# Patient Record
Sex: FEMALE | Race: BLACK OR AFRICAN AMERICAN | NOT HISPANIC OR LATINO | URBAN - METROPOLITAN AREA
[De-identification: names, ages, dates, MRNs, and addresses within clinical notes are randomized per-mention and may not be internally consistent; named-entity substitution may affect disease eponyms.]

---

## 2017-08-10 ENCOUNTER — OUTPATIENT (OUTPATIENT)
Dept: OUTPATIENT SERVICES | Facility: HOSPITAL | Age: 22
LOS: 1 days | Discharge: HOME | End: 2017-08-10

## 2017-08-10 DIAGNOSIS — R10.84 GENERALIZED ABDOMINAL PAIN: ICD-10-CM

## 2017-08-10 DIAGNOSIS — R80.9 PROTEINURIA, UNSPECIFIED: ICD-10-CM

## 2017-08-10 DIAGNOSIS — K80.00 CALCULUS OF GALLBLADDER WITH ACUTE CHOLECYSTITIS WITHOUT OBSTRUCTION: ICD-10-CM

## 2017-08-10 DIAGNOSIS — D57.1 SICKLE-CELL DISEASE WITHOUT CRISIS: ICD-10-CM

## 2018-12-28 ENCOUNTER — OUTPATIENT (OUTPATIENT)
Dept: OUTPATIENT SERVICES | Facility: HOSPITAL | Age: 23
LOS: 1 days | Discharge: HOME | End: 2018-12-28

## 2018-12-28 DIAGNOSIS — R80.9 PROTEINURIA, UNSPECIFIED: ICD-10-CM

## 2019-01-02 ENCOUNTER — EMERGENCY (EMERGENCY)
Facility: HOSPITAL | Age: 24
LOS: 0 days | Discharge: HOME | End: 2019-01-02
Attending: EMERGENCY MEDICINE | Admitting: EMERGENCY MEDICINE

## 2019-01-02 VITALS
DIASTOLIC BLOOD PRESSURE: 58 MMHG | OXYGEN SATURATION: 95 % | SYSTOLIC BLOOD PRESSURE: 121 MMHG | RESPIRATION RATE: 18 BRPM | TEMPERATURE: 98 F | HEART RATE: 86 BPM

## 2019-01-02 DIAGNOSIS — D57.1 SICKLE-CELL DISEASE WITHOUT CRISIS: ICD-10-CM

## 2019-01-02 DIAGNOSIS — Z79.899 OTHER LONG TERM (CURRENT) DRUG THERAPY: ICD-10-CM

## 2019-01-02 DIAGNOSIS — R53.1 WEAKNESS: ICD-10-CM

## 2019-01-02 DIAGNOSIS — Z88.8 ALLERGY STATUS TO OTHER DRUGS, MEDICAMENTS AND BIOLOGICAL SUBSTANCES STATUS: ICD-10-CM

## 2019-01-02 LAB
ALBUMIN SERPL ELPH-MCNC: 4.6 G/DL — SIGNIFICANT CHANGE UP (ref 3.5–5.2)
ALP SERPL-CCNC: 53 U/L — SIGNIFICANT CHANGE UP (ref 30–115)
ALT FLD-CCNC: 14 U/L — SIGNIFICANT CHANGE UP (ref 0–41)
ANION GAP SERPL CALC-SCNC: 15 MMOL/L — HIGH (ref 7–14)
APPEARANCE UR: CLEAR — SIGNIFICANT CHANGE UP
AST SERPL-CCNC: 40 U/L — SIGNIFICANT CHANGE UP (ref 0–41)
BILIRUB SERPL-MCNC: 8.2 MG/DL — HIGH (ref 0.2–1.2)
BILIRUB UR-MCNC: NEGATIVE — SIGNIFICANT CHANGE UP
BUN SERPL-MCNC: 5 MG/DL — LOW (ref 10–20)
CALCIUM SERPL-MCNC: 9.3 MG/DL — SIGNIFICANT CHANGE UP (ref 8.5–10.1)
CHLORIDE SERPL-SCNC: 99 MMOL/L — SIGNIFICANT CHANGE UP (ref 98–110)
CO2 SERPL-SCNC: 22 MMOL/L — SIGNIFICANT CHANGE UP (ref 17–32)
COLOR SPEC: SIGNIFICANT CHANGE UP
CREAT SERPL-MCNC: <0.5 MG/DL — LOW (ref 0.7–1.5)
DIFF PNL FLD: ABNORMAL
EPI CELLS # UR: ABNORMAL /HPF
GLUCOSE SERPL-MCNC: 91 MG/DL — SIGNIFICANT CHANGE UP (ref 70–99)
GLUCOSE UR QL: NEGATIVE MG/DL — SIGNIFICANT CHANGE UP
HCT VFR BLD CALC: 20.6 % — LOW (ref 37–47)
HGB BLD-MCNC: 7.7 G/DL — LOW (ref 12–16)
KETONES UR-MCNC: NEGATIVE — SIGNIFICANT CHANGE UP
LEUKOCYTE ESTERASE UR-ACNC: ABNORMAL
MCHC RBC-ENTMCNC: 33 PG — HIGH (ref 27–31)
MCHC RBC-ENTMCNC: 37.4 G/DL — HIGH (ref 32–37)
MCV RBC AUTO: 88.4 FL — SIGNIFICANT CHANGE UP (ref 81–99)
NITRITE UR-MCNC: NEGATIVE — SIGNIFICANT CHANGE UP
PH UR: 6.5 — SIGNIFICANT CHANGE UP (ref 5–8)
PLATELET # BLD AUTO: 421 K/UL — HIGH (ref 130–400)
POTASSIUM SERPL-MCNC: 5 MMOL/L — SIGNIFICANT CHANGE UP (ref 3.5–5)
POTASSIUM SERPL-SCNC: 5 MMOL/L — SIGNIFICANT CHANGE UP (ref 3.5–5)
PROT SERPL-MCNC: 7.5 G/DL — SIGNIFICANT CHANGE UP (ref 6–8)
PROT UR-MCNC: 30 MG/DL
RBC # BLD: 2.33 M/UL — LOW (ref 4.2–5.4)
RBC # BLD: 2.33 M/UL — LOW (ref 4.2–5.4)
RBC # FLD: 22.8 % — HIGH (ref 11.5–14.5)
RBC CASTS # UR COMP ASSIST: SIGNIFICANT CHANGE UP /HPF
RETICS #: 663.8 K/UL — HIGH (ref 25–125)
RETICS/RBC NFR: 28.5 % — HIGH (ref 0.5–1.5)
SODIUM SERPL-SCNC: 136 MMOL/L — SIGNIFICANT CHANGE UP (ref 135–146)
SP GR SPEC: 1.01 — SIGNIFICANT CHANGE UP (ref 1.01–1.03)
TROPONIN T SERPL-MCNC: <0.01 NG/ML — SIGNIFICANT CHANGE UP
UROBILINOGEN FLD QL: 1 MG/DL (ref 0.2–0.2)
WBC # BLD: 13.66 K/UL — HIGH (ref 4.8–10.8)
WBC # FLD AUTO: 13.66 K/UL — HIGH (ref 4.8–10.8)
WBC UR QL: SIGNIFICANT CHANGE UP /HPF

## 2019-01-02 NOTE — ED PROVIDER NOTE - MEDICAL DECISION MAKING DETAILS
23f w HbSS and generalized weakness. nontoxic appearing, n/v intact. Labs, EKG, & imaging reviewed. Pt advised regarding symptomatic/supportive care, importance of PMD/Hematology f/u, and symptoms to prompt ED return. Copy of results given to patient.

## 2019-01-02 NOTE — ED PROVIDER NOTE - NSFOLLOWUPINSTRUCTIONS_ED_ALL_ED_FT
Return to the ER for worsening or concerning symptoms.    See printed discharge information sheets for further instructions on care for your condition.

## 2019-01-02 NOTE — ED PROVIDER NOTE - NS ED ROS FT
Review of Systems  Constitutional:  No fever or chills. +Generalized weakness.  Eyes:  Negative.   ENMT:  No nasal congestion, discharge, or throat pain.   Cardiac:  No chest pain, palpitations, syncope, or edema.  Respiratory:  No dyspnea, orthopnea, wheezing, or cough. No hemoptysis.  GI:  No vomiting, diarrhea, or abdominal pain. No melena or hematochezia.  :  No dysuria or hematuria.   Musculoskeletal:  No gait abnormality, joint swelling, joint pain, or back pain.  Skin:  No skin rash, jaundice, or lesions.  Neuro:  No headache, loss of sensation, or focal weakness.  No change in mental status.

## 2019-01-02 NOTE — ED PROVIDER NOTE - PROVIDER TOKENS
FREE:[LAST:[Your Primary Care Physician],PHONE:[(   )    -],FAX:[(   )    -]],FREE:[LAST:[Your Hematologist],PHONE:[(   )    -],FAX:[(   )    -]],TOKEN:'[id number]'

## 2019-01-02 NOTE — ED PROVIDER NOTE - CARE PLAN
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 nontoxic appearing, n/v intact. --CBC, CMP, HCG, retic%, CXR, EKG, UA. --Transfuse as needed, observe/re-assess.  Secondary Diagnosis:	Hb-SS disease without crisis

## 2019-01-02 NOTE — ED PROVIDER NOTE - CARE PROVIDERS DIRECT ADDRESSES
,DirectAddress_Unknown,DirectAddress_Unknown,[email or web address]

## 2019-01-02 NOTE — ED PROVIDER NOTE - CARE PROVIDER_API CALL
Your Primary Care Physician,   Phone: (   )    -  Fax: (   )    -    Your Hematologist,   Phone: (   )    -  Fax: (   )    -    Power Thomas), Internal Medicine; Medical Oncology  [address] [place] [address]  Phone: [phone]  Fax: [phone]

## 2019-01-02 NOTE — ED PROVIDER NOTE - PLAN OF CARE
23f w HbSS and generalized weakness. nontoxic appearing, n/v intact. --CBC, CMP, HCG, retic%, CXR, EKG, UA. --Transfuse as needed, observe/re-assess.

## 2019-01-02 NOTE — ED PROVIDER NOTE - PROGRESS NOTE DETAILS
prior labs reviewed in HIE tab and Hb unchanged from 2017 prior labs reviewed in HIE tab and Hb unchanged from 2017 and bilirubin improved from that time. no indication for transfusion at this time.

## 2019-01-02 NOTE — ED PROVIDER NOTE - PHYSICAL EXAMINATION
Physical Exam  General: Awake, alert, NAD, WDWN, non-toxic appearing, NCAT  Eyes: PERRL, EOMI, +scleral icterus (chronic according to mother), lids are normal  ENT: External inspection normal, pink/moist membranes, pharynx normal  CV: S1S2, regular rate and rhythm, no murmur/gallops/rubs, no JVD, 2+ pulses b/l, no edema/cords/homans, warm/well-perfused  Respiratory: Normal respiratory rate/effort, no respiratory distress, normal voice, speaking full sentences, lungs clear to auscultation b/l, no wheezing/rales/rhonchi, no retractions, no stridor  Abdomen: Soft abdomen, no tender/distended/guarding/rebound, no CVA tender  Musculoskeletal: FROM all 4 extremities, N/V intact, no isela tender/deform, stable gait  Neck: FROM neck, supple, no meningismus, trachea midline, no JVD  Integumentary: Color normal for race, warm and dry, no rash  Neuro: Oriented x3, CN 2-12 grossly intact, normal motor, normal sensory, normal gait  Psych: Oriented x3, mood normal, affect normal Physical Exam  General: Awake, alert, NAD, WDWN, non-toxic appearing, NCAT  Eyes: PERRL, EOMI, +scleral icterus (chronic/unchanged according to mother), lids are normal  ENT: External inspection normal, pink/moist membranes, pharynx normal  CV: S1S2, regular rate and rhythm, no murmur/gallops/rubs, no JVD, 2+ pulses b/l, no edema/cords/homans, warm/well-perfused  Respiratory: Normal respiratory rate/effort, no respiratory distress, normal voice, speaking full sentences, lungs clear to auscultation b/l, no wheezing/rales/rhonchi, no retractions, no stridor  Abdomen: Soft abdomen, no tender/distended/guarding/rebound, no CVA tender  Musculoskeletal: FROM all 4 extremities, N/V intact, no isela tender/deform, stable gait  Neck: FROM neck, supple, no meningismus, trachea midline, no JVD  Integumentary: Color normal for race, warm and dry, no rash  Neuro: Oriented x3, CN 2-12 grossly intact, normal motor, normal sensory, normal gait  Psych: Oriented x3, mood normal, affect normal

## 2019-01-02 NOTE — ED ADULT NURSE NOTE - NSIMPLEMENTINTERV_GEN_ALL_ED
Implemented All Universal Safety Interventions:  [place] to call system. Call bell, personal items and telephone within reach. Instruct patient to call for assistance. Room bathroom lighting operational. Non-slip footwear when patient is off stretcher. Physically safe environment: no spills, clutter or unnecessary equipment. Stretcher in lowest position, wheels locked, appropriate side rails in place.

## 2019-01-02 NOTE — ED PROVIDER NOTE - OBJECTIVE STATEMENT
23f w a hx of HbSS reports 2-3 months of generalized weakness. Symptoms are moderate, constant, no radiating, no exacerbating/alleviating. No changes w eating/exertion. No recent travel/hosp/immobilization. No bleeding. Pt denies any pain or swollen joints.

## 2019-01-03 ENCOUNTER — INBOUND DOCUMENT (OUTPATIENT)
Age: 24
End: 2019-01-03

## 2019-08-15 ENCOUNTER — OUTPATIENT (OUTPATIENT)
Dept: OUTPATIENT SERVICES | Facility: HOSPITAL | Age: 24
LOS: 1 days | Discharge: HOME | End: 2019-08-15

## 2019-08-16 DIAGNOSIS — D57.1 SICKLE-CELL DISEASE WITHOUT CRISIS: ICD-10-CM

## 2019-08-16 DIAGNOSIS — R80.9 PROTEINURIA, UNSPECIFIED: ICD-10-CM

## 2019-11-21 ENCOUNTER — APPOINTMENT (OUTPATIENT)
Dept: HEMATOLOGY ONCOLOGY | Facility: CLINIC | Age: 24
End: 2019-11-21
Payer: COMMERCIAL

## 2019-11-21 ENCOUNTER — OUTPATIENT (OUTPATIENT)
Dept: OUTPATIENT SERVICES | Facility: HOSPITAL | Age: 24
LOS: 1 days | Discharge: HOME | End: 2019-11-21

## 2019-11-21 ENCOUNTER — LABORATORY RESULT (OUTPATIENT)
Age: 24
End: 2019-11-21

## 2019-11-21 VITALS
HEART RATE: 74 BPM | SYSTOLIC BLOOD PRESSURE: 104 MMHG | WEIGHT: 144 LBS | RESPIRATION RATE: 16 BRPM | DIASTOLIC BLOOD PRESSURE: 68 MMHG | HEIGHT: 64 IN | BODY MASS INDEX: 24.59 KG/M2 | TEMPERATURE: 97.2 F

## 2019-11-21 DIAGNOSIS — Z78.9 OTHER SPECIFIED HEALTH STATUS: ICD-10-CM

## 2019-11-21 DIAGNOSIS — Z92.89 PERSONAL HISTORY OF OTHER MEDICAL TREATMENT: ICD-10-CM

## 2019-11-21 DIAGNOSIS — D57.00 HB-SS DISEASE WITH CRISIS, UNSPECIFIED: ICD-10-CM

## 2019-11-21 DIAGNOSIS — Z83.2 FAMILY HISTORY OF DISEASES OF THE BLOOD AND BLOOD-FORMING ORGANS AND CERTAIN DISORDERS INVOLVING THE IMMUNE MECHANISM: ICD-10-CM

## 2019-11-21 PROCEDURE — 99245 OFF/OP CONSLTJ NEW/EST HI 55: CPT

## 2019-11-21 RX ORDER — FOLIC ACID 1 MG/1
1 TABLET ORAL
Refills: 0 | Status: ACTIVE | COMMUNITY

## 2019-11-22 DIAGNOSIS — D57.1 SICKLE-CELL DISEASE WITHOUT CRISIS: ICD-10-CM

## 2019-11-22 LAB
ALBUMIN SERPL ELPH-MCNC: 4.5 G/DL
ALP BLD-CCNC: 53 U/L
ALT SERPL-CCNC: 15 U/L
ANION GAP SERPL CALC-SCNC: 15 MMOL/L
AST SERPL-CCNC: 45 U/L
BILIRUB SERPL-MCNC: 8 MG/DL
BUN SERPL-MCNC: 6 MG/DL
CALCIUM SERPL-MCNC: 9.3 MG/DL
CHLORIDE SERPL-SCNC: 103 MMOL/L
CO2 SERPL-SCNC: 22 MMOL/L
CREAT SERPL-MCNC: <0.5 MG/DL
FERRITIN SERPL-MCNC: 75 NG/ML
GLUCOSE SERPL-MCNC: 83 MG/DL
HBV CORE IGG+IGM SER QL: NONREACTIVE
HBV SURFACE AB SER QL: ABNORMAL
HBV SURFACE AG SER QL: NONREACTIVE
HCT VFR BLD CALC: 21.3 %
HGB BLD-MCNC: 7.6 G/DL
LDH SERPL-CCNC: 610
MCHC RBC-ENTMCNC: 31.9 PG
MCHC RBC-ENTMCNC: 35.7 G/DL
MCV RBC AUTO: 89.5 FL
PLATELET # BLD AUTO: 357 K/UL
PMV BLD: 9.6 FL
POTASSIUM SERPL-SCNC: 5.2 MMOL/L
PROT SERPL-MCNC: 7.6 G/DL
RBC # BLD: 2.38 M/UL
RBC # FLD: 23.6 %
SODIUM SERPL-SCNC: 140 MMOL/L
VIT B12 SERPL-MCNC: 468 PG/ML
WBC # FLD AUTO: 8.36 K/UL

## 2019-11-22 NOTE — PHYSICAL EXAM
[Fully active, able to carry on all pre-disease performance without restriction] : Status 0 - Fully active, able to carry on all pre-disease performance without restriction [Normal Female] : normal external genitalia, urethral meatus without lesions or discharge. Bladder non-distended, without tenderness. Vagina and cervix normal on visual inspection. On bimanual exam uterus, adnexa, parametria all normal without any discrete masses. [Normal] : grossly intact [de-identified] : icteric sclerae [de-identified] : spleen non-palpable

## 2019-11-22 NOTE — ASSESSMENT
[FreeTextEntry1] : Sickle cell disease with chronic hemolysis , infrequent vaso-occlusive crisis , nephropathy with hypertension and proteinuria . \par - Follow up labs (CBC,CMP,LDH,ferritin, Hepatitis panel, HIV, hemoglobin electrophoresis) \par - Will review abdominal u/s from [facility]  \par - Follow with nephrologist .\par - Refer to opthalmology to r/o retinopathy  \par - Continue Tylenol and heat for pain management folic acid . Encouraged PO hydration . follow up in 6 months .

## 2019-11-22 NOTE — REVIEW OF SYSTEMS
[Shortness Of Breath] : shortness of breath [Negative] : Heme/Lymph [FreeTextEntry9] : intermittent left wrist pain, bilateral shin pain

## 2019-11-22 NOTE — HISTORY OF PRESENT ILLNESS
[de-identified] : 24 year old female sent in by nephrologist for evaluation of sickle cell disease diagnosed at  birth. Both parents are carriers of sickle cell trait. She gives history of transfusion once only at 9 years of age  and had a cholecystectomy at age 17. She has infrequent mild  vaso-occlusive  crisis and required admission only once at 18 years of age . She has not had any recent crisis, but has occasional mild intermittent pain in her left wrist, bilateral shins, and fingers. In addition, she has occasional SOB with exertion. 2Decho was normal and she follows with her cardiologist. Pain is controlled with Tylenol and heat. She is followed by nephrology for hypertension and proteinuria , sickle cell related . NO history of leg ulcers , visual symptoms , acute chest syndrome or significant arthropathy .

## 2019-11-25 LAB
HGB A MFR BLD: 0 %
HGB A2 MFR BLD: 3.3 %
HGB F MFR BLD: 6.1 %
HGB FRACT BLD-IMP: NORMAL
HGB S MFR BLD: 90.6 %
HIV1+2 AB SPEC QL IA.RAPID: NONREACTIVE

## 2020-05-21 ENCOUNTER — APPOINTMENT (OUTPATIENT)
Dept: HEMATOLOGY ONCOLOGY | Facility: CLINIC | Age: 25
End: 2020-05-21

## 2022-01-11 ENCOUNTER — EMERGENCY (EMERGENCY)
Facility: HOSPITAL | Age: 27
LOS: 0 days | Discharge: HOME | End: 2022-01-11
Attending: STUDENT IN AN ORGANIZED HEALTH CARE EDUCATION/TRAINING PROGRAM | Admitting: STUDENT IN AN ORGANIZED HEALTH CARE EDUCATION/TRAINING PROGRAM
Payer: COMMERCIAL

## 2022-01-11 VITALS
TEMPERATURE: 98 F | HEIGHT: 64 IN | SYSTOLIC BLOOD PRESSURE: 122 MMHG | HEART RATE: 77 BPM | RESPIRATION RATE: 17 BRPM | WEIGHT: 145.06 LBS | OXYGEN SATURATION: 97 % | DIASTOLIC BLOOD PRESSURE: 58 MMHG

## 2022-01-11 DIAGNOSIS — M79.604 PAIN IN RIGHT LEG: ICD-10-CM

## 2022-01-11 DIAGNOSIS — M79.642 PAIN IN LEFT HAND: ICD-10-CM

## 2022-01-11 DIAGNOSIS — M79.641 PAIN IN RIGHT HAND: ICD-10-CM

## 2022-01-11 DIAGNOSIS — D57.00 HB-SS DISEASE WITH CRISIS, UNSPECIFIED: ICD-10-CM

## 2022-01-11 LAB
ACANTHOCYTES BLD QL SMEAR: SLIGHT — SIGNIFICANT CHANGE UP
ALBUMIN SERPL ELPH-MCNC: 4.6 G/DL — SIGNIFICANT CHANGE UP (ref 3.5–5.2)
ALP SERPL-CCNC: 52 U/L — SIGNIFICANT CHANGE UP (ref 30–115)
ALT FLD-CCNC: 9 U/L — SIGNIFICANT CHANGE UP (ref 0–41)
ANION GAP SERPL CALC-SCNC: 17 MMOL/L — HIGH (ref 7–14)
ANISOCYTOSIS BLD QL: SIGNIFICANT CHANGE UP
AST SERPL-CCNC: 31 U/L — SIGNIFICANT CHANGE UP (ref 0–41)
BASOPHILS # BLD AUTO: 0.09 K/UL — SIGNIFICANT CHANGE UP (ref 0–0.2)
BASOPHILS NFR BLD AUTO: 0.9 % — SIGNIFICANT CHANGE UP (ref 0–1)
BILIRUB SERPL-MCNC: 7.7 MG/DL — HIGH (ref 0.2–1.2)
BUN SERPL-MCNC: 4 MG/DL — LOW (ref 10–20)
CALCIUM SERPL-MCNC: 9.5 MG/DL — SIGNIFICANT CHANGE UP (ref 8.5–10.1)
CHLORIDE SERPL-SCNC: 103 MMOL/L — SIGNIFICANT CHANGE UP (ref 98–110)
CO2 SERPL-SCNC: 19 MMOL/L — SIGNIFICANT CHANGE UP (ref 17–32)
CREAT SERPL-MCNC: <0.5 MG/DL — LOW (ref 0.7–1.5)
ELLIPTOCYTES BLD QL SMEAR: SIGNIFICANT CHANGE UP
EOSINOPHIL # BLD AUTO: 0.37 K/UL — SIGNIFICANT CHANGE UP (ref 0–0.7)
EOSINOPHIL NFR BLD AUTO: 3.6 % — SIGNIFICANT CHANGE UP (ref 0–8)
GIANT PLATELETS BLD QL SMEAR: PRESENT — SIGNIFICANT CHANGE UP
GLUCOSE SERPL-MCNC: 90 MG/DL — SIGNIFICANT CHANGE UP (ref 70–99)
HCT VFR BLD CALC: 20.2 % — LOW (ref 37–47)
HGB BLD-MCNC: 7.4 G/DL — LOW (ref 12–16)
HOWELL-JOLLY BOD BLD QL SMEAR: PRESENT — SIGNIFICANT CHANGE UP
LDH SERPL L TO P-CCNC: 581 — HIGH (ref 50–242)
LYMPHOCYTES # BLD AUTO: 2.48 K/UL — SIGNIFICANT CHANGE UP (ref 1.2–3.4)
LYMPHOCYTES # BLD AUTO: 24.3 % — SIGNIFICANT CHANGE UP (ref 20.5–51.1)
MACROCYTES BLD QL: SIGNIFICANT CHANGE UP
MANUAL SMEAR VERIFICATION: SIGNIFICANT CHANGE UP
MCHC RBC-ENTMCNC: 33.8 PG — HIGH (ref 27–31)
MCHC RBC-ENTMCNC: 36.6 G/DL — SIGNIFICANT CHANGE UP (ref 32–37)
MCV RBC AUTO: 92.2 FL — SIGNIFICANT CHANGE UP (ref 81–99)
MONOCYTES # BLD AUTO: 0.92 K/UL — HIGH (ref 0.1–0.6)
MONOCYTES NFR BLD AUTO: 9 % — SIGNIFICANT CHANGE UP (ref 1.7–9.3)
NEUTROPHILS # BLD AUTO: 6.35 K/UL — SIGNIFICANT CHANGE UP (ref 1.4–6.5)
NEUTROPHILS NFR BLD AUTO: 62.2 % — SIGNIFICANT CHANGE UP (ref 42.2–75.2)
NRBC # BLD: 3 /100 — HIGH (ref 0–0)
NRBC # BLD: SIGNIFICANT CHANGE UP /100 WBCS (ref 0–0)
OVALOCYTES BLD QL SMEAR: SIGNIFICANT CHANGE UP
PLAT MORPH BLD: ABNORMAL
PLATELET # BLD AUTO: 549 K/UL — HIGH (ref 130–400)
POIKILOCYTOSIS BLD QL AUTO: SIGNIFICANT CHANGE UP
POLYCHROMASIA BLD QL SMEAR: SIGNIFICANT CHANGE UP
POTASSIUM SERPL-MCNC: 5 MMOL/L — SIGNIFICANT CHANGE UP (ref 3.5–5)
POTASSIUM SERPL-SCNC: 5 MMOL/L — SIGNIFICANT CHANGE UP (ref 3.5–5)
PROT SERPL-MCNC: 7.7 G/DL — SIGNIFICANT CHANGE UP (ref 6–8)
RBC # BLD: 2.19 M/UL — LOW (ref 4.2–5.4)
RBC # BLD: 2.19 M/UL — LOW (ref 4.2–5.4)
RBC # FLD: 22.6 % — HIGH (ref 11.5–14.5)
RBC BLD AUTO: ABNORMAL
RETICS #: 701.9 K/UL — HIGH (ref 25–125)
RETICS/RBC NFR: 32.1 % — HIGH (ref 0.5–1.5)
SCHISTOCYTES BLD QL AUTO: SLIGHT — SIGNIFICANT CHANGE UP
SICKLE CELLS BLD QL SMEAR: SIGNIFICANT CHANGE UP
SODIUM SERPL-SCNC: 139 MMOL/L — SIGNIFICANT CHANGE UP (ref 135–146)
TARGETS BLD QL SMEAR: SIGNIFICANT CHANGE UP
WBC # BLD: 10.21 K/UL — SIGNIFICANT CHANGE UP (ref 4.8–10.8)
WBC # FLD AUTO: 10.21 K/UL — SIGNIFICANT CHANGE UP (ref 4.8–10.8)

## 2022-01-11 PROCEDURE — 99285 EMERGENCY DEPT VISIT HI MDM: CPT

## 2022-01-11 RX ORDER — MORPHINE SULFATE 50 MG/1
30 CAPSULE, EXTENDED RELEASE ORAL ONCE
Refills: 0 | Status: DISCONTINUED | OUTPATIENT
Start: 2022-01-11 | End: 2022-01-11

## 2022-01-11 RX ORDER — HYDROMORPHONE HYDROCHLORIDE 2 MG/ML
4 INJECTION INTRAMUSCULAR; INTRAVENOUS; SUBCUTANEOUS ONCE
Refills: 0 | Status: DISCONTINUED | OUTPATIENT
Start: 2022-01-11 | End: 2022-01-11

## 2022-01-11 RX ORDER — METOCLOPRAMIDE HCL 10 MG
10 TABLET ORAL ONCE
Refills: 0 | Status: COMPLETED | OUTPATIENT
Start: 2022-01-11 | End: 2022-01-11

## 2022-01-11 RX ORDER — DIPHENHYDRAMINE HCL 50 MG
25 CAPSULE ORAL ONCE
Refills: 0 | Status: COMPLETED | OUTPATIENT
Start: 2022-01-11 | End: 2022-01-11

## 2022-01-11 RX ORDER — SODIUM CHLORIDE 9 MG/ML
1000 INJECTION, SOLUTION INTRAVENOUS ONCE
Refills: 0 | Status: COMPLETED | OUTPATIENT
Start: 2022-01-11 | End: 2022-01-11

## 2022-01-11 RX ORDER — IBUPROFEN 200 MG
600 TABLET ORAL ONCE
Refills: 0 | Status: COMPLETED | OUTPATIENT
Start: 2022-01-11 | End: 2022-01-11

## 2022-01-11 RX ADMIN — SODIUM CHLORIDE 1000 MILLILITER(S): 9 INJECTION, SOLUTION INTRAVENOUS at 10:10

## 2022-01-11 RX ADMIN — MORPHINE SULFATE 30 MILLIGRAM(S): 50 CAPSULE, EXTENDED RELEASE ORAL at 10:30

## 2022-01-11 RX ADMIN — Medication 10 MILLIGRAM(S): at 10:30

## 2022-01-11 RX ADMIN — Medication 600 MILLIGRAM(S): at 10:30

## 2022-01-11 RX ADMIN — Medication 25 MILLIGRAM(S): at 10:30

## 2022-01-11 NOTE — ED PROVIDER NOTE - NS ED ROS FT
Constitutional: See HPI.  Eyes: No visual changes, No Photophobia  Cardiac: No SOB or edema. No chest pain with exertion.  Respiratory: No cough or respiratory distress. No hemoptysis.   GI: No nausea, vomiting, diarrhea or abdominal pain.  : No dysuria, frequency or burning. No Discharge  MS: + joint pain, No myalgia, muscle weakness, back pain.  Neuro: No headache or weakness. No LOC.  Skin: No skin rash.  Except as documented in the HPI, all other systems are negative.

## 2022-01-11 NOTE — ED PROVIDER NOTE - PROGRESS NOTE DETAILS
patient reassessed at the bedside; pain moderate/high  will give po dilaudid 4mg and reassess. Labs pending

## 2022-01-11 NOTE — ED PROVIDER NOTE - DISPOSITION TYPE
pt alert verbal, speaks comprehend simple english phrases, assist adl rw ambulation to weakness and unsteady gait
DISCHARGE

## 2022-01-11 NOTE — ED PROVIDER NOTE - NSFOLLOWUPINSTRUCTIONS_ED_ALL_ED_FT
Sickle cell disease (SCD) causes your RBCs to be sickle (crescent) shaped. The sickle shape is caused by abnormal hemoglobin attached to the RBC. Hemoglobin carries oxygen to all tissues in your body. Sickle-shaped RBCs can get stuck to the walls of blood vessels. This can stop or slow blood flow, and prevent oxygen from getting to tissues. When this happens, it is called a sickle cell crisis.    Hydroxyurea helps your body make red blood cells that are less likely to sickle. This may help decrease your pain and prevent sickle cell crisis. Folic acid helps your body may healthy red blood cells.    **For pain, please take tylenol as needed- 1000mg up to 3 times per day. You may also take ibuprofen (Motrin) 600mg up to 3 times per day. Other ways of dealing with pain include: Apply heat to areas of pain. Use a heating pad or take a warm bath. Gently massage areas where you feel pain. Balance rest and exercise. Rest during a sickle cell crisis. Over time, increase your activity. Exercise may help decrease pain. Eat healthy foods.    To prevent a sickle cell crisis: Drink liquids as directed. Dehydration can increase your risk for a sickle cell crisis. Avoid quick changes in temperature. Do not go quickly from a warm place to a cold place. Get a flu shot every year as directed. You may also need a pneumonia vaccine. Wash your hands frequently. Do not smoke. Limit or do not drink alcohol. Manage your stress. Do not travel in an unpressurized plane or travel to high altitudes.     Seek immediate medical attention if you have a fever, severe headache, chest pain, trouble breathing, have facial drooping, feel pain more severe than usual or so severe that you cannot cope and feel like harming yourself, if you have nausea or vomiting, are not urinating as much as usual, or eyes or skin are yellow.

## 2022-01-11 NOTE — ED PROVIDER NOTE - PROVIDER TOKENS
PROVIDER:[TOKEN:[[id number]],FOLLOWUP:[1-3 Days]],PROVIDER:[TOKEN:[[id number]],FOLLOWUP:[1-3 Days],ESTABLISHEDPATIENT:[T]]

## 2022-01-11 NOTE — ED PROVIDER NOTE - OBJECTIVE STATEMENT
26F with pmh SCD on folic acid who presents to [facility] for pain to b/l hands and legs similar to prior SC crises in the past; pain present for last few days, unrelieved with tylenol at home. Not Rx hydroxyurea; was lost to f/u with her heme/onc and has not seen in years. Denies CP, SOB, cough, f/c, abd pain, n/v/d.

## 2022-01-11 NOTE — ED PROVIDER NOTE - CLINICAL SUMMARY MEDICAL DECISION MAKING FREE TEXT BOX
26F with pmh SCD on folic acid who presents to [facility] for pain to b/l hands and legs similar to prior SC crises in the past; 26F with pmh SCD on folic acid who presents to [facility] for pain to b/l hands and legs similar to prior SC crises in the past; Exam reassuring; labs reviewed with elevated reticulocyte ct, Hb similar to prior. Pt given po meds with interval improvement of sx. States she has never been on hydroxyurea. Will give heme/onc referral and close f/u with pcp.  Patient given return precautions, f/u instructions and verbalizes understanding.

## 2022-01-11 NOTE — ED PROVIDER NOTE - PATIENT PORTAL LINK FT
You can access the FollowMyHealth Patient Portal offered by [facility] by registering at the following website: [email or web address]. By joining [employer]’s FollowMyHealth portal, you will also be able to view your health information using other applications (apps) compatible with our system.

## 2022-01-11 NOTE — ED PROVIDER NOTE - CARE PROVIDER_API CALL
Nate Ortiz)  Hematology; Internal Medicine; Medical Oncology  [address] [address]  Phone: [phone]  Fax: [phone]  Follow Up Time: 1-3 Days    Rito Cabrera)  Internal Medicine; Nephrology  [address] [place] [address]  Phone: [phone]  Fax: [phone]  Established Patient  Follow Up Time: 1-3 Days

## 2022-01-11 NOTE — ED PROVIDER NOTE - CARE PROVIDERS DIRECT ADDRESSES
,[email or web address]

## 2022-03-14 ENCOUNTER — INPATIENT (INPATIENT)
Facility: HOSPITAL | Age: 27
LOS: 1 days | Discharge: HOME | End: 2022-03-16
Attending: INTERNAL MEDICINE | Admitting: INTERNAL MEDICINE
Payer: COMMERCIAL

## 2022-03-14 VITALS
RESPIRATION RATE: 20 BRPM | HEIGHT: 64 IN | DIASTOLIC BLOOD PRESSURE: 52 MMHG | WEIGHT: 139.99 LBS | TEMPERATURE: 99 F | SYSTOLIC BLOOD PRESSURE: 110 MMHG | HEART RATE: 84 BPM | OXYGEN SATURATION: 93 %

## 2022-03-14 DIAGNOSIS — Z90.49 ACQUIRED ABSENCE OF OTHER SPECIFIED PARTS OF DIGESTIVE TRACT: Chronic | ICD-10-CM

## 2022-03-14 LAB
ALBUMIN SERPL ELPH-MCNC: 4.3 G/DL — SIGNIFICANT CHANGE UP (ref 3.5–5.2)
ALP SERPL-CCNC: 54 U/L — SIGNIFICANT CHANGE UP (ref 30–115)
ALT FLD-CCNC: 11 U/L — SIGNIFICANT CHANGE UP (ref 0–41)
ANION GAP SERPL CALC-SCNC: 12 MMOL/L — SIGNIFICANT CHANGE UP (ref 7–14)
AST SERPL-CCNC: 41 U/L — SIGNIFICANT CHANGE UP (ref 0–41)
BASE EXCESS BLDV CALC-SCNC: 1.1 MMOL/L — SIGNIFICANT CHANGE UP (ref -2–3)
BASOPHILS # BLD AUTO: 0.07 K/UL — SIGNIFICANT CHANGE UP (ref 0–0.2)
BASOPHILS NFR BLD AUTO: 0.5 % — SIGNIFICANT CHANGE UP (ref 0–1)
BILIRUB SERPL-MCNC: 7.5 MG/DL — HIGH (ref 0.2–1.2)
BUN SERPL-MCNC: 3 MG/DL — LOW (ref 10–20)
CA-I SERPL-SCNC: 1.26 MMOL/L — SIGNIFICANT CHANGE UP (ref 1.15–1.33)
CALCIUM SERPL-MCNC: 9.4 MG/DL — SIGNIFICANT CHANGE UP (ref 8.5–10.1)
CHLORIDE SERPL-SCNC: 103 MMOL/L — SIGNIFICANT CHANGE UP (ref 98–110)
CO2 SERPL-SCNC: 23 MMOL/L — SIGNIFICANT CHANGE UP (ref 17–32)
CREAT SERPL-MCNC: <0.5 MG/DL — LOW (ref 0.7–1.5)
D DIMER BLD IA.RAPID-MCNC: 399 NG/ML DDU — HIGH (ref 0–230)
EGFR: 142 ML/MIN/1.73M2 — SIGNIFICANT CHANGE UP
EOSINOPHIL # BLD AUTO: 0.22 K/UL — SIGNIFICANT CHANGE UP (ref 0–0.7)
EOSINOPHIL NFR BLD AUTO: 1.4 % — SIGNIFICANT CHANGE UP (ref 0–8)
GAS PNL BLDV: 138 MMOL/L — SIGNIFICANT CHANGE UP (ref 136–145)
GAS PNL BLDV: SIGNIFICANT CHANGE UP
GLUCOSE SERPL-MCNC: 104 MG/DL — HIGH (ref 70–99)
HCG SERPL QL: NEGATIVE — SIGNIFICANT CHANGE UP
HCO3 BLDV-SCNC: 26 MMOL/L — SIGNIFICANT CHANGE UP (ref 22–29)
HCT VFR BLD CALC: 19.4 % — LOW (ref 37–47)
HCT VFR BLDA CALC: 23 % — LOW (ref 39–51)
HGB BLD CALC-MCNC: 7.6 G/DL — LOW (ref 12.6–17.4)
HGB BLD-MCNC: 7.1 G/DL — LOW (ref 12–16)
IMM GRANULOCYTES NFR BLD AUTO: 0.5 % — HIGH (ref 0.1–0.3)
LACTATE BLDV-MCNC: 1 MMOL/L — SIGNIFICANT CHANGE UP (ref 0.5–2)
LYMPHOCYTES # BLD AUTO: 27.1 % — SIGNIFICANT CHANGE UP (ref 20.5–51.1)
LYMPHOCYTES # BLD AUTO: 4.12 K/UL — HIGH (ref 1.2–3.4)
MCHC RBC-ENTMCNC: 34.8 PG — HIGH (ref 27–31)
MCHC RBC-ENTMCNC: 36.6 G/DL — SIGNIFICANT CHANGE UP (ref 32–37)
MCV RBC AUTO: 95.1 FL — SIGNIFICANT CHANGE UP (ref 81–99)
MONOCYTES # BLD AUTO: 1.21 K/UL — HIGH (ref 0.1–0.6)
MONOCYTES NFR BLD AUTO: 8 % — SIGNIFICANT CHANGE UP (ref 1.7–9.3)
NEUTROPHILS # BLD AUTO: 9.52 K/UL — HIGH (ref 1.4–6.5)
NEUTROPHILS NFR BLD AUTO: 62.5 % — SIGNIFICANT CHANGE UP (ref 42.2–75.2)
NRBC # BLD: 1 /100 WBCS — HIGH (ref 0–0)
NT-PROBNP SERPL-SCNC: 14 PG/ML — SIGNIFICANT CHANGE UP (ref 0–300)
PCO2 BLDV: 42 MMHG — SIGNIFICANT CHANGE UP (ref 39–42)
PH BLDV: 7.4 — SIGNIFICANT CHANGE UP (ref 7.32–7.43)
PLATELET # BLD AUTO: 479 K/UL — HIGH (ref 130–400)
PO2 BLDV: 59 MMHG — SIGNIFICANT CHANGE UP
POTASSIUM BLDV-SCNC: 4.6 MMOL/L — SIGNIFICANT CHANGE UP (ref 3.5–5.1)
POTASSIUM SERPL-MCNC: 4.6 MMOL/L — SIGNIFICANT CHANGE UP (ref 3.5–5)
POTASSIUM SERPL-SCNC: 4.6 MMOL/L — SIGNIFICANT CHANGE UP (ref 3.5–5)
PROT SERPL-MCNC: 7.5 G/DL — SIGNIFICANT CHANGE UP (ref 6–8)
RBC # BLD: 2.04 M/UL — LOW (ref 4.2–5.4)
RBC # BLD: 2.04 M/UL — LOW (ref 4.2–5.4)
RBC # FLD: 25.5 % — HIGH (ref 11.5–14.5)
RETICS #: 689.7 K/UL — HIGH (ref 25–125)
RETICS/RBC NFR: 33.8 % — HIGH (ref 0.5–1.5)
SAO2 % BLDV: 78.7 % — SIGNIFICANT CHANGE UP
SARS-COV-2 RNA SPEC QL NAA+PROBE: SIGNIFICANT CHANGE UP
SODIUM SERPL-SCNC: 138 MMOL/L — SIGNIFICANT CHANGE UP (ref 135–146)
TROPONIN T SERPL-MCNC: <0.01 NG/ML — SIGNIFICANT CHANGE UP
WBC # BLD: 15.22 K/UL — HIGH (ref 4.8–10.8)
WBC # FLD AUTO: 15.22 K/UL — HIGH (ref 4.8–10.8)

## 2022-03-14 PROCEDURE — 99223 1ST HOSP IP/OBS HIGH 75: CPT

## 2022-03-14 PROCEDURE — 36000 PLACE NEEDLE IN VEIN: CPT

## 2022-03-14 PROCEDURE — 76937 US GUIDE VASCULAR ACCESS: CPT | Mod: 26

## 2022-03-14 PROCEDURE — 71045 X-RAY EXAM CHEST 1 VIEW: CPT | Mod: 26

## 2022-03-14 PROCEDURE — 71275 CT ANGIOGRAPHY CHEST: CPT | Mod: 26,MA

## 2022-03-14 PROCEDURE — 93010 ELECTROCARDIOGRAM REPORT: CPT

## 2022-03-14 PROCEDURE — 99285 EMERGENCY DEPT VISIT HI MDM: CPT | Mod: 25

## 2022-03-14 RX ORDER — SODIUM CHLORIDE 9 MG/ML
1000 INJECTION INTRAMUSCULAR; INTRAVENOUS; SUBCUTANEOUS ONCE
Refills: 0 | Status: COMPLETED | OUTPATIENT
Start: 2022-03-14 | End: 2022-03-14

## 2022-03-14 RX ORDER — ENOXAPARIN SODIUM 100 MG/ML
40 INJECTION SUBCUTANEOUS EVERY 24 HOURS
Refills: 0 | Status: DISCONTINUED | OUTPATIENT
Start: 2022-03-14 | End: 2022-03-16

## 2022-03-14 RX ORDER — KETOROLAC TROMETHAMINE 30 MG/ML
15 SYRINGE (ML) INJECTION EVERY 8 HOURS
Refills: 0 | Status: DISCONTINUED | OUTPATIENT
Start: 2022-03-14 | End: 2022-03-16

## 2022-03-14 RX ORDER — AZITHROMYCIN 500 MG/1
500 TABLET, FILM COATED ORAL ONCE
Refills: 0 | Status: COMPLETED | OUTPATIENT
Start: 2022-03-14 | End: 2022-03-14

## 2022-03-14 RX ORDER — LOSARTAN POTASSIUM 100 MG/1
50 TABLET, FILM COATED ORAL DAILY
Refills: 0 | Status: DISCONTINUED | OUTPATIENT
Start: 2022-03-14 | End: 2022-03-16

## 2022-03-14 RX ORDER — ONDANSETRON 8 MG/1
4 TABLET, FILM COATED ORAL EVERY 8 HOURS
Refills: 0 | Status: DISCONTINUED | OUTPATIENT
Start: 2022-03-14 | End: 2022-03-16

## 2022-03-14 RX ORDER — CEFTRIAXONE 500 MG/1
1000 INJECTION, POWDER, FOR SOLUTION INTRAMUSCULAR; INTRAVENOUS EVERY 24 HOURS
Refills: 0 | Status: DISCONTINUED | OUTPATIENT
Start: 2022-03-15 | End: 2022-03-16

## 2022-03-14 RX ORDER — CEFTRIAXONE 500 MG/1
1000 INJECTION, POWDER, FOR SOLUTION INTRAMUSCULAR; INTRAVENOUS ONCE
Refills: 0 | Status: COMPLETED | OUTPATIENT
Start: 2022-03-14 | End: 2022-03-14

## 2022-03-14 RX ORDER — KETOROLAC TROMETHAMINE 30 MG/ML
15 SYRINGE (ML) INJECTION ONCE
Refills: 0 | Status: DISCONTINUED | OUTPATIENT
Start: 2022-03-14 | End: 2022-03-14

## 2022-03-14 RX ORDER — ACETAMINOPHEN 500 MG
650 TABLET ORAL EVERY 6 HOURS
Refills: 0 | Status: DISCONTINUED | OUTPATIENT
Start: 2022-03-14 | End: 2022-03-15

## 2022-03-14 RX ORDER — SODIUM CHLORIDE 9 MG/ML
1000 INJECTION INTRAMUSCULAR; INTRAVENOUS; SUBCUTANEOUS
Refills: 0 | Status: DISCONTINUED | OUTPATIENT
Start: 2022-03-14 | End: 2022-03-15

## 2022-03-14 RX ORDER — FOLIC ACID 0.8 MG
1 TABLET ORAL DAILY
Refills: 0 | Status: DISCONTINUED | OUTPATIENT
Start: 2022-03-14 | End: 2022-03-16

## 2022-03-14 RX ORDER — LANOLIN ALCOHOL/MO/W.PET/CERES
3 CREAM (GRAM) TOPICAL AT BEDTIME
Refills: 0 | Status: DISCONTINUED | OUTPATIENT
Start: 2022-03-14 | End: 2022-03-16

## 2022-03-14 RX ORDER — AZITHROMYCIN 500 MG/1
500 TABLET, FILM COATED ORAL EVERY 24 HOURS
Refills: 0 | Status: DISCONTINUED | OUTPATIENT
Start: 2022-03-15 | End: 2022-03-16

## 2022-03-14 RX ADMIN — Medication 650 MILLIGRAM(S): at 20:33

## 2022-03-14 RX ADMIN — Medication 650 MILLIGRAM(S): at 21:03

## 2022-03-14 RX ADMIN — ENOXAPARIN SODIUM 40 MILLIGRAM(S): 100 INJECTION SUBCUTANEOUS at 22:19

## 2022-03-14 RX ADMIN — Medication 1 MILLIGRAM(S): at 22:19

## 2022-03-14 RX ADMIN — Medication 15 MILLIGRAM(S): at 08:38

## 2022-03-14 RX ADMIN — AZITHROMYCIN 500 MILLIGRAM(S): 500 TABLET, FILM COATED ORAL at 10:33

## 2022-03-14 RX ADMIN — Medication 15 MILLIGRAM(S): at 08:53

## 2022-03-14 RX ADMIN — CEFTRIAXONE 100 MILLIGRAM(S): 500 INJECTION, POWDER, FOR SOLUTION INTRAMUSCULAR; INTRAVENOUS at 10:23

## 2022-03-14 RX ADMIN — SODIUM CHLORIDE 150 MILLILITER(S): 9 INJECTION INTRAMUSCULAR; INTRAVENOUS; SUBCUTANEOUS at 15:41

## 2022-03-14 RX ADMIN — SODIUM CHLORIDE 1000 MILLILITER(S): 9 INJECTION INTRAMUSCULAR; INTRAVENOUS; SUBCUTANEOUS at 11:00

## 2022-03-14 RX ADMIN — AZITHROMYCIN 255 MILLIGRAM(S): 500 TABLET, FILM COATED ORAL at 09:32

## 2022-03-14 NOTE — ED ADULT NURSE NOTE - CHIEF COMPLAINT QUOTE
Pt. complains left sided chest pain radiating to left shoulder and left back. Pain intensifies when taking deep breaths As per pt. symptoms began this evening.

## 2022-03-14 NOTE — H&P ADULT - NSHPPHYSICALEXAM_GEN_ALL_CORE
General: In NAD  HEENT: No LAD, Oropharynx clear. Icteric sclerae   CV: RRR, normal S1/S2, systolic murmur heard at apex, no rubs/gallops  Respiratory: CTAB  Abdom: NT, ND, No HSM, normal BS  EXTREM: Nonedematous  MSK: 5/5 strength b/l in UE and LE  NEURO: CN II-XII intact, sensation intact b/l in UE and LE

## 2022-03-14 NOTE — H&P ADULT - NSHPLABSRESULTS_GEN_ALL_CORE
LABS                  7.1    15.22 )-----------( 479      ( 14 Mar 2022 04:25 )             19.4   03-14    138  |  103  |  3<L>  ----------------------------<  104<H>  4.6   |  23  |  <0.5<L>    Ca    9.4      14 Mar 2022 04:25    TPro  7.5  /  Alb  4.3  /  TBili  7.5<H>  /  DBili  x   /  AST  41  /  ALT  11  /  AlkPhos  54  03-14    RADS      ACC: [id number] EXAM:  XR CHEST PORTABLE URGENT 1V                          PROCEDURE DATE:  03/14/2022          INTERPRETATION:  Clinical History / Reason for exam: Sickle cell disease,   chest pain    Comparison : Chest radiograph 1/2/2019    Technique/Positioning: AP portable.    Findings:    Support devices: None.    Cardiac/mediastinum/hilum: Unremarkable.    Lung parenchyma/Pleura: Within normal limits.    Skeleton/soft tissues: Right upper quadrant surgical clips.    Impression:    No radiographic evidence of acute cardiopulmonary disease.        --- End of Report ---            OLEG EDWARDS MD; Attending Radiologist  This document has been electronically signed. Mar 14 2022  6:20AM    CTA PE (3/14/22):    IMPRESSION:  1.  No evidence of acute pulmonary embolism.  2.  Slightly increased patchy groundglass opacification in the left lower   lobe with adjacent small effusion may represent an atypical infectious   process.  3.  Slightly prominent hilar lymph nodes measuring up to 1.4 cm on the   right, nonspecific though possibly reactive.  4.  Cardiomegaly.

## 2022-03-14 NOTE — ED PROCEDURE NOTE - NS ED ATTENDING STATEMENT MOD
This was a shared visit with the ADRIANO. I reviewed and verified the documentation and independently performed the documented:

## 2022-03-14 NOTE — ED PROVIDER NOTE - PROGRESS NOTE DETAILS
Patient remained stable in ED, signed out pending CTA/reevaluation and dispo. Note authored by Dr. Angel: Patient signed out to me by Dr. Gaxiola to follow-up results of the CT scan.  In short, patient is a 26-year-old sickle cell with left-sided pleuritic chest pain.  Labs and imaging reviewed.  Patient currently going to CT scan.  No respiratory distress. MQ: New infiltrate on CT, will give IV abx, will admit

## 2022-03-14 NOTE — CONSULT NOTE ADULT - SUBJECTIVE AND OBJECTIVE BOX
Pt examined bed side on 3Awith family around  She appeared comfortable, no acute distress   Stated her pain occurs during inhalation only  Rates her pain 4/10 and stated she currently does not want any pain medication     Explained to pt if pain increases that I will recommend pain medications and for her and to tell her nurse or primary care team

## 2022-03-14 NOTE — ED ADULT TRIAGE NOTE - CHIEF COMPLAINT QUOTE
Pt. complains left sided chest pain radiating to left shoulder and left back. As per pt. symptoms began this evening. Pt. complains left sided chest pain radiating to left shoulder and left back. Pain intensifies when taking deep breaths As per pt. symptoms began this evening.

## 2022-03-14 NOTE — H&P ADULT - NSHPOUTPATIENTPROVIDERS_GEN_ALL_CORE
Dr. Wesley Jimenez, Hematologist at [facility] (started seeing recently, within past 6 months)   Dr. Rito Cabrera, Nephrologist (been seing for three years)  Dr. Ericka Vinson, Pediatrician (patient hasn't seen since 21)

## 2022-03-14 NOTE — ED PROVIDER NOTE - ATTENDING CONTRIBUTION TO CARE
Patient presents to ED c/o Chest pain/sob, described as difficulty breathing and pain on breathing x one day. Patient denies f/c/n/v/URI symptoms. Denies abd pain. Denies lower ext pain/swelling. Patient is also c/o pain in LT hand 2nd MCP joint area. Patient states that, when she has sickle cell crisis, has pain/swelling in the joints/fingers. Patient denies previous h/o sickle cell chest syndrome. Patient denies taking birth control pills, denies recent travel and other risk factors for PE.   Vitals reviewed.   Patient is awake, alert, appears uncomfortable but not in distress.   +icteric sclera,   Lungs: B/L decreased air entry, no wheezing, no crackles.   Abd: +BS, NT, ND, soft,   Ext: no E/E/C: pulse+  no calf tenderness.   CNS: awake, alert, o x 3, no focal neurologic deficits.  A/P: Chest pain/sob,   labs, EKG, CXR,   D-dimer, reevaluation.

## 2022-03-14 NOTE — H&P ADULT - HISTORY OF PRESENT ILLNESS
Marianne Gonzalez is a 26 year old female with Sickle Cell Disease, cholecystitis s/p cholecystectomy (2013), nephropathy who presents for recent onset chest pain and difficulty breathing. Patient noted yesterday evening sudden onset left sided pleuritic chest pain radiating from the back (L side) up to the L shoulder. Patient has noted some difficulty breathing, as she has chest pain worsened with inspiration. Patient notes that she has had sickle cell crises in the past 3x (when she was younger, at age 18, and this past January); she notes her crises have been with severe (8/10) pain in her shins, fingers/hands. She notes previously having similar chest pain in January (separate from her ED visit for SC crisis in her fingers/hands) that lasted four days and resolved on its own. Patient denies any associated fevers, nausea/vomiting, no weakness/lightheadedness, no diarrhea, dysuria.   Patient has recently started seeing Dr. Wesley Jimenez, Hematologist at [facility], within past 6 months.     In the ED: VS showing T 98.6, HR 84, /52, SpO2 93% on RA. WBC elevated to 15.22, H/H 7.1/19.4 (bl ~7-8), D-dimer 399, Bilirubin 7.5. Troponin .01, Pro-BNP 14. CXR non-impressive. CTA PE showing no PE, but showing opacification of LLL w/ adjacent small effusion and b/l subsegmental atelectasis.   Received azithromycin/cefepime, 1L LR, 15 mg Toradol.

## 2022-03-14 NOTE — PATIENT PROFILE ADULT - FALL HARM RISK - HARM RISK INTERVENTIONS

## 2022-03-14 NOTE — H&P ADULT - ASSESSMENT
Marianne Gonzalez is a 26 year old female with Sickle Cell Disease, cholecystitis s/p cholecystectomy (2013), nephropathy who presents for recent onset chest pain and difficulty breathing.     #Pleuritic Chest pain 2/2 LLL PNA vs Acute Chest Syndrome  #Sickle Cell Disease  #Hemolytic Anemia   - Patient with new L sided pleuritic chest pain   - Follows with Dr. Wesley Jimenez, Hematologist at [facility]   - WBC 15.22, Hgb 7.1 (bl ~ 7-8)  - Reticulocyte increased to 33.8 (32.1 in 1/2022), bilirubin 7.5  - Troponin .01, BNP 14  - CXR non-impressive  - CTA PE showing no PE  - CTA showing opacification of LLL w/ adjacent small effusion, b/l subsegmental atelectasis  - Due to elevated WBC and opacity on LLL, will treat for PNA  - C/w azithromycin, Rocephin   - C/w Folic acid 1 mg daily   - With opacity seen on CT, and patient having chest pain, slightly decreased oxygenation (93%), remain concerned for ACS presentation  - Pain management w/ Toradol   - Maintain SpO2 >92%     #Nephropathy likely 2/2 SCD  - Patient noting she sees Dr. Rito Paula, Nephrologist  - UA showing proteinuria  - C/w Losartan 50 mg (home dose)     #Cardiomegaly  - Noted cardiomegaly on CTA PE  - Likely 2/2 SCD with increased output     #Punctate RUL micronodule  - 2 mm nodule seen on CT scan    DVT PPx: Lovenox 40 mg   GI PPx: None  Diet: Regular   Code Status: Full  Dispo: Acute, admit to medicine  Marianne Gonzalez is a 26 year old female with Sickle Cell Disease, cholecystitis s/p cholecystectomy (2013), nephropathy who presents for recent onset chest pain and difficulty breathing.     #Pleuritic Chest pain 2/2 LLL PNA vs Acute Chest Syndrome  #Sickle Cell Disease w/ possible acute Sickle Cell Crisis   #Hemolytic Anemia   - Patient with new L sided pleuritic chest pain   - Follows with Dr. Wesley Jimenez, Hematologist at [facility]   - WBC 15.22, Hgb 7.1 (bl ~ 7-8)  - Reticulocyte increased to 33.8 (32.1 in 1/2022), bilirubin 7.5  - Troponin .01, BNP 14, EKG showing NSR w/ 1st degree AV block   - CXR non-impressive  - CTA PE showing no PE  - CTA showing opacification of LLL w/ adjacent small effusion, b/l subsegmental atelectasis  - Due to elevated WBC and opacity on LLL, will treat for PNA  - C/w azithromycin, Rocephin   - With opacity seen on CT, and patient having chest pain, slightly decreased oxygenation (93%), remain concerned for ACS presentation  - Pain management w/ Toradol, consider opioids if uncontrolled   - Pain management c/s for appropriate pain control   - C/w Folic acid 1 mg daily   - C/w IVF NS at 150 cc/hr to maintain intravascular volume, decrease sickling   - Maintain SpO2 >92%, supplemental O2 as needed, incentive spirometry  - Patient notes having history of SpO2 in the mid 80s when hospitalized     #Nephropathy likely 2/2 SCD  - Patient noting she sees Nephrologist Dr. Rito Cabrera  - UA showing proteinuria  - C/w Losartan 50 mg (home dose)     #Cardiomegaly  - Noted cardiomegaly on CTA PE  - Likely 2/2 SCD with high output     #Hilad LAD   - Measuring up to 1.4 cm seen on CT  - Possibly reactive  - F/u CT OP     DVT PPx: Lovenox 40 mg   GI PPx: None  Diet: Regular   Code Status: Full  Dispo: Acute, admit to medicine

## 2022-03-14 NOTE — ED PROVIDER NOTE - OBJECTIVE STATEMENT
26y F pmh sickle cell presenting with left sided chest pain radiating to the back since this evening. Intermittent, no exacerbating/relieving factors. Not on hydroxyurea. No cough/sore throat. No hx of sickle cell. No f/c/n/v. No abdominal pain. No shortness of breath. Non smoker. No hx of blood clots.

## 2022-03-14 NOTE — H&P ADULT - ATTENDING COMMENTS
27 YO F with a PMH of Sickle Cell Disease and nephropathy who presents to the hospital with a c/o CP for the past x 1 day. Described as sharp, left-sided, radiating to back, and waxes/wanes. - worse with exertion. Associated with + SOB, - nausea, - palpitations, and - diaphoresis. Denies any fevers/chills, cough, ABD pain, LE swelling, dysuria, headaches, or rashes.     In the ED, cardiac enzymes were negative and an EKG showed no ischemic changes. CTA-chest showed LLL opacity and cardiomegaly, no PE. Pt started on IV ABXs (Ceftr/Azithro) in the ED.     Family hx of early heart disease (-)     Physical exam shows pt in NAD, resting comfortably. VSS, afebrile, not hypoxic on RA. A&Ox3. Neuro exam intact. CTA B/L with no W/C/R. RRR, no M/G/R. ABD is soft and non-tender to palpation, normoactive BSs. LEs without swelling, pulses palpated bilaterally. No rashes. Labs and imaging as above resident note.     Chest pain, atypical, likely from pneumonia vs sickle cell crisis, doubt acute chest syndrome. Serial cardiac enzymes and EKGs. Send procal/CRP. IV ABXs (Ceftr/Azithro). Incentive spirometry. Supplemental O2 PRN. PRN pain meds. Restart home meds.   -Obtain pain management consult for SCD crisis    Normocytic anemia, at baseline. Pt denies bleeding symptoms. Send anemia work-up. Replace as necessary.     Hx of nephropathy & SCD. Restart home meds, except as stated above. DVT PPX. Inform PCP of pt's admission to hospital. My note supersedes the residents note.     Date seen by Attending: 3/14/22

## 2022-03-14 NOTE — CONSULT NOTE ADULT - ASSESSMENT
25yo F  with Sickle Cell Disease, cholecystitis s/p cholecystectomy (2013), nephropathy who presents for recent onset chest pain and difficulty breathing. possible acute Sickle Cell Crisis with Hemolytic Anemia               recommendations  -standing tylenol  - oxycodone 5 mg q4h prn   - if pain increased can increase to oxycodone 10mg q6h prn  - if pt requires additional  may order Dilaudid pca 0.2mg q 8min, 4hr max does 8 mg

## 2022-03-15 LAB
ALBUMIN SERPL ELPH-MCNC: 3.9 G/DL — SIGNIFICANT CHANGE UP (ref 3.5–5.2)
ALLERGY+IMMUNOLOGY DIAG STUDY NOTE: SIGNIFICANT CHANGE UP
ALP SERPL-CCNC: 52 U/L — SIGNIFICANT CHANGE UP (ref 30–115)
ALT FLD-CCNC: 11 U/L — SIGNIFICANT CHANGE UP (ref 0–41)
ANION GAP SERPL CALC-SCNC: 12 MMOL/L — SIGNIFICANT CHANGE UP (ref 7–14)
AST SERPL-CCNC: 32 U/L — SIGNIFICANT CHANGE UP (ref 0–41)
BASOPHILS # BLD AUTO: 0.07 K/UL — SIGNIFICANT CHANGE UP (ref 0–0.2)
BASOPHILS NFR BLD AUTO: 0.6 % — SIGNIFICANT CHANGE UP (ref 0–1)
BILIRUB SERPL-MCNC: 4.6 MG/DL — HIGH (ref 0.2–1.2)
BLD GP AB SCN SERPL QL: SIGNIFICANT CHANGE UP
BUN SERPL-MCNC: 5 MG/DL — LOW (ref 10–20)
CALCIUM SERPL-MCNC: 9 MG/DL — SIGNIFICANT CHANGE UP (ref 8.5–10.1)
CHLORIDE SERPL-SCNC: 106 MMOL/L — SIGNIFICANT CHANGE UP (ref 98–110)
CO2 SERPL-SCNC: 21 MMOL/L — SIGNIFICANT CHANGE UP (ref 17–32)
CREAT SERPL-MCNC: <0.5 MG/DL — LOW (ref 0.7–1.5)
DIR ANTIGLOB POLYSPECIFIC INTERPRETATION: SIGNIFICANT CHANGE UP
EGFR: 142 ML/MIN/1.73M2 — SIGNIFICANT CHANGE UP
EOSINOPHIL # BLD AUTO: 0.3 K/UL — SIGNIFICANT CHANGE UP (ref 0–0.7)
EOSINOPHIL NFR BLD AUTO: 2.4 % — SIGNIFICANT CHANGE UP (ref 0–8)
FERRITIN SERPL-MCNC: 85 NG/ML — SIGNIFICANT CHANGE UP (ref 15–150)
GLUCOSE SERPL-MCNC: 84 MG/DL — SIGNIFICANT CHANGE UP (ref 70–99)
HCT VFR BLD CALC: 17.4 % — LOW (ref 37–47)
HGB BLD-MCNC: 6.5 G/DL — CRITICAL LOW (ref 12–16)
IMM GRANULOCYTES NFR BLD AUTO: 0.7 % — HIGH (ref 0.1–0.3)
LDH SERPL L TO P-CCNC: 509 — HIGH (ref 50–242)
LYMPHOCYTES # BLD AUTO: 32.7 % — SIGNIFICANT CHANGE UP (ref 20.5–51.1)
LYMPHOCYTES # BLD AUTO: 4 K/UL — HIGH (ref 1.2–3.4)
MAGNESIUM SERPL-MCNC: 1.6 MG/DL — LOW (ref 1.8–2.4)
MCHC RBC-ENTMCNC: 35.3 PG — HIGH (ref 27–31)
MCHC RBC-ENTMCNC: 37.4 G/DL — HIGH (ref 32–37)
MCV RBC AUTO: 94.6 FL — SIGNIFICANT CHANGE UP (ref 81–99)
MONOCYTES # BLD AUTO: 1.25 K/UL — HIGH (ref 0.1–0.6)
MONOCYTES NFR BLD AUTO: 10.2 % — HIGH (ref 1.7–9.3)
NEUTROPHILS # BLD AUTO: 6.55 K/UL — HIGH (ref 1.4–6.5)
NEUTROPHILS NFR BLD AUTO: 53.4 % — SIGNIFICANT CHANGE UP (ref 42.2–75.2)
NRBC # BLD: 1 /100 WBCS — HIGH (ref 0–0)
PLATELET # BLD AUTO: 412 K/UL — HIGH (ref 130–400)
POTASSIUM SERPL-MCNC: 4.4 MMOL/L — SIGNIFICANT CHANGE UP (ref 3.5–5)
POTASSIUM SERPL-SCNC: 4.4 MMOL/L — SIGNIFICANT CHANGE UP (ref 3.5–5)
PROCALCITONIN SERPL-MCNC: <0.02 NG/ML — SIGNIFICANT CHANGE UP (ref 0.02–0.1)
PROT SERPL-MCNC: 6.5 G/DL — SIGNIFICANT CHANGE UP (ref 6–8)
RBC # BLD: 1.84 M/UL — LOW (ref 4.2–5.4)
RBC # FLD: 21.8 % — HIGH (ref 11.5–14.5)
SODIUM SERPL-SCNC: 139 MMOL/L — SIGNIFICANT CHANGE UP (ref 135–146)
TROPONIN T SERPL-MCNC: <0.01 NG/ML — SIGNIFICANT CHANGE UP
WBC # BLD: 12.25 K/UL — HIGH (ref 4.8–10.8)
WBC # FLD AUTO: 12.25 K/UL — HIGH (ref 4.8–10.8)

## 2022-03-15 PROCEDURE — 99232 SBSQ HOSP IP/OBS MODERATE 35: CPT

## 2022-03-15 PROCEDURE — 86077 PHYS BLOOD BANK SERV XMATCH: CPT

## 2022-03-15 RX ORDER — OXYCODONE HYDROCHLORIDE 5 MG/1
5 TABLET ORAL EVERY 4 HOURS
Refills: 0 | Status: DISCONTINUED | OUTPATIENT
Start: 2022-03-15 | End: 2022-03-16

## 2022-03-15 RX ORDER — ACETAMINOPHEN 500 MG
650 TABLET ORAL EVERY 6 HOURS
Refills: 0 | Status: DISCONTINUED | OUTPATIENT
Start: 2022-03-15 | End: 2022-03-16

## 2022-03-15 RX ADMIN — LOSARTAN POTASSIUM 50 MILLIGRAM(S): 100 TABLET, FILM COATED ORAL at 05:59

## 2022-03-15 RX ADMIN — AZITHROMYCIN 255 MILLIGRAM(S): 500 TABLET, FILM COATED ORAL at 00:10

## 2022-03-15 RX ADMIN — Medication 650 MILLIGRAM(S): at 12:58

## 2022-03-15 RX ADMIN — CEFTRIAXONE 100 MILLIGRAM(S): 500 INJECTION, POWDER, FOR SOLUTION INTRAMUSCULAR; INTRAVENOUS at 05:58

## 2022-03-15 RX ADMIN — Medication 650 MILLIGRAM(S): at 23:53

## 2022-03-15 RX ADMIN — AZITHROMYCIN 255 MILLIGRAM(S): 500 TABLET, FILM COATED ORAL at 23:53

## 2022-03-15 RX ADMIN — SODIUM CHLORIDE 150 MILLILITER(S): 9 INJECTION INTRAMUSCULAR; INTRAVENOUS; SUBCUTANEOUS at 04:40

## 2022-03-15 RX ADMIN — Medication 1 MILLIGRAM(S): at 12:58

## 2022-03-15 NOTE — PROGRESS NOTE ADULT - ASSESSMENT
26 year old female with Sickle Cell Disease, cholecystitis s/p cholecystectomy (2013), and nephropathy who presents for recent onset chest pain and difficulty breathing.     #Pleuritic Chest pain 2/2 LLL PNA vs Acute Chest Syndrome  #Sickle Cell Disease w/ possible acute Sickle Cell Crisis   #Hemolytic Anemia   > Patient presents with new L sided pleuritic chest pain  > Of note, pt has history of SpO2 in the mid 80s when hospitalized   > Follows with Dr. Wesley Jimenez, Hematologist at [place]   > WBC 15.22, Hgb 7.1 (bl ~ 7-8) on admission  > Reticulocyte increased to 33.8 (32.1 in 1/2022), bilirubin 7.5  > Troponin .01, BNP 14, EKG showing NSR w/ 1st degree AV block   > CXR non-impressive  > CTA showing no PE but opacification of LLL w/ adjacent small effusion, b/l subsegmental atelectasis  > empirically treating for pna with azithro and rocephin in setting of leukocytosis and LLL opacity on CTA  - C/w azithromycin, Rocephin   - With opacity seen on CT, and patient having chest pain, slightly decreased oxygenation (93%), remain concerned for ACS presentation  - c/w pain regimen of standing tylenol and Toradol and Oxycodone PRN per pain managment  - C/w Folic acid 1 mg daily   - C/w IVF NS at 150 cc/hr to maintain intravascular volume, decrease sickling   - Maintain SpO2 >92%, supplemental O2 via NC as needed, incentive spirometry    #Nephropathy likely 2/2 SCD  > Patient noting she sees Nephrologist Dr. Rito Cabrera  > UA showing proteinuria  - C/w Losartan 50 mg (home dose)     #Cardiomegaly, likely 2/2 SCD with high output  > Noted cardiomegaly on CTA PE    #Hilad LAD, possibly reactive   > Measuring up to 1.4 cm seen on CT  - F/u CT OP      26 year old female with Sickle Cell Disease, cholecystitis s/p cholecystectomy (2013), and nephropathy who presents for recent onset chest pain and difficulty breathing.     #Pleuritic Chest pain 2/2 LLL PNA vs Acute Chest Syndrome  #Sickle Cell Disease w/  acute Sickle Cell Crisis   #Hemolytic Anemia   - Patient presents with new L sided pleuritic chest pain  - Follows with Dr. Wesley Jimenez, Hematologist at [place]   - 3/15 patient states being chest pain free - IVF and oxygen stopped  - WBC trending down   - Reticulocyte increased to 33.8 (32.1 in 1/2022), bilirubin 7.5  - CXR non-impressive  - CTA showing no PE but opacification of LLL w/ adjacent small effusion, b/l subsegmental atelectasis  - empirically treating for pna with azithro and rocephin in setting of leukocytosis and LLL opacity on CTA  - procal pending  - c/w pain regimen of standing tylenol and Toradol and Oxycodone PRN per pain managment  - C/w Folic acid 1 mg daily   - hemoglobin trending down to 6.6 -> will transfuse 1UPRBC    #Cardiomegaly on CT  - concern for sickle complication  - TTE ordered    #Anxiety  -patient states anxiety regarding being the hospital  -patient was offered reassurance  -refusing any medication assistance     #Nephropathy likely 2/2 SCD  > Patient noting she sees Nephrologist Dr. Rito Cabrera  > UA showing proteinuria  - C/w Losartan 50 mg (home dose)     #Hilad LAD, possibly reactive   > Measuring up to 1.4 cm seen on CT  - F/u CT OP     Pending: Procal, repeat CBC, transfusion, TTE

## 2022-03-15 NOTE — CHART NOTE - NSCHARTNOTEFT_GEN_A_CORE
Spoke with patient regarding collecting a type and screen in anticipation of a blood transfusion given her hgb of 6.5.  Discussed the risks/benefits of transfusion.  Patient became very emotional and requested time to consider the treatment.  Upon returning with the ultrasound for peripheral IV placement and blood draw, patient refused all intervention including IV placement at this time.  Will continue to try to keep open communication with the patient.  Placed type and screen order for AM labs in case patient changes her mind regarding the transfusion.

## 2022-03-16 ENCOUNTER — TRANSCRIPTION ENCOUNTER (OUTPATIENT)
Age: 27
End: 2022-03-16

## 2022-03-16 VITALS
DIASTOLIC BLOOD PRESSURE: 53 MMHG | TEMPERATURE: 98 F | SYSTOLIC BLOOD PRESSURE: 107 MMHG | HEART RATE: 68 BPM | RESPIRATION RATE: 18 BRPM

## 2022-03-16 DIAGNOSIS — D57.00 HB-SS DISEASE WITH CRISIS, UNSPECIFIED: ICD-10-CM

## 2022-03-16 DIAGNOSIS — I51.7 CARDIOMEGALY: ICD-10-CM

## 2022-03-16 DIAGNOSIS — D57.1 SICKLE-CELL DISEASE WITHOUT CRISIS: ICD-10-CM

## 2022-03-16 DIAGNOSIS — J18.9 PNEUMONIA, UNSPECIFIED ORGANISM: ICD-10-CM

## 2022-03-16 LAB
ALBUMIN SERPL ELPH-MCNC: 3.6 G/DL — SIGNIFICANT CHANGE UP (ref 3.5–5.2)
ALP SERPL-CCNC: 59 U/L — SIGNIFICANT CHANGE UP (ref 30–115)
ALT FLD-CCNC: 12 U/L — SIGNIFICANT CHANGE UP (ref 0–41)
ANION GAP SERPL CALC-SCNC: 10 MMOL/L — SIGNIFICANT CHANGE UP (ref 7–14)
AST SERPL-CCNC: 31 U/L — SIGNIFICANT CHANGE UP (ref 0–41)
BASOPHILS # BLD AUTO: 0.11 K/UL — SIGNIFICANT CHANGE UP (ref 0–0.2)
BASOPHILS NFR BLD AUTO: 0.8 % — SIGNIFICANT CHANGE UP (ref 0–1)
BILIRUB SERPL-MCNC: 5.6 MG/DL — HIGH (ref 0.2–1.2)
BUN SERPL-MCNC: 5 MG/DL — LOW (ref 10–20)
CALCIUM SERPL-MCNC: 8.7 MG/DL — SIGNIFICANT CHANGE UP (ref 8.5–10.1)
CHLORIDE SERPL-SCNC: 106 MMOL/L — SIGNIFICANT CHANGE UP (ref 98–110)
CO2 SERPL-SCNC: 23 MMOL/L — SIGNIFICANT CHANGE UP (ref 17–32)
CREAT SERPL-MCNC: <0.5 MG/DL — LOW (ref 0.7–1.5)
CRP SERPL-MCNC: 8 MG/L — HIGH
EGFR: 142 ML/MIN/1.73M2 — SIGNIFICANT CHANGE UP
EOSINOPHIL # BLD AUTO: 0.32 K/UL — SIGNIFICANT CHANGE UP (ref 0–0.7)
EOSINOPHIL NFR BLD AUTO: 2.4 % — SIGNIFICANT CHANGE UP (ref 0–8)
GLUCOSE SERPL-MCNC: 84 MG/DL — SIGNIFICANT CHANGE UP (ref 70–99)
HCT VFR BLD CALC: 20.5 % — LOW (ref 37–47)
HGB BLD-MCNC: 7.4 G/DL — LOW (ref 12–16)
IMM GRANULOCYTES NFR BLD AUTO: 0.9 % — HIGH (ref 0.1–0.3)
LYMPHOCYTES # BLD AUTO: 28.3 % — SIGNIFICANT CHANGE UP (ref 20.5–51.1)
LYMPHOCYTES # BLD AUTO: 3.74 K/UL — HIGH (ref 1.2–3.4)
MAGNESIUM SERPL-MCNC: 1.8 MG/DL — SIGNIFICANT CHANGE UP (ref 1.8–2.4)
MCHC RBC-ENTMCNC: 33.9 PG — HIGH (ref 27–31)
MCHC RBC-ENTMCNC: 36.1 G/DL — SIGNIFICANT CHANGE UP (ref 32–37)
MCV RBC AUTO: 94 FL — SIGNIFICANT CHANGE UP (ref 81–99)
MONOCYTES # BLD AUTO: 1.19 K/UL — HIGH (ref 0.1–0.6)
MONOCYTES NFR BLD AUTO: 9 % — SIGNIFICANT CHANGE UP (ref 1.7–9.3)
NEUTROPHILS # BLD AUTO: 7.72 K/UL — HIGH (ref 1.4–6.5)
NEUTROPHILS NFR BLD AUTO: 58.6 % — SIGNIFICANT CHANGE UP (ref 42.2–75.2)
NRBC # BLD: 1 /100 WBCS — HIGH (ref 0–0)
PLATELET # BLD AUTO: 483 K/UL — HIGH (ref 130–400)
POTASSIUM SERPL-MCNC: 4.4 MMOL/L — SIGNIFICANT CHANGE UP (ref 3.5–5)
POTASSIUM SERPL-SCNC: 4.4 MMOL/L — SIGNIFICANT CHANGE UP (ref 3.5–5)
PROT SERPL-MCNC: 6.6 G/DL — SIGNIFICANT CHANGE UP (ref 6–8)
RBC # BLD: 2.18 M/UL — LOW (ref 4.2–5.4)
RBC # FLD: 22.8 % — HIGH (ref 11.5–14.5)
SODIUM SERPL-SCNC: 139 MMOL/L — SIGNIFICANT CHANGE UP (ref 135–146)
WBC # BLD: 13.2 K/UL — HIGH (ref 4.8–10.8)
WBC # FLD AUTO: 13.2 K/UL — HIGH (ref 4.8–10.8)

## 2022-03-16 PROCEDURE — 93306 TTE W/DOPPLER COMPLETE: CPT | Mod: 26

## 2022-03-16 PROCEDURE — 99232 SBSQ HOSP IP/OBS MODERATE 35: CPT

## 2022-03-16 RX ADMIN — Medication 650 MILLIGRAM(S): at 00:23

## 2022-03-16 RX ADMIN — CEFTRIAXONE 100 MILLIGRAM(S): 500 INJECTION, POWDER, FOR SOLUTION INTRAMUSCULAR; INTRAVENOUS at 05:58

## 2022-03-16 RX ADMIN — Medication 650 MILLIGRAM(S): at 06:28

## 2022-03-16 RX ADMIN — Medication 650 MILLIGRAM(S): at 05:58

## 2022-03-16 RX ADMIN — Medication 1 MILLIGRAM(S): at 13:12

## 2022-03-16 NOTE — DISCHARGE NOTE PROVIDER - HOSPITAL COURSE
HPI: 26 year old female with Sickle Cell Disease, cholecystitis s/p cholecystectomy (2013), and nephropathy who presents for recent onset chest pain and difficulty breathing. Patient noted yesterday evening sudden onset left sided pleuritic chest pain radiating from the back (L side) up to the L shoulder. Patient has noted some difficulty breathing, as she has chest pain worsened with inspiration. Patient notes that she has had sickle cell crises in the past 3x (when she was younger, at age 18, and this past January); she notes her crises have been with severe (8/10) pain in her shins, fingers/hands. She notes previously having similar chest pain in January (separate from her ED visit for SC crisis in her fingers/hands) that lasted four days and resolved on its own. Patient denies any associated fevers, nausea/vomiting, no weakness/lightheadedness, no diarrhea, dysuria.  Of note, patient has recently started seeing Dr. Wesley Jimenez, Hematologist at [place], within past 6 months.     ED Course: VS showing T 98.6, HR 84, /52, SpO2 93% on RA. WBC elevated to 15.22, H/H 7.1/19.4 (bl ~7-8), D-dimer 399, Bilirubin 7.5. Troponin .01, Pro-BNP 14. CXR non-impressive. CTA PE showing no PE, but showing opacification of LLL w/ adjacent small effusion and b/l subsegmental atelectasis. Received azithromycin/cefepime, 1L LR, 15 mg Toradol. Admitted to Medicine for further management and work-up of acute chest pain and SOB 2/2 pna vs acute chest syndrome.    Hospital Course: Repeat blood work revealed hgb 6.5.  Patient received 1u pRBC and repeat hgb was 7.4 this AM.  Patient scheduled for TTE today for cardiomegaly seen on imaging.  Patient otherwise denies any pain this morning and is only taking tylenol for pain control.  Patient is medically optimized for discharge to home with hematology and nephrology outpatient follow-up.

## 2022-03-16 NOTE — DISCHARGE NOTE PROVIDER - NSDCPNSUBOBJ_GEN_ALL_CORE
<<<RESIDENT DISCHARGE NOTE>>>     JAMES VÁSQUEZ  MRN-[id number]    VITAL SIGNS:  T(F): 97.4 (03-16-22 @ 05:00), Max: 99.1 (03-15-22 @ 21:10)  HR: 74 (03-16-22 @ 10:02)  BP: 106/52 (03-16-22 @ 10:02)  SpO2: 100% (03-16-22 @ 10:02)      PHYSICAL EXAMINATION:  General: lying in bed, NAD  Head & Neck: nontraumatic, neck supple   Pulmonary: CTA, no cough or increased WOB, on RA  Cardiovascular: RRR, S1/S2  Gastrointestinal/Abdomen & Pelvis: soft, nontender, nondistended  Neurologic/Motor: AAOx4, CN grossly intact    TEST RESULTS:                        7.4    13.20 )-----------( 483      ( 16 Mar 2022 04:30 )             20.5       03-16    139  |  106  |  5<L>  ----------------------------<  84  4.4   |  23  |  <0.5<L>    Ca    8.7      16 Mar 2022 04:30  Mg     1.8     03-16    TPro  6.6  /  Alb  3.6  /  TBili  5.6<H>  /  DBili  x   /  AST  31  /  ALT  12  /  AlkPhos  59  03-16      FINAL DISCHARGE INTERVIEW:  Resident(s) Present: (Name: Deidra Seth PGY1), RN Present: (Name: ROSETTA Sparks)    DISCHARGE MEDICATION RECONCILIATION  reviewed with Attending (Name: Lg Berg MD)    DISPOSITION:   [ X ] Home,    [  ] Home with Visiting Nursing Services,   [    ]  SNF/ NH,    [   ] Acute Rehab (4A),   [   ] Other (Specify:_________)

## 2022-03-16 NOTE — DISCHARGE NOTE PROVIDER - CARE PROVIDER_API CALL
Rito Cabrera (DO)  Internal Medicine; Nephrology  [address] [place] [address]  Phone: [phone]  Fax: [phone]  Established Patient  Follow Up Time: 1 month

## 2022-03-16 NOTE — DISCHARGE NOTE NURSING/CASE MANAGEMENT/SOCIAL WORK - NSDCFUADDAPPT_GEN_ALL_CORE_FT
Please follow-up with your primary care provider and Nephrologist, Dr. Cabrera, upon discharge from the hospital to review the results of your cardiac echocardiogram.     Please also follow-up with your hematologist, Dr. Wesley Jimenez, within 2 weeks upon discharge from the hospital.
No

## 2022-03-16 NOTE — DISCHARGE NOTE NURSING/CASE MANAGEMENT/SOCIAL WORK - PATIENT PORTAL LINK FT
You can access the FollowMyHealth Patient Portal offered by [facility] by registering at the following website: [email or web address]. By joining [employer]’s FollowMyHealth portal, you will also be able to view your health information using other applications (apps) compatible with our system.

## 2022-03-16 NOTE — DISCHARGE NOTE NURSING/CASE MANAGEMENT/SOCIAL WORK - NSDCPEFALRISK_GEN_ALL_CORE
For information on Fall & Injury Prevention, visit: [email or web address] OR  [email or web address] OR  https://www.cdc.gov/steadi/patient.html

## 2022-03-16 NOTE — PROGRESS NOTE ADULT - SUBJECTIVE AND OBJECTIVE BOX
Patient is a 26y old  Female who presents with a chief complaint of Chest pain, Difficulty Breathing (15 Mar 2022 10:43)      Patient seen and examined at bedside.    ALLERGIES:  No Known Allergies    MEDICATIONS:  acetaminophen     Tablet .. 650 milliGRAM(s) Oral every 6 hours  aluminum hydroxide/magnesium hydroxide/simethicone Suspension 30 milliLiter(s) Oral every 4 hours PRN  azithromycin  IVPB 500 milliGRAM(s) IV Intermittent every 24 hours  cefTRIAXone   IVPB 1000 milliGRAM(s) IV Intermittent every 24 hours  enoxaparin Injectable 40 milliGRAM(s) SubCutaneous every 24 hours  folic acid 1 milliGRAM(s) Oral daily  ketorolac   Injectable 15 milliGRAM(s) IV Push every 8 hours PRN  losartan 50 milliGRAM(s) Oral daily  melatonin 3 milliGRAM(s) Oral at bedtime PRN  ondansetron Injectable 4 milliGRAM(s) IV Push every 8 hours PRN  oxyCODONE    IR 5 milliGRAM(s) Oral every 4 hours PRN    Vital Signs Last 24 Hrs  T(F): 98.3 (15 Mar 2022 11:50), Max: 98.5 (15 Mar 2022 05:00)  HR: 83 (15 Mar 2022 11:50) (78 - 83)  BP: 105/55 (15 Mar 2022 11:50) (102/52 - 105/55)  RR: 18 (15 Mar 2022 11:50) (18 - 18)  SpO2: 90% (14 Mar 2022 20:00) (90% - 92%)  I&O's Summary      PHYSICAL EXAM:  General: NAD, A/O x 3  ENT: MMM, significant scleral icterus   Neck: Supple, No JVD  Lungs: Clear to auscultation bilaterally  Cardio: RRR, S1/S2, No murmurs  Abdomen: Soft, Nontender, Nondistended; Bowel sounds present  Extremities: No cyanosis, No edema    LABS:                        6.5    12.25 )-----------( 412      ( 15 Mar 2022 04:30 )             17.4     03-15    139  |  106  |  5   ----------------------------<  84  4.4   |  21  |  <0.5    Ca    9.0      15 Mar 2022 04:30  Mg     1.6     03-15    TPro  6.5  /  Alb  3.9  /  TBili  4.6  /  DBili  x   /  AST  32  /  ALT  11  /  AlkPhos  52  03-15                  04:35 - VBG - pH: 7.40  | pCO2: 42    | pO2: 59    | Lactate: 1.00                     COVID-19 PCR: NotDetec (03-14-22 @ 09:27)      RADIOLOGY & ADDITIONAL TESTS:    Care Discussed with Consultants/Other Providers: 
  JAMES VÁSQUEZ  26y  Female      Patient is a 26y old  Female who presents with a chief complaint of Chest pain, Difficulty Breathing/      INTERVAL HPI/OVERNIGHT EVENTS:      ******************************* REVIEW OF SYSTEMS:**********************************************  All other review of systems negative    *********************** VITALS ******************************************    T(F): 97.4 (03-16-22 @ 05:00)  HR: 74 (03-16-22 @ 10:02) (68 - 84)  BP: 106/52 (03-16-22 @ 10:02) (106/51 - 108/62)  RR: 18 (03-16-22 @ 05:00) (18 - 18)  SpO2: 100% (03-16-22 @ 10:02) (93% - 100%)            ******************************** PHYSICAL EXAM:**************************************************  GENERAL: NAD    PSYCH: no agitation, baseline mentation  HEENT:     NERVOUS SYSTEM:  Alert & Oriented X3, MS  5/5 B/L  UE and LE ; Sensory intact    PULMONARY: PRABHJOT, CTA    CARDIOVASCULAR: S1S2 RRR    GI: Soft, NT, ND; BS present.    EXTREMITIES:  2+ Peripheral Pulses, No clubbing, cyanosis, or edema    LYMPH: No lymphadenopathy noted    SKIN: No rashes or lesions      **************************** LABS *******************************************************                          7.4    13.20 )-----------( 483      ( 16 Mar 2022 04:30 )             20.5     03-16    139  |  106  |  5<L>  ----------------------------<  84  4.4   |  23  |  <0.5<L>    Ca    8.7      16 Mar 2022 04:30  Mg     1.8     03-16    TPro  6.6  /  Alb  3.6  /  TBili  5.6<H>  /  DBili  x   /  AST  31  /  ALT  12  /  AlkPhos  59  03-16          Lactate Trend    CARDIAC MARKERS ( 15 Mar 2022 04:30 )  x     / <0.01 ng/mL / x     / x     / x          CAPILLARY BLOOD GLUCOSE              **************************Active Medications *******************************************  No Known Allergies      acetaminophen     Tablet .. 650 milliGRAM(s) Oral every 6 hours  aluminum hydroxide/magnesium hydroxide/simethicone Suspension 30 milliLiter(s) Oral every 4 hours PRN  azithromycin  IVPB 500 milliGRAM(s) IV Intermittent every 24 hours  cefTRIAXone   IVPB 1000 milliGRAM(s) IV Intermittent every 24 hours  enoxaparin Injectable 40 milliGRAM(s) SubCutaneous every 24 hours  folic acid 1 milliGRAM(s) Oral daily  ketorolac   Injectable 15 milliGRAM(s) IV Push every 8 hours PRN  losartan 50 milliGRAM(s) Oral daily  melatonin 3 milliGRAM(s) Oral at bedtime PRN  ondansetron Injectable 4 milliGRAM(s) IV Push every 8 hours PRN  oxyCODONE    IR 5 milliGRAM(s) Oral every 4 hours PRN      ***************************************************  RADIOLOGY & ADDITIONAL TESTS:    Imaging Personally Reviewed:  [ ] YES  [ ] NO    HEALTH ISSUES - PROBLEM Dx:  Sickle cell anemia with pain    Sickle cell nephropathy    Pneumonia    Cardiomegaly        
JAMES VÁSQUEZ 26y Female  MRN#: [id number]   CODE STATUS: FULL    Hospital Day: 1d    Pt is currently admitted with the primary diagnosis of chest pain and SOB.    SUBJECTIVE  HPI: 26 year old female with Sickle Cell Disease, cholecystitis s/p cholecystectomy (2013), and nephropathy who presents for recent onset chest pain and difficulty breathing. Patient noted yesterday evening sudden onset left sided pleuritic chest pain radiating from the back (L side) up to the L shoulder. Patient has noted some difficulty breathing, as she has chest pain worsened with inspiration. Patient notes that she has had sickle cell crises in the past 3x (when she was younger, at age 18, and this past January); she notes her crises have been with severe (8/10) pain in her shins, fingers/hands. She notes previously having similar chest pain in January (separate from her ED visit for SC crisis in her fingers/hands) that lasted four days and resolved on its own. Patient denies any associated fevers, nausea/vomiting, no weakness/lightheadedness, no diarrhea, dysuria.  Of note, patient has recently started seeing Dr. Wesley Jimenez, Hematologist at [place], within past 6 months.     ED Course: VS showing T 98.6, HR 84, /52, SpO2 93% on RA. WBC elevated to 15.22, H/H 7.1/19.4 (bl ~7-8), D-dimer 399, Bilirubin 7.5. Troponin .01, Pro-BNP 14. CXR non-impressive. CTA PE showing no PE, but showing opacification of LLL w/ adjacent small effusion and b/l subsegmental atelectasis. Received azithromycin/cefepime, 1L LR, 15 mg Toradol. Admitted to Medicine for further management and work-up of acute chest pain and SOB 2/2 pna vs acute chest syndrome.    Overnight events   Patient admitted to Medicine overnight.    Subjective complaints:  Patient seen and examined at bedside.  Patient resting comfortably this AM and only taking tylenol for pain.    Present Today:   - Renae:  No [ X ], Yes [   ] : Indication:     - Type of IV Access:       .. CVC/Piccline:  No [ X ], Yes [   ] : Indication:       .. Midline: No [ X ], Yes [   ] : Indication:                                             ----------------------------------------------------------  OBJECTIVE  PAST MEDICAL & SURGICAL HISTORY  Sickle cell anemia    S/P cholecystectomy                                              -----------------------------------------------------------  ALLERGIES:  No Known Allergies                                            ------------------------------------------------------------    HOME MEDICATIONS  Home Medications:  folic acid 1 mg oral tablet: 1 tab(s) orally once a day (14 Mar 2022 11:52)  losartan 50 mg oral tablet: 1 tab(s) orally once a day (14 Mar 2022 11:52)                           MEDICATIONS:  STANDING MEDICATIONS  acetaminophen     Tablet .. 650 milliGRAM(s) Oral every 6 hours  azithromycin  IVPB 500 milliGRAM(s) IV Intermittent every 24 hours  cefTRIAXone   IVPB 1000 milliGRAM(s) IV Intermittent every 24 hours  enoxaparin Injectable 40 milliGRAM(s) SubCutaneous every 24 hours  folic acid 1 milliGRAM(s) Oral daily  losartan 50 milliGRAM(s) Oral daily  sodium chloride 0.9%. 1000 milliLiter(s) IV Continuous <Continuous>    PRN MEDICATIONS  aluminum hydroxide/magnesium hydroxide/simethicone Suspension 30 milliLiter(s) Oral every 4 hours PRN  ketorolac   Injectable 15 milliGRAM(s) IV Push every 8 hours PRN  melatonin 3 milliGRAM(s) Oral at bedtime PRN  ondansetron Injectable 4 milliGRAM(s) IV Push every 8 hours PRN  oxyCODONE    IR 5 milliGRAM(s) Oral every 4 hours PRN                                            ------------------------------------------------------------  VITAL SIGNS: Last 24 Hours  T(C): 36.9 (15 Mar 2022 05:00), Max: 36.9 (15 Mar 2022 05:00)  T(F): 98.5 (15 Mar 2022 05:00), Max: 98.5 (15 Mar 2022 05:00)  HR: 78 (15 Mar 2022 05:00) (78 - 83)  BP: 104/56 (15 Mar 2022 05:00) (102/52 - 104/56)  BP(mean): --  RR: 18 (15 Mar 2022 05:00) (18 - 18)  SpO2: 90% (14 Mar 2022 20:00) (90% - 92%)                                             --------------------------------------------------------------  LABS:                        6.5    12.25 )-----------( 412      ( 15 Mar 2022 04:30 )             17.4     03-15    139  |  106  |  5<L>  ----------------------------<  84  4.4   |  21  |  <0.5<L>    Ca    9.0      15 Mar 2022 04:30  Mg     1.6     03-15    TPro  6.5  /  Alb  3.9  /  TBili  4.6<H>  /  DBili  x   /  AST  32  /  ALT  11  /  AlkPhos  52  03-15          Troponin T, Serum: <0.01 ng/mL (03-15-22 @ 04:30)          CARDIAC MARKERS ( 15 Mar 2022 04:30 )  x     / <0.01 ng/mL / x     / x     / x      CARDIAC MARKERS ( 14 Mar 2022 04:25 )  x     / <0.01 ng/mL / x     / x     / x                                                  -------------------------------------------------------------  RADIOLOGY:  ACC: [id number] [facility]AM:  [facility]                        PROCEDURE DATE:  03/14/2022    IMPRESSION:  1.  No evidence of acute pulmonary embolism.  2.  Slightly increased patchy groundglass opacification in the left lower   lobe with adjacent small effusion may represent an atypical infectious process.  3.  Slightly prominent hilar lymph nodes measuring up to 1.4 cm on the   right, nonspecific though possibly reactive.  4.  Cardiomegaly.      ACC: [id number] EXAM:  XR CHEST PORTABLE URGENT 1V                        PROCEDURE DATE:  03/14/2022    Impression:  No radiographic evidence of acute cardiopulmonary disease.                                            --------------------------------------------------------------    PHYSICAL EXAM:  General: lying in bed, NAD  HEENT: nontraumatic, neck supple, conjunctiva clear  LUNGS: CTA, no cough or increased WOB  HEART: RRR, S1/S2  ABDOMEN: soft, nontender, nondistended  EXT: moves all extremities  NEURO: AAOx3, CN grossly intact  SKIN: no LE edema or erythema                                           --------------------------------------------------------------    ASSESSMENT & PLAN  26 year old female with Sickle Cell Disease, cholecystitis s/p cholecystectomy (2013), and nephropathy who presents for recent onset chest pain and difficulty breathing.     #Pleuritic Chest pain 2/2 LLL PNA vs Acute Chest Syndrome  #Sickle Cell Disease w/ possible acute Sickle Cell Crisis   #Hemolytic Anemia   > Patient presents with new L sided pleuritic chest pain  > Of note, pt has history of SpO2 in the mid 80s when hospitalized   > Follows with Dr. Wesley Jimenez, Hematologist at [place]   > WBC 15.22, Hgb 7.1 (bl ~ 7-8) on admission  > Reticulocyte increased to 33.8 (32.1 in 1/2022), bilirubin 7.5  > Troponin .01, BNP 14, EKG showing NSR w/ 1st degree AV block   > CXR non-impressive  > CTA showing no PE but opacification of LLL w/ adjacent small effusion, b/l subsegmental atelectasis  > empirically treating for pna with azithro and rocephin in setting of leukocytosis and LLL opacity on CTA  - C/w azithromycin, Rocephin   - With opacity seen on CT, and patient having chest pain, slightly decreased oxygenation (93%), remain concerned for ACS presentation  - c/w pain regimen of standing tylenol and Toradol and Oxycodone PRN per pain managment  - C/w Folic acid 1 mg daily   - C/w IVF NS at 150 cc/hr to maintain intravascular volume, decrease sickling   - Maintain SpO2 >92%, supplemental O2 via NC as needed, incentive spirometry    #Nephropathy likely 2/2 SCD  > Patient noting she sees Nephrologist Dr. Rito Cabrera  > UA showing proteinuria  - C/w Losartan 50 mg (home dose)     #Cardiomegaly, likely 2/2 SCD with high output  > Noted cardiomegaly on CTA PE    #Hilad LAD, possibly reactive   > Measuring up to 1.4 cm seen on CT  - F/u CT OP                                                                               ----------------------------------------------------  # DVT prophylaxis: Lovenox  # GI prophylaxis: not indicated  # Diet: regular  # Activity Score (AM-PAC): AAT  # Code status: FULL  # Disposition: from home, acute for now                                                                             --------------------------------------------------------    # Handoff   - F/u CT OP

## 2022-03-16 NOTE — DISCHARGE NOTE PROVIDER - NSDCFUADDAPPT_GEN_ALL_CORE_FT
Please follow-up with your primary care provider and Nephrologist, Dr. Cabrera, upon discharge from the hospital to review the results of your cardiac echocardiogram.     Please also follow-up with your hematologist, Dr. Wesley Jimenez, within 2 weeks upon discharge from the hospital.

## 2022-03-16 NOTE — DISCHARGE NOTE PROVIDER - NSDCMRMEDTOKEN_GEN_ALL_CORE_FT
amoxicillin-clavulanate 875 mg-125 mg oral tablet: 1 tab(s) orally every 12 hours through Friday, 3/18/22  folic acid 1 mg oral tablet: 1 tab(s) orally once a day  losartan 50 mg oral tablet: 1 tab(s) orally once a day

## 2022-03-16 NOTE — PROGRESS NOTE ADULT - ASSESSMENT
26 year old female with Sickle Cell Disease, cholecystitis s/p cholecystectomy (2013), and nephropathy who presents for recent onset chest pain and difficulty breathing.     #Pleuritic Chest pain 2/2 LLL PNA vs Acute Chest Syndrome  #Sickle Cell Disease w/  acute Sickle Cell Crisis   #Hemolytic Anemia   - Patient presents with new L sided pleuritic chest pain  - Follows with Dr. Wesley Jimenez, Hematologist at [place]   - 3/15 patient states being chest pain free - IVF and oxygen stopped  - WBC trending down   - Reticulocyte increased to 33.8 (32.1 in 1/2022), bilirubin 7.5  - CXR non-impressive  - CTA showing no PE but opacification of LLL w/ adjacent small effusion, b/l subsegmental atelectasis  - empirically treating for pna with azithro and rocephin in setting of leukocytosis and LLL opacity on CTA  - procal >> Neg  >>> can stop aBX.   - c/w pain regimen of standing tylenol and Toradol and Oxycodone PRN per pain managment  - C/w Folic acid 1 mg daily   - hemoglobin trending down to 6.6 -> S/P transfusion ,  1UPRBC    #Cardiomegaly on CT  - concern for sickle complication  - TTE ordered    #Anxiety  -patient states anxiety regarding being the hospital  -patient was offered reassurance  -refusing any medication assistance     #Nephropathy likely 2/2 SCD  > Patient noting she sees Nephrologist Dr. Rito Cabrera  > UA showing proteinuria  - C/w Losartan 50 mg (home dose)     #Hilar  LAD, possibly reactive   > Measuring up to 1.4 cm seen on CT  - F/u CT OP     D/C planning.  Pending 2D Echo for cardiomegaly.

## 2022-03-16 NOTE — DISCHARGE NOTE PROVIDER - NSDCCPCAREPLAN_GEN_ALL_CORE_FT
PRINCIPAL DISCHARGE DIAGNOSIS  Diagnosis: Acute chest syndrome  Assessment and Plan of Treatment: Chest Pain  Chest pain can be caused by many different conditions which may or may not be dangerous. Causes include heartburn, lung infections, heart attack, blood clot in lungs, skin infections, strain or damage to muscle, cartilage, or bones, etc. In addition to a history and physical examination, an electrocardiogram (ECG) or other lab tests may have been performed to determine the cause of your chest pain. Follow up with your primary care provider or with a cardiologist as instructed.   SEEK IMMEDIATE MEDICAL CARE IF YOU HAVE ANY OF THE FOLLOWING SYMPTOMS: worsening chest pain, coughing up blood, unexplained back/neck/jaw pain, severe abdominal pain, dizziness or lightheadedness, fainting, shortness of breath, sweaty or clammy skin, vomiting, or racing heart beat. These symptoms may represent a serious problem that is an emergency. Do not wait to see if the symptoms will go away. Get medical help right away. Call 911 and do not drive yourself to the hospital.

## 2022-03-22 DIAGNOSIS — R07.9 CHEST PAIN, UNSPECIFIED: ICD-10-CM

## 2022-03-22 DIAGNOSIS — D57.01 HB-SS DISEASE WITH ACUTE CHEST SYNDROME: ICD-10-CM

## 2022-03-22 DIAGNOSIS — N08 GLOMERULAR DISORDERS IN DISEASES CLASSIFIED ELSEWHERE: ICD-10-CM

## 2022-03-22 DIAGNOSIS — D72.829 ELEVATED WHITE BLOOD CELL COUNT, UNSPECIFIED: ICD-10-CM

## 2022-03-22 DIAGNOSIS — D59.9 ACQUIRED HEMOLYTIC ANEMIA, UNSPECIFIED: ICD-10-CM

## 2022-03-22 DIAGNOSIS — R06.02 SHORTNESS OF BREATH: ICD-10-CM

## 2022-03-22 DIAGNOSIS — I51.7 CARDIOMEGALY: ICD-10-CM

## 2022-05-14 ENCOUNTER — TRANSCRIPTION ENCOUNTER (OUTPATIENT)
Age: 27
End: 2022-05-14

## 2022-05-14 ENCOUNTER — INPATIENT (INPATIENT)
Facility: HOSPITAL | Age: 27
LOS: 0 days | Discharge: LEFT BEFORE DISCHARGE | End: 2022-05-14
Attending: HOSPITALIST | Admitting: HOSPITALIST
Payer: COMMERCIAL

## 2022-05-14 VITALS
DIASTOLIC BLOOD PRESSURE: 80 MMHG | HEIGHT: 64 IN | OXYGEN SATURATION: 92 % | HEART RATE: 103 BPM | WEIGHT: 139.99 LBS | SYSTOLIC BLOOD PRESSURE: 141 MMHG | RESPIRATION RATE: 16 BRPM | TEMPERATURE: 99 F

## 2022-05-14 DIAGNOSIS — Z90.49 ACQUIRED ABSENCE OF OTHER SPECIFIED PARTS OF DIGESTIVE TRACT: Chronic | ICD-10-CM

## 2022-05-14 LAB
ALBUMIN SERPL ELPH-MCNC: 4.4 G/DL — SIGNIFICANT CHANGE UP (ref 3.5–5.2)
ALP SERPL-CCNC: 44 U/L — SIGNIFICANT CHANGE UP (ref 30–115)
ALT FLD-CCNC: 21 U/L — SIGNIFICANT CHANGE UP (ref 0–41)
ANION GAP SERPL CALC-SCNC: 14 MMOL/L — SIGNIFICANT CHANGE UP (ref 7–14)
ANISOCYTOSIS BLD QL: SIGNIFICANT CHANGE UP
APPEARANCE UR: ABNORMAL
AST SERPL-CCNC: 87 U/L — HIGH (ref 0–41)
BACTERIA # UR AUTO: NEGATIVE — SIGNIFICANT CHANGE UP
BASE EXCESS BLDV CALC-SCNC: -2.6 MMOL/L — LOW (ref -2–3)
BASOPHILS # BLD AUTO: 0.11 K/UL — SIGNIFICANT CHANGE UP (ref 0–0.2)
BASOPHILS NFR BLD AUTO: 0.9 % — SIGNIFICANT CHANGE UP (ref 0–1)
BILIRUB SERPL-MCNC: 5.8 MG/DL — HIGH (ref 0.2–1.2)
BILIRUB UR-MCNC: ABNORMAL
BUN SERPL-MCNC: 7 MG/DL — LOW (ref 10–20)
CA-I SERPL-SCNC: 1.11 MMOL/L — LOW (ref 1.15–1.33)
CALCIUM SERPL-MCNC: 9.1 MG/DL — SIGNIFICANT CHANGE UP (ref 8.5–10.1)
CHLORIDE SERPL-SCNC: 96 MMOL/L — LOW (ref 98–110)
CO2 SERPL-SCNC: 22 MMOL/L — SIGNIFICANT CHANGE UP (ref 17–32)
COLOR SPEC: ABNORMAL
CREAT SERPL-MCNC: 0.6 MG/DL — LOW (ref 0.7–1.5)
DIFF PNL FLD: ABNORMAL
EGFR: 128 ML/MIN/1.73M2 — SIGNIFICANT CHANGE UP
EOSINOPHIL # BLD AUTO: 0.11 K/UL — SIGNIFICANT CHANGE UP (ref 0–0.7)
EOSINOPHIL NFR BLD AUTO: 0.9 % — SIGNIFICANT CHANGE UP (ref 0–8)
EPI CELLS # UR: 4 /HPF — SIGNIFICANT CHANGE UP (ref 0–5)
GAS PNL BLDV: 133 MMOL/L — LOW (ref 136–145)
GAS PNL BLDV: SIGNIFICANT CHANGE UP
GIANT PLATELETS BLD QL SMEAR: PRESENT — SIGNIFICANT CHANGE UP
GLUCOSE SERPL-MCNC: 92 MG/DL — SIGNIFICANT CHANGE UP (ref 70–99)
GLUCOSE UR QL: NEGATIVE — SIGNIFICANT CHANGE UP
HCO3 BLDV-SCNC: 22 MMOL/L — SIGNIFICANT CHANGE UP (ref 22–29)
HCT VFR BLD CALC: 22.8 % — LOW (ref 37–47)
HCT VFR BLDA CALC: 23 % — LOW (ref 39–51)
HGB BLD CALC-MCNC: 7.5 G/DL — LOW (ref 12.6–17.4)
HGB BLD-MCNC: 8.2 G/DL — LOW (ref 12–16)
HYALINE CASTS # UR AUTO: 10 /LPF — HIGH (ref 0–7)
HYPOCHROMIA BLD QL: SIGNIFICANT CHANGE UP
KETONES UR-MCNC: ABNORMAL
LACTATE BLDV-MCNC: 1.9 MMOL/L — SIGNIFICANT CHANGE UP (ref 0.5–2)
LEUKOCYTE ESTERASE UR-ACNC: ABNORMAL
LYMPHOCYTES # BLD AUTO: 0.88 K/UL — LOW (ref 1.2–3.4)
LYMPHOCYTES # BLD AUTO: 7.1 % — LOW (ref 20.5–51.1)
MACROCYTES BLD QL: SIGNIFICANT CHANGE UP
MANUAL SMEAR VERIFICATION: SIGNIFICANT CHANGE UP
MCHC RBC-ENTMCNC: 33.9 PG — HIGH (ref 27–31)
MCHC RBC-ENTMCNC: 36 G/DL — SIGNIFICANT CHANGE UP (ref 32–37)
MCV RBC AUTO: 94.2 FL — SIGNIFICANT CHANGE UP (ref 81–99)
MONOCYTES # BLD AUTO: 0.77 K/UL — HIGH (ref 0.1–0.6)
MONOCYTES NFR BLD AUTO: 6.2 % — SIGNIFICANT CHANGE UP (ref 1.7–9.3)
MYELOCYTES NFR BLD: 0.9 % — HIGH (ref 0–0)
NEUTROPHILS # BLD AUTO: 10.41 K/UL — HIGH (ref 1.4–6.5)
NEUTROPHILS NFR BLD AUTO: 82.3 % — HIGH (ref 42.2–75.2)
NEUTS BAND # BLD: 1.7 % — SIGNIFICANT CHANGE UP (ref 0–6)
NITRITE UR-MCNC: NEGATIVE — SIGNIFICANT CHANGE UP
NRBC # BLD: 19 /100 — HIGH (ref 0–0)
NRBC # BLD: SIGNIFICANT CHANGE UP /100 WBCS (ref 0–0)
PCO2 BLDV: 39 MMHG — SIGNIFICANT CHANGE UP (ref 39–42)
PH BLDV: 7.37 — SIGNIFICANT CHANGE UP (ref 7.32–7.43)
PH UR: 6.5 — SIGNIFICANT CHANGE UP (ref 5–8)
PLAT MORPH BLD: ABNORMAL
PLATELET # BLD AUTO: 330 K/UL — SIGNIFICANT CHANGE UP (ref 130–400)
PO2 BLDV: 25 MMHG — SIGNIFICANT CHANGE UP
POIKILOCYTOSIS BLD QL AUTO: SIGNIFICANT CHANGE UP
POLYCHROMASIA BLD QL SMEAR: SIGNIFICANT CHANGE UP
POTASSIUM BLDV-SCNC: 4.4 MMOL/L — SIGNIFICANT CHANGE UP (ref 3.5–5.1)
POTASSIUM SERPL-MCNC: 4.3 MMOL/L — SIGNIFICANT CHANGE UP (ref 3.5–5)
POTASSIUM SERPL-SCNC: 4.3 MMOL/L — SIGNIFICANT CHANGE UP (ref 3.5–5)
PROT SERPL-MCNC: 8.1 G/DL — HIGH (ref 6–8)
PROT UR-MCNC: ABNORMAL
RAPID RVP RESULT: SIGNIFICANT CHANGE UP
RBC # BLD: 2.42 M/UL — LOW (ref 4.2–5.4)
RBC # BLD: 2.42 M/UL — LOW (ref 4.2–5.4)
RBC # FLD: 23.9 % — HIGH (ref 11.5–14.5)
RBC BLD AUTO: ABNORMAL
RBC CASTS # UR COMP ASSIST: 4 /HPF — SIGNIFICANT CHANGE UP (ref 0–4)
RETICS #: 657.8 K/UL — HIGH (ref 25–125)
RETICS/RBC NFR: 27.2 % — HIGH (ref 0.5–1.5)
SAO2 % BLDV: 29.2 % — SIGNIFICANT CHANGE UP
SARS-COV-2 RNA SPEC QL NAA+PROBE: SIGNIFICANT CHANGE UP
SCHISTOCYTES BLD QL AUTO: SLIGHT — SIGNIFICANT CHANGE UP
SICKLE CELLS BLD QL SMEAR: SIGNIFICANT CHANGE UP
SODIUM SERPL-SCNC: 132 MMOL/L — LOW (ref 135–146)
SP GR SPEC: 1.02 — SIGNIFICANT CHANGE UP (ref 1.01–1.03)
TARGETS BLD QL SMEAR: SIGNIFICANT CHANGE UP
UROBILINOGEN FLD QL: ABNORMAL
WBC # BLD: 12.39 K/UL — HIGH (ref 4.8–10.8)
WBC # FLD AUTO: 12.39 K/UL — HIGH (ref 4.8–10.8)
WBC UR QL: 13 /HPF — HIGH (ref 0–5)

## 2022-05-14 PROCEDURE — 71046 X-RAY EXAM CHEST 2 VIEWS: CPT | Mod: 26

## 2022-05-14 PROCEDURE — 99284 EMERGENCY DEPT VISIT MOD MDM: CPT

## 2022-05-14 RX ORDER — DIPHENHYDRAMINE HCL 50 MG
50 CAPSULE ORAL ONCE
Refills: 0 | Status: COMPLETED | OUTPATIENT
Start: 2022-05-14 | End: 2022-05-14

## 2022-05-14 RX ORDER — FAMOTIDINE 10 MG/ML
20 INJECTION INTRAVENOUS ONCE
Refills: 0 | Status: DISCONTINUED | OUTPATIENT
Start: 2022-05-14 | End: 2022-05-14

## 2022-05-14 RX ORDER — ACETAMINOPHEN 500 MG
975 TABLET ORAL ONCE
Refills: 0 | Status: COMPLETED | OUTPATIENT
Start: 2022-05-14 | End: 2022-05-14

## 2022-05-14 RX ORDER — VANCOMYCIN HCL 1 G
1000 VIAL (EA) INTRAVENOUS ONCE
Refills: 0 | Status: DISCONTINUED | OUTPATIENT
Start: 2022-05-14 | End: 2022-05-14

## 2022-05-14 RX ORDER — SODIUM CHLORIDE 9 MG/ML
1000 INJECTION INTRAMUSCULAR; INTRAVENOUS; SUBCUTANEOUS ONCE
Refills: 0 | Status: COMPLETED | OUTPATIENT
Start: 2022-05-14 | End: 2022-05-14

## 2022-05-14 RX ORDER — MEROPENEM 1 G/30ML
1000 INJECTION INTRAVENOUS ONCE
Refills: 0 | Status: COMPLETED | OUTPATIENT
Start: 2022-05-14 | End: 2022-05-14

## 2022-05-14 RX ADMIN — MEROPENEM 100 MILLIGRAM(S): 1 INJECTION INTRAVENOUS at 17:00

## 2022-05-14 RX ADMIN — SODIUM CHLORIDE 1000 MILLILITER(S): 9 INJECTION INTRAMUSCULAR; INTRAVENOUS; SUBCUTANEOUS at 16:22

## 2022-05-14 RX ADMIN — Medication 975 MILLIGRAM(S): at 16:59

## 2022-05-14 RX ADMIN — Medication 50 MILLIGRAM(S): at 16:59

## 2022-05-14 RX ADMIN — Medication 100 MILLIGRAM(S): at 17:00

## 2022-05-14 NOTE — DISCHARGE NOTE PROVIDER - NSDCMRMEDTOKEN_GEN_ALL_CORE_FT
amoxicillin-clavulanate 875 mg-125 mg oral tablet: 1 tab(s) orally 2 times a day   folic acid 1 mg oral tablet: 1 tab(s) orally once a day  losartan 50 mg oral tablet: 1 tab(s) orally once a day

## 2022-05-14 NOTE — ED PROVIDER NOTE - OBJECTIVE STATEMENT
27 y/o female with hx Sickle cell anemia presents to the ED c/o "I have fever with chills tmax 103 since Monday. I also have a itchy rash to my body and some facial swelling. I was using jergens lotion that was recalled for bacteria contamination."

## 2022-05-14 NOTE — ED PROVIDER NOTE - ATTENDING APP SHARED VISIT CONTRIBUTION OF CARE
26-year-old female past medical history sickle cell, status post cholecystectomy, presents with 5 days of fever and generalized body rash.  Itching red bumps.  Patient states symptoms started after using Jergens ultra healing lotion 10 ounce , which has been recalled out of concern for bacterial contamination.  No chest pain shortness of breath.  No abdominal pain nausea vomiting diarrhea.  No throat pain or swelling.  No cough URI symptoms.  Patient seen at urgent care prior to arrival, had temp of 100.3 and was tachycardic with systolic blood pressure in the 90s.  Patient also concern for facial swelling.    On exam, AFVSS, Well appearing, No acute distress, NCAT, EOMI, PERRLA, MMM, mild nonpitting facial swelling noted, posterior occipital lymphadenopathy present, neck supple, LCTAB, RRR nl s1s2 No mrg, Abdomen Soft NTND, AAOx3, No Focal Deficits, No LE edema or calf TTP, sandpaperlike rash to arms and legs    A/P; fever/rash x5 days, sandpaperlike on exam, facial swelling, concern for possible bacterial infection/bacteremia, ?strep, enterobacter 2/2 jergens, will do labs cultures x-ray urine RVP IV antibiotics Benadryl admit

## 2022-05-14 NOTE — ED PROVIDER NOTE - CLINICAL SUMMARY MEDICAL DECISION MAKING FREE TEXT BOX
A/P; fever/rash x5 days, sandpaperlike on exam, facial swelling, concern for possible bacterial infection/bacteremia, ?strep, enterobacter 2/2 jergens, will do labs cultures x-ray urine RVP IV antibiotics Benadryl admit

## 2022-05-14 NOTE — DISCHARGE NOTE PROVIDER - NSDCFUSCHEDAPPT_GEN_ALL_CORE_FT
[facility]  Cardio [address] [place] [address]  Scheduled Appointment: 05/16/2022

## 2022-05-14 NOTE — H&P ADULT - ASSESSMENT
26 year old female with Sickle Cell Disease, cholecystitis s/p cholecystectomy (2013), and nephropathy who presents with diffuse body hives and fever for the past week.     #Suspected skin infection  #Sepsis  - No rash at the time of exam  - Was using skin cream that was recalled for contamination with pluralibacter gergoviae 26 year old female with Sickle Cell Disease, cholecystitis s/p cholecystectomy (2013), and nephropathy who presents with diffuse body hives and fever for the past week.     #Suspected skin infection  #Sepsis  - No rash at the time of exam  - Was using skin cream that was recalled for contamination with pluralibacter gergoviae  - Patient leaving AMA, risks of untreated infection discussed  - Will discharge on Augmentin and strongly recommend close follow up with PCP on Monday  - Instructed to come back to the ED if she has recurrent high fever, rashes, facial swelling or rash    #SCD  - Labs at baseline, not in crisis    Dispo: AMA discharge

## 2022-05-14 NOTE — DISCHARGE NOTE PROVIDER - CARE PROVIDER_API CALL
Rito Cabrera (DO)  Internal Medicine; Nephrology  [address] [place] [address]  Phone: [phone]  Fax: [phone]  Follow Up Time: 1-3 days

## 2022-05-14 NOTE — H&P ADULT - NSHPPHYSICALEXAM_GEN_ALL_CORE
General: NAD  Mental status: AAOx3  Eyes: jaundice  ENT: Moist mucous membranes  Neck: No JVD  Chest/lung: Clear to auscultation bilaterally; No wheezing or crackles  Heart: Regular rate and rhythm; No murmurs, rubs, or gallops  Abdomen: Bowel sounds present; Soft, Nontender, Nondistended  Extremities:  2+ Peripheral Pulses. No edema or cyanosis  Skin: no facial swelling or rash. No rash on visible parts of the body

## 2022-05-14 NOTE — DISCHARGE NOTE PROVIDER - HOSPITAL COURSE
26 year old female with Sickle Cell Disease, cholecystitis s/p cholecystectomy (2013), and nephropathy who presents with diffuse body hives and fever for the past week. She had used a topical cream called Jergens (applied all over the body) on the weekend before the rash started. This cream had been recalled from the market for contamination with pluralibacter gergoviae. The patient went to the urgent care then was sent to the ED. She denies facial swelling or rash, SOB, cough, chest pain, abdominal pain, dysuria, weakness, joint pains.    ED VS: T(F): , Max: 99 (05-14-22 @ 13:34)  HR:  (103 - 103)  BP:  (141/80 - 141/80)  RR: 16  SpO2:  (92% - 92%)    The patient was not aware she is being admitted and decided to leave AMA. When I saw the patient, she had no facial swelling or rash or rash on visible parts of the body. The risks and complications of an untreated or undertreated infection were discussed with the patient and she still requested to leave AMA.    #Suspected skin infection  #Sepsis  - No rash at the time of exam  - Was using skin cream that was recalled for contamination with pluralibacter gergoviae  - Patient leaving AMA, risks of untreated infection discussed  - Will discharge on Augmentin and strongly recommend close follow up with PCP on Monday  - Instructed to come back to the ED if she has recurrent high fever, rashes, facial swelling or rash    #SCD  - Labs at baseline, not in crisis    Dispo: AMA discharge

## 2022-05-14 NOTE — DISCHARGE NOTE PROVIDER - NSDCCPCAREPLAN_GEN_ALL_CORE_FT
PRINCIPAL DISCHARGE DIAGNOSIS  Diagnosis: Fever  Assessment and Plan of Treatment: You had a fever that was presumed to be from a skin infection, possibly related to the use of skin care cream that was contaminated with antibiotic. You decided to leave the hospital against medical advice. The risks of an untreated or undertreated infection were discussed with you. You will be discharged with antibiotics to take at home. Follow up with your doctor on Monday. If you develop high fever, facial rash, trouble breathing, chest pain, shortness of breath, severe headache, sensitivity to light come back to the ED.      SECONDARY DISCHARGE DIAGNOSES  Diagnosis: Rash  Assessment and Plan of Treatment:

## 2022-05-14 NOTE — H&P ADULT - HISTORY OF PRESENT ILLNESS
26 year old female with Sickle Cell Disease, cholecystitis s/p cholecystectomy (2013), and nephropathy who presents with diffuse body hives and fever for the past week. She had used a topical cream called Jergens (applied all over the body) on the weekend before the rash started. This cream had been recalled from the market for contamination with pluralibacter gergoviae. The patient went to the urgent care then was sent to the ED. She denies facial swelling or rash, SOB, cough, chest pain, abdominal pain, dysuria, weakness, joint pains.    ED VS: T(F): , Max: 99 (05-14-22 @ 13:34)  HR:  (103 - 103)  BP:  (141/80 - 141/80)  RR: 16  SpO2:  (92% - 92%)    The patient was not aware she is being admitted and decided to leave AMA. When I saw the patient, she had no facial swelling or rash or rash on visible parts of the body. The risks and complications of an untreated or undertreated infection were discussed with the patient and she still requested to leave AMA.

## 2022-05-14 NOTE — H&P ADULT - NSHPLABSRESULTS_GEN_ALL_CORE
LABS:  cret                        8.2    12.39 )-----------( 330      ( 14 May 2022 16:45 )             22.8     05-14    132<L>  |  96<L>  |  7<L>  ----------------------------<  92  4.3   |  22  |  0.6<L>    Ca    9.1      14 May 2022 16:45    TPro  8.1<H>  /  Alb  4.4  /  TBili  5.8<H>  /  DBili  x   /  AST  87<H>  /  ALT  21  /  AlkPhos  44  05-14

## 2022-05-14 NOTE — ED ADULT NURSE NOTE - OBJECTIVE STATEMENT
Patient presents to ED c/o rash, fever, and chills. Patient states she used Jergens lotion that was recalled.

## 2022-05-20 ENCOUNTER — INPATIENT (INPATIENT)
Facility: HOSPITAL | Age: 27
LOS: 2 days | Discharge: HOME | End: 2022-05-23
Attending: INTERNAL MEDICINE | Admitting: INTERNAL MEDICINE
Payer: COMMERCIAL

## 2022-05-20 VITALS
WEIGHT: 139.99 LBS | TEMPERATURE: 103 F | SYSTOLIC BLOOD PRESSURE: 122 MMHG | RESPIRATION RATE: 18 BRPM | HEIGHT: 64 IN | OXYGEN SATURATION: 91 % | HEART RATE: 122 BPM | DIASTOLIC BLOOD PRESSURE: 60 MMHG

## 2022-05-20 DIAGNOSIS — L08.9 LOCAL INFECTION OF THE SKIN AND SUBCUTANEOUS TISSUE, UNSPECIFIED: ICD-10-CM

## 2022-05-20 DIAGNOSIS — D57.1 SICKLE-CELL DISEASE WITHOUT CRISIS: ICD-10-CM

## 2022-05-20 DIAGNOSIS — Z90.49 ACQUIRED ABSENCE OF OTHER SPECIFIED PARTS OF DIGESTIVE TRACT: Chronic | ICD-10-CM

## 2022-05-20 LAB
ALBUMIN SERPL ELPH-MCNC: 4.1 G/DL — SIGNIFICANT CHANGE UP (ref 3.5–5.2)
ALLERGY+IMMUNOLOGY DIAG STUDY NOTE: SIGNIFICANT CHANGE UP
ALP SERPL-CCNC: 57 U/L — SIGNIFICANT CHANGE UP (ref 30–115)
ALT FLD-CCNC: 24 U/L — SIGNIFICANT CHANGE UP (ref 0–41)
ANION GAP SERPL CALC-SCNC: 14 MMOL/L — SIGNIFICANT CHANGE UP (ref 7–14)
AST SERPL-CCNC: 53 U/L — HIGH (ref 0–41)
BASOPHILS # BLD AUTO: 0.1 K/UL — SIGNIFICANT CHANGE UP (ref 0–0.2)
BASOPHILS # BLD AUTO: 0.11 K/UL — SIGNIFICANT CHANGE UP (ref 0–0.2)
BASOPHILS NFR BLD AUTO: 0.4 % — SIGNIFICANT CHANGE UP (ref 0–1)
BASOPHILS NFR BLD AUTO: 0.4 % — SIGNIFICANT CHANGE UP (ref 0–1)
BILIRUB SERPL-MCNC: 4 MG/DL — HIGH (ref 0.2–1.2)
BLD GP AB SCN SERPL QL: SIGNIFICANT CHANGE UP
BUN SERPL-MCNC: <3 MG/DL — LOW (ref 10–20)
CALCIUM SERPL-MCNC: 9.2 MG/DL — SIGNIFICANT CHANGE UP (ref 8.5–10.1)
CHLORIDE SERPL-SCNC: 103 MMOL/L — SIGNIFICANT CHANGE UP (ref 98–110)
CO2 SERPL-SCNC: 21 MMOL/L — SIGNIFICANT CHANGE UP (ref 17–32)
CREAT SERPL-MCNC: <0.5 MG/DL — LOW (ref 0.7–1.5)
CULTURE RESULTS: SIGNIFICANT CHANGE UP
CULTURE RESULTS: SIGNIFICANT CHANGE UP
D DIMER BLD IA.RAPID-MCNC: 727 NG/ML DDU — HIGH (ref 0–230)
DAT IGG-SP REAG RBC-IMP: ABNORMAL
DIR ANTIGLOB POLYSPECIFIC INTERPRETATION: ABNORMAL
EGFR: 142 ML/MIN/1.73M2 — SIGNIFICANT CHANGE UP
EOSINOPHIL # BLD AUTO: 0.02 K/UL — SIGNIFICANT CHANGE UP (ref 0–0.7)
EOSINOPHIL # BLD AUTO: 0.07 K/UL — SIGNIFICANT CHANGE UP (ref 0–0.7)
EOSINOPHIL NFR BLD AUTO: 0.1 % — SIGNIFICANT CHANGE UP (ref 0–8)
EOSINOPHIL NFR BLD AUTO: 0.2 % — SIGNIFICANT CHANGE UP (ref 0–8)
GLUCOSE SERPL-MCNC: 86 MG/DL — SIGNIFICANT CHANGE UP (ref 70–99)
HCG SERPL QL: NEGATIVE — SIGNIFICANT CHANGE UP
HCT VFR BLD CALC: 19.7 % — LOW (ref 37–47)
HCT VFR BLD CALC: 23 % — LOW (ref 37–47)
HGB BLD-MCNC: 7.1 G/DL — LOW (ref 12–16)
HGB BLD-MCNC: 8.3 G/DL — LOW (ref 12–16)
IAT COMP-SP REAG SERPL QL: SIGNIFICANT CHANGE UP
IMM GRANULOCYTES NFR BLD AUTO: 0.8 % — HIGH (ref 0.1–0.3)
IMM GRANULOCYTES NFR BLD AUTO: 1.2 % — HIGH (ref 0.1–0.3)
LACTATE SERPL-SCNC: 1.4 MMOL/L — SIGNIFICANT CHANGE UP (ref 0.7–2)
LDH SERPL L TO P-CCNC: 737 — HIGH (ref 50–242)
LYMPHOCYTES # BLD AUTO: 16.7 % — LOW (ref 20.5–51.1)
LYMPHOCYTES # BLD AUTO: 18.1 % — LOW (ref 20.5–51.1)
LYMPHOCYTES # BLD AUTO: 3.78 K/UL — HIGH (ref 1.2–3.4)
LYMPHOCYTES # BLD AUTO: 5.22 K/UL — HIGH (ref 1.2–3.4)
MCHC RBC-ENTMCNC: 33.2 PG — HIGH (ref 27–31)
MCHC RBC-ENTMCNC: 33.6 PG — HIGH (ref 27–31)
MCHC RBC-ENTMCNC: 36 G/DL — SIGNIFICANT CHANGE UP (ref 32–37)
MCHC RBC-ENTMCNC: 36.1 G/DL — SIGNIFICANT CHANGE UP (ref 32–37)
MCV RBC AUTO: 92 FL — SIGNIFICANT CHANGE UP (ref 81–99)
MCV RBC AUTO: 93.4 FL — SIGNIFICANT CHANGE UP (ref 81–99)
MONOCYTES # BLD AUTO: 1.63 K/UL — HIGH (ref 0.1–0.6)
MONOCYTES # BLD AUTO: 3.66 K/UL — HIGH (ref 0.1–0.6)
MONOCYTES NFR BLD AUTO: 12.7 % — HIGH (ref 1.7–9.3)
MONOCYTES NFR BLD AUTO: 7.2 % — SIGNIFICANT CHANGE UP (ref 1.7–9.3)
NEUTROPHILS # BLD AUTO: 16.86 K/UL — HIGH (ref 1.4–6.5)
NEUTROPHILS # BLD AUTO: 19.41 K/UL — HIGH (ref 1.4–6.5)
NEUTROPHILS NFR BLD AUTO: 67.4 % — SIGNIFICANT CHANGE UP (ref 42.2–75.2)
NEUTROPHILS NFR BLD AUTO: 74.8 % — SIGNIFICANT CHANGE UP (ref 42.2–75.2)
NRBC # BLD: 2 /100 WBCS — HIGH (ref 0–0)
NRBC # BLD: 3 /100 WBCS — HIGH (ref 0–0)
NT-PROBNP SERPL-SCNC: 71 PG/ML — SIGNIFICANT CHANGE UP (ref 0–300)
PLATELET # BLD AUTO: 515 K/UL — HIGH (ref 130–400)
PLATELET # BLD AUTO: 518 K/UL — HIGH (ref 130–400)
POTASSIUM SERPL-MCNC: 4.2 MMOL/L — SIGNIFICANT CHANGE UP (ref 3.5–5)
POTASSIUM SERPL-SCNC: 4.2 MMOL/L — SIGNIFICANT CHANGE UP (ref 3.5–5)
PROT SERPL-MCNC: 7.7 G/DL — SIGNIFICANT CHANGE UP (ref 6–8)
RAPID RVP RESULT: SIGNIFICANT CHANGE UP
RBC # BLD: 2.11 M/UL — LOW (ref 4.2–5.4)
RBC # BLD: 2.5 M/UL — LOW (ref 4.2–5.4)
RBC # BLD: 2.5 M/UL — LOW (ref 4.2–5.4)
RBC # FLD: 20 % — HIGH (ref 11.5–14.5)
RBC # FLD: 20.8 % — HIGH (ref 11.5–14.5)
RETICS #: 421.5 K/UL — HIGH (ref 25–125)
RETICS/RBC NFR: 16.9 % — HIGH (ref 0.5–1.5)
SARS-COV-2 RNA SPEC QL NAA+PROBE: SIGNIFICANT CHANGE UP
SODIUM SERPL-SCNC: 138 MMOL/L — SIGNIFICANT CHANGE UP (ref 135–146)
SPECIMEN SOURCE: SIGNIFICANT CHANGE UP
SPECIMEN SOURCE: SIGNIFICANT CHANGE UP
TROPONIN T SERPL-MCNC: <0.01 NG/ML — SIGNIFICANT CHANGE UP
WBC # BLD: 22.57 K/UL — HIGH (ref 4.8–10.8)
WBC # BLD: 28.82 K/UL — HIGH (ref 4.8–10.8)
WBC # FLD AUTO: 22.57 K/UL — HIGH (ref 4.8–10.8)
WBC # FLD AUTO: 28.82 K/UL — HIGH (ref 4.8–10.8)

## 2022-05-20 PROCEDURE — 86077 PHYS BLOOD BANK SERV XMATCH: CPT

## 2022-05-20 PROCEDURE — 99223 1ST HOSP IP/OBS HIGH 75: CPT

## 2022-05-20 PROCEDURE — 71250 CT THORAX DX C-: CPT | Mod: 26

## 2022-05-20 PROCEDURE — 71045 X-RAY EXAM CHEST 1 VIEW: CPT | Mod: 26

## 2022-05-20 PROCEDURE — 93010 ELECTROCARDIOGRAM REPORT: CPT

## 2022-05-20 PROCEDURE — 99291 CRITICAL CARE FIRST HOUR: CPT

## 2022-05-20 RX ORDER — FOLIC ACID 0.8 MG
1 TABLET ORAL
Qty: 0 | Refills: 0 | DISCHARGE

## 2022-05-20 RX ORDER — ACETAMINOPHEN 500 MG
975 TABLET ORAL ONCE
Refills: 0 | Status: COMPLETED | OUTPATIENT
Start: 2022-05-20 | End: 2022-05-20

## 2022-05-20 RX ORDER — FOLIC ACID 0.8 MG
1 TABLET ORAL DAILY
Refills: 0 | Status: DISCONTINUED | OUTPATIENT
Start: 2022-05-20 | End: 2022-05-23

## 2022-05-20 RX ORDER — AZITHROMYCIN 500 MG/1
500 TABLET, FILM COATED ORAL EVERY 24 HOURS
Refills: 0 | Status: DISCONTINUED | OUTPATIENT
Start: 2022-05-21 | End: 2022-05-21

## 2022-05-20 RX ORDER — CEFTRIAXONE 500 MG/1
1000 INJECTION, POWDER, FOR SOLUTION INTRAMUSCULAR; INTRAVENOUS ONCE
Refills: 0 | Status: COMPLETED | OUTPATIENT
Start: 2022-05-20 | End: 2022-05-20

## 2022-05-20 RX ORDER — SODIUM CHLORIDE 9 MG/ML
500 INJECTION, SOLUTION INTRAVENOUS ONCE
Refills: 0 | Status: COMPLETED | OUTPATIENT
Start: 2022-05-20 | End: 2022-05-20

## 2022-05-20 RX ORDER — ENOXAPARIN SODIUM 100 MG/ML
40 INJECTION SUBCUTANEOUS EVERY 24 HOURS
Refills: 0 | Status: DISCONTINUED | OUTPATIENT
Start: 2022-05-20 | End: 2022-05-23

## 2022-05-20 RX ORDER — KETOROLAC TROMETHAMINE 30 MG/ML
15 SYRINGE (ML) INJECTION ONCE
Refills: 0 | Status: DISCONTINUED | OUTPATIENT
Start: 2022-05-20 | End: 2022-05-20

## 2022-05-20 RX ORDER — SODIUM CHLORIDE 9 MG/ML
1000 INJECTION, SOLUTION INTRAVENOUS
Refills: 0 | Status: DISCONTINUED | OUTPATIENT
Start: 2022-05-20 | End: 2022-05-23

## 2022-05-20 RX ORDER — AZITHROMYCIN 500 MG/1
500 TABLET, FILM COATED ORAL ONCE
Refills: 0 | Status: COMPLETED | OUTPATIENT
Start: 2022-05-20 | End: 2022-05-20

## 2022-05-20 RX ORDER — LOSARTAN POTASSIUM 100 MG/1
1 TABLET, FILM COATED ORAL
Qty: 0 | Refills: 0 | DISCHARGE

## 2022-05-20 RX ORDER — ACETAMINOPHEN 500 MG
650 TABLET ORAL EVERY 6 HOURS
Refills: 0 | Status: DISCONTINUED | OUTPATIENT
Start: 2022-05-20 | End: 2022-05-23

## 2022-05-20 RX ORDER — CEFTRIAXONE 500 MG/1
1000 INJECTION, POWDER, FOR SOLUTION INTRAMUSCULAR; INTRAVENOUS EVERY 24 HOURS
Refills: 0 | Status: DISCONTINUED | OUTPATIENT
Start: 2022-05-21 | End: 2022-05-23

## 2022-05-20 RX ORDER — ONDANSETRON 8 MG/1
4 TABLET, FILM COATED ORAL ONCE
Refills: 0 | Status: COMPLETED | OUTPATIENT
Start: 2022-05-20 | End: 2022-05-20

## 2022-05-20 RX ADMIN — SODIUM CHLORIDE 500 MILLILITER(S): 9 INJECTION, SOLUTION INTRAVENOUS at 16:48

## 2022-05-20 RX ADMIN — Medication 15 MILLIGRAM(S): at 13:59

## 2022-05-20 RX ADMIN — Medication 975 MILLIGRAM(S): at 13:59

## 2022-05-20 RX ADMIN — CEFTRIAXONE 100 MILLIGRAM(S): 500 INJECTION, POWDER, FOR SOLUTION INTRAMUSCULAR; INTRAVENOUS at 13:58

## 2022-05-20 RX ADMIN — SODIUM CHLORIDE 100 MILLILITER(S): 9 INJECTION, SOLUTION INTRAVENOUS at 23:08

## 2022-05-20 RX ADMIN — AZITHROMYCIN 255 MILLIGRAM(S): 500 TABLET, FILM COATED ORAL at 13:58

## 2022-05-20 NOTE — ED ADULT NURSE NOTE - OBJECTIVE STATEMENT
Patient came in with complains of generalized chest pain started few days ago. Patient denies dizziness, nausea vomiting and SOB at this time. Patient also complains of fever chills started few days ago.

## 2022-05-20 NOTE — ED PROVIDER NOTE - OBJECTIVE STATEMENT
Patient is a 25 yo female with PMHx of sickle cell disease c/o chest pain and fever for past couple of days. Patient has had fever up to 103F for the past couple of days, taking tylenol without relief. Also c/o of sharp, midsternal, constant chest pain associated with some SOB. denies chills, cough, abdominal pain, n/v/d. Admitted a couple of months ago for acute chest syndrome. Hematologist in [place] Dr. Jimenez.

## 2022-05-20 NOTE — ED PROVIDER NOTE - NS ED ROS FT
Review of Systems:  •	CONSTITUTIONAL - + fever, No diaphoresis, No weight change  •	SKIN - No rash  •	HEMATOLOGIC - No abnormal bleeding or bruising  •	EYES - No eye pain, No blurred vision  •	ENT - No change in hearing, No sore throat, No neck pain, No rhinorrhea, No ear pain  •	RESPIRATORY - + shortness of breath, No cough  •	CARDIAC -+ chest pain, No palpitations  •	GI - No abdominal pain, No nausea, No vomiting, No diarrhea, No constipation, No bright red blood per rectum or melena. No flank pain  •                 - No dysuria, frequency, hematuria.   •	ENDO - No polydypsia, No polyuria, No heat/cold intolerance  •	MUSCULOSKELETAL - No joint paint, No swelling, No back pain  •	NEUROLOGIC - No numbness, No focal weakness, No headache, No dizziness  All other systems negative, unless specified in HPI

## 2022-05-20 NOTE — H&P ADULT - ASSESSMENT
26 year old F with a PMHx of sickle cell disease on obryxta (last use was one week ago), cholelithiasis s/p CCY in 2013, presents to the ED for a two day history of abdominal and chest pain.    IMPRESSION:  LLL infiltrate  Acute Chest Syndrome  Sickle Cell Disease Crisis   Probable LLL CAP      PLAN:    CNS: pain control w/tylenol, if no relief, then can try keterolac, then if not relieving pain, can escalate to opioids.     HEENT: Oral care    PULMONARY:  HOB @ 45 degrees.  Aspiration precautions. Target SaO2 >92%. CXR showes left lower lobe infiltrate, check D-Dimer. CT Chest NC    CARDIOVASCULAR: keep euvolemic, D51/2NS @150cc/hr    GI: GI prophylaxis.  Feeding as tolerated.      RENAL:  Follow up lytes.  Correct as needed    INFECTIOUS DISEASE: Follow up cultures, continue Ceftriaxone, check procalcitonin, continue ceftriaxone and azithromycin    HEMATOLOGICAL:  DVT prophylaxis. Heme evaluation: Holding off on exchange now as patient is stable and not hypoxic or in distress, follow up LDH.   -    ENDOCRINE:  Follow up FS.  Insulin protocol if needed.    MUSCULOSKELETAL: bedrest    MICU monitoring

## 2022-05-20 NOTE — H&P ADULT - NSHPLABSRESULTS_GEN_ALL_CORE
CBC Full  -  ( 20 May 2022 13:22 )  WBC Count : 22.57 K/uL  RBC Count : 2.50 M/uL  Hemoglobin : 8.3 g/dL  Hematocrit : 23.0 %  Platelet Count - Automated : 515 K/uL  Mean Cell Volume : 92.0 fL  Mean Cell Hemoglobin : 33.2 pg  Mean Cell Hemoglobin Concentration : 36.1 g/dL  Auto Neutrophil # : 16.86 K/uL  Auto Lymphocyte # : 3.78 K/uL  Auto Monocyte # : 1.63 K/uL  Auto Eosinophil # : 0.02 K/uL  Auto Basophil # : 0.10 K/uL  Auto Neutrophil % : 74.8 %  Auto Lymphocyte % : 16.7 %  Auto Monocyte % : 7.2 %  Auto Eosinophil % : 0.1 %  Auto Basophil % : 0.4 %  05-20    138  |  103  |  <3<L>  ----------------------------<  86  4.2   |  21  |  <0.5<L>    Ca    9.2      20 May 2022 13:22    TPro  7.7  /  Alb  4.1  /  TBili  4.0<H>  /  DBili  x   /  AST  53<H>  /  ALT  24  /  AlkPhos  57  05-20  < from: Xray Chest 1 View- PORTABLE-Urgent (05.20.22 @ 13:47) >      Impression:    Patchy bilateral opacifications, worse within the left lower lung field.    < end of copied text >

## 2022-05-20 NOTE — ED PROVIDER NOTE - NS ED MD DISPO ISOLATION TYPES
41yo F with RLQ pain, no s/s of appendicitis    - C/W regular diet  - Imaging for dysmotility as per GI  - Rifaxamin as per GI  - Possible transfer to a non-surgical service  - OOB/IS no difficulties None

## 2022-05-20 NOTE — ED PROVIDER NOTE - ACUTE OR EVOLVING MI?
[Dear  ___] : Dear  [unfilled], [Consult Letter:] : I had the pleasure of evaluating your patient, [unfilled]. no [Please see my note below.] : Please see my note below. [Consult Closing:] : Thank you very much for allowing me to participate in the care of this patient.  If you have any questions, please do not hesitate to contact me. [Sincerely,] : Sincerely, [FreeTextEntry2] : SIMEON HERNÁNDEZ\par  [FreeTextEntry3] : Leonardo Farrell MD\par Chief of Joint Reconstruction\par Primary / Revision Hip & Knee Replacement\par [facility]

## 2022-05-20 NOTE — ED PROVIDER NOTE - CLINICAL SUMMARY MEDICAL DECISION MAKING FREE TEXT BOX
26 y.o. F, PMH of Sickle cell anemia (on Oxbryta - followed by Hematologist - Dr. Jimenez in [place]), recently discharged after treatment of pneumonia and sickle cell crisis, presented to the ED with c/o right sided chest, shoulder and upper back pain, fever and cough. States that she overexerted herself. She states that she has not been feeling well since yesterday. Denies any shortness of breath but it is painful to take a deep breath. No abdominal pain, nausea, vomiting, diarrhea, leg pain/swelling. On exam, pt in NAD, AAOx3, head NC/AT, CN II-XII intact, lungs rales B/L, CV S1S2 regular, abdomen soft/NT/ND/(+)BS, ext (-) edema, motor 5/5x4, sensation intact. Labs/XR reviewed. Pt with pneumonia, SS crisis and concern for acute chest. Heme/onc aware, will evaluate pt. Will admit pt to ICU.

## 2022-05-20 NOTE — CONSULT NOTE ADULT - ATTENDING COMMENTS
The patient was seen. Agree with above.  27 yo female with Hgb SS disease presented with chest pain and fever. CXR shows b/l patchy opacities.   O2 Sat 96% on 2% O2.    -- Started on ABx with Ceftriaxone and Azithromycin.   -- Supportive care and O2 support.   -- IVF for hydration.   -- Folic acid  -- No indication for exchange transfusion at this time. Will close monitor.

## 2022-05-20 NOTE — ED PROVIDER NOTE - PHYSICAL EXAMINATION
CONSTITUTIONAL: in no acute distress, laying on the bed  SKIN: warm, dry  HEAD: Normocephalic; atraumatic.  EYES: no conjunctival injection. PERRL.   ENT: No nasal discharge; airway clear.  NECK: Supple; non tender.  CARD: S1, S2 normal; no murmurs, gallops, or rubs. Tachycardic and regular rhythm.   RESP: No respiratory distress. B/l rales, no wheezing or rhonchi.  ABD: soft ntnd  EXT: Normal ROM.  No clubbing, cyanosis or edema.   LYMPH: No acute cervical adenopathy.  NEURO: Alert, oriented, grossly unremarkable  PSYCH: Cooperative, appropriate.

## 2022-05-20 NOTE — PATIENT PROFILE ADULT - FALL HARM RISK - UNIVERSAL INTERVENTIONS
Bed in lowest position, wheels locked, appropriate side rails in place/Call bell, personal items and telephone in reach/Instruct patient to call for assistance before getting out of bed or chair/Non-slip footwear when patient is out of bed/[place] to call system/Physically safe environment - no spills, clutter or unnecessary equipment/Purposeful Proactive Rounding/Room/bathroom lighting operational, light cord in reach

## 2022-05-20 NOTE — ED ADULT NURSE NOTE - NSIMPLEMENTINTERV_GEN_ALL_ED
Implemented All Universal Safety Interventions:  [place] to call system. Call bell, personal items and telephone within reach. Instruct patient to call for assistance. Room bathroom lighting operational. Non-slip footwear when patient is off stretcher. Physically safe environment: no spills, clutter or unnecessary equipment. Stretcher in lowest position, wheels locked, appropriate side rails in place.

## 2022-05-20 NOTE — H&P ADULT - HISTORY OF PRESENT ILLNESS
This is a 26 year old F with a PMHx of SCD  This is a 26 year old F with a PMHx of sickle cell disease on obryxta (last use was one week ago), cholelithiasis s/p CCY in 2013, presents to the ED for a two day history of abdominal and chest pain.  Presentation dates back to 5/18/2022 when patient first endorsed epigastric pain that was sharp in nature, which then radiated to the right shoulder and then to her entire back.  She did not endorse fever, rigors, shortness or chest pain during this time.  On 5/19/22, patient endorsed sharp pain across her entire chest that was exacerbated by inspiration. She did not endorse shortness of breath during the entire presentation.  She also endorsed a 103F prior to admission w/rigors.  She denies nausea, vomiting, headache, dizziness, joint pains, urinary symptoms.     ICU Vital Signs Last 24 Hrs  T(C): 39.3 (20 May 2022 11:14), Max: 39.3 (20 May 2022 11:14)  T(F): 102.7 (20 May 2022 11:14), Max: 102.7 (20 May 2022 11:14)  HR: 122 (20 May 2022 11:14) (122 - 122)  BP: 122/60 (20 May 2022 11:14) (122/60 - 122/60)  BP(mean): --  ABP: --  ABP(mean): --  RR: 18 (20 May 2022 11:14) (18 - 18)  SpO2: 91% (20 May 2022 11:14) (91% - 91%)    In the ED: Patient was found to be tachycardic. WBC 22K, Hgb 8.3, Reticulocyte % 16.3, CMP does not demonstrate electrolyte abnormalities, Trop -ve x1, RVP -ve.  CXR demonstrated bilateral infiltrates. Patient was found to have a SpO2 of 90% on RA which improved to 94% on 2L of O2. Received 500cc of LR, 1x dose of Rocephin and Azithromycin.

## 2022-05-20 NOTE — CONSULT NOTE ADULT - ASSESSMENT
IMPRESSION:    Acute Chest Syndrome  Sickle Cell Disease Crisis       PLAN:    CNS: pain control     HEENT: Oral care    PULMONARY:  HOB @ 45 degrees.  Aspiration precautions. Target SaO2 >92%    CARDIOVASCULAR: keep euvolemic, D51/2NS @150cc/hr    GI: GI prophylaxis.  Feeding as tolerated.  Bowel regimen     RENAL:  Follow up lytes.  Correct as needed    INFECTIOUS DISEASE: Follow up cultures, continue Ceftriaxone, check procalcitonin    HEMATOLOGICAL:  DVT prophylaxis. Heme evaluation for need of RBC exchange     ENDOCRINE:  Follow up FS.  Insulin protocol if needed.    MUSCULOSKELETAL: bedrest    MICU monitoring          IMPRESSION:    Acute Chest Syndrome  Sickle Cell Disease Crisis       PLAN:    CNS: pain control     HEENT: Oral care    PULMONARY:  HOB @ 45 degrees.  Aspiration precautions. Target SaO2 >92%. check D-Dimer    CARDIOVASCULAR: keep euvolemic, D51/2NS @150cc/hr    GI: GI prophylaxis.  Feeding as tolerated.  Bowel regimen     RENAL:  Follow up lytes.  Correct as needed    INFECTIOUS DISEASE: Follow up cultures, continue Ceftriaxone, check procalcitonin    HEMATOLOGICAL:  DVT prophylaxis. Heme evaluation for need of RBC exchange     ENDOCRINE:  Follow up FS.  Insulin protocol if needed.    MUSCULOSKELETAL: bedrest    MICU monitoring          IMPRESSION:    Acute Chest Syndrome  Sickle Cell Disease Crisis   Probable LLL CAP      PLAN:    CNS: pain control     HEENT: Oral care    PULMONARY:  HOB @ 45 degrees.  Aspiration precautions. Target SaO2 >92%. check D-Dimer. CT Chest NC    CARDIOVASCULAR: keep euvolemic, D51/2NS @150cc/hr    GI: GI prophylaxis.  Feeding as tolerated.  Bowel regimen     RENAL:  Follow up lytes.  Correct as needed    INFECTIOUS DISEASE: Follow up cultures, continue Ceftriaxone, check procalcitonin, continue ceftriaxone and azithromycin    HEMATOLOGICAL:  DVT prophylaxis. Heme evaluation for need of RBC exchange     ENDOCRINE:  Follow up FS.  Insulin protocol if needed.    MUSCULOSKELETAL: bedrest    MICU monitoring          IMPRESSION:  LLL infiltrate  Acute Chest Syndrome  Sickle Cell Disease Crisis   Probable LLL CAP      PLAN:    CNS: pain control     HEENT: Oral care    PULMONARY:  HOB @ 45 degrees.  Aspiration precautions. Target SaO2 >92%. check D-Dimer. CT Chest NC    CARDIOVASCULAR: keep euvolemic, D51/2NS @150cc/hr    GI: GI prophylaxis.  Feeding as tolerated.  Bowel regimen     RENAL:  Follow up lytes.  Correct as needed    INFECTIOUS DISEASE: Follow up cultures, continue Ceftriaxone, check procalcitonin, continue ceftriaxone and azithromycin    HEMATOLOGICAL:  DVT prophylaxis. Heme evaluation for need of RBC exchange     ENDOCRINE:  Follow up FS.  Insulin protocol if needed.    MUSCULOSKELETAL: bedrest    MICU monitoring

## 2022-05-20 NOTE — CONSULT NOTE ADULT - ASSESSMENT
27 yo F with PMHx of Hgb SS disease and cholecystitis s/p cholecystectomy (2013) presented to ED complaining of chest pain and fever for past couple of days. In ED, pt had a fever of 102.7 and CXR shows b/l patchy opacities. Hematologist is in [place] - Dr. Jimenez.    Hematology consulted for suspected acute chest syndrome.     # r/o acute chest syndrome  # Hgb SS disease  - r/o infection   - CXR shows patchy bilateral opacification  - start abx with ceftriaxone and azithro 27 yo F with PMHx of Hgb SS disease and cholecystitis s/p cholecystectomy (2013) presented to ED complaining of chest pain and fever for past couple of days. In ED, pt had a fever of 102.7 and CXR shows b/l patchy opacities. Hematologist is in [place] - Dr. Jimenez.    Hematology consulted for suspected acute chest syndrome.     # r/o acute chest syndrome  # Hgb SS disease  - r/o infection   - CXR shows patchy bilateral opacification  - hgb baseline of 7-8  - reticulocytes elevated from baseline likely representing sickling  - leukocytosis and thrombocytosis likely reactive from sickle cell crisis     Plan:  - obtain panculture  - f/u COVID PCR testing   - obtain LDH  - c/w folic acid supplement  - adequate pain control  - start abx with ceftriaxone and azithro  - start gentle IV 0.45% NS at 100 cc/hr and encourage PO hydration   - pt is 2L NC and spO2 of 96%, resting comfortably. Will hold off RBC exchange for now  - please notify heme/onc stat if there is increase demand of O2     Will follow.

## 2022-05-20 NOTE — ED ADULT TRIAGE NOTE - CHIEF COMPLAINT QUOTE
pt co chest pain with deep breathes starting Wednesday and fever today tmax 103.   last week was here for bacterial infection generalized body. took ABx.

## 2022-05-20 NOTE — ED PROVIDER NOTE - ATTENDING CONTRIBUTION TO CARE
26 y.o. F, PMH of Sickle cell anemia (on Oxbryta - followed by Hematologist - Dr. Jimenez in [place]), recently discharged after treatment of pneumonia and sickle cell crisis, presented to the ED with c/o right sided chest, shoulder and upper back pain, fever and cough. States that she overexerted herself. She states that she has not been feeling well since yesterday. Denies any shortness of breath but it is painful to take a deep breath. No abdominal pain, nausea, vomiting, diarrhea, leg pain/swelling. On exam, pt in NAD, AAOx3, head NC/AT, CN II-XII intact, lungs rales B/L, CV S1S2 regular, abdomen soft/NT/ND/(+)BS, ext (-) edema, motor 5/5x4, sensation intact. Labs/XR reviewed. Pt with pneumonia, SS crisis and concern for acute chest. Heme/onc aware, will evaluate pt. Will admit pt to ICU.

## 2022-05-21 LAB
ALBUMIN SERPL ELPH-MCNC: 3.4 G/DL — LOW (ref 3.5–5.2)
ALP SERPL-CCNC: 46 U/L — SIGNIFICANT CHANGE UP (ref 30–115)
ALT FLD-CCNC: 19 U/L — SIGNIFICANT CHANGE UP (ref 0–41)
ANION GAP SERPL CALC-SCNC: 10 MMOL/L — SIGNIFICANT CHANGE UP (ref 7–14)
AST SERPL-CCNC: 36 U/L — SIGNIFICANT CHANGE UP (ref 0–41)
BASOPHILS # BLD AUTO: 0.1 K/UL — SIGNIFICANT CHANGE UP (ref 0–0.2)
BASOPHILS # BLD AUTO: 0.12 K/UL — SIGNIFICANT CHANGE UP (ref 0–0.2)
BASOPHILS NFR BLD AUTO: 0.4 % — SIGNIFICANT CHANGE UP (ref 0–1)
BASOPHILS NFR BLD AUTO: 0.4 % — SIGNIFICANT CHANGE UP (ref 0–1)
BILIRUB SERPL-MCNC: 2.5 MG/DL — HIGH (ref 0.2–1.2)
BUN SERPL-MCNC: <3 MG/DL — LOW (ref 10–20)
CALCIUM SERPL-MCNC: 8.4 MG/DL — LOW (ref 8.5–10.1)
CHLORIDE SERPL-SCNC: 104 MMOL/L — SIGNIFICANT CHANGE UP (ref 98–110)
CO2 SERPL-SCNC: 24 MMOL/L — SIGNIFICANT CHANGE UP (ref 17–32)
CREAT SERPL-MCNC: <0.5 MG/DL — LOW (ref 0.7–1.5)
EGFR: 142 ML/MIN/1.73M2 — SIGNIFICANT CHANGE UP
EOSINOPHIL # BLD AUTO: 0.02 K/UL — SIGNIFICANT CHANGE UP (ref 0–0.7)
EOSINOPHIL # BLD AUTO: 0.12 K/UL — SIGNIFICANT CHANGE UP (ref 0–0.7)
EOSINOPHIL NFR BLD AUTO: 0.1 % — SIGNIFICANT CHANGE UP (ref 0–8)
EOSINOPHIL NFR BLD AUTO: 0.4 % — SIGNIFICANT CHANGE UP (ref 0–8)
GLUCOSE SERPL-MCNC: 99 MG/DL — SIGNIFICANT CHANGE UP (ref 70–99)
HCT VFR BLD CALC: 19.7 % — LOW (ref 37–47)
HCT VFR BLD CALC: 20.5 % — LOW (ref 37–47)
HGB BLD-MCNC: 7 G/DL — LOW (ref 12–16)
HGB BLD-MCNC: 7.4 G/DL — LOW (ref 12–16)
IMM GRANULOCYTES NFR BLD AUTO: 1 % — HIGH (ref 0.1–0.3)
IMM GRANULOCYTES NFR BLD AUTO: 1.1 % — HIGH (ref 0.1–0.3)
LDH SERPL L TO P-CCNC: 713 — HIGH (ref 50–242)
LYMPHOCYTES # BLD AUTO: 16.4 % — LOW (ref 20.5–51.1)
LYMPHOCYTES # BLD AUTO: 17.9 % — LOW (ref 20.5–51.1)
LYMPHOCYTES # BLD AUTO: 4.62 K/UL — HIGH (ref 1.2–3.4)
LYMPHOCYTES # BLD AUTO: 5.59 K/UL — HIGH (ref 1.2–3.4)
MAGNESIUM SERPL-MCNC: 1.6 MG/DL — LOW (ref 1.8–2.4)
MCHC RBC-ENTMCNC: 32.9 PG — HIGH (ref 27–31)
MCHC RBC-ENTMCNC: 33.5 PG — HIGH (ref 27–31)
MCHC RBC-ENTMCNC: 35.5 G/DL — SIGNIFICANT CHANGE UP (ref 32–37)
MCHC RBC-ENTMCNC: 36.1 G/DL — SIGNIFICANT CHANGE UP (ref 32–37)
MCV RBC AUTO: 92.5 FL — SIGNIFICANT CHANGE UP (ref 81–99)
MCV RBC AUTO: 92.8 FL — SIGNIFICANT CHANGE UP (ref 81–99)
MONOCYTES # BLD AUTO: 3.59 K/UL — HIGH (ref 0.1–0.6)
MONOCYTES # BLD AUTO: 3.68 K/UL — HIGH (ref 0.1–0.6)
MONOCYTES NFR BLD AUTO: 11.5 % — HIGH (ref 1.7–9.3)
MONOCYTES NFR BLD AUTO: 13.1 % — HIGH (ref 1.7–9.3)
NEUTROPHILS # BLD AUTO: 19.34 K/UL — HIGH (ref 1.4–6.5)
NEUTROPHILS # BLD AUTO: 21.53 K/UL — HIGH (ref 1.4–6.5)
NEUTROPHILS NFR BLD AUTO: 68.7 % — SIGNIFICANT CHANGE UP (ref 42.2–75.2)
NEUTROPHILS NFR BLD AUTO: 69 % — SIGNIFICANT CHANGE UP (ref 42.2–75.2)
NRBC # BLD: 1 /100 WBCS — HIGH (ref 0–0)
NRBC # BLD: 2 /100 WBCS — HIGH (ref 0–0)
PHOSPHATE SERPL-MCNC: 2.8 MG/DL — SIGNIFICANT CHANGE UP (ref 2.1–4.9)
PLATELET # BLD AUTO: 544 K/UL — HIGH (ref 130–400)
PLATELET # BLD AUTO: 555 K/UL — HIGH (ref 130–400)
POTASSIUM SERPL-MCNC: 3.4 MMOL/L — LOW (ref 3.5–5)
POTASSIUM SERPL-SCNC: 3.4 MMOL/L — LOW (ref 3.5–5)
PROT SERPL-MCNC: 6.4 G/DL — SIGNIFICANT CHANGE UP (ref 6–8)
RBC # BLD: 2.13 M/UL — LOW (ref 4.2–5.4)
RBC # BLD: 2.21 M/UL — LOW (ref 4.2–5.4)
RBC # FLD: 18.8 % — HIGH (ref 11.5–14.5)
RBC # FLD: 18.9 % — HIGH (ref 11.5–14.5)
SODIUM SERPL-SCNC: 138 MMOL/L — SIGNIFICANT CHANGE UP (ref 135–146)
WBC # BLD: 28.15 K/UL — HIGH (ref 4.8–10.8)
WBC # BLD: 31.2 K/UL — HIGH (ref 4.8–10.8)
WBC # FLD AUTO: 28.15 K/UL — HIGH (ref 4.8–10.8)
WBC # FLD AUTO: 31.2 K/UL — HIGH (ref 4.8–10.8)

## 2022-05-21 PROCEDURE — 83020 HEMOGLOBIN ELECTROPHORESIS: CPT | Mod: 26

## 2022-05-21 PROCEDURE — 99232 SBSQ HOSP IP/OBS MODERATE 35: CPT

## 2022-05-21 PROCEDURE — 99233 SBSQ HOSP IP/OBS HIGH 50: CPT

## 2022-05-21 RX ORDER — IBUPROFEN 200 MG
400 TABLET ORAL ONCE
Refills: 0 | Status: COMPLETED | OUTPATIENT
Start: 2022-05-21 | End: 2022-05-21

## 2022-05-21 RX ORDER — ACETAMINOPHEN 500 MG
650 TABLET ORAL ONCE
Refills: 0 | Status: COMPLETED | OUTPATIENT
Start: 2022-05-21 | End: 2022-05-21

## 2022-05-21 RX ORDER — KETOROLAC TROMETHAMINE 30 MG/ML
15 SYRINGE (ML) INJECTION ONCE
Refills: 0 | Status: DISCONTINUED | OUTPATIENT
Start: 2022-05-21 | End: 2022-05-21

## 2022-05-21 RX ORDER — CHLORHEXIDINE GLUCONATE 213 G/1000ML
1 SOLUTION TOPICAL
Refills: 0 | Status: DISCONTINUED | OUTPATIENT
Start: 2022-05-21 | End: 2022-05-23

## 2022-05-21 RX ORDER — PANTOPRAZOLE SODIUM 20 MG/1
40 TABLET, DELAYED RELEASE ORAL
Refills: 0 | Status: DISCONTINUED | OUTPATIENT
Start: 2022-05-21 | End: 2022-05-23

## 2022-05-21 RX ORDER — MORPHINE SULFATE 50 MG/1
2 CAPSULE, EXTENDED RELEASE ORAL EVERY 4 HOURS
Refills: 0 | Status: DISCONTINUED | OUTPATIENT
Start: 2022-05-21 | End: 2022-05-23

## 2022-05-21 RX ADMIN — CEFTRIAXONE 100 MILLIGRAM(S): 500 INJECTION, POWDER, FOR SOLUTION INTRAMUSCULAR; INTRAVENOUS at 08:50

## 2022-05-21 RX ADMIN — Medication 650 MILLIGRAM(S): at 03:19

## 2022-05-21 RX ADMIN — CHLORHEXIDINE GLUCONATE 1 APPLICATION(S): 213 SOLUTION TOPICAL at 06:26

## 2022-05-21 RX ADMIN — Medication 650 MILLIGRAM(S): at 12:53

## 2022-05-21 RX ADMIN — Medication 650 MILLIGRAM(S): at 13:30

## 2022-05-21 RX ADMIN — Medication 650 MILLIGRAM(S): at 08:50

## 2022-05-21 RX ADMIN — MORPHINE SULFATE 2 MILLIGRAM(S): 50 CAPSULE, EXTENDED RELEASE ORAL at 08:50

## 2022-05-21 RX ADMIN — Medication 400 MILLIGRAM(S): at 14:47

## 2022-05-21 RX ADMIN — Medication 400 MILLIGRAM(S): at 14:20

## 2022-05-21 RX ADMIN — Medication 650 MILLIGRAM(S): at 09:20

## 2022-05-21 RX ADMIN — Medication 1 MILLIGRAM(S): at 12:13

## 2022-05-21 RX ADMIN — ENOXAPARIN SODIUM 40 MILLIGRAM(S): 100 INJECTION SUBCUTANEOUS at 06:27

## 2022-05-21 RX ADMIN — Medication 650 MILLIGRAM(S): at 05:00

## 2022-05-21 RX ADMIN — MORPHINE SULFATE 2 MILLIGRAM(S): 50 CAPSULE, EXTENDED RELEASE ORAL at 09:20

## 2022-05-21 NOTE — PROVIDER CONTACT NOTE (CHANGE IN STATUS NOTIFICATION) - NAME OF MD/NP/PA/DO NOTIFIED:
Dr Regis Perry
77yF w/pmhx CKD, HTN, gout, hx spinal fusion p/w left arm weakness x 8am.  Was code stroke in the ED, CT head w/o acute findings, refused CTa due to renal disease. In main ED labs with Cr 2.12, CT head w/o acute findings, neuro evaluated pt is now back at baseline  Sent to CDU for MRI brain/c-spine, echo and US carotid dopplers

## 2022-05-22 LAB
ALBUMIN SERPL ELPH-MCNC: 3.3 G/DL — LOW (ref 3.5–5.2)
ALP SERPL-CCNC: 58 U/L — SIGNIFICANT CHANGE UP (ref 30–115)
ALT FLD-CCNC: 18 U/L — SIGNIFICANT CHANGE UP (ref 0–41)
ANION GAP SERPL CALC-SCNC: 15 MMOL/L — HIGH (ref 7–14)
AST SERPL-CCNC: 32 U/L — SIGNIFICANT CHANGE UP (ref 0–41)
BILIRUB SERPL-MCNC: 4.2 MG/DL — HIGH (ref 0.2–1.2)
BUN SERPL-MCNC: <3 MG/DL — LOW (ref 10–20)
CALCIUM SERPL-MCNC: 8.3 MG/DL — LOW (ref 8.5–10.1)
CHLORIDE SERPL-SCNC: 104 MMOL/L — SIGNIFICANT CHANGE UP (ref 98–110)
CO2 SERPL-SCNC: 19 MMOL/L — SIGNIFICANT CHANGE UP (ref 17–32)
CREAT SERPL-MCNC: <0.5 MG/DL — LOW (ref 0.7–1.5)
EGFR: 152 ML/MIN/1.73M2 — SIGNIFICANT CHANGE UP
GLUCOSE SERPL-MCNC: 80 MG/DL — SIGNIFICANT CHANGE UP (ref 70–99)
HAPTOGLOB SERPL-MCNC: <20 MG/DL — LOW (ref 34–200)
HCT VFR BLD CALC: 18.5 % — LOW (ref 37–47)
HCT VFR BLD CALC: 19.2 % — LOW (ref 37–47)
HGB BLD-MCNC: 6.2 G/DL — CRITICAL LOW (ref 12–16)
HGB BLD-MCNC: 6.5 G/DL — CRITICAL LOW (ref 12–16)
LDH SERPL L TO P-CCNC: 650 — HIGH (ref 50–242)
MAGNESIUM SERPL-MCNC: 1.5 MG/DL — LOW (ref 1.8–2.4)
MCHC RBC-ENTMCNC: 31.8 PG — HIGH (ref 27–31)
MCHC RBC-ENTMCNC: 32.3 PG — HIGH (ref 27–31)
MCHC RBC-ENTMCNC: 33.5 G/DL — SIGNIFICANT CHANGE UP (ref 32–37)
MCHC RBC-ENTMCNC: 33.9 G/DL — SIGNIFICANT CHANGE UP (ref 32–37)
MCV RBC AUTO: 94.9 FL — SIGNIFICANT CHANGE UP (ref 81–99)
MCV RBC AUTO: 95.5 FL — SIGNIFICANT CHANGE UP (ref 81–99)
NRBC # BLD: 1 /100 WBCS — HIGH (ref 0–0)
NRBC # BLD: 1 /100 WBCS — HIGH (ref 0–0)
PLATELET # BLD AUTO: 613 K/UL — HIGH (ref 130–400)
PLATELET # BLD AUTO: 615 K/UL — HIGH (ref 130–400)
POTASSIUM SERPL-MCNC: 3.6 MMOL/L — SIGNIFICANT CHANGE UP (ref 3.5–5)
POTASSIUM SERPL-SCNC: 3.6 MMOL/L — SIGNIFICANT CHANGE UP (ref 3.5–5)
PROCALCITONIN SERPL-MCNC: 0.09 NG/ML — SIGNIFICANT CHANGE UP (ref 0.02–0.1)
PROT SERPL-MCNC: 6.5 G/DL — SIGNIFICANT CHANGE UP (ref 6–8)
RBC # BLD: 1.95 M/UL — LOW (ref 4.2–5.4)
RBC # BLD: 2.01 M/UL — LOW (ref 4.2–5.4)
RBC # BLD: 2.01 M/UL — LOW (ref 4.2–5.4)
RBC # FLD: 18.2 % — HIGH (ref 11.5–14.5)
RBC # FLD: 18.3 % — HIGH (ref 11.5–14.5)
RETICS #: 263.6 K/UL — HIGH (ref 25–125)
RETICS/RBC NFR: 13.6 % — HIGH (ref 0.5–1.5)
SODIUM SERPL-SCNC: 138 MMOL/L — SIGNIFICANT CHANGE UP (ref 135–146)
WBC # BLD: 22.46 K/UL — HIGH (ref 4.8–10.8)
WBC # BLD: 24.61 K/UL — HIGH (ref 4.8–10.8)
WBC # FLD AUTO: 22.46 K/UL — HIGH (ref 4.8–10.8)
WBC # FLD AUTO: 24.61 K/UL — HIGH (ref 4.8–10.8)

## 2022-05-22 PROCEDURE — 99232 SBSQ HOSP IP/OBS MODERATE 35: CPT

## 2022-05-22 PROCEDURE — 99233 SBSQ HOSP IP/OBS HIGH 50: CPT

## 2022-05-22 RX ORDER — SODIUM CHLORIDE 9 MG/ML
1000 INJECTION INTRAMUSCULAR; INTRAVENOUS; SUBCUTANEOUS ONCE
Refills: 0 | Status: COMPLETED | OUTPATIENT
Start: 2022-05-22 | End: 2022-05-22

## 2022-05-22 RX ORDER — MAGNESIUM SULFATE 500 MG/ML
2 VIAL (ML) INJECTION ONCE
Refills: 0 | Status: COMPLETED | OUTPATIENT
Start: 2022-05-22 | End: 2022-05-22

## 2022-05-22 RX ADMIN — CHLORHEXIDINE GLUCONATE 1 APPLICATION(S): 213 SOLUTION TOPICAL at 05:29

## 2022-05-22 RX ADMIN — Medication 1 MILLIGRAM(S): at 11:39

## 2022-05-22 RX ADMIN — SODIUM CHLORIDE 100 MILLILITER(S): 9 INJECTION, SOLUTION INTRAVENOUS at 02:42

## 2022-05-22 RX ADMIN — Medication 650 MILLIGRAM(S): at 14:13

## 2022-05-22 RX ADMIN — CEFTRIAXONE 100 MILLIGRAM(S): 500 INJECTION, POWDER, FOR SOLUTION INTRAMUSCULAR; INTRAVENOUS at 09:13

## 2022-05-22 RX ADMIN — Medication 650 MILLIGRAM(S): at 14:43

## 2022-05-22 RX ADMIN — ENOXAPARIN SODIUM 40 MILLIGRAM(S): 100 INJECTION SUBCUTANEOUS at 05:29

## 2022-05-22 RX ADMIN — SODIUM CHLORIDE 1000 MILLILITER(S): 9 INJECTION INTRAMUSCULAR; INTRAVENOUS; SUBCUTANEOUS at 03:57

## 2022-05-22 RX ADMIN — Medication 650 MILLIGRAM(S): at 03:37

## 2022-05-22 NOTE — CHART NOTE - NSCHARTNOTEFT_GEN_A_CORE
patient Hgb = 6.2   despite only 3x lifetime transfusions she has warm antibodies against blood  Spoke w Blood Bank Director Lisa Burroughs who stated that it is still ok to give blood slowly over 4hrs  nurse aware that there is a risk the patient becomes hemodynamically unstable during transfusion. She is sitting outside of patient's room, if she goes on break I will sit at her station while blood is being administered

## 2022-05-22 NOTE — PROVIDER CONTACT NOTE (MEDICATION) - SITUATION
As per MD Coleman ext [phone] RN to wait for complete blood count before blood transfusion. RN awaiting instruction from MD before administering blood products.

## 2022-05-22 NOTE — PROGRESS NOTE ADULT - ATTENDING COMMENTS
The patient was seen. Agree with above.  Off oxygen. O2 sat is normal.  Still febrile.  Continue ABx, IVF and supportive care.  Hb 7.0, Recommended blood transfusion. She prefers to wait. Will reassess tomorrow.
The patient was seen. Agree with above.  Remain febrile. Blood culture is negative. Continue ABx and ID consult.  Hb is trending down to 6.5. Recommend to give one unit PRBC.   Continue IVF and Folic acid.

## 2022-05-23 ENCOUNTER — TRANSCRIPTION ENCOUNTER (OUTPATIENT)
Age: 27
End: 2022-05-23

## 2022-05-23 VITALS
DIASTOLIC BLOOD PRESSURE: 53 MMHG | RESPIRATION RATE: 20 BRPM | HEART RATE: 86 BPM | TEMPERATURE: 96 F | SYSTOLIC BLOOD PRESSURE: 103 MMHG

## 2022-05-23 LAB
ALBUMIN SERPL ELPH-MCNC: 3.2 G/DL — LOW (ref 3.5–5.2)
ALP SERPL-CCNC: 64 U/L — SIGNIFICANT CHANGE UP (ref 30–115)
ALT FLD-CCNC: 16 U/L — SIGNIFICANT CHANGE UP (ref 0–41)
ANION GAP SERPL CALC-SCNC: 13 MMOL/L — SIGNIFICANT CHANGE UP (ref 7–14)
AST SERPL-CCNC: 28 U/L — SIGNIFICANT CHANGE UP (ref 0–41)
BILIRUB SERPL-MCNC: 3.5 MG/DL — HIGH (ref 0.2–1.2)
BUN SERPL-MCNC: <3 MG/DL — LOW (ref 10–20)
CALCIUM SERPL-MCNC: 8.6 MG/DL — SIGNIFICANT CHANGE UP (ref 8.5–10.1)
CHLORIDE SERPL-SCNC: 108 MMOL/L — SIGNIFICANT CHANGE UP (ref 98–110)
CO2 SERPL-SCNC: 21 MMOL/L — SIGNIFICANT CHANGE UP (ref 17–32)
CREAT SERPL-MCNC: <0.5 MG/DL — LOW (ref 0.7–1.5)
EGFR: 142 ML/MIN/1.73M2 — SIGNIFICANT CHANGE UP
GLUCOSE SERPL-MCNC: 83 MG/DL — SIGNIFICANT CHANGE UP (ref 70–99)
HCT VFR BLD CALC: 21.2 % — LOW (ref 37–47)
HGB BLD-MCNC: 7.3 G/DL — LOW (ref 12–16)
LEGIONELLA AG UR QL: NEGATIVE — SIGNIFICANT CHANGE UP
MAGNESIUM SERPL-MCNC: 1.5 MG/DL — LOW (ref 1.8–2.4)
MCHC RBC-ENTMCNC: 32 PG — HIGH (ref 27–31)
MCHC RBC-ENTMCNC: 34.4 G/DL — SIGNIFICANT CHANGE UP (ref 32–37)
MCV RBC AUTO: 93 FL — SIGNIFICANT CHANGE UP (ref 81–99)
NRBC # BLD: 1 /100 WBCS — HIGH (ref 0–0)
PLATELET # BLD AUTO: 708 K/UL — HIGH (ref 130–400)
POTASSIUM SERPL-MCNC: 3.8 MMOL/L — SIGNIFICANT CHANGE UP (ref 3.5–5)
POTASSIUM SERPL-SCNC: 3.8 MMOL/L — SIGNIFICANT CHANGE UP (ref 3.5–5)
PROT SERPL-MCNC: 6.7 G/DL — SIGNIFICANT CHANGE UP (ref 6–8)
RBC # BLD: 2.28 M/UL — LOW (ref 4.2–5.4)
RBC # FLD: 17.2 % — HIGH (ref 11.5–14.5)
S PNEUM AG UR QL: NEGATIVE — SIGNIFICANT CHANGE UP
SODIUM SERPL-SCNC: 142 MMOL/L — SIGNIFICANT CHANGE UP (ref 135–146)
WBC # BLD: 18.41 K/UL — HIGH (ref 4.8–10.8)
WBC # FLD AUTO: 18.41 K/UL — HIGH (ref 4.8–10.8)

## 2022-05-23 PROCEDURE — 99239 HOSP IP/OBS DSCHRG MGMT >30: CPT

## 2022-05-23 PROCEDURE — 71045 X-RAY EXAM CHEST 1 VIEW: CPT | Mod: 26

## 2022-05-23 RX ORDER — ENOXAPARIN SODIUM 100 MG/ML
40 INJECTION SUBCUTANEOUS EVERY 24 HOURS
Refills: 0 | Status: DISCONTINUED | OUTPATIENT
Start: 2022-05-23 | End: 2022-05-23

## 2022-05-23 RX ORDER — MAGNESIUM SULFATE 500 MG/ML
2 VIAL (ML) INJECTION
Refills: 0 | Status: COMPLETED | OUTPATIENT
Start: 2022-05-23 | End: 2022-05-23

## 2022-05-23 RX ADMIN — SODIUM CHLORIDE 100 MILLILITER(S): 9 INJECTION, SOLUTION INTRAVENOUS at 08:55

## 2022-05-23 RX ADMIN — CEFTRIAXONE 100 MILLIGRAM(S): 500 INJECTION, POWDER, FOR SOLUTION INTRAMUSCULAR; INTRAVENOUS at 09:03

## 2022-05-23 RX ADMIN — Medication 25 GRAM(S): at 11:12

## 2022-05-23 RX ADMIN — CHLORHEXIDINE GLUCONATE 1 APPLICATION(S): 213 SOLUTION TOPICAL at 05:17

## 2022-05-23 RX ADMIN — Medication 1 MILLIGRAM(S): at 11:08

## 2022-05-23 RX ADMIN — Medication 25 GRAM(S): at 15:02

## 2022-05-23 RX ADMIN — Medication 650 MILLIGRAM(S): at 05:27

## 2022-05-23 NOTE — PROGRESS NOTE ADULT - SUBJECTIVE AND OBJECTIVE BOX
JAMES VÁSQUEZ  26y  Female      Patient is a 26y old  Female who presents with a chief complaint of Chest Pain (22 May 2022 10:35)      INTERVAL HPI/OVERNIGHT EVENTS:  She feels better, no chest pain, SOB improved, still with fever today.   Vital Signs Last 24 Hrs  T(C): 37.3 (22 May 2022 07:40), Max: 39.9 (22 May 2022 03:24)  T(F): 99.1 (22 May 2022 07:40), Max: 103.8 (22 May 2022 03:24)  HR: 95 (22 May 2022 07:40) (82 - 128)  BP: 109/55 (22 May 2022 05:32) (97/51 - 114/53)  BP(mean): 72 (21 May 2022 20:44) (68 - 75)  RR: 19 (22 May 2022 07:40) (16 - 33)  SpO2: 94% (22 May 2022 07:40) (92% - 97%)      05-21-22 @ 07:01  -  05-22-22 @ 07:00  --------------------------------------------------------  IN: 2090 mL / OUT: 0 mL / NET: 2090 mL            Consultant(s) Notes Reviewed:  [x ] YES  [ ] NO          MEDICATIONS  (STANDING):  cefTRIAXone   IVPB 1000 milliGRAM(s) IV Intermittent every 24 hours  chlorhexidine 4% Liquid 1 Application(s) Topical <User Schedule>  dextrose 5% + sodium chloride 0.45%. 1000 milliLiter(s) (100 mL/Hr) IV Continuous <Continuous>  enoxaparin Injectable 40 milliGRAM(s) SubCutaneous every 24 hours  folic acid 1 milliGRAM(s) Oral daily  levoFLOXacin IVPB 500 milliGRAM(s) IV Intermittent every 24 hours  magnesium sulfate  IVPB 2 Gram(s) IV Intermittent once  pantoprazole    Tablet 40 milliGRAM(s) Oral before breakfast    MEDICATIONS  (PRN):  acetaminophen     Tablet .. 650 milliGRAM(s) Oral every 6 hours PRN Temp greater or equal to 38C (100.4F), Mild Pain (1 - 3)  morphine  - Injectable 2 milliGRAM(s) IV Push every 4 hours PRN Moderate Pain (4 - 6)      LABS                          6.5    24.61 )-----------( 615      ( 22 May 2022 06:21 )             19.2     05-22    138  |  104  |  <3<L>  ----------------------------<  80  3.6   |  19  |  <0.5<L>    Ca    8.3<L>      22 May 2022 06:21  Phos  2.8     05-21  Mg     1.5     05-22    TPro  6.5  /  Alb  3.3<L>  /  TBili  4.2<H>  /  DBili  x   /  AST  32  /  ALT  18  /  AlkPhos  58  05-22          Lactate Trend  05-20 @ 13:22 Lactate:1.4     CARDIAC MARKERS ( 20 May 2022 13:22 )  x     / <0.01 ng/mL / x     / x     / x          CAPILLARY BLOOD GLUCOSE          Culture - Blood (collected 05-20-22 @ 21:41)  Source: .Blood Blood-Peripheral  Preliminary Report (05-22-22 @ 09:01):    No growth to date.        RADIOLOGY & ADDITIONAL TESTS:    Imaging Personally Reviewed:  [ ] YES  [ ] NO    HEALTH ISSUES - PROBLEM Dx:          PHYSICAL EXAM:  GENERAL: NAD, well-developed.  HEAD:  Atraumatic, Normocephalic.  EYES: EOMI, PERRLA, conjunctiva and sclera clear.  NECK: Supple, No JVD.  CHEST/LUNG: left lung rales, decrease breath sound on lower lungs mostly on right side.   HEART: Regular rate and rhythm; S1 S2.   ABDOMEN: Soft, Nontender, Nondistended; Bowel sounds present.  EXTREMITIES:  2+ Peripheral Pulses, No clubbing, cyanosis, or edema.  PSYCH: AAOx3.  NEUROLOGY: non-focal.  SKIN: No rashes or lesions.
JAMES VÁSQUEZ 26y Female  MRN#: [id number]   Hospital Day: 1d    SUBJECTIVE  Patient is a 26y old Female who presents with a chief complaint of Chest Pain (21 May 2022 12:04)  Currently admitted to medicine with the primary diagnosis of Acute chest syndrome      INTERVAL HPI AND OVERNIGHT EVENTS:  Patient was examined and seen at bedside. This morning she is resting comfortably in bed. No issues or overnight events.    OBJECTIVE  PAST MEDICAL & SURGICAL HISTORY  Sickle cell anemia    S/P cholecystectomy      ALLERGIES:  No Known Allergies    MEDICATIONS:  STANDING MEDICATIONS  acetaminophen     Tablet .. 650 milliGRAM(s) Oral once  cefTRIAXone   IVPB 1000 milliGRAM(s) IV Intermittent every 24 hours  chlorhexidine 4% Liquid 1 Application(s) Topical <User Schedule>  dextrose 5% + sodium chloride 0.45%. 1000 milliLiter(s) IV Continuous <Continuous>  enoxaparin Injectable 40 milliGRAM(s) SubCutaneous every 24 hours  folic acid 1 milliGRAM(s) Oral daily  levoFLOXacin IVPB 500 milliGRAM(s) IV Intermittent every 24 hours  pantoprazole    Tablet 40 milliGRAM(s) Oral before breakfast    PRN MEDICATIONS  acetaminophen     Tablet .. 650 milliGRAM(s) Oral every 6 hours PRN  morphine  - Injectable 2 milliGRAM(s) IV Push every 4 hours PRN      VITAL SIGNS: Last 24 Hours  T(C): 38.9 (21 May 2022 12:00), Max: 39.3 (21 May 2022 02:00)  T(F): 102.1 (21 May 2022 12:00), Max: 102.7 (21 May 2022 02:00)  HR: 114 (21 May 2022 12:00) (84 - 114)  BP: 95/50 (21 May 2022 11:00) (95/50 - 125/65)  BP(mean): 64 (21 May 2022 11:00) (64 - 76)  RR: 29 (21 May 2022 12:00) (18 - 29)  SpO2: 95% (21 May 2022 12:00) (95% - 100%)    LABS:                        7.0    28.15 )-----------( 555      ( 21 May 2022 05:01 )             19.7     05-21    138  |  104  |  <3<L>  ----------------------------<  99  3.4<L>   |  24  |  <0.5<L>    Ca    8.4<L>      21 May 2022 05:01  Phos  2.8     05-21  Mg     1.6     05-21    TPro  6.4  /  Alb  3.4<L>  /  TBili  2.5<H>  /  DBili  x   /  AST  36  /  ALT  19  /  AlkPhos  46  05-21          Troponin T, Serum: <0.01 ng/mL (05-20-22 @ 13:22)  Lactate, Blood: 1.4 mmol/L (05-20-22 @ 13:22)      CARDIAC MARKERS ( 20 May 2022 13:22 )  x     / <0.01 ng/mL / x     / x     / x          RADIOLOGY:      PHYSICAL EXAM:  GENERAL: NAD, lying in bed comfortably  HEAD:  Atraumatic, Normocephalic  EYES: EOMI, PERRLA, conjunctiva and sclera clear  ENT: Moist mucous membranes  NECK: Supple, No JVD  CHEST/LUNG: Clear to auscultation bilaterally; No rales, rhonchi, wheezing, or rubs. Unlabored respirations  HEART: Regular rate and rhythm; No murmurs, rubs, or gallops  ABDOMEN: Bowel sounds present; Soft, Nontender, Nondistended. No hepatomegally  EXTREMITIES:  2+ Peripheral Pulses, brisk capillary refill. No clubbing, cyanosis, or edema  NERVOUS SYSTEM:  Alert & Oriented X3, speech clear. No deficits   MSK: FROM all 4 extremities, full and equal strength  SKIN: No rashes or lesions  
JAMES VÁSQUEZ 26y Female  MRN#: [id number]   CODE STATUS: FULL     Hospital Day: 3d    Pt is currently admitted with the primary diagnosis of sickle cell crisis with concern for bacterial PNA vs acute chest syndrome     SUBJECTIVE  Hospital Course: This is a 26 year old F with a PMHx of sickle cell disease on obryxta (last use was one week ago), cholelithiasis s/p CCY in 2013, presents to the ED for a two day history of abdominal and chest pain.  Presentation dates back to 5/18/2022 when patient first endorsed epigastric pain that was sharp in nature, which then radiated to the right shoulder and then to her entire back.  She did not endorse fever, rigors, shortness or chest pain during this time.  On 5/19/22, patient endorsed sharp pain across her entire chest that was exacerbated by inspiration. She did not endorse shortness of breath during the entire presentation.  She also endorsed a 103F prior to admission w/rigors.  She denies nausea, vomiting, headache, dizziness, joint pains, urinary symptoms.     ICU Vital Signs Last 24 Hrs  T(C): 39.3 (20 May 2022 11:14), Max: 39.3 (20 May 2022 11:14)  T(F): 102.7 (20 May 2022 11:14), Max: 102.7 (20 May 2022 11:14)  HR: 122 (20 May 2022 11:14) (122 - 122)  BP: 122/60 (20 May 2022 11:14) (122/60 - 122/60)  BP(mean): --  ABP: --  ABP(mean): --  RR: 18 (20 May 2022 11:14) (18 - 18)  SpO2: 91% (20 May 2022 11:14) (91% - 91%)    In the ED: Patient was found to be tachycardic. WBC 22K, Hgb 8.3, Reticulocyte % 16.3, CMP does not demonstrate electrolyte abnormalities, Trop -ve x1, RVP -ve.  CXR demonstrated bilateral infiltrates. Patient was found to have a SpO2 of 90% on RA which improved to 94% on 2L of O2. Received 500cc of LR, 1x dose of Rocephin and Azithromycin.     Overnight events: none    Interval hx/Subjective complaints: Pt feeling well, reports pain better controlled today. No complaints.     Pt denies any fevers, chills, fatigue, CP, palpitations, cough, SOB, abdominal pain, n/v/d, hematochezia, melena, dysuria, urinary urgency/frequency weakness, numbness, tingling, or other FND.                                             ----------------------------------------------------------  OBJECTIVE  PAST MEDICAL & SURGICAL HISTORY  Sickle cell anemia    S/P cholecystectomy                                              -----------------------------------------------------------  ALLERGIES:  No Known Allergies                                            ------------------------------------------------------------    HOME MEDICATIONS  Home Medications:  folic acid 1 mg oral tablet: 1 tab(s) orally once a day (20 May 2022 16:12)  losartan 25 mg oral tablet: 1 tab(s) orally once a day (20 May 2022 16:12)                           MEDICATIONS:  STANDING MEDICATIONS  cefTRIAXone   IVPB 1000 milliGRAM(s) IV Intermittent every 24 hours  chlorhexidine 4% Liquid 1 Application(s) Topical <User Schedule>  dextrose 5% + sodium chloride 0.45%. 1000 milliLiter(s) IV Continuous <Continuous>  enoxaparin Injectable 40 milliGRAM(s) SubCutaneous every 24 hours  folic acid 1 milliGRAM(s) Oral daily  levoFLOXacin IVPB 500 milliGRAM(s) IV Intermittent every 24 hours  magnesium sulfate  IVPB 2 Gram(s) IV Intermittent every 2 hours  pantoprazole    Tablet 40 milliGRAM(s) Oral before breakfast    PRN MEDICATIONS  acetaminophen     Tablet .. 650 milliGRAM(s) Oral every 6 hours PRN  morphine  - Injectable 2 milliGRAM(s) IV Push every 4 hours PRN                                            ------------------------------------------------------------  VITAL SIGNS: Last 24 Hours  T(C): 36.4 (23 May 2022 10:15), Max: 38.3 (23 May 2022 03:50)  T(F): 97.5 (23 May 2022 10:15), Max: 100.9 (23 May 2022 03:50)  HR: 93 (23 May 2022 03:50) (77 - 101)  BP: 108/55 (23 May 2022 03:50) (102/53 - 140/73)  BP(mean): --  RR: 16 (23 May 2022 03:50) (16 - 18)  SpO2: 95% (23 May 2022 09:47) (95% - 95%)                                             --------------------------------------------------------------  LABS:                        7.3    18.41 )-----------( 708      ( 23 May 2022 07:15 )             21.2     05-23    142  |  108  |  <3<L>  ----------------------------<  83  3.8   |  21  |  <0.5<L>    Ca    8.6      23 May 2022 07:15  Mg     1.5     05-23    TPro  6.7  /  Alb  3.2<L>  /  TBili  3.5<H>  /  DBili  x   /  AST  28  /  ALT  16  /  AlkPhos  64  05-23                Culture - Blood (collected 20 May 2022 21:41)  Source: .Blood Blood-Peripheral  Preliminary Report (22 May 2022 09:01):    No growth to date.                                                    -------------------------------------------------------------  RADIOLOGY:                                            --------------------------------------------------------------  PHYSICAL EXAM:  General: Well appearing, in NAD  HEENT: ncat, eomi, mmm  LUNGS: dec bibasilar breath sounds   HEART: s1/s2, rrr  ABDOMEN: soft, ntnd, bs+  EXT: wwp, no cyanosis, clubbing, edema  NEURO: aox4, EAST  SKIN: intact without rashes or lesions                                          --------------------------------------------------------------    ASSESSMENT & PLAN  26 year old F with a PMHx of sickle cell disease on obryxta (last use was one week ago), cholelithiasis s/p CCY in 2013, presents to the ED for a two day history of abdominal and chest pain.      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 If needed please run blood slowly over 4h and continuously monitor pt for transfusion rxn     #Hypomagnesemia   - 1.7 today, repleated     #Misc  - Dvt ppx: lovenox  - GI ppx: protonix  - Diet: regular  - Activity: IAT  - Dispo: acute pending afebrile 24h     #Handoff   - Monitor for resolution of fevers   - f/u ID recs   
Patient is a 26y old  Female who presents with a chief complaint of Chest Pain (21 May 2022 12:31)      Subjective: Still spiking high fever. Symptoms wise she feels better than yesterday. No breathing complaints. Pain better controlled.       Vital Signs Last 24 Hrs  T(C): 37.3 (22 May 2022 07:40), Max: 39.9 (22 May 2022 03:24)  T(F): 99.1 (22 May 2022 07:40), Max: 103.8 (22 May 2022 03:24)  HR: 95 (22 May 2022 07:40) (82 - 128)  BP: 109/55 (22 May 2022 05:32) (95/50 - 114/53)  BP(mean): 72 (21 May 2022 20:44) (64 - 75)  RR: 19 (22 May 2022 07:40) (16 - 33)  SpO2: 94% (22 May 2022 07:40) (92% - 97%)    PHYSICAL EXAM  General: adult in NAD  HEENT:  anicteric sclera, pink conjunctiva  Neck: supple  CV: normal S1/S2 with no murmur rubs or gallops  Lungs: positive air movement b/l ant lungs  Abdomen: soft non-tender non-distended  Ext: no clubbing cyanosis or edema  Skin: no rashes and no petechiae  Neuro: alert and oriented X 4, no focal deficits    MEDICATIONS  (STANDING):  cefTRIAXone   IVPB 1000 milliGRAM(s) IV Intermittent every 24 hours  chlorhexidine 4% Liquid 1 Application(s) Topical <User Schedule>  dextrose 5% + sodium chloride 0.45%. 1000 milliLiter(s) (100 mL/Hr) IV Continuous <Continuous>  enoxaparin Injectable 40 milliGRAM(s) SubCutaneous every 24 hours  folic acid 1 milliGRAM(s) Oral daily  levoFLOXacin IVPB 500 milliGRAM(s) IV Intermittent every 24 hours  pantoprazole    Tablet 40 milliGRAM(s) Oral before breakfast    MEDICATIONS  (PRN):  acetaminophen     Tablet .. 650 milliGRAM(s) Oral every 6 hours PRN Temp greater or equal to 38C (100.4F), Mild Pain (1 - 3)  morphine  - Injectable 2 milliGRAM(s) IV Push every 4 hours PRN Moderate Pain (4 - 6)      LABS:                          6.5    24.61 )-----------( 615      ( 22 May 2022 06:21 )             19.2         Mean Cell Volume : 95.5 fL  Mean Cell Hemoglobin : 32.3 pg  Mean Cell Hemoglobin Concentration : 33.9 g/dL  Auto Neutrophil # : x  Auto Lymphocyte # : x  Auto Monocyte # : x  Auto Eosinophil # : x  Auto Basophil # : x  Auto Neutrophil % : x  Auto Lymphocyte % : x  Auto Monocyte % : x  Auto Eosinophil % : x  Auto Basophil % : x      Serial CBC's  05-22 @ 06:21  Hct-19.2 / Hgb-6.5 / Plat-615 / RBC-2.01 / WBC-24.61  Serial CBC's  05-21 @ 12:12  Hct-20.5 / Hgb-7.4 / Plat-544 / RBC-2.21 / WBC-31.20  Serial CBC's  05-21 @ 05:01  Hct-19.7 / Hgb-7.0 / Plat-555 / RBC-2.13 / WBC-28.15  Serial CBC's  05-20 @ 21:41  Hct-19.7 / Hgb-7.1 / Plat-518 / RBC-2.11 / WBC-28.82  Serial CBC's  05-20 @ 13:22  Hct-23.0 / Hgb-8.3 / Plat-515 / RBC-2.50 / WBC-22.57      05-22    138  |  104  |  <3<L>  ----------------------------<  80  3.6   |  19  |  <0.5<L>    Ca    8.3<L>      22 May 2022 06:21  Phos  2.8     05-21  Mg     1.5     05-22    TPro  6.5  /  Alb  3.3<L>  /  TBili  4.2<H>  /  DBili  x   /  AST  32  /  ALT  18  /  AlkPhos  58  05-22    Reticulocyte Percent: 13.6 % (05-22 @ 06:21)  Reticulocyte Percent: 16.9 % (05-20 @ 13:22)      Culture - Blood (collected 20 May 2022 21:41)  Source: .Blood Blood-Peripheral  Preliminary Report (22 May 2022 09:01):    No growth to date.            
Patient is a 26y old  Female who presents with a chief complaint of Chest Pain (21 May 2022 08:38)      Subjective: Pt complains of pain, in the back and LE. Breathing is better, and is not using oxygen currently.      Vital Signs Last 24 Hrs  T(C): 38.6 (21 May 2022 08:00), Max: 39.3 (21 May 2022 02:00)  T(F): 101.5 (21 May 2022 08:00), Max: 102.7 (21 May 2022 02:00)  HR: 98 (21 May 2022 11:00) (84 - 104)  BP: 95/50 (21 May 2022 11:00) (95/50 - 125/65)  BP(mean): 64 (21 May 2022 11:00) (64 - 76)  RR: 23 (21 May 2022 11:00) (18 - 28)  SpO2: 95% (21 May 2022 11:00) (95% - 100%)    PHYSICAL EXAM  General: adult in NAD  HEENT: anicteric sclera, pink conjunctiva  Neck: supple  CV: normal S1/S2   Lungs: positive air movement b/l ant lungs  Abdomen: soft non-tender non-distended  Ext: no clubbing cyanosis or edema  Skin: no rashes and no petechiae  Neuro: alert and oriented X 4    MEDICATIONS  (STANDING):  cefTRIAXone   IVPB 1000 milliGRAM(s) IV Intermittent every 24 hours  chlorhexidine 4% Liquid 1 Application(s) Topical <User Schedule>  dextrose 5% + sodium chloride 0.45%. 1000 milliLiter(s) (100 mL/Hr) IV Continuous <Continuous>  enoxaparin Injectable 40 milliGRAM(s) SubCutaneous every 24 hours  folic acid 1 milliGRAM(s) Oral daily  levoFLOXacin IVPB 500 milliGRAM(s) IV Intermittent every 24 hours  pantoprazole    Tablet 40 milliGRAM(s) Oral before breakfast    MEDICATIONS  (PRN):  acetaminophen     Tablet .. 650 milliGRAM(s) Oral every 6 hours PRN Temp greater or equal to 38C (100.4F), Mild Pain (1 - 3)  morphine  - Injectable 2 milliGRAM(s) IV Push every 4 hours PRN Moderate Pain (4 - 6)      LABS:                          7.0    28.15 )-----------( 555      ( 21 May 2022 05:01 )             19.7         Mean Cell Volume : 92.5 fL  Mean Cell Hemoglobin : 32.9 pg  Mean Cell Hemoglobin Concentration : 35.5 g/dL  Auto Neutrophil # : 19.34 K/uL  Auto Lymphocyte # : 4.62 K/uL  Auto Monocyte # : 3.68 K/uL  Auto Eosinophil # : 0.12 K/uL  Auto Basophil # : 0.10 K/uL  Auto Neutrophil % : 68.7 %  Auto Lymphocyte % : 16.4 %  Auto Monocyte % : 13.1 %  Auto Eosinophil % : 0.4 %  Auto Basophil % : 0.4 %      Serial CBC's  05-21 @ 05:01  Hct-19.7 / Hgb-7.0 / Plat-555 / RBC-2.13 / WBC-28.15  Serial CBC's  05-20 @ 21:41  Hct-19.7 / Hgb-7.1 / Plat-518 / RBC-2.11 / WBC-28.82  Serial CBC's  05-20 @ 13:22  Hct-23.0 / Hgb-8.3 / Plat-515 / RBC-2.50 / WBC-22.57      05-21    138  |  104  |  <3<L>  ----------------------------<  99  3.4<L>   |  24  |  <0.5<L>    Ca    8.4<L>      21 May 2022 05:01  Phos  2.8     05-21  Mg     1.6     05-21    TPro  6.4  /  Alb  3.4<L>  /  TBili  2.5<H>  /  DBili  x   /  AST  36  /  ALT  19  /  AlkPhos  46  05-21      Reticulocyte Percent: 16.9 % (05-20 @ 13:22)  Reticulocyte Percent: 27.2 % (05-14 @ 16:45)        
Over Night Events: events noted, on RA co back pain, low grade fever, sp hemoc eval    PHYSICAL EXAM    ICU Vital Signs Last 24 Hrs  T(C): 37.9 (21 May 2022 05:00), Max: 39.3 (20 May 2022 11:14)  T(F): 100.2 (21 May 2022 05:00), Max: 102.7 (20 May 2022 11:14)  HR: 96 (21 May 2022 07:00) (84 - 122)  BP: 103/48 (21 May 2022 07:00) (97/54 - 125/65)  BP(mean): 69 (21 May 2022 07:00) (67 - 73)  RR: 23 (21 May 2022 07:00) (18 - 26)  SpO2: 95% (21 May 2022 07:00) (91% - 100%)      General: ill looking  HEENT: ANTONIETTA             Lymph Nodes: No cervical LN   Lungs: dec bs both bases  Cardiovascular: HOMA 2/6  Abdomen: Soft, Positive BS  Extremities: No clubbing   Skin: Warm  Neurological: Non focal       05-20-22 @ 07:01  -  05-21-22 @ 07:00  --------------------------------------------------------  IN:    dextrose 5% + sodium chloride 0.45%: 850 mL  Total IN: 850 mL    OUT:    Voided (mL): 400 mL  Total OUT: 400 mL    Total NET: 450 mL          LABS:                          7.0    28.15 )-----------( 555      ( 21 May 2022 05:01 )             19.7                                               05-21    138  |  104  |  <3<L>  ----------------------------<  99  3.4<L>   |  24  |  <0.5<L>    Ca    8.4<L>      21 May 2022 05:01  Phos  2.8     05-21  Mg     1.6     05-21    TPro  6.4  /  Alb  3.4<L>  /  TBili  2.5<H>  /  DBili  x   /  AST  36  /  ALT  19  /  AlkPhos  46  05-21                                                 CARDIAC MARKERS ( 20 May 2022 13:22 )  x     / <0.01 ng/mL / x     / x     / x                                                LIVER FUNCTIONS - ( 21 May 2022 05:01 )  Alb: 3.4 g/dL / Pro: 6.4 g/dL / ALK PHOS: 46 U/L / ALT: 19 U/L / AST: 36 U/L / GGT: x                                                                                                                                       MEDICATIONS  (STANDING):  azithromycin  IVPB 500 milliGRAM(s) IV Intermittent every 24 hours  cefTRIAXone   IVPB 1000 milliGRAM(s) IV Intermittent every 24 hours  chlorhexidine 4% Liquid 1 Application(s) Topical <User Schedule>  dextrose 5% + sodium chloride 0.45%. 1000 milliLiter(s) (100 mL/Hr) IV Continuous <Continuous>  enoxaparin Injectable 40 milliGRAM(s) SubCutaneous every 24 hours  folic acid 1 milliGRAM(s) Oral daily  pantoprazole    Tablet 40 milliGRAM(s) Oral before breakfast    MEDICATIONS  (PRN):  acetaminophen     Tablet .. 650 milliGRAM(s) Oral every 6 hours PRN Temp greater or equal to 38C (100.4F), Mild Pain (1 - 3)    
  JAMES VÁSQUEZ  26y  Female  ***My note supersedes ALL resident notes that I sign.  My corrections for their notes are in my note.***    I can be reached directly on [employer] [id number]. My office number is [phone]. My personal cell number is [phone].    INTERVAL EVENTS: Here for f/u of sickle cell dz. Pt feels well and wants to go home. No cough. No SOB. Pain is well controlled and she does NOT want pain meds other than tyl or advil.    T(F): 96 (05-23-22 @ 12:54), Max: 100.9 (05-23-22 @ 03:50)  HR: 86 (05-23-22 @ 12:54) (77 - 95)  BP: 103/53 (05-23-22 @ 12:54) (103/53 - 140/73)  RR: 20 (05-23-22 @ 12:54) (16 - 20)  SpO2: 95% (05-23-22 @ 09:47) (95% - 95%) - room air    Gen: NAD  HEENT: PERRL, EOMI, mouth clr, nose clr  Neck: no nodes, no JVD, thyroid nl  lungs: decr BS b/l bases w/ slight crackles; no wheeze  hrt: s1 s2 rrr no murmur  abd: soft, NT/ND, no HS megaly  ext: no edema, no c/c  neuro: aa ox3, cn intact, can move all 4 ext    LABS:                      7.3     (    93.0   18.41 )-----------( ---------      708      ( 23 May 2022 07:15 )             21.2    (    17.2     WBC Count: 18.41 K/uL (05-23-22 @ 07:15) - pt reports chr elevation to WBC 2/2 SC Dz  WBC Count: 22.46 K/uL (05-22-22 @ 18:00)  WBC Count: 24.61 K/uL (05-22-22 @ 06:21)  WBC Count: 31.20 K/uL (05-21-22 @ 12:12)  WBC Count: 28.15 K/uL (05-21-22 @ 05:01)  WBC Count: 28.82 K/uL (05-20-22 @ 21:41)  WBC Count: 22.57 K/uL (05-20-22 @ 13:22)    Hemoglobin: 7.3 g/dL (05-23 @ 07:15)  Hemoglobin: 6.2 g/dL (05-22 @ 18:00)  Hemoglobin: 6.5 g/dL (05-22 @ 06:21)  Hemoglobin: 7.4 g/dL (05-21 @ 12:12)  Hemoglobin: 7.0 g/dL (05-21 @ 05:01)  Hemoglobin: 7.1 g/dL (05-20 @ 21:41)  Hemoglobin: 8.3 g/dL (05-20 @ 13:22)    142   (   108   (   83      05-23-22 @ 07:15  ----------------------               3.8   (   21   (   <3                             -----                        <0.5  Ca  8.6   Mg  1.5    P   --     LFT  6.7  (  3.5  (  28       05-23-22 @ 07:15  -------------------------  3.2  (  64  (  16    RADIOLOGY & ADDITIONAL TESTS:  < from: Xray Chest 1 View- PORTABLE-Routine (Xray Chest 1 View- PORTABLE-Routine in AM.) (05.23.22 @ 06:23) >  IMPRESSION:    Bibasilar opacities without significant change.    < end of copied text >    < from: CT Chest No Cont (05.20.22 @ 17:21) >    IMPRESSION:  1.  Patchy bilateral lower lobe consolidation suspicious for bilateral   pneumonia in the appropriate clinical setting.    < end of copied text >    MEDICATIONS:  cefTRIAXone   IVPB 1000 milliGRAM(s) IV Intermittent every 24 hours  levoFLOXacin IVPB 500 milliGRAM(s) IV Intermittent every 24 hours    acetaminophen     Tablet .. 650 milliGRAM(s) Oral every 6 hours PRN  chlorhexidine 4% Liquid 1 Application(s) Topical <User Schedule>  dextrose 5% + sodium chloride 0.45%. 1000 milliLiter(s) IV Continuous <Continuous>  enoxaparin Injectable 40 milliGRAM(s) SubCutaneous every 24 hours  folic acid 1 milliGRAM(s) Oral daily  morphine  - Injectable 2 milliGRAM(s) IV Push every 4 hours PRN  pantoprazole    Tablet 40 milliGRAM(s) Oral before breakfast

## 2022-05-23 NOTE — DISCHARGE NOTE NURSING/CASE MANAGEMENT/SOCIAL WORK - PATIENT PORTAL LINK FT
You can access the FollowMyHealth Patient Portal offered by [facility] by registering at the following website: [email or web address]. By joining [employer]’s FollowMyHealth portal, you will also be able to view your health information using other applications (apps) compatible with our system.

## 2022-05-23 NOTE — DISCHARGE NOTE PROVIDER - PROVIDER TOKENS
PROVIDER:[TOKEN:[[id number]],FOLLOWUP:[1 week]],PROVIDER:[TOKEN:[[id number]],FOLLOWUP:[Routine]]

## 2022-05-23 NOTE — DISCHARGE NOTE PROVIDER - NSDCFUSCHEDAPPT_GEN_ALL_CORE_FT
Deepak Bridges Physician [facility]  Cardio [address] [place] [address]  Scheduled Appointment: 07/18/2022

## 2022-05-23 NOTE — DISCHARGE NOTE PROVIDER - NSDCCPCAREPLAN_GEN_ALL_CORE_FT
PRINCIPAL DISCHARGE DIAGNOSIS  Diagnosis: Acute sickle cell crisis  Assessment and Plan of Treatment: You came into the hospital with chest/abdominal pain and were diagnosed with acute sickle cell pain crisis. You were treated with pain control and IV fluids. You did require 1 unit blood transfusion for low hemoglobin (a marker of red blood cell count) and your red blood cell count improved to appropriate levels. Please continue to follow with your primary doctor and hematologist.   If you develop recurrence of chest/abdominal pain or symptoms of anemia (fatigue, lightheadedness, palpitations), please seek evaluation by a medical professional.      SECONDARY DISCHARGE DIAGNOSES  Diagnosis: Bacterial pneumonia  Assessment and Plan of Treatment: While being worked up for sickle cell pain crisis, you were found to have evidence of pneumonia, an infection in your lungs. You were treated with antibiotics. You were seen by our infectious disease doctor who reccomended you complete a couse of antibiotics with levofloxacin. We are sending this medication to your pharmacy. Please complete the course of antibiotics as prescribed.   If you develop worsenin fevers, chills, cough, or shortness of breath please return to the ED for evaluation.

## 2022-05-23 NOTE — MEDICAL STUDENT PROGRESS NOTE(EDUCATION) - SUBJECTIVE AND OBJECTIVE BOX
JAMES VÁSQUEZ 26y Female  MRN#: [id number]   CODE STATUS: full    Hospital Day: 3d    Pt is currently admitted with the primary diagnosis of acute chest syndrome secondary to sickle cell anemia    SUBJECTIVE  Hospital Course  Patient is a 26 year old F with a PMHx of sickle cell disease on obryxta (last use was one week ago), cholelithiasis s/p CCY in 2013, presents to the ED for a two day history of abdominal and chest pain.  Presentation dates back to 5/18/2022 when patient first endorsed epigastric pain that was sharp in nature, which then radiated to the right shoulder and then to her entire back.  She did not endorse fever, rigors, shortness or chest pain during this time.  On 5/19/22, patient endorsed sharp pain across her entire chest that was exacerbated by inspiration. She did not endorse shortness of breath during the entire presentation.  She also endorsed a 103F prior to admission w/rigors.  She denies nausea, vomiting, headache, dizziness, joint pains, urinary symptoms.     Overnight events: patient had a 100.9 fever at 3am    Subjective complaints: Patient resting comfortably in bed, A&Ox3. No breathing complaints, reported that pain is well controlled    Present Today:   - Renae:  No [x  ], Yes [   ] : Indication:     - Type of IV Access:       .. CVC/Piccline:  No [x  ], Yes [   ] : Indication:       .. Midline: No [x  ], Yes [   ] : Indication:                                             ----------------------------------------------------------  OBJECTIVE  PAST MEDICAL & SURGICAL HISTORY  Sickle cell anemia    S/P cholecystectomy                                              -----------------------------------------------------------  ALLERGIES:  No Known Allergies                                            ------------------------------------------------------------    HOME MEDICATIONS  Home Medications:  folic acid 1 mg oral tablet: 1 tab(s) orally once a day (20 May 2022 16:12)  losartan 25 mg oral tablet: 1 tab(s) orally once a day (20 May 2022 16:12)                           MEDICATIONS:  STANDING MEDICATIONS  cefTRIAXone   IVPB 1000 milliGRAM(s) IV Intermittent every 24 hours  chlorhexidine 4% Liquid 1 Application(s) Topical <User Schedule>  dextrose 5% + sodium chloride 0.45%. 1000 milliLiter(s) IV Continuous <Continuous>  enoxaparin Injectable 40 milliGRAM(s) SubCutaneous every 24 hours  folic acid 1 milliGRAM(s) Oral daily  levoFLOXacin IVPB 500 milliGRAM(s) IV Intermittent every 24 hours  magnesium sulfate  IVPB 2 Gram(s) IV Intermittent every 2 hours  pantoprazole    Tablet 40 milliGRAM(s) Oral before breakfast    PRN MEDICATIONS  acetaminophen     Tablet .. 650 milliGRAM(s) Oral every 6 hours PRN  morphine  - Injectable 2 milliGRAM(s) IV Push every 4 hours PRN                                            ------------------------------------------------------------  VITAL SIGNS: Last 24 Hours  T(C): 36.4 (23 May 2022 10:15), Max: 38.3 (23 May 2022 03:50)  T(F): 97.5 (23 May 2022 10:15), Max: 100.9 (23 May 2022 03:50)  HR: 93 (23 May 2022 03:50) (77 - 101)  BP: 108/55 (23 May 2022 03:50) (102/53 - 140/73)  BP(mean): --  RR: 16 (23 May 2022 03:50) (16 - 18)  SpO2: 95% (23 May 2022 09:47) (95% - 95%)                                             --------------------------------------------------------------  LABS:                        7.3    18.41 )-----------( 708      ( 23 May 2022 07:15 )             21.2     05-23    142  |  108  |  <3<L>  ----------------------------<  83  3.8   |  21  |  <0.5<L>    Ca    8.6      23 May 2022 07:15  Mg     1.5     05-23    TPro  6.7  /  Alb  3.2<L>  /  TBili  3.5<H>  /  DBili  x   /  AST  28  /  ALT  16  /  AlkPhos  64  05-23      Culture - Blood (collected 20 May 2022 21:41)  Source: .Blood Blood-Peripheral  Preliminary Report (22 May 2022 09:01):    No growth to date.    Procalcitonin, Serum: 0.09  D-Dimer Assay, Quantitative: 727  Lactate Dehydrogenase, Serum: 713 (05.21.22 @ 12:12)                                               -------------------------------------------------------------  RADIOLOGY:  < from: CT Chest No Cont (05.20.22 @ 17:21) >  IMPRESSION:  1.  Patchy bilateral lower lobe consolidation suspicious for bilateral   pneumonia in the appropriate clinical setting.    < from: Xray Chest 1 View- PORTABLE-Urgent (05.20.22 @ 13:47) >  Impression:    Patchy bilateral opacifications, worse within the left lower lung field.                                              --------------------------------------------------------------    PHYSICAL EXAM:  General: NAD, A&Ox3  HEENT: anicteric sclera  LUNGS: decreased breath sounds at lung bases b/l. R>L  HEART: RRR, S1/S2, no murmurs, rubs or gallops  ABDOMEN: nontender, nondistended, BS present  EXT: no edema, clubbing or cyanosis  SKIN: no rashes, lesions or petichiae                                           --------------------------------------------------------------    ASSESSMENT & PLAN    Patient is a 26 year old F with a PMHx of sickle cell disease on obryxta (last use was one week ago), cholelithiasis s/p CCY in 2013, presents to the ED for a two day history of abdominal and chest pain.  Presentation dates back to 5/18/2022 when patient first endorsed epigastric pain that was sharp in nature, which then radiated to the right shoulder and then to her entire back.  She did not endorse fever, rigors, shortness or chest pain during this time.  On 5/19/22, patient endorsed sharp pain across her entire chest that was exacerbated by inspiration. She did not endorse shortness of breath during the entire presentation.  She also endorsed a 103F prior to admission w/rigors.  She denies nausea, vomiting, headache, dizziness, joint pains, urinary symptoms.    #Acute Chest Syndrome secondary to Sickle cell Anemia vs B/L bacterial pneumonia  - Sepsis on admission, fever, tachycardia,   - CXR showed patchy bilateral opacities worse on the left lower lung.   - Chest CT showed Patchy bilateral lower lobe consolidation suspicious for bilateral pneumonia in the appropriate clinical setting.  - Procalcitonin 0.09  - Last fever (5/23) of 100.9 at 3am, 97.5F on repeat at 10am no cough, chest pain improved, blood cx no growth. Legionella, mycoplasma pneumonia and Streptococcus pending  - Continue Rocephin and Zithromax, ID consult.   - Per hematology, no need for plasma exchange, as no hypoxia.   - Continue pain management, pain is controlled today, continue folic acid  - Hgb 7.3 after 1 unit PRBC yesterday (5/22) - increased from 6.2 yesterday  - Hematology f/u  - d/c IVF  - Anticipate for tomorrow if stays afebrile                                                                                ----------------------------------------------------  # DVT prophylaxis: lovenox  # GI prophylaxis: protonix  # Diet: Regular  # Code status: full  # Disposition: home                                                                             --------------------------------------------------------    Beltran Choi

## 2022-05-23 NOTE — DISCHARGE NOTE NURSING/CASE MANAGEMENT/SOCIAL WORK - NSDCPEFALRISK_GEN_ALL_CORE
For information on Fall & Injury Prevention, visit: [email or web address] OR  [email or web address] OR  https://www.cdc.gov/steadi/patient.html

## 2022-05-23 NOTE — DISCHARGE NOTE PROVIDER - CARE PROVIDER_API CALL
Rito Cabrera ()  Internal Medicine; Nephrology  [address] [place] [address]  Phone: [phone]  Fax: [phone]  Follow Up Time: 1 week    Fiorella Avila)  Hematology; Internal Medicine; Medical Oncology  [address] [address]  Phone: [phone]  Fax: [phone]  Follow Up Time: Routine

## 2022-05-23 NOTE — DISCHARGE NOTE PROVIDER - HOSPITAL COURSE
26 year old F with a PMHx of sickle cell disease on obryxta (last use was one week ago), cholelithiasis s/p CCY in 2013, presents to the ED for a two day history of abdominal and chest pain.      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 If needed please run blood slowly over 4h and continuously monitor pt for transfusion rxn     #Hypomagnesemia   - 1.7 today, repleated

## 2022-05-23 NOTE — DISCHARGE NOTE PROVIDER - NSDCMRMEDTOKEN_GEN_ALL_CORE_FT
folic acid 1 mg oral tablet: 1 tab(s) orally once a day  losartan 25 mg oral tablet: 1 tab(s) orally once a day   folic acid 1 mg oral tablet: 1 tab(s) orally once a day  levoFLOXacin 500 mg oral tablet: 1 tab(s) orally once a day   losartan 25 mg oral tablet: 1 tab(s) orally once a day

## 2022-05-23 NOTE — CONSULT NOTE ADULT - ASSESSMENT
26 year old F with a PMHx of sickle cell disease on obryxta (last use was one week ago), cholelithiasis s/p CCY in 2013, presents to the ED for a two day history of abdominal and chest pain.  Presentation dates back to 5/18/2022 when patient first endorsed epigastric pain that was sharp in nature, which then radiated to the right shoulder and then to her entire back.  She did not endorse fever, rigors, shortness or chest pain during this time.  On 5/19/22, patient endorsed sharp pain across her entire chest that was exacerbated by inspiration. She did not endorse shortness of breath during the entire presentation.  She also endorsed a 103F prior to admission w/rigors.    26 year old F with a PMHx of sickle cell disease on obryxta (last use was one week ago), cholelithiasis s/p CCY in 2013, presents to the ED for a two day history of abdominal and chest pain.  Presentation dates back to 5/18/2022 when patient first endorsed epigastric pain that was sharp in nature, which then radiated to the right shoulder and then to her entire back.  She did not endorse fever, rigors, shortness or chest pain during this time.  On 5/19/22, patient endorsed sharp pain across her entire chest that was exacerbated by inspiration. She did not endorse shortness of breath during the entire presentation.  She also endorsed a 103F prior to admission w/rigors.     IMPRESSION;   Doubt infectious etiology of fevers  Doubt bacterial PNA  Clinically presently has no complaints  Procal 0.09  5/14,20 BCx NGTD  Sickle cell crisis with acute chest syndrome  Haptoglobin < 20, reticulocytosis    RECOMMENDATIONS;  Po Levoquin 500 mg q24h for 7 days starting 5/21  Please do not hesitate to recall ID if any questions arise either through [employer] or through [employer]

## 2022-05-23 NOTE — DISCHARGE NOTE PROVIDER - CARE PROVIDERS DIRECT ADDRESSES
[email or web address]

## 2022-05-23 NOTE — PROGRESS NOTE ADULT - ASSESSMENT
25 yo F with PMHx of Hgb SS disease and cholecystitis s/p cholecystectomy (2013) presented to ED complaining of chest pain and fever for past couple of days. In ED, pt had a fever of 102.7 and CXR shows b/l patchy opacities. Hematologist is in [place] - Dr. Jimenez.    Hematology consulted for suspected acute chest syndrome.     # r/o acute chest syndrome  # Hgb SS disease  - r/o infection   - CXR shows patchy bilateral opacification  - hgb baseline of 7-8  - reticulocytes elevated from baseline likely representing sickling  - leukocytosis and thrombocytosis likely reactive from sickle cell crisis   -     Plan:  - obtain panculture; COVID negative  - c/w folic acid supplement  - adequate pain control  - C/w abx per primary team  - C/w IV 0.45% NS at 100 cc/hr and encourage PO hydration   - pt is currently off oxygen: Will hold off RBC exchange for now  - We recommended 1U PRBC today but pt wants to hold off today: If Hb <7 tomorrow, give 1U PRBC.     Rest of the care per primary team.     Will follow.  
25 yo F with PMHx of Hgb SS disease and cholecystitis s/p cholecystectomy (2013) presented to ED complaining of chest pain and fever for past couple of days. In ED, pt had a fever of 102.7 and CXR shows b/l patchy opacities. Hematologist is in [place] - Dr. Jimenez.    Hematology consulted for suspected acute chest syndrome.     # r/o acute chest syndrome  # Hgb SS disease  - r/o infection : Still febrile.   - CXR shows patchy bilateral opacification  - hgb baseline of 7-8  - reticulocytes elevated from baseline likely representing sickling  - leukocytosis and thrombocytosis likely reactive from sickle cell crisis   -   - COVID and blood cx negative    Plan:  - c/w folic acid supplement  - Adequate pain control  - C/w abx per primary team  - C/w IV 0.45% NS at 100 cc/hr and encourage PO hydration   - Recommend 1U PRBC today as Hb is 6.5  - Recommend ID follow up as pt is still spiking high grade fever  - pt is currently off oxygen: Will hold off RBC exchange for now     Rest of the care per primary team.     Will follow.
IMPRESSION:    Sickle Cell Disease Crisis   Possible pneumonia/ atelectasis      PLAN:    CNS: pain control     HEENT: Oral care    PULMONARY:  HOB @ 45 degrees.  Aspiration precautions. Target SaO2 92 to 96%    CARDIOVASCULAR: keep euvolemic, D51/2NS @100cc/hr    GI: GI prophylaxis.  Feeding as tolerated.  Bowel regimen     RENAL:  Follow up lytes.  Correct as needed    INFECTIOUS DISEASE: iv abx, procal    HEMATOLOGICAL:  DVT prophylaxis. trend hemolysis profile, HB Electropheresis    ENDOCRINE:  Follow up FS.  Insulin protocol if needed.    MUSCULOSKELETAL: bedrest    MICU monitoring         
This is a 26 year old F with a PMHx of sickle cell disease on Oxbryta (last use was one week ago), cholelithiasis s/p CCY in 2013, presents to the ED for a two day history of abdominal and chest pain.  Presentation dates back to 5/18/2022 when patient first endorsed epigastric pain that was sharp in nature, which then radiated to the right shoulder and then to her entire back.  She did not endorse fever, rigors, shortness or chest pain during this time.  On 5/19/22, patient endorsed sharp pain across her entire chest that was exacerbated by inspiration. She did not endorse shortness of breath during the entire presentation.  She also endorsed a 103F prior to admission w/rigors.  She denies nausea, vomiting, headache, dizziness, joint pains, urinary symptoms.     # Sickle cell crisis; b/l infil on CT chest w/ fever and high WBC favors Bilateral bacterial pneumonia poss 2/2 to gram negative bacteria; reactive leukocytosis and thrombocytosis  Sepsis was present on admission  CXR showed patchy bilateral opacities worse on the left lower lung.   Chest CT showed Patchy bilateral lower lobe consolidation suspicious for bilateral pneumonia in the appropriate clinical setting.  Procalcitonin 0.09  bld cx neg  ID noted  abx: levo 500mg po q24 for 1wk (5/21-5/28)  Per hematology, no need for plasma exchange, as no hypoxia.   pain is controlled today - only needs tyl or advil  continue folic acid  Hematology follow up as outpt    # hypomagnesemia  replete    # DVT Prophylaxis: Lovenox 40mg daily.     # updated mom at bedside    Disposition: despite minor fever and WBC, pt wanted to go home today; oral abx  d/c home
26 year old F with a PMHx of sickle cell disease on obryxta (last use was one week ago), cholelithiasis s/p CCY in 2013, presents to the ED for a two day history of abdominal and chest pain.    In the ED: Patient was found to be tachycardic. WBC 22K, Hgb 8.3, Reticulocyte % 16.3, Trop -ve x1, RVP -ve.  CXR demonstrated bilateral infiltrates. Patient was found to have a SpO2 of 90% on RA which improved to 94% on 2L of O2. Received 500cc of LR, 1x dose of Rocephin and Azithromycin.   Admitted to ICU for management of acute chest syndrome.     #Sickle Cell Disease  # LLL infiltrate  # Acute Chest Syndrome   - Follow up cultures, check procalcitonin  - continue Ceftriaxone, DC azitho and start levofloxacin   - keep euvolemic, D51/2NS @100cc/hr  - trend hemolysis profile daily, will send HB Electropheresis  - heme onc following - Will hold off RBC exchange for now.  If Hb <7 tomorrow, give 1U PRBC.   - Id consult sent  - pain management consult sent  - pain regimen as needed     DVt: lovenox  GI: ppi  Diet: regular  Activity: IAT  Dispo: MARIELLE
This is a 26 year old F with a PMHx of sickle cell disease on obryxta (last use was one week ago), cholelithiasis s/p CCY in 2013, presents to the ED for a two day history of abdominal and chest pain.  Presentation dates back to 5/18/2022 when patient first endorsed epigastric pain that was sharp in nature, which then radiated to the right shoulder and then to her entire back.  She did not endorse fever, rigors, shortness or chest pain during this time.  On 5/19/22, patient endorsed sharp pain across her entire chest that was exacerbated by inspiration. She did not endorse shortness of breath during the entire presentation.  She also endorsed a 103F prior to admission w/rigors.  She denies nausea, vomiting, headache, dizziness, joint pains, urinary symptoms.     A/P;   Sickle cell crisis, to rule out Acute chest Syndrome vs Bilateral bacterial pneumonia favor for gram negative.   Sepsis on admission, fever, tachycardia,   CXR showed patchy bilateral opacities worse on the left lower lung.   Chest CT showed Patchy bilateral lower lobe consolidation suspicious for bilateral pneumonia in the appropriate clinical setting.  Procalcitonin 0.09  Still with fever, no cough, however chest pain improved, blood cx no growth. Send Legionella and Streptococcus.   Continue Rocephin and Zithromax, ID consult.   Per hematology, no need for plasma exchange, as no hypoxia.   Continue pain management, pain is controlled today, continue folic acid  Hb 6.5 will give 1 packed RBC today.   Hematology follow up.     DVT Prophylaxis: Lovenox 40mg daily.   #Progress Note Handoff:  Pending (specify):  improving fever, anemia, ID consult.   Family discussion:  Disposition: Home.

## 2022-05-23 NOTE — CONSULT NOTE ADULT - SUBJECTIVE AND OBJECTIVE BOX
Pain Medicine Consult Note    History of Present Illness  26 year old female with pmh of sickle cell presented to ED with acute chest pain, findings consistent with acute chest syndrome. Patient seen and examined at bedside, patient receiving IV fluid hydration, denies pain. States that her pain is well controlled, Denies chest pain, shortness of breath, nausea, vomiting, dizziness, weakness.     Current Inpatient Medication Regimen:  acetaminophen     Tablet .. 650 milliGRAM(s) Oral every 6 hours PRN  cefTRIAXone   IVPB 1000 milliGRAM(s) IV Intermittent every 24 hours  chlorhexidine 4% Liquid 1 Application(s) Topical <User Schedule>  dextrose 5% + sodium chloride 0.45%. 1000 milliLiter(s) IV Continuous <Continuous>  enoxaparin Injectable 40 milliGRAM(s) SubCutaneous every 24 hours  folic acid 1 milliGRAM(s) Oral daily  levoFLOXacin IVPB 500 milliGRAM(s) IV Intermittent every 24 hours  morphine  - Injectable 2 milliGRAM(s) IV Push every 4 hours PRN  pantoprazole    Tablet 40 milliGRAM(s) Oral before breakfast      Home Analgesic Regimen: Patient denies.     Allergies:  No Known Allergies      Past Medical History:  ACUTE CHEST SYNDROME    ^ACUTE CHEST SYNDROME    Handoff    MEWS Score    Sickle cell anemia    Acute chest syndrome    Acute sickle cell crisis    S/P cholecystectomy              Review of Systems:  General: [  ] fevers,  [  ] chills  Eyes: [  ] diplopia, [  ] blurred vision  ENT: [  ] rhinorrhea  CV: [  ] chest pain  Resp: [  ] cough, [  ] dyspnea  GI: [  ] abdominal pain, [  ] constipation, [  ] diarrhea  : [  ] urinary incontinence, [  ] dysuria  Neuro: [  ] weakness, [  ] numbness, [  ] headache  Psych: [  ] depression, [  ] anxiety    Physical Exam:  T(C): 35.6 (05-23-22 @ 12:54), Max: 38.3 (05-23-22 @ 03:50)  HR: 86 (05-23-22 @ 12:54) (77 - 95)  BP: 103/53 (05-23-22 @ 12:54) (103/53 - 140/73)  RR: 20 (05-23-22 @ 12:54) (16 - 20)  SpO2: 95% (05-23-22 @ 09:47) (95% - 95%)  Gen: [  ] NAD  Eyes: [  ] glasses, [  ] scleral icterus  Head: [  ] Normocephalic / Atraumatic  CV: [  ] JVD  Lungs: [  ] nonlabored breathing  Abdomen: [  ] distended, [  ] soft  : [  ] reilly catheter in place  Back: [  ] tenderness to palpation  Neuro: [  ] AOx3, [  ] Cranial nerves intact, [  ] +5/5 strength in ______ extremities  Extremities: [  ] full ROM in upper/lower extremities  Gait: [  ] antalgic  Psych: [  ] normal affect      Labs:  CBC  18.41 K/uL<H> [4.80 - 10.80] > 7.3 g/dL<L> [12.0 - 16.0] / 21.2 %<L> [37.0 - 47.0] < 708 K/uL<H> [130 - 400]      BMP  142 mmol/L [135 - 146] | 108 mmol/L [98 - 110] | <3 mg/dL<L> [10 - 20]  3.8 mmol/L [3.5 - 5.0] | 21 mmol/L [17 - 32] | <0.5 mg/dL<L> [0.7 - 1.5]    83 mg/dL [70 - 99]  CBC  22.46 K/uL<H> [4.80 - 10.80] > 6.2 g/dL<LL> [12.0 - 16.0] / 18.5 %<L> [37.0 - 47.0] < 613 K/uL<H> [130 - 400]      BMP  -- | -- | --  -- | -- | --    --        
Patient is a 26y old  Female who presents with a chief complaint of chest pain    HPI: 27 yo F with PMHx for Sickle Cell Disease, Cholecystitis s/p cholecystectomy (2013), and nephropathy initially presented on 05/14 with complaint of who diffuse body hives and fever for the past week, patient however left AMA returned today with complaint of chest pain.     PAST MEDICAL & SURGICAL HISTORY:  Sickle cell anemia      S/P cholecystectomy          SOCIAL HX:   Smoking                         ETOH                            Other    FAMILY HISTORY:  :  No known cardiovascular family history     Review Of Systems:     All ROS are negative except per HPI       Allergies    No Known Allergies    Intolerances          PHYSICAL EXAM    ICU Vital Signs Last 24 Hrs  T(C): 39.3 (20 May 2022 11:14), Max: 39.3 (20 May 2022 11:14)  T(F): 102.7 (20 May 2022 11:14), Max: 102.7 (20 May 2022 11:14)  HR: 122 (20 May 2022 11:14) (122 - 122)  BP: 122/60 (20 May 2022 11:14) (122/60 - 122/60)  RR: 18 (20 May 2022 11:14) (18 - 18)  SpO2: 91% (20 May 2022 11:14) (91% - 91%)      CONSTITUTIONAL:  Well nourished.   NAD    ENT:   Airway patent,   Mouth with normal mucosa.   No thrush      CARDIAC:   Normal rate,   Regular rhythm.    No edema      Vascular:   normal systolic impulse  no bruits    RESPIRATORY:   No wheezing  Bilateral BS   Not tachypneic,  No use of accessory muscles    GASTROINTESTINAL:  Abdomen soft,   Non-tender,   No guarding,   + BS      NEUROLOGICAL:   Alert and oriented   No motor deficits.    SKIN:   Skin normal color for race,         HEME LYMPH: .  No cervical  lymphadenopathy.  No inguinal lymphadenopathy              LABS:                          8.3    22.57 )-----------( 515      ( 20 May 2022 13:22 )             23.0                                               05-20    138  |  103  |  <3<L>  ----------------------------<  86  4.2   |  21  |  <0.5<L>    Ca    9.2      20 May 2022 13:22    TPro  7.7  /  Alb  4.1  /  TBili  4.0<H>  /  DBili  x   /  AST  53<H>  /  ALT  24  /  AlkPhos  57  05-20                                                 CARDIAC MARKERS ( 20 May 2022 13:22 )  x     / <0.01 ng/mL / x     / x     / x                                                LIVER FUNCTIONS - ( 20 May 2022 13:22 )  Alb: 4.1 g/dL / Pro: 7.7 g/dL / ALK PHOS: 57 U/L / ALT: 24 U/L / AST: 53 U/L / GGT: x                                                                                            MEDICATIONS  (STANDING):  lactated ringers Bolus 500 milliLiter(s) IV Bolus once    CXR: bilateral opacities         
  JAMES VÁSQUEZ  26y, Female  Allergy: No Known Allergies      All historical available data reviewed.    HPI:  This is a 26 year old F with a PMHx of sickle cell disease on obryxta (last use was one week ago), cholelithiasis s/p CCY in 2013, presents to the ED for a two day history of abdominal and chest pain.  Presentation dates back to 5/18/2022 when patient first endorsed epigastric pain that was sharp in nature, which then radiated to the right shoulder and then to her entire back.  She did not endorse fever, rigors, shortness or chest pain during this time.  On 5/19/22, patient endorsed sharp pain across her entire chest that was exacerbated by inspiration. She did not endorse shortness of breath during the entire presentation.  She also endorsed a 103F prior to admission w/rigors.  She denies nausea, vomiting, headache, dizziness, joint pains, urinary symptoms.     ICU Vital Signs Last 24 Hrs  T(C): 39.3 (20 May 2022 11:14), Max: 39.3 (20 May 2022 11:14)  T(F): 102.7 (20 May 2022 11:14), Max: 102.7 (20 May 2022 11:14)  HR: 122 (20 May 2022 11:14) (122 - 122)  BP: 122/60 (20 May 2022 11:14) (122/60 - 122/60)  BP(mean): --  ABP: --  ABP(mean): --  RR: 18 (20 May 2022 11:14) (18 - 18)  SpO2: 91% (20 May 2022 11:14) (91% - 91%)    In the ED: Patient was found to be tachycardic. WBC 22K, Hgb 8.3, Reticulocyte % 16.3, CMP does not demonstrate electrolyte abnormalities, Trop -ve x1, RVP -ve.  CXR demonstrated bilateral infiltrates. Patient was found to have a SpO2 of 90% on RA which improved to 94% on 2L of O2. Received 500cc of LR, 1x dose of Rocephin and Azithromycin.  (20 May 2022 15:20)    ID called to r/o infectious etiology of leucocytosis    FAMILY HISTORY:    PAST MEDICAL & SURGICAL HISTORY:  Sickle cell anemia      S/P cholecystectomy            VITALS:  T(F): 100.9, Max: 100.9 (05-23-22 @ 03:50)  HR: 93  BP: 108/55  RR: 16Vital Signs Last 24 Hrs  T(C): 38.3 (23 May 2022 03:50), Max: 38.3 (23 May 2022 03:50)  T(F): 100.9 (23 May 2022 03:50), Max: 100.9 (23 May 2022 03:50)  HR: 93 (23 May 2022 03:50) (77 - 101)  BP: 108/55 (23 May 2022 03:50) (102/53 - 140/73)  BP(mean): --  RR: 16 (23 May 2022 03:50) (16 - 19)  SpO2: 94% (22 May 2022 07:40) (94% - 94%)    TESTS & MEASUREMENTS:                        6.2    22.46 )-----------( 613      ( 22 May 2022 18:00 )             18.5     05-22    138  |  104  |  <3<L>  ----------------------------<  80  3.6   |  19  |  <0.5<L>    Ca    8.3<L>      22 May 2022 06:21  Mg     1.5     05-22    TPro  6.5  /  Alb  3.3<L>  /  TBili  4.2<H>  /  DBili  x   /  AST  32  /  ALT  18  /  AlkPhos  58  05-22    LIVER FUNCTIONS - ( 22 May 2022 06:21 )  Alb: 3.3 g/dL / Pro: 6.5 g/dL / ALK PHOS: 58 U/L / ALT: 18 U/L / AST: 32 U/L / GGT: x             Culture - Blood (collected 05-20-22 @ 21:41)  Source: .Blood Blood-Peripheral  Preliminary Report (05-22-22 @ 09:01):    No growth to date.            RADIOLOGY & ADDITIONAL TESTS:  Personal review of radiological diagnostics performed  Echo and EKG results noted when applicable.     MEDICATIONS:  acetaminophen     Tablet .. 650 milliGRAM(s) Oral every 6 hours PRN  cefTRIAXone   IVPB 1000 milliGRAM(s) IV Intermittent every 24 hours  chlorhexidine 4% Liquid 1 Application(s) Topical <User Schedule>  dextrose 5% + sodium chloride 0.45%. 1000 milliLiter(s) IV Continuous <Continuous>  enoxaparin Injectable 40 milliGRAM(s) SubCutaneous every 24 hours  folic acid 1 milliGRAM(s) Oral daily  levoFLOXacin IVPB 500 milliGRAM(s) IV Intermittent every 24 hours  morphine  - Injectable 2 milliGRAM(s) IV Push every 4 hours PRN  pantoprazole    Tablet 40 milliGRAM(s) Oral before breakfast      ANTIBIOTICS:  cefTRIAXone   IVPB 1000 milliGRAM(s) IV Intermittent every 24 hours  levoFLOXacin IVPB 500 milliGRAM(s) IV Intermittent every 24 hours    
Patient is a 26y old  Female who presents with a chief complaint of     HPI:  25 yo female with PMHx of sickle cell disease c/o chest pain and fever for past couple of days. Patient has had fever up to 103F for the past couple of days, taking tylenol without relief. Also c/o of sharp, midsternal, constant chest pain associated with some SOB. denies chills, cough, abdominal pain, n/v/d. Admitted a couple of months ago for acute chest syndrome. Hematologist in [place] Dr. Jimenez.    ROS:  Negative except for: chest pain    PAST MEDICAL & SURGICAL HISTORY:  Sickle cell anemia  S/P cholecystectomy    SOCIAL HISTORY: Denies alcohol or tobacco use.    FAMILY HISTORY: No pertinent family hx.    MEDICATIONS  (STANDING):  lactated ringers Bolus 500 milliLiter(s) IV Bolus once    MEDICATIONS  (PRN):    Height (cm): 162.6 (05-20-22 @ 11:14)  Weight (kg): 63.5 (05-20-22 @ 11:14)  BMI (kg/m2): 24 (05-20-22 @ 11:14)  BSA (m2): 1.68 (05-20-22 @ 11:14)  Allergies    No Known Allergies    Intolerances    Vital Signs Last 24 Hrs  T(C): 39.3 (20 May 2022 11:14), Max: 39.3 (20 May 2022 11:14)  T(F): 102.7 (20 May 2022 11:14), Max: 102.7 (20 May 2022 11:14)  HR: 122 (20 May 2022 11:14) (122 - 122)  BP: 122/60 (20 May 2022 11:14) (122/60 - 122/60)  BP(mean): --  RR: 18 (20 May 2022 11:14) (18 - 18)  SpO2: 91% (20 May 2022 11:14) (91% - 91%)    PHYSICAL EXAM  General: adult in NAD on 2L NC   HEENT: clear oropharynx, anicteric sclera, pink conjunctiva  Neck: supple  CV: normal S1/S2 with no murmur rubs or gallops  Lungs: Decreased lung sound on LLL, no wheezes, no rales  Abdomen: soft non-tender non-distended, no hepatosplenomegaly  Ext: no clubbing cyanosis or edema  Skin: no rashes and no petechiae  Neuro: alert and oriented X 4, no focal deficits      LABS:                          8.3    22.57 )-----------( 515      ( 20 May 2022 13:22 )             23.0         Mean Cell Volume : 92.0 fL  Mean Cell Hemoglobin : 33.2 pg  Mean Cell Hemoglobin Concentration : 36.1 g/dL  Auto Neutrophil # : 16.86 K/uL  Auto Lymphocyte # : 3.78 K/uL  Auto Monocyte # : 1.63 K/uL  Auto Eosinophil # : 0.02 K/uL  Auto Basophil # : 0.10 K/uL  Auto Neutrophil % : 74.8 %  Auto Lymphocyte % : 16.7 %  Auto Monocyte % : 7.2 %  Auto Eosinophil % : 0.1 %  Auto Basophil % : 0.4 %      Serial CBC's  05-20 @ 13:22  Hct-23.0 / Hgb-8.3 / Plat-515 / RBC-2.50 / WBC-22.57      05-20    138  |  103  |  <3<L>  ----------------------------<  86  4.2   |  21  |  <0.5<L>    Ca    9.2      20 May 2022 13:22    TPro  7.7  /  Alb  4.1  /  TBili  4.0<H>  /  DBili  x   /  AST  53<H>  /  ALT  24  /  AlkPhos  57  05-20          Reticulocyte Percent: 16.9 % (05-20 @ 13:22)  Reticulocyte Percent: 27.2 % (05-14 @ 16:45)    BLOOD SMEAR INTERPRETATION:       RADIOLOGY & ADDITIONAL STUDIES:    < from: Xray Chest 1 View- PORTABLE-Urgent (05.20.22 @ 13:47) >  Impression:  Patchy bilateral opacifications, worse within the left lower lung field.  --- End of Report ---  < end of copied text >

## 2022-05-24 LAB
HEMOGLOBIN INTERPRETATION: SIGNIFICANT CHANGE UP
HGB A MFR BLD: 0 % — LOW (ref 95.8–98)
HGB A2 MFR BLD: 3.8 % — HIGH (ref 2–3.2)
HGB F MFR BLD: 5.7 % — HIGH (ref 0–1)
HGB S MFR BLD: 90.5 % — HIGH

## 2022-05-25 LAB
M PNEUMO IGM SER-ACNC: 0.78 INDEX — SIGNIFICANT CHANGE UP (ref 0–0.9)
MYCOPLASMA AG SPEC QL: NEGATIVE — SIGNIFICANT CHANGE UP

## 2022-05-26 ENCOUNTER — INPATIENT (INPATIENT)
Facility: HOSPITAL | Age: 27
LOS: 14 days | Discharge: HOME | End: 2022-06-10
Attending: INTERNAL MEDICINE | Admitting: INTERNAL MEDICINE
Payer: COMMERCIAL

## 2022-05-26 VITALS
RESPIRATION RATE: 19 BRPM | TEMPERATURE: 98 F | OXYGEN SATURATION: 97 % | WEIGHT: 130.07 LBS | DIASTOLIC BLOOD PRESSURE: 62 MMHG | SYSTOLIC BLOOD PRESSURE: 110 MMHG | HEART RATE: 117 BPM | HEIGHT: 65 IN

## 2022-05-26 DIAGNOSIS — Z90.49 ACQUIRED ABSENCE OF OTHER SPECIFIED PARTS OF DIGESTIVE TRACT: Chronic | ICD-10-CM

## 2022-05-26 LAB
ALBUMIN SERPL ELPH-MCNC: 3.5 G/DL — SIGNIFICANT CHANGE UP (ref 3.5–5.2)
ALP SERPL-CCNC: 65 U/L — SIGNIFICANT CHANGE UP (ref 30–115)
ALT FLD-CCNC: 15 U/L — SIGNIFICANT CHANGE UP (ref 0–41)
ANION GAP SERPL CALC-SCNC: 15 MMOL/L — HIGH (ref 7–14)
AST SERPL-CCNC: 59 U/L — HIGH (ref 0–41)
BASOPHILS # BLD AUTO: 0.11 K/UL — SIGNIFICANT CHANGE UP (ref 0–0.2)
BASOPHILS NFR BLD AUTO: 0.5 % — SIGNIFICANT CHANGE UP (ref 0–1)
BILIRUB SERPL-MCNC: 4.2 MG/DL — HIGH (ref 0.2–1.2)
BUN SERPL-MCNC: 3 MG/DL — LOW (ref 10–20)
CALCIUM SERPL-MCNC: 8.6 MG/DL — SIGNIFICANT CHANGE UP (ref 8.5–10.1)
CHLORIDE SERPL-SCNC: 98 MMOL/L — SIGNIFICANT CHANGE UP (ref 98–110)
CO2 SERPL-SCNC: 20 MMOL/L — SIGNIFICANT CHANGE UP (ref 17–32)
CREAT SERPL-MCNC: <0.5 MG/DL — LOW (ref 0.7–1.5)
CULTURE RESULTS: SIGNIFICANT CHANGE UP
EGFR: 142 ML/MIN/1.73M2 — SIGNIFICANT CHANGE UP
EOSINOPHIL # BLD AUTO: 0.02 K/UL — SIGNIFICANT CHANGE UP (ref 0–0.7)
EOSINOPHIL NFR BLD AUTO: 0.1 % — SIGNIFICANT CHANGE UP (ref 0–8)
GLUCOSE SERPL-MCNC: 80 MG/DL — SIGNIFICANT CHANGE UP (ref 70–99)
HCG SERPL QL: NEGATIVE — SIGNIFICANT CHANGE UP
HCT VFR BLD CALC: 22.9 % — LOW (ref 37–47)
HGB BLD-MCNC: 7.8 G/DL — LOW (ref 12–16)
IMM GRANULOCYTES NFR BLD AUTO: 1.5 % — HIGH (ref 0.1–0.3)
LYMPHOCYTES # BLD AUTO: 2.09 K/UL — SIGNIFICANT CHANGE UP (ref 1.2–3.4)
LYMPHOCYTES # BLD AUTO: 8.9 % — LOW (ref 20.5–51.1)
MAGNESIUM SERPL-MCNC: 1.5 MG/DL — LOW (ref 1.8–2.4)
MCHC RBC-ENTMCNC: 32.8 PG — HIGH (ref 27–31)
MCHC RBC-ENTMCNC: 34.1 G/DL — SIGNIFICANT CHANGE UP (ref 32–37)
MCV RBC AUTO: 96.2 FL — SIGNIFICANT CHANGE UP (ref 81–99)
MONOCYTES # BLD AUTO: 2.15 K/UL — HIGH (ref 0.1–0.6)
MONOCYTES NFR BLD AUTO: 9.1 % — SIGNIFICANT CHANGE UP (ref 1.7–9.3)
NEUTROPHILS # BLD AUTO: 18.85 K/UL — HIGH (ref 1.4–6.5)
NEUTROPHILS NFR BLD AUTO: 79.9 % — HIGH (ref 42.2–75.2)
NRBC # BLD: 2 /100 WBCS — HIGH (ref 0–0)
PLATELET # BLD AUTO: 650 K/UL — HIGH (ref 130–400)
POTASSIUM SERPL-MCNC: 4.8 MMOL/L — SIGNIFICANT CHANGE UP (ref 3.5–5)
POTASSIUM SERPL-SCNC: 4.8 MMOL/L — SIGNIFICANT CHANGE UP (ref 3.5–5)
PROT SERPL-MCNC: 7.7 G/DL — SIGNIFICANT CHANGE UP (ref 6–8)
RBC # BLD: 2.38 M/UL — LOW (ref 4.2–5.4)
RBC # BLD: 2.39 M/UL — LOW (ref 4.2–5.4)
RBC # FLD: 22.6 % — HIGH (ref 11.5–14.5)
RETICS #: 374.5 K/UL — HIGH (ref 25–125)
RETICS/RBC NFR: 14.9 % — HIGH (ref 0.5–1.5)
SODIUM SERPL-SCNC: 133 MMOL/L — LOW (ref 135–146)
SPECIMEN SOURCE: SIGNIFICANT CHANGE UP
TROPONIN T SERPL-MCNC: <0.01 NG/ML — SIGNIFICANT CHANGE UP
WBC # BLD: 23.58 K/UL — HIGH (ref 4.8–10.8)
WBC # FLD AUTO: 23.58 K/UL — HIGH (ref 4.8–10.8)

## 2022-05-26 PROCEDURE — 71045 X-RAY EXAM CHEST 1 VIEW: CPT | Mod: 26

## 2022-05-26 PROCEDURE — 93010 ELECTROCARDIOGRAM REPORT: CPT

## 2022-05-26 PROCEDURE — 83020 HEMOGLOBIN ELECTROPHORESIS: CPT | Mod: 26

## 2022-05-26 PROCEDURE — 99285 EMERGENCY DEPT VISIT HI MDM: CPT

## 2022-05-26 RX ORDER — LANOLIN ALCOHOL/MO/W.PET/CERES
3 CREAM (GRAM) TOPICAL AT BEDTIME
Refills: 0 | Status: DISCONTINUED | OUTPATIENT
Start: 2022-05-26 | End: 2022-06-10

## 2022-05-26 RX ORDER — ONDANSETRON 8 MG/1
4 TABLET, FILM COATED ORAL EVERY 8 HOURS
Refills: 0 | Status: DISCONTINUED | OUTPATIENT
Start: 2022-05-26 | End: 2022-06-10

## 2022-05-26 RX ORDER — FOLIC ACID 0.8 MG
1 TABLET ORAL DAILY
Refills: 0 | Status: DISCONTINUED | OUTPATIENT
Start: 2022-05-26 | End: 2022-06-10

## 2022-05-26 RX ORDER — OXYCODONE HYDROCHLORIDE 5 MG/1
5 TABLET ORAL EVERY 4 HOURS
Refills: 0 | Status: DISCONTINUED | OUTPATIENT
Start: 2022-05-26 | End: 2022-05-30

## 2022-05-26 RX ORDER — SODIUM CHLORIDE 9 MG/ML
1000 INJECTION, SOLUTION INTRAVENOUS
Refills: 0 | Status: DISCONTINUED | OUTPATIENT
Start: 2022-05-26 | End: 2022-05-27

## 2022-05-26 RX ORDER — LOSARTAN POTASSIUM 100 MG/1
25 TABLET, FILM COATED ORAL DAILY
Refills: 0 | Status: DISCONTINUED | OUTPATIENT
Start: 2022-05-26 | End: 2022-05-26

## 2022-05-26 RX ORDER — MORPHINE SULFATE 50 MG/1
4 CAPSULE, EXTENDED RELEASE ORAL ONCE
Refills: 0 | Status: DISCONTINUED | OUTPATIENT
Start: 2022-05-26 | End: 2022-05-26

## 2022-05-26 RX ORDER — PIPERACILLIN AND TAZOBACTAM 4; .5 G/20ML; G/20ML
3.38 INJECTION, POWDER, LYOPHILIZED, FOR SOLUTION INTRAVENOUS ONCE
Refills: 0 | Status: DISCONTINUED | OUTPATIENT
Start: 2022-05-26 | End: 2022-05-27

## 2022-05-26 RX ORDER — ENOXAPARIN SODIUM 100 MG/ML
40 INJECTION SUBCUTANEOUS EVERY 24 HOURS
Refills: 0 | Status: DISCONTINUED | OUTPATIENT
Start: 2022-05-26 | End: 2022-06-10

## 2022-05-26 RX ORDER — MAGNESIUM SULFATE 500 MG/ML
2 VIAL (ML) INJECTION ONCE
Refills: 0 | Status: COMPLETED | OUTPATIENT
Start: 2022-05-26 | End: 2022-05-26

## 2022-05-26 RX ORDER — ACETAMINOPHEN 500 MG
650 TABLET ORAL EVERY 6 HOURS
Refills: 0 | Status: DISCONTINUED | OUTPATIENT
Start: 2022-05-26 | End: 2022-06-10

## 2022-05-26 RX ORDER — HYDROMORPHONE HYDROCHLORIDE 2 MG/ML
1 INJECTION INTRAMUSCULAR; INTRAVENOUS; SUBCUTANEOUS ONCE
Refills: 0 | Status: DISCONTINUED | OUTPATIENT
Start: 2022-05-26 | End: 2022-05-27

## 2022-05-26 RX ORDER — SODIUM CHLORIDE 9 MG/ML
2000 INJECTION, SOLUTION INTRAVENOUS ONCE
Refills: 0 | Status: COMPLETED | OUTPATIENT
Start: 2022-05-26 | End: 2022-05-26

## 2022-05-26 RX ADMIN — Medication 25 GRAM(S): at 23:14

## 2022-05-26 RX ADMIN — MORPHINE SULFATE 4 MILLIGRAM(S): 50 CAPSULE, EXTENDED RELEASE ORAL at 22:27

## 2022-05-26 NOTE — ED PROVIDER NOTE - CARE PLAN
Principal Discharge DX:	Sickle cell disease, with acute chest syndrome  Secondary Diagnosis:	Pneumonia  Secondary Diagnosis:	Hypomagnesemia   1

## 2022-05-26 NOTE — ED PROVIDER NOTE - PROGRESS NOTE DETAILS
[FreeTextEntry1] : 66 year old male presents to the [facility] with a pilonidal cyst. The patient reports that he has had this cyst for the past couple of months. The patient followed up with Dr. Mcpherson who recommended the patient follow up with the [facility] for care. \par \par Right ischium pressure ulcer.  tpc with hematology- no need for acute exchange, will follow

## 2022-05-26 NOTE — ED PROVIDER NOTE - PHYSICAL EXAMINATION
CONSTITUTIONAL: Well-appearing; well-nourished; in no apparent distress.   NECK: Supple; non-tender; no cervical lymphadenopathy.   CARDIOVASCULAR: Normal S1, S2; no murmurs, rubs, or gallops.   RESPIRATORY: Normal chest excursion with respiration; breath sounds clear and equal bilaterally; no wheezes, rhonchi, or rales.  GI/: non-distended; non-tender; no palpable organomegaly.   MS: No evidence of trauma or deformity. Normal ROM in all four extremities; non-tender to palpation; distal pulses are normal.   SKIN: Normal for age and race; warm; dry; good turgor; no apparent lesions or exudate.   NEURO/PSYCH: A & O x 4; grossly unremarkable. mood and manner are appropriate.

## 2022-05-26 NOTE — H&P ADULT - HISTORY OF PRESENT ILLNESS
27yo  F with PMH of Sickle cell anemia (on oxbryta - followed by Hematologist - Dr. Jimenez in [place]), recently discharged after treatment of pneumonia and sickle cell crisis, presented to the ED with c/o right sided chest, shoulder and upper back pain. States that she overexerted herself. She states that she has not been feeling well since yesterday, had mild cough and fever upto 102F at home. Denies any shortness of breath, abdominal pain, nausea, vomiting, diarrhea, leg pain, swelling.     ED vitals:   T(F): 102.6 (05-26 @ 23:54), Max: 102.6 (05-26 @ 23:54)  HR: 110 (05-26 @ 23:54) (109 - 117)  BP: 117/55 (05-26 @ 23:54) (110/62 - 117/55)  RR: 18 (05-26 @ 23:54) (18 - 19)  SpO2: 95% (05-26 @ 23:54) (95% - 97%)    ED workup: Hb 7.8, WBC 23.58, Plt 650, Retics 14.9%. Mg 1.5.  She was given LR bolus, Magnesium and morphine in ED. Patient being admitted for management of sickle cell crisis, r/o pneumonia/Acute chest syndrome.

## 2022-05-26 NOTE — ED PROVIDER NOTE - NS ED ROS FT
Constitutional: no recent weight loss, change in appetite   Eyes: no redness/discharge/pain/vision changes  ENT: no rhinorrhea/ear pain/sore throat  Cardiac: No chest pain, SOB or edema.  Respiratory: No respiratory distress  GI: No nausea, vomiting, diarrhea or abdominal pain.  : No dysuria, frequency, urgency or hematuria  MS: no loss of ROM, no weakness  Neuro: No headache or weakness. No LOC.  Skin: No skin rash.  Endocrine: No history of thyroid disease or diabetes.  Except as documented in the HPI, all other systems are negative.

## 2022-05-26 NOTE — H&P ADULT - NSHPPHYSICALEXAM_GEN_ALL_CORE
Vital Signs Last 24 Hrs  T(C): 36.9 (26 May 2022 19:22), Max: 36.9 (26 May 2022 19:22)  T(F): 98.4 (26 May 2022 19:22), Max: 98.4 (26 May 2022 19:22)  HR: 109 (26 May 2022 20:45) (109 - 117)  BP: 110/62 (26 May 2022 19:22) (110/62 - 110/62)  RR: 19 (26 May 2022 19:22) (19 - 19)  SpO2: 97% (26 May 2022 20:45) (97% - 97%) Vital Signs Last 24 Hrs  T(C): 36.9 (26 May 2022 19:22), Max: 36.9 (26 May 2022 19:22)  T(F): 98.4 (26 May 2022 19:22), Max: 98.4 (26 May 2022 19:22)  HR: 109 (26 May 2022 20:45) (109 - 117)  BP: 110/62 (26 May 2022 19:22) (110/62 - 110/62)  RR: 19 (26 May 2022 19:22) (19 - 19)  SpO2: 97% (26 May 2022 20:45) (97% - 97%)    General: No acute distress; Pallor (+), Icterus (+), Cyanosis (-), Clubbing (-)  HEENT: Normocephalic, atraumatic, PERRLA, EOMI  PULM: Diminished breath sounds on right side, wheeze (-), rubs (-), crackles (-)  CVS: Normal S1 and S2, murmurs (-), rubs (-), gallops (-)   GI: Soft, nondistended, nontender, BS +  MSK: Edema (-), no muscle, bone or joint tenderness noted  SKIN: Warm and well perfused, no rashes noted  NEURO:  Alert and Oriented x 3; No gross focal neurological deficit noted

## 2022-05-26 NOTE — ED PROVIDER NOTE - OBJECTIVE STATEMENT
pt with pmhx sickle cell disease on Oxbryta (followed by heme in [place]), recent DC 3 days ago; was admitted for sickle cell crisis and b/l PNA and was DC on levaquin 500mg daily- still has 2 more doses; returned to work today but feels she overexerted herself and is c/o exac of sickle pain- typically localized to R shoulder and upper back. has still been experiencing fevers intermittently. Denies HA/dizziness/chest pain/palpitation/sob/abd pain/n/v/d/ black stool/bloody stool/urinary sxs

## 2022-05-26 NOTE — H&P ADULT - NSHPLABSRESULTS_GEN_ALL_CORE
(05-26 @ 21:55)                      7.8  23.58 )-----------( 650                 22.9    Neutrophils = 18.85 (79.9%)  Lymphocytes = 2.09 (8.9%)  Eosinophils = 0.02 (0.1%)  Basophils = 0.11 (0.5%)  Monocytes = 2.15 (9.1%)  Bands = --%    05-26    133<L>  |  98  |  3<L>  ----------------------------<  80  4.8   |  20  |  <0.5<L>    Ca    8.6      26 May 2022 21:55  Mg     1.5     05-26    TPro  7.7  /  Alb  3.5  /  TBili  4.2<H>  /  DBili  x   /  AST  59<H>  /  ALT  15  /  AlkPhos  65  05-26    Magnesium, Serum: 1.5 mg/dL (05-26-22 @ 21:55)      CARDIAC MARKERS ( 26 May 2022 21:55 )  Trop <0.01 ng/mL / CK x     / CKMB x           Serum Pro-Brain Natriuretic Peptide: 71 pg/mL (05-20-22 @ 13:22)    RVP:(05-20 @ 13:28)  [facility] (05-26 @ 21:55)                      7.8  23.58 )-----------( 650                 22.9    Neutrophils = 18.85 (79.9%)  Lymphocytes = 2.09 (8.9%)  Eosinophils = 0.02 (0.1%)  Basophils = 0.11 (0.5%)  Monocytes = 2.15 (9.1%)  Bands = --%    05-26    133<L>  |  98  |  3<L>  ----------------------------<  80  4.8   |  20  |  <0.5<L>    Ca    8.6      26 May 2022 21:55  Mg     1.5     05-26    TPro  7.7  /  Alb  3.5  /  TBili  4.2<H>  /  DBili  x   /  AST  59<H>  /  ALT  15  /  AlkPhos  65  05-26    Magnesium, Serum: 1.5 mg/dL (05-26-22 @ 21:55)      CARDIAC MARKERS ( 26 May 2022 21:55 )  Trop <0.01 ng/mL / CK x     / CKMB x           Serum Pro-Brain Natriuretic Peptide: 71 pg/mL (05-20-22 @ 13:22)    RVP:(05-20 @ 13:28)  NotDetec    CXR - RLL opacity

## 2022-05-26 NOTE — ED PROVIDER NOTE - CLINICAL SUMMARY MEDICAL DECISION MAKING FREE TEXT BOX
26-year-old female with known sickle cell disease presents to the emergency department for persistence of right chest and back pain consistent with known vaso-occlusive crisis.  Had recent admission for same with supportive management and IV antibiotics with outpatient care instituted recently.  Presents for persistence of symptoms, thinks overexertion is the provoking situation.  In the emergency department had screening exam, labs, imaging, pain management and parenteral IV antibiotics.  Noted persistence of right-sided infiltrate, given signs and symptoms, concern for acute chest syndrome.  Will admit to inpatient monitored setting for continued management.  Had conversation with ICU team for optimal disposition and plan is ICU admission.

## 2022-05-26 NOTE — ED ADULT TRIAGE NOTE - CHIEF COMPLAINT QUOTE
pt recently d/c from hospital on sunday after being admitted with pneumonia. pt now c/o returning sob and cp

## 2022-05-26 NOTE — ED PROVIDER NOTE - ATTENDING APP SHARED VISIT CONTRIBUTION OF CARE
I personally evaluated the patient. I reviewed the Resident’s or Physician Assistant’s note (as assigned above), and agree with the findings and plan except as documented in my note.     26 female with known sickle cell diseases here for upper back pain. Recent evaluation for same with inpatient management. States symptoms provoked by overexertion at work.  Denies cough, chest pain, nausea, vomiting, abdominal pain. Symptoms consistent with prior VOC.     PMD Hematologist is in [place]. Compliant with regimen and treatments otherwise.     ROS otherwise unremarkable    PE: female in no distress. CV: pulses intact. CHEST: normal work of breathing. no accessory muscle use. ABD: nondistended. SKIN: normal. EXT: FROM. NEURO: AAO 3 no focal deficits. HEENT: mucosa normal icteric sclera noted.     Impression: vasoocclusive crisis    Plan: IV labs imaging supportive care and reevaluation

## 2022-05-26 NOTE — H&P ADULT - ASSESSMENT
Impression:   Sickle cell crisis, r/o Acute chest syndrome  Suspected RLL pneumonia - septic on presentation  h/o Sickle cell disease    Plan:   CNS:   - Avoid CNS Depressants   - Pain control - Tylenol, oxycodone PRN    HEENT:   - Oral care  - Aspiration precautions     PULMONARY:   - HOB @ 45.   - Monitor Pulse Ox. Keep > 93%. Supplement as needed    CARDIOVASCULAR:   - Daily Weight, Strict I&Os. Keep I < O  - Check Trops, Telemetry    GI:   - Diet: Regular    RENAL:   - Monitor BMP/Cr  - Avoid nephrotoxic agents     INFECTIOUS DISEASE:   - Monitor WBC  - Trend fever  - ID eval  - follow up blood cultures, Procal, sputum culture, Urine strep/legionella, Nasal MRSA  - Start on Rocephin and azithromycin    HEMATOLOGICAL:   - Monitor H&H, Keep active T&S, Transfuse PRN to Hb >7  - DVT prophylaxis: Lovenox  - Hem-onc following; No indication for exchange transfusion currently  - Will need exchange transfusion if worsening chest pain/Hypoxia  - Follow up Coags    ENDOCRINE:   - Monitor FS  - Insulin regimen if needed    MUSCULOSKELETAL:   - Ambulate as tolerated     CODE STATUS: Full    DISPOSITION: MICU   Impression:   Sickle cell crisis, r/o Acute chest syndrome  Suspected RLL pneumonia - septic on presentation  h/o Sickle cell disease    Plan:   CNS:   - Avoid CNS Depressants   - Pain control - Tylenol, oxycodone PRN    HEENT:   - Oral care  - Aspiration precautions     PULMONARY:   - HOB @ 45.   - Monitor Pulse Ox. Keep > 93%. Supplement as needed    CARDIOVASCULAR:   - Daily Weight, Strict I&Os. Keep I < O  - Check Trops, Telemetry  - s/p 2L LR in ED, continue with IV hydration - D5, 1/2NS    GI:   - Diet: Regular    RENAL:   - Monitor BMP/Cr  - Avoid nephrotoxic agents     INFECTIOUS DISEASE:   - Monitor WBC  - Trend fever  - ID eval  - follow up blood cultures, Procal, sputum culture, Urine strep/legionella, Nasal MRSA  - Start on Rocephin and azithromycin      HEMATOLOGICAL:   - Monitor H&H, Keep active T&S, Transfuse PRN to Hb >7  - DVT prophylaxis: Lovenox  - Hem-onc following; No indication for exchange transfusion currently  - Will need exchange transfusion if worsening chest pain/Hypoxia  - Follow up Coags    ENDOCRINE:   - Monitor FS  - Insulin regimen if needed    MUSCULOSKELETAL:   - Ambulate as tolerated     CODE STATUS: Full    DISPOSITION: MICU

## 2022-05-27 DIAGNOSIS — A41.9 SEPSIS, UNSPECIFIED ORGANISM: ICD-10-CM

## 2022-05-27 DIAGNOSIS — D57.01 HB-SS DISEASE WITH ACUTE CHEST SYNDROME: ICD-10-CM

## 2022-05-27 DIAGNOSIS — D75.838 OTHER THROMBOCYTOSIS: ICD-10-CM

## 2022-05-27 DIAGNOSIS — E83.42 HYPOMAGNESEMIA: ICD-10-CM

## 2022-05-27 DIAGNOSIS — R07.9 CHEST PAIN, UNSPECIFIED: ICD-10-CM

## 2022-05-27 DIAGNOSIS — J98.11 ATELECTASIS: ICD-10-CM

## 2022-05-27 DIAGNOSIS — D72.829 ELEVATED WHITE BLOOD CELL COUNT, UNSPECIFIED: ICD-10-CM

## 2022-05-27 DIAGNOSIS — J15.9 UNSPECIFIED BACTERIAL PNEUMONIA: ICD-10-CM

## 2022-05-27 LAB
ALBUMIN SERPL ELPH-MCNC: 3.1 G/DL — LOW (ref 3.5–5.2)
ALLERGY+IMMUNOLOGY DIAG STUDY NOTE: SIGNIFICANT CHANGE UP
ALP SERPL-CCNC: 63 U/L — SIGNIFICANT CHANGE UP (ref 30–115)
ALT FLD-CCNC: 12 U/L — SIGNIFICANT CHANGE UP (ref 0–41)
ANION GAP SERPL CALC-SCNC: 12 MMOL/L — SIGNIFICANT CHANGE UP (ref 7–14)
ANISOCYTOSIS BLD QL: SIGNIFICANT CHANGE UP
APTT BLD: 32.7 SEC — SIGNIFICANT CHANGE UP (ref 27–39.2)
AST SERPL-CCNC: 24 U/L — SIGNIFICANT CHANGE UP (ref 0–41)
BASOPHILS # BLD AUTO: 0 K/UL — SIGNIFICANT CHANGE UP (ref 0–0.2)
BASOPHILS NFR BLD AUTO: 0 % — SIGNIFICANT CHANGE UP (ref 0–1)
BILIRUB SERPL-MCNC: 3.1 MG/DL — HIGH (ref 0.2–1.2)
BLD GP AB SCN SERPL QL: SIGNIFICANT CHANGE UP
BUN SERPL-MCNC: 4 MG/DL — LOW (ref 10–20)
CALCIUM SERPL-MCNC: 8.2 MG/DL — LOW (ref 8.5–10.1)
CHLORIDE SERPL-SCNC: 98 MMOL/L — SIGNIFICANT CHANGE UP (ref 98–110)
CO2 SERPL-SCNC: 23 MMOL/L — SIGNIFICANT CHANGE UP (ref 17–32)
CREAT SERPL-MCNC: <0.5 MG/DL — LOW (ref 0.7–1.5)
D DIMER BLD IA.RAPID-MCNC: 1093 NG/ML DDU — HIGH (ref 0–230)
DAT IGG-SP REAG RBC-IMP: ABNORMAL
DIR ANTIGLOB POLYSPECIFIC INTERPRETATION: ABNORMAL
EGFR: 152 ML/MIN/1.73M2 — SIGNIFICANT CHANGE UP
EOSINOPHIL # BLD AUTO: 0 K/UL — SIGNIFICANT CHANGE UP (ref 0–0.7)
EOSINOPHIL NFR BLD AUTO: 0 % — SIGNIFICANT CHANGE UP (ref 0–8)
GIANT PLATELETS BLD QL SMEAR: PRESENT — SIGNIFICANT CHANGE UP
GLUCOSE SERPL-MCNC: 107 MG/DL — HIGH (ref 70–99)
HAPTOGLOB SERPL-MCNC: <20 MG/DL — LOW (ref 34–200)
HCT VFR BLD CALC: 19.2 % — LOW (ref 37–47)
HCT VFR BLD CALC: 19.6 % — LOW (ref 37–47)
HGB BLD-MCNC: 6.5 G/DL — CRITICAL LOW (ref 12–16)
HGB BLD-MCNC: 6.7 G/DL — CRITICAL LOW (ref 12–16)
IAT COMP-SP REAG SERPL QL: SIGNIFICANT CHANGE UP
INR BLD: 1.57 RATIO — HIGH (ref 0.65–1.3)
LDH SERPL L TO P-CCNC: 447 — HIGH (ref 50–242)
LDH SERPL L TO P-CCNC: 529 — HIGH (ref 50–242)
LYMPHOCYTES # BLD AUTO: 14.8 % — LOW (ref 20.5–51.1)
LYMPHOCYTES # BLD AUTO: 4.1 K/UL — HIGH (ref 1.2–3.4)
MACROCYTES BLD QL: SIGNIFICANT CHANGE UP
MAGNESIUM SERPL-MCNC: 1.8 MG/DL — SIGNIFICANT CHANGE UP (ref 1.8–2.4)
MANUAL SMEAR VERIFICATION: SIGNIFICANT CHANGE UP
MCHC RBC-ENTMCNC: 32.3 PG — HIGH (ref 27–31)
MCHC RBC-ENTMCNC: 32.8 PG — HIGH (ref 27–31)
MCHC RBC-ENTMCNC: 33.9 G/DL — SIGNIFICANT CHANGE UP (ref 32–37)
MCHC RBC-ENTMCNC: 34.2 G/DL — SIGNIFICANT CHANGE UP (ref 32–37)
MCV RBC AUTO: 95.5 FL — SIGNIFICANT CHANGE UP (ref 81–99)
MCV RBC AUTO: 96.1 FL — SIGNIFICANT CHANGE UP (ref 81–99)
MONOCYTES # BLD AUTO: 4.32 K/UL — HIGH (ref 0.1–0.6)
MONOCYTES NFR BLD AUTO: 15.6 % — HIGH (ref 1.7–9.3)
NEUTROPHILS # BLD AUTO: 19.26 K/UL — HIGH (ref 1.4–6.5)
NEUTROPHILS NFR BLD AUTO: 69.6 % — SIGNIFICANT CHANGE UP (ref 42.2–75.2)
NRBC # BLD: 0 /100 WBCS — SIGNIFICANT CHANGE UP (ref 0–0)
NRBC # BLD: 4 /100 — HIGH (ref 0–0)
NRBC # BLD: SIGNIFICANT CHANGE UP /100 WBCS (ref 0–0)
PLAT MORPH BLD: ABNORMAL
PLATELET # BLD AUTO: 520 K/UL — HIGH (ref 130–400)
PLATELET # BLD AUTO: 533 K/UL — HIGH (ref 130–400)
POLYCHROMASIA BLD QL SMEAR: SIGNIFICANT CHANGE UP
POTASSIUM SERPL-MCNC: 3.5 MMOL/L — SIGNIFICANT CHANGE UP (ref 3.5–5)
POTASSIUM SERPL-SCNC: 3.5 MMOL/L — SIGNIFICANT CHANGE UP (ref 3.5–5)
PROT SERPL-MCNC: 6.7 G/DL — SIGNIFICANT CHANGE UP (ref 6–8)
PROTHROM AB SERPL-ACNC: 18 SEC — HIGH (ref 9.95–12.87)
RAPID RVP RESULT: SIGNIFICANT CHANGE UP
RBC # BLD: 2.01 M/UL — LOW (ref 4.2–5.4)
RBC # BLD: 2.04 M/UL — LOW (ref 4.2–5.4)
RBC # FLD: 20.2 % — HIGH (ref 11.5–14.5)
RBC # FLD: 21.7 % — HIGH (ref 11.5–14.5)
RBC BLD AUTO: ABNORMAL
SARS-COV-2 RNA SPEC QL NAA+PROBE: SIGNIFICANT CHANGE UP
SODIUM SERPL-SCNC: 133 MMOL/L — LOW (ref 135–146)
TARGETS BLD QL SMEAR: SIGNIFICANT CHANGE UP
TROPONIN T SERPL-MCNC: <0.01 NG/ML — SIGNIFICANT CHANGE UP
WBC # BLD: 25.67 K/UL — HIGH (ref 4.8–10.8)
WBC # BLD: 27.67 K/UL — HIGH (ref 4.8–10.8)
WBC # FLD AUTO: 25.67 K/UL — HIGH (ref 4.8–10.8)
WBC # FLD AUTO: 27.67 K/UL — HIGH (ref 4.8–10.8)

## 2022-05-27 PROCEDURE — 86077 PHYS BLOOD BANK SERV XMATCH: CPT

## 2022-05-27 PROCEDURE — 71045 X-RAY EXAM CHEST 1 VIEW: CPT | Mod: 26

## 2022-05-27 PROCEDURE — 99221 1ST HOSP IP/OBS SF/LOW 40: CPT | Mod: GC

## 2022-05-27 PROCEDURE — 99233 SBSQ HOSP IP/OBS HIGH 50: CPT

## 2022-05-27 RX ORDER — AZITHROMYCIN 500 MG/1
500 TABLET, FILM COATED ORAL ONCE
Refills: 0 | Status: COMPLETED | OUTPATIENT
Start: 2022-05-27 | End: 2022-05-27

## 2022-05-27 RX ORDER — CEFEPIME 1 G/1
2000 INJECTION, POWDER, FOR SOLUTION INTRAMUSCULAR; INTRAVENOUS ONCE
Refills: 0 | Status: COMPLETED | OUTPATIENT
Start: 2022-05-27 | End: 2022-05-27

## 2022-05-27 RX ORDER — INFLUENZA VIRUS VACCINE 15; 15; 15; 15 UG/.5ML; UG/.5ML; UG/.5ML; UG/.5ML
0.5 SUSPENSION INTRAMUSCULAR ONCE
Refills: 0 | Status: COMPLETED | OUTPATIENT
Start: 2022-05-27 | End: 2022-05-27

## 2022-05-27 RX ORDER — CEFTRIAXONE 500 MG/1
1000 INJECTION, POWDER, FOR SOLUTION INTRAMUSCULAR; INTRAVENOUS ONCE
Refills: 0 | Status: COMPLETED | OUTPATIENT
Start: 2022-05-27 | End: 2022-05-27

## 2022-05-27 RX ORDER — SODIUM CHLORIDE 9 MG/ML
1000 INJECTION, SOLUTION INTRAVENOUS
Refills: 0 | Status: DISCONTINUED | OUTPATIENT
Start: 2022-05-27 | End: 2022-06-10

## 2022-05-27 RX ORDER — HYDROMORPHONE HYDROCHLORIDE 2 MG/ML
1 INJECTION INTRAMUSCULAR; INTRAVENOUS; SUBCUTANEOUS ONCE
Refills: 0 | Status: DISCONTINUED | OUTPATIENT
Start: 2022-05-27 | End: 2022-05-27

## 2022-05-27 RX ORDER — CEFEPIME 1 G/1
INJECTION, POWDER, FOR SOLUTION INTRAMUSCULAR; INTRAVENOUS
Refills: 0 | Status: DISCONTINUED | OUTPATIENT
Start: 2022-05-27 | End: 2022-06-03

## 2022-05-27 RX ORDER — CEFEPIME 1 G/1
2000 INJECTION, POWDER, FOR SOLUTION INTRAMUSCULAR; INTRAVENOUS EVERY 8 HOURS
Refills: 0 | Status: DISCONTINUED | OUTPATIENT
Start: 2022-05-27 | End: 2022-06-03

## 2022-05-27 RX ORDER — SODIUM CHLORIDE 9 MG/ML
1000 INJECTION, SOLUTION INTRAVENOUS
Refills: 0 | Status: DISCONTINUED | OUTPATIENT
Start: 2022-05-27 | End: 2022-05-27

## 2022-05-27 RX ORDER — AZITHROMYCIN 500 MG/1
TABLET, FILM COATED ORAL
Refills: 0 | Status: DISCONTINUED | OUTPATIENT
Start: 2022-05-27 | End: 2022-05-28

## 2022-05-27 RX ORDER — PANTOPRAZOLE SODIUM 20 MG/1
40 TABLET, DELAYED RELEASE ORAL
Refills: 0 | Status: DISCONTINUED | OUTPATIENT
Start: 2022-05-27 | End: 2022-06-10

## 2022-05-27 RX ORDER — LOSARTAN POTASSIUM 100 MG/1
25 TABLET, FILM COATED ORAL DAILY
Refills: 0 | Status: DISCONTINUED | OUTPATIENT
Start: 2022-05-27 | End: 2022-06-10

## 2022-05-27 RX ORDER — CHLORHEXIDINE GLUCONATE 213 G/1000ML
1 SOLUTION TOPICAL DAILY
Refills: 0 | Status: DISCONTINUED | OUTPATIENT
Start: 2022-05-27 | End: 2022-06-10

## 2022-05-27 RX ORDER — HYDROMORPHONE HYDROCHLORIDE 2 MG/ML
1 INJECTION INTRAMUSCULAR; INTRAVENOUS; SUBCUTANEOUS EVERY 4 HOURS
Refills: 0 | Status: DISCONTINUED | OUTPATIENT
Start: 2022-05-27 | End: 2022-05-29

## 2022-05-27 RX ORDER — CEFTRIAXONE 500 MG/1
INJECTION, POWDER, FOR SOLUTION INTRAMUSCULAR; INTRAVENOUS
Refills: 0 | Status: DISCONTINUED | OUTPATIENT
Start: 2022-05-27 | End: 2022-05-27

## 2022-05-27 RX ORDER — AZITHROMYCIN 500 MG/1
500 TABLET, FILM COATED ORAL EVERY 24 HOURS
Refills: 0 | Status: DISCONTINUED | OUTPATIENT
Start: 2022-05-28 | End: 2022-05-28

## 2022-05-27 RX ORDER — VANCOMYCIN HCL 1 G
1000 VIAL (EA) INTRAVENOUS EVERY 12 HOURS
Refills: 0 | Status: DISCONTINUED | OUTPATIENT
Start: 2022-05-27 | End: 2022-05-30

## 2022-05-27 RX ADMIN — HYDROMORPHONE HYDROCHLORIDE 1 MILLIGRAM(S): 2 INJECTION INTRAMUSCULAR; INTRAVENOUS; SUBCUTANEOUS at 01:58

## 2022-05-27 RX ADMIN — Medication 650 MILLIGRAM(S): at 01:57

## 2022-05-27 RX ADMIN — Medication 650 MILLIGRAM(S): at 10:47

## 2022-05-27 RX ADMIN — Medication 250 MILLIGRAM(S): at 17:36

## 2022-05-27 RX ADMIN — SODIUM CHLORIDE 2000 MILLILITER(S): 9 INJECTION, SOLUTION INTRAVENOUS at 00:28

## 2022-05-27 RX ADMIN — CEFEPIME 100 MILLIGRAM(S): 1 INJECTION, POWDER, FOR SOLUTION INTRAMUSCULAR; INTRAVENOUS at 10:49

## 2022-05-27 RX ADMIN — HYDROMORPHONE HYDROCHLORIDE 1 MILLIGRAM(S): 2 INJECTION INTRAMUSCULAR; INTRAVENOUS; SUBCUTANEOUS at 23:41

## 2022-05-27 RX ADMIN — HYDROMORPHONE HYDROCHLORIDE 1 MILLIGRAM(S): 2 INJECTION INTRAMUSCULAR; INTRAVENOUS; SUBCUTANEOUS at 18:37

## 2022-05-27 RX ADMIN — AZITHROMYCIN 255 MILLIGRAM(S): 500 TABLET, FILM COATED ORAL at 23:23

## 2022-05-27 RX ADMIN — Medication 650 MILLIGRAM(S): at 18:37

## 2022-05-27 RX ADMIN — Medication 650 MILLIGRAM(S): at 11:17

## 2022-05-27 RX ADMIN — AZITHROMYCIN 500 MILLIGRAM(S): 500 TABLET, FILM COATED ORAL at 00:46

## 2022-05-27 RX ADMIN — HYDROMORPHONE HYDROCHLORIDE 1 MILLIGRAM(S): 2 INJECTION INTRAMUSCULAR; INTRAVENOUS; SUBCUTANEOUS at 01:43

## 2022-05-27 RX ADMIN — PANTOPRAZOLE SODIUM 40 MILLIGRAM(S): 20 TABLET, DELAYED RELEASE ORAL at 06:01

## 2022-05-27 RX ADMIN — SODIUM CHLORIDE 150 MILLILITER(S): 9 INJECTION, SOLUTION INTRAVENOUS at 02:00

## 2022-05-27 RX ADMIN — Medication 650 MILLIGRAM(S): at 01:27

## 2022-05-27 RX ADMIN — HYDROMORPHONE HYDROCHLORIDE 1 MILLIGRAM(S): 2 INJECTION INTRAMUSCULAR; INTRAVENOUS; SUBCUTANEOUS at 10:48

## 2022-05-27 RX ADMIN — Medication 650 MILLIGRAM(S): at 17:10

## 2022-05-27 RX ADMIN — ENOXAPARIN SODIUM 40 MILLIGRAM(S): 100 INJECTION SUBCUTANEOUS at 00:46

## 2022-05-27 RX ADMIN — LOSARTAN POTASSIUM 25 MILLIGRAM(S): 100 TABLET, FILM COATED ORAL at 06:00

## 2022-05-27 RX ADMIN — HYDROMORPHONE HYDROCHLORIDE 1 MILLIGRAM(S): 2 INJECTION INTRAMUSCULAR; INTRAVENOUS; SUBCUTANEOUS at 17:09

## 2022-05-27 RX ADMIN — CEFTRIAXONE 100 MILLIGRAM(S): 500 INJECTION, POWDER, FOR SOLUTION INTRAMUSCULAR; INTRAVENOUS at 00:46

## 2022-05-27 RX ADMIN — CEFEPIME 100 MILLIGRAM(S): 1 INJECTION, POWDER, FOR SOLUTION INTRAMUSCULAR; INTRAVENOUS at 22:14

## 2022-05-27 NOTE — ED ADULT NURSE NOTE - OBJECTIVE STATEMENT
pt presented to ED  recently d/c from hospital on sunday after being admitted with pneumonia. pt now c/o returning sob and cp. Pt placed on 2L NC Pt placed on cardiac monitor. Pt states julia

## 2022-05-27 NOTE — CONSULT NOTE ADULT - ASSESSMENT
IMPRESSION:    Sickle cell crisis  CAP non resolving after levaquin therapy   HO sickle cell disease     PLAN:    CNS:  Pain control     HEENT: Oral care    PULMONARY:  HOB @ 45 degrees.  Aspiration precautions.  Wean o2     CARDIOVASCULAR:  Continue Hydration.      GI: GI prophylaxis.  Feeding.  Bowel regimen     RENAL:  Follow up lytes.  Correct as needed    INFECTIOUS DISEASE: Follow up cultures.  Cefepime., Vanc, and Azithro.  Procal.  Nasal MRSA     HEMATOLOGICAL:  DVT prophylaxis.  FU wiht Hem onc.  FU Hemolysis.      ENDOCRINE:  Follow up FS.  Insulin protocol if needed.    MUSCULOSKELETAL:  OOB to chair     DGTF if no intervention

## 2022-05-27 NOTE — CONSULT NOTE ADULT - ATTENDING COMMENTS
She was seen earlier today with my foellow.  She was comfortable on 2 L of nasal cannula, and review of vitals showed that she was 100% at a certain point on room air as well.  Continues to be febrile.  She continues to be in pain but declined to have her oxycodone adjusted.    Hemoglobin is below baseline and continues to have hemolysis.    She was just recently discharged from the hospital    Plan  #Vaso-occlusive pain crisis likely due to pneumonia and possible acute chest syndrome.  -Continue with antibiotics as per infectious disease  -Follow-up cultures  -I spoke to her about a simple PRBC transfusion given that chest x-ray showing multilobular disease but she is saturating well on minimal oxygen and she was hesitant because of concern of iron overload and after discussion we decided for close observation and possible simple transfusion or exchange transfusions depending on clinical course  -Continue with hydration at one half NS at 100 cc  -Continue with current oxycodone dose  -Continue with folic acid 1 mg daily  -Monitor CBC  -She also has IV Dilaudid ordered  -If there is any clinical evidence of deterioration we will arrange for a RBC exchange  -On Lovenox for DVT prophylaxis

## 2022-05-27 NOTE — CONSULT NOTE ADULT - SUBJECTIVE AND OBJECTIVE BOX
Patient is a 26y old  Female who presents with a chief complaint of Acute chest syndrome (27 May 2022 05:37)      HPI:  25yo  F with PMH of Sickle cell anemia (on oxbryta - followed by Hematologist - Dr. Jimenez in [place]), recently discharged after treatment of pneumonia and sickle cell crisis, presented to the ED with c/o right sided chest, shoulder and upper back pain. States that she overexerted herself. She states that she has not been feeling well since yesterday, had mild cough and fever upto 102F at home. Denies any shortness of breath, abdominal pain, nausea, vomiting, diarrhea, leg pain, swelling.     ED vitals:   T(F): 102.6 (05-26 @ 23:54), Max: 102.6 (05-26 @ 23:54)  HR: 110 (05-26 @ 23:54) (109 - 117)  BP: 117/55 (05-26 @ 23:54) (110/62 - 117/55)  RR: 18 (05-26 @ 23:54) (18 - 19)  SpO2: 95% (05-26 @ 23:54) (95% - 97%)    ED workup: Hb 7.8, WBC 23.58, Plt 650, Retics 14.9%. Mg 1.5.  She was given LR bolus, Magnesium and morphine in ED. Patient being admitted for management of sickle cell crisis, r/o pneumonia/Acute chest syndrome.  (26 May 2022 23:34)      PAST MEDICAL & SURGICAL HISTORY:  Sickle cell anemia      S/P cholecystectomy          SOCIAL HX:   Smoking     NO                   ETOH                            Other    FAMILY HISTORY:  :  No known cardiovacular family hisotry     Review Of Systems:     All ROS are negative except per HPI       Allergies    No Known Allergies    Intolerances          PHYSICAL EXAM    ICU Vital Signs Last 24 Hrs  T(C): 37.1 (27 May 2022 05:00), Max: 39.2 (26 May 2022 23:54)  T(F): 98.7 (27 May 2022 05:00), Max: 102.6 (26 May 2022 23:54)  HR: 82 (27 May 2022 07:00) (76 - 117)  BP: 98/50 (27 May 2022 07:00) (98/50 - 118/62)  BP(mean): 66 (27 May 2022 07:00) (66 - 84)  ABP: --  ABP(mean): --  RR: 27 (27 May 2022 07:00) (18 - 27)  SpO2: 100% (27 May 2022 07:00) (93% - 100%)      CONSTITUTIONAL:  ILL APPEARING IN NAD    ENT:   Airway patent,   Mouth with normal mucosa.         CARDIAC:   Normal rate,   Regular rhythm.    No edema      RESPIRATORY:   No wheezing  Bilateral crackles   Not tachypneic,  No use of accessory muscles    GASTROINTESTINAL:  Abdomen soft,   Non-tender,   No guarding,   + BS      NEUROLOGICAL:   Alert and oriented   No motor deficits.    SKIN:   Skin normal color for race,   No evidence of rash.      HEME LYMPH: .  No cervical  lymphadenopathy.  No inguinal lymphadenopathy            05-26-22 @ 07:01  -  05-27-22 @ 07:00  --------------------------------------------------------  IN:    dextrose 5% + sodium chloride 0.45%: 600 mL    IV PiggyBack: 250 mL  Total IN: 850 mL    OUT:  Total OUT: 0 mL    Total NET: 850 mL          LABS:                          6.7    27.67 )-----------( 533      ( 27 May 2022 05:27 )             19.6                                               05-27    133<L>  |  98  |  4<L>  ----------------------------<  107<H>  3.5   |  23  |  <0.5<L>    Ca    8.2<L>      27 May 2022 05:27  Mg     1.8     05-27    TPro  6.7  /  Alb  3.1<L>  /  TBili  3.1<H>  /  DBili  x   /  AST  24  /  ALT  12  /  AlkPhos  63  05-27      PT/INR - ( 27 May 2022 00:40 )   PT: 18.00 sec;   INR: 1.57 ratio         PTT - ( 27 May 2022 00:40 )  PTT:32.7 sec                                           CARDIAC MARKERS ( 27 May 2022 05:27 )  x     / <0.01 ng/mL / x     / x     / x      CARDIAC MARKERS ( 26 May 2022 21:55 )  x     / <0.01 ng/mL / x     / x     / x                                                LIVER FUNCTIONS - ( 27 May 2022 05:27 )  Alb: 3.1 g/dL / Pro: 6.7 g/dL / ALK PHOS: 63 U/L / ALT: 12 U/L / AST: 24 U/L / GGT: x                                                                                                                                       X-Rays reviewed                                                                                     ECHO        MEDICATIONS  (STANDING):  azithromycin  IVPB      cefTRIAXone   IVPB      chlorhexidine 4% Liquid 1 Application(s) Topical daily  dextrose 5% + sodium chloride 0.45%. 1000 milliLiter(s) (150 mL/Hr) IV Continuous <Continuous>  enoxaparin Injectable 40 milliGRAM(s) SubCutaneous every 24 hours  folic acid 1 milliGRAM(s) Oral daily  influenza   Vaccine 0.5 milliLiter(s) IntraMuscular once  losartan 25 milliGRAM(s) Oral daily  pantoprazole    Tablet 40 milliGRAM(s) Oral before breakfast    MEDICATIONS  (PRN):  acetaminophen     Tablet .. 650 milliGRAM(s) Oral every 6 hours PRN Temp greater or equal to 38C (100.4F), Mild Pain (1 - 3)  melatonin 3 milliGRAM(s) Oral at bedtime PRN Insomnia  ondansetron Injectable 4 milliGRAM(s) IV Push every 8 hours PRN Nausea and/or Vomiting  oxyCODONE    IR 5 milliGRAM(s) Oral every 4 hours PRN Severe Pain (7 - 10)

## 2022-05-27 NOTE — CONSULT NOTE ADULT - ASSESSMENT
25yo  F with PMH of Sickle cell anemia (on oxbryta - followed by Hematologist - Dr. Jimenez in [place]), recently discharged after treatment of pneumonia and sickle cell crisis, presented to the ED with c/o right sided chest, shoulder and upper back pain. States that she overexerted herself. She states that she has not been feeling well since yesterday, had mild cough and fever upto 102F at home. Denies any shortness of breath, abdominal pain, nausea, vomiting, diarrhea, leg pain, swelling.     IMPRESSION;  27yo  F with PMH of Sickle cell anemia (on oxbryta - followed by Hematologist - Dr. Jimenez in [place]), recently discharged after treatment of pneumonia and sickle cell crisis, presented to the ED with c/o right sided chest, shoulder and upper back pain. States that she overexerted herself. She states that she has not been feeling well since yesterday, had mild cough and fever upto 102F at home. Denies any shortness of breath, abdominal pain, nausea, vomiting, diarrhea, leg pain, swelling.     IMPRESSION;   Sepsis secondary to multilobar bacterial PNA RLL/LLL  CXR new consolidation RLL  5/20 CT bibasilar consolidation  Sickle cell crisis with hemolysis  5/26 COVID-19 NG   5/20 Legionella NG    RECOMMENDATIONS;     27yo  F with PMH of Sickle cell anemia (on oxbryta - followed by Hematologist - Dr. Jimenez in [place]), recently discharged after treatment of pneumonia and sickle cell crisis, presented to the ED with c/o right sided chest, shoulder and upper back pain. States that she overexerted herself. She states that she has not been feeling well since yesterday, had mild cough and fever upto 102F at home. Denies any shortness of breath, abdominal pain, nausea, vomiting, diarrhea, leg pain, swelling.     IMPRESSION;   Sepsis secondary to multilobar bacterial PNA RLL/LLL  CXR new consolidation RLL  5/20 CT bibasilar consolidation  Sickle cell crisis with hemolysis  5/26 COVID-19 NG   5/20 Legionella NG    RECOMMENDATIONS;  BCx  Nares ORSA  Vancomycin 1 gm iv q12h ( hold if nares ORSA NG )  Cefepime 2 gm iv q8h  Levoquin 750 mg iv q24h  Off loading to prevent pressure sores and preventive measures to avoid aspiration

## 2022-05-27 NOTE — CONSULT NOTE ADULT - SUBJECTIVE AND OBJECTIVE BOX
JAMES VÁSQUEZ  26y, Female  Allergy: No Known Allergies      All historical available data reviewed.    HPI:  27yo  F with PMH of Sickle cell anemia (on oxbryta - followed by Hematologist - Dr. Jimenez in [place]), recently discharged after treatment of pneumonia and sickle cell crisis, presented to the ED with c/o right sided chest, shoulder and upper back pain. States that she overexerted herself. She states that she has not been feeling well since yesterday, had mild cough and fever upto 102F at home. Denies any shortness of breath, abdominal pain, nausea, vomiting, diarrhea, leg pain, swelling.     ED vitals:   T(F): 102.6 (05-26 @ 23:54), Max: 102.6 (05-26 @ 23:54)  HR: 110 (05-26 @ 23:54) (109 - 117)  BP: 117/55 (05-26 @ 23:54) (110/62 - 117/55)  RR: 18 (05-26 @ 23:54) (18 - 19)  SpO2: 95% (05-26 @ 23:54) (95% - 97%)    ED workup: Hb 7.8, WBC 23.58, Plt 650, Retics 14.9%. Mg 1.5.  She was given LR bolus, Magnesium and morphine in ED. Patient being admitted for management of sickle cell crisis, r/o pneumonia/Acute chest syndrome.  (26 May 2022 23:34)    ID called for PNA    FAMILY HISTORY:    PAST MEDICAL & SURGICAL HISTORY:  Sickle cell anemia      S/P cholecystectomy            VITALS:  T(F): 98.7, Max: 102.6 (05-26-22 @ 23:54)  HR: 78  BP: 103/56  RR: 23Vital Signs Last 24 Hrs  T(C): 37.1 (27 May 2022 05:00), Max: 39.2 (26 May 2022 23:54)  T(F): 98.7 (27 May 2022 05:00), Max: 102.6 (26 May 2022 23:54)  HR: 78 (27 May 2022 05:00) (78 - 117)  BP: 103/56 (27 May 2022 05:00) (103/56 - 118/62)  BP(mean): 73 (27 May 2022 05:00) (73 - 84)  RR: 23 (27 May 2022 05:00) (18 - 27)  SpO2: 100% (27 May 2022 05:00) (93% - 100%)    TESTS & MEASUREMENTS:                        7.8    23.58 )-----------( 650      ( 26 May 2022 21:55 )             22.9     05-26    133<L>  |  98  |  3<L>  ----------------------------<  80  4.8   |  20  |  <0.5<L>    Ca    8.6      26 May 2022 21:55  Mg     1.5     05-26    TPro  7.7  /  Alb  3.5  /  TBili  4.2<H>  /  DBili  x   /  AST  59<H>  /  ALT  15  /  AlkPhos  65  05-26    LIVER FUNCTIONS - ( 26 May 2022 21:55 )  Alb: 3.5 g/dL / Pro: 7.7 g/dL / ALK PHOS: 65 U/L / ALT: 15 U/L / AST: 59 U/L / GGT: x             Culture - Blood (collected 05-20-22 @ 21:41)  Source: .Blood Blood-Peripheral  Final Report (05-26-22 @ 09:01):    No Growth Final            RADIOLOGY & ADDITIONAL TESTS:  Personal review of radiological diagnostics performed  Echo and EKG results noted when applicable.     MEDICATIONS:  acetaminophen     Tablet .. 650 milliGRAM(s) Oral every 6 hours PRN  azithromycin  IVPB      cefTRIAXone   IVPB      chlorhexidine 4% Liquid 1 Application(s) Topical daily  dextrose 5% + sodium chloride 0.45%. 1000 milliLiter(s) IV Continuous <Continuous>  enoxaparin Injectable 40 milliGRAM(s) SubCutaneous every 24 hours  folic acid 1 milliGRAM(s) Oral daily  influenza   Vaccine 0.5 milliLiter(s) IntraMuscular once  losartan 25 milliGRAM(s) Oral daily  melatonin 3 milliGRAM(s) Oral at bedtime PRN  ondansetron Injectable 4 milliGRAM(s) IV Push every 8 hours PRN  oxyCODONE    IR 5 milliGRAM(s) Oral every 4 hours PRN  pantoprazole    Tablet 40 milliGRAM(s) Oral before breakfast      ANTIBIOTICS:  azithromycin  IVPB      cefTRIAXone   IVPB

## 2022-05-27 NOTE — CHART NOTE - NSCHARTNOTEFT_GEN_A_CORE
Transfer Note    Transfer from: ICU  Transfer to: Med Floor       HOSPITAL COURSE:    25yo  F with PMH of Sickle cell anemia (on oxbryta - followed by Hematologist - Dr. Jimenez in [place]), recently discharged after treatment of pneumonia and sickle cell crisis, presented to the ED with c/o right sided chest, shoulder and upper back pain. States that she overexerted herself. She states that she has not been feeling well since yesterday, had mild cough and fever upto 102F at home. Denies any shortness of breath, abdominal pain, nausea, vomiting, diarrhea, leg pain, swelling.     ASSESSMENT & PLAN:     The patient was admitted to MICU for possible exchange transfusion ,juong recommended no exchange for now ,the patient will be downgraded to Med floor.    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   -Hemolysis labs: Hb 6.7, Retic count 14.9%,  (hemolyzed), T bili 3.1, hapto <20  -Baseline Hb 7-8  -She has had 3 crisis so far this year.   - C/w folic acid supplementation  - Hem :  will hold off on exchange transfusion as pt is hemodynamically stable with no signs/symptoms of hypoxemia. Recommend simple transfusion to keep Hb around pt's baseline.      #Sepsis secondary to multilobar bacterial PNA RLL/LLL  CXR new consolidation RLL  5/20 CT  Patchy bilateral lower lobe consolidation suspicious for bilateral pneumonia.  5/26 COVID-19 NG   5/20 Legionella , mycoplasma ,strep NG  ID following Vancomycin 1 gm iv q12h ( hold if nares ORSA NG )  Cefepime 2 gm iv q8h ,Levoquin 750 mg iv q24h  f/u Bcx ,procal     # DVT prophylaxis lovenox     # GI prophylaxis protonix     # Diet regular     # Code status Full    # Disposition possible downgrade       For Follow-Up:  f/u Bcx ,procal ,MRSA , CBC at 4pm Transfer Note    Transfer from: ICU  Transfer to: Med Floor       HOSPITAL COURSE:    27yo  F with PMH of Sickle cell anemia (on oxbryta - followed by Hematologist - Dr. Jimenez in [place]), recently discharged after treatment of pneumonia and sickle cell crisis, presented to the ED with c/o right sided chest, shoulder and upper back pain. States that she overexerted herself. She states that she has not been feeling well since yesterday, had mild cough and fever upto 102F at home. Denies any shortness of breath, abdominal pain, nausea, vomiting, diarrhea, leg pain, swelling.     ASSESSMENT & PLAN:     The patient was admitted to MICU for possible exchange transfusion ,hemong recommended no exchange for now ,the patient will be downgraded to Med floor.    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   -Hemolysis labs: Hb 6.7, Retic count 14.9%,  (hemolyzed), T bili 3.1, hapto <20  -Baseline Hb 7-8  -She has had 3 crisis so far this year.   - C/w folic acid supplementation  - Hem :  will hold off on exchange transfusion as pt is hemodynamically stable with no signs/symptoms of hypoxemia. Recommend simple transfusion to keep Hb around pt's baseline.  -pt refused blood transfusion with Hg of 6.7 ,please follow up repeat CBC at 4pm and transfuse if necessary ,pt agreed for transfusion if persistently low.    #Sepsis secondary to multilobar bacterial PNA RLL/LLL  CXR new consolidation RLL  5/20 CT  Patchy bilateral lower lobe consolidation suspicious for bilateral pneumonia.  5/26 COVID-19 NG   5/20 Legionella , mycoplasma ,strep NG  ID following Vancomycin 1 gm iv q12h ( hold if nares ORSA NG )  Cefepime 2 gm iv q8h ,Levoquin 750 mg iv q24h  f/u Bcx ,procal     # DVT prophylaxis lovenox     # GI prophylaxis protonix     # Diet regular     # Code status Full    # Disposition possible downgrade       For Follow-Up:  f/u Bcx ,procal ,MRSA , CBC at 4pm  -pt refused blood transfusion with Hg of 6.7 ,please follow up repeat CBC at 4pm and transfuse if necessary ,pt agreed for transfusion later if persistently low

## 2022-05-27 NOTE — PROGRESS NOTE ADULT - SUBJECTIVE AND OBJECTIVE BOX
JAMES VÁSQUEZ 26y Female  MRN#: [id number]   CODE STATUS:________    Hospital Day: 1d    Pt is currently admitted with the primary diagnosis of pna /acute chest syndrome.    SUBJECTIVE  Hospital Course    27yo  F with PMH of Sickle cell anemia (on oxbryta - followed by Hematologist - Dr. Jimenez in [place]), recently discharged after treatment of pneumonia and sickle cell crisis, presented to the ED with c/o right sided chest, shoulder and upper back pain. States that she overexerted herself. She states that she has not been feeling well since yesterday, had mild cough and fever upto 102F at home. Denies any shortness of breath, abdominal pain, nausea, vomiting, diarrhea, leg pain, swelling.    The patient was seen and examined ,alert ,orientedx3 ,comfortable in bed ,pain controlled.  No new or overnight events.                                            ----------------------------------------------------------  OBJECTIVE  PAST MEDICAL & SURGICAL HISTORY  Sickle cell anemia    S/P cholecystectomy                                              -----------------------------------------------------------  ALLERGIES:  No Known Allergies                                            ------------------------------------------------------------    HOME MEDICATIONS  Home Medications:  folic acid 1 mg oral tablet: 1 tab(s) orally once a day (20 May 2022 16:12)  losartan 25 mg oral tablet: 1 tab(s) orally once a day (20 May 2022 16:12)                           MEDICATIONS:  STANDING MEDICATIONS  azithromycin  IVPB      cefepime   IVPB      cefepime   IVPB 2000 milliGRAM(s) IV Intermittent once  cefepime   IVPB 2000 milliGRAM(s) IV Intermittent every 8 hours  chlorhexidine 4% Liquid 1 Application(s) Topical daily  dextrose 5% + sodium chloride 0.45%. 1000 milliLiter(s) IV Continuous <Continuous>  enoxaparin Injectable 40 milliGRAM(s) SubCutaneous every 24 hours  folic acid 1 milliGRAM(s) Oral daily  influenza   Vaccine 0.5 milliLiter(s) IntraMuscular once  losartan 25 milliGRAM(s) Oral daily  pantoprazole    Tablet 40 milliGRAM(s) Oral before breakfast  vancomycin  IVPB 1000 milliGRAM(s) IV Intermittent every 12 hours    PRN MEDICATIONS  acetaminophen     Tablet .. 650 milliGRAM(s) Oral every 6 hours PRN  melatonin 3 milliGRAM(s) Oral at bedtime PRN  ondansetron Injectable 4 milliGRAM(s) IV Push every 8 hours PRN  oxyCODONE    IR 5 milliGRAM(s) Oral every 4 hours PRN                                            ------------------------------------------------------------  VITAL SIGNS: Last 24 Hours  T(C): 37.1 (27 May 2022 05:00), Max: 39.2 (26 May 2022 23:54)  T(F): 98.7 (27 May 2022 05:00), Max: 102.6 (26 May 2022 23:54)  HR: 82 (27 May 2022 07:00) (76 - 117)  BP: 98/50 (27 May 2022 07:00) (98/50 - 118/62)  BP(mean): 66 (27 May 2022 07:00) (66 - 84)  RR: 27 (27 May 2022 07:00) (18 - 27)  SpO2: 100% (27 May 2022 09:07) (93% - 100%)      05-26-22 @ 07:01  -  05-27-22 @ 07:00  --------------------------------------------------------  IN: 850 mL / OUT: 0 mL / NET: 850 mL                                             --------------------------------------------------------------  LABS:                        6.7    27.67 )-----------( 533      ( 27 May 2022 05:27 )             19.6     05-27    133<L>  |  98  |  4<L>  ----------------------------<  107<H>  3.5   |  23  |  <0.5<L>    Ca    8.2<L>      27 May 2022 05:27  Mg     1.8     05-27    TPro  6.7  /  Alb  3.1<L>  /  TBili  3.1<H>  /  DBili  x   /  AST  24  /  ALT  12  /  AlkPhos  63  05-27    PT/INR - ( 27 May 2022 00:40 )   PT: 18.00 sec;   INR: 1.57 ratio         PTT - ( 27 May 2022 00:40 )  PTT:32.7 sec      Troponin T, Serum: <0.01 ng/mL (05-27-22 @ 05:27)  Troponin T, Serum: <0.01 ng/mL (05-26-22 @ 21:55)          CARDIAC MARKERS ( 27 May 2022 05:27 )  x     / <0.01 ng/mL / x     / x     / x      CARDIAC MARKERS ( 26 May 2022 21:55 )  x     / <0.01 ng/mL / x     / x     / x                                                  -------------------------------------------------------------  RADIOLOGY:  < from: Xray Chest 1 View- PORTABLE-Urgent (Xray Chest 1 View- PORTABLE-Urgent .) (05.27.22 @ 09:18) >  Impression:    Low lung volume.    Bilateral opacities, worse.    < end of copied text >  < from: Xray Chest 1 View-PORTABLE IMMEDIATE (05.26.22 @ 22:03) >    Impression:    Stable left basilar opacity and worsening right basilar opacity/effusion.    < end of copied text >  < from: CT Chest No Cont (05.20.22 @ 17:21) >  1.  Patchy bilateral lower lobe consolidation suspicious for bilateral   pneumonia in the appropriate clinical setting.    < end of copied text >                                            --------------------------------------------------------------    PHYSICAL EXAM:  GENERAL: NAD, lying in bed comfortably  HEAD:  Atraumatic, Normocephalic  ENT: Moist mucous membranes  CHEST/LUNG: Clear to auscultation bilaterally  HEART: Regular rate and rhythm  ABDOMEN:Soft, Nontender, Nondistended.   EXTREMITIES:  No clubbing, cyanosis, or edema  NERVOUS SYSTEM:  Alert & Oriented X3, speech clear. 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   -Hemolysis labs: Hb 6.7, Retic count 14.9%,  (hemolyzed), T bili 3.1, hapto <20  -Baseline Hb 7-8  -She has had 3 crisis so far this year.   - C/w folic acid supplementation  - Hem :  will hold off on exchange transfusion as pt is hemodynamically stable with no signs/symptoms of hypoxemia. Recommend simple transfusion to keep Hb around pt's baseline.      #Sepsis secondary to multilobar bacterial PNA RLL/LLL  CXR new consolidation RLL  5/20 CT  Patchy bilateral lower lobe consolidation suspicious for bilateral pneumonia.  5/26 COVID-19 NG   5/20 Legionella , mycoplasma ,strep NG  ID following Vancomycin 1 gm iv q12h ( hold if nares ORSA NG )  Cefepime 2 gm iv q8h  cont Azithromycin   f/u Bcx ,procal     # DVT prophylaxis lovenox     # GI prophylaxis protonix     # Diet regular     # Code status Full    # Disposition possible downgrade     # Handoff  f/u Bcx ,procal ,MRSA ,Hem

## 2022-05-27 NOTE — CONSULT NOTE ADULT - ASSESSMENT
25yo  F with PMH of Sickle cell anemia (on oxbryta - followed by Hematologist - Dr. Jimenez in [place]), recently discharged after treatment of pneumonia and sickle cell crisis, presented to the ED with c/o right sided chest, shoulder and upper back pain.  She also reports fever at home 102F.  She was recently treated for sickle cell crisis and pneumonia and discharged last week on Levaquin PO.    Hematology consulted for r/o ACS with hx of sickle cell disease.     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   -Hemolysis labs: Hb 6.7, Retic count 14.9%,  (hemolyzed), T bili 3.1, hapto pending   -Baseline Hb 7-8  -She has had 3 crisis so far this year.     #Chronic leukocytosis and thrombocytosis  - likely reactive     RECOMMENDATIONS:    - Continue with IV hydration with 0.45% NS @100cc/Hr and encourage PO hydration.   - Adequate pain control; Pt currently on oxycodone 5mg q4hrly prn. If pain still uncontrolled, please call pain management. Please ensure that patient is discharged on adequate pain control regimen.   - At this point, we will hold off on exchange transfusion as pt is hemodynamically stable with no signs/symptoms of hypoxemia.   - Recommend simple transfusion to keep Hb around pt's baseline.  - C/w folic acid supplementation  - Continue with IV antibiotic therapy per ID for underlying pneumonia   - Follow up rest of the hemolysis labs; F/u Hb electrophoresis (Sent upon admission)  - She can continue Oxbryta as outpatient and follow up with her primary hematologist post discharge.     DVT ppx: Lovenox      27yo  F with PMH of Sickle cell anemia (on oxbryta - followed by Hematologist - Dr. Jimenez in [place]), recently discharged after treatment of pneumonia and sickle cell crisis, presented to the ED with c/o right sided chest, shoulder and upper back pain.  She also reports fever at home 102F.  She was recently treated for sickle cell crisis and pneumonia and discharged last week on Levaquin PO.    Hematology consulted for r/o ACS with hx of sickle cell disease.     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   -Hemolysis labs: Hb 6.7, Retic count 14.9%,  (hemolyzed), T bili 3.1, hapto pending   -Baseline Hb 7-8  -She has had 2criseso far this year.     #Chronic leukocytosis and thrombocytosis  - likely reactive     RECOMMENDATIONS:    - Continue with IV hydration with 0.45% NS @100cc/Hr and encourage PO hydration.   - Adequate pain control; Pt currently on oxycodone 5mg q4hrly prn. If pain still uncontrolled, please call pain management. Please ensure that patient is discharged on adequate pain control regimen.   - At this point, we will hold off on exchange transfusion as pt is hemodynamically stable with no signs/symptoms of hypoxemia. We did discuss with her about simple trans  - C/w folic acid supplementation  - Continue with IV antibiotic therapy per ID for underlying pneumonia   - Follow up rest of the hemolysis labs; F/u Hb electrophoresis (Sent upon admission)  - She can continue Oxbryta as outpatient and follow up with her primary hematologist post discharge.     DVT ppx: Lovenox      27yo  F with PMH of Sickle cell anemia (on oxbryta - followed by Hematologist - Dr. Jiemnez in [place]), recently discharged after treatment of pneumonia and sickle cell crisis, presented to the ED with c/o right sided chest, shoulder and upper back pain.  She also reports fever at home 102F.  She was recently treated for sickle cell crisis and pneumonia and discharged last week on Levaquin PO.    Hematology consulted for r/o ACS with hx of sickle cell disease.     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   -Hemolysis labs: Hb 6.7, Retic count 14.9%,  (hemolyzed), T bili 3.1, hapto pending   -Baseline Hb 7-8  -She has had 2 crises far this year.     #Chronic leukocytosis and thrombocytosis  - likely reactive     RECOMMENDATIONS:    - Continue with IV hydration with 0.45% NS @100cc/Hr and encourage PO hydration.   - Adequate pain control; Pt currently on oxycodone 5mg q4hrly prn. If pain still uncontrolled, please call pain management. Please ensure that patient is discharged on adequate pain control regimen.   - At this point, we will hold off on exchange transfusion as pt is hemodynamically stable with no signs/symptoms of hypoxemia. We did discuss with her about simple transfusion. Pt wanted to hold off for now as she wants to avoid iron overload. We will repeat CBC in the AM, and discuss if she needs transfusion tomorrow. FOr now, we can just monitor CBC, and continue supportive care.   - C/w folic acid supplementation  - Continue with IV antibiotic therapy per ID for underlying pneumonia   - Follow up rest of the hemolysis labs; F/u Hb electrophoresis (Sent upon admission)  - She can continue Oxbryta as outpatient and follow up with her primary hematologist post discharge.     DVT ppx: Lovenox

## 2022-05-27 NOTE — CONSULT NOTE ADULT - SUBJECTIVE AND OBJECTIVE BOX
Patient is a 26y old  Female who presents with a chief complaint of Acute chest syndrome (27 May 2022 08:54)      HPI:  27yo  F with PMH of Sickle cell anemia (on oxbryta - followed by Hematologist - Dr. Jimenez in [place]), recently discharged after treatment of pneumonia and sickle cell crisis, presented to the ED with c/o right sided chest, shoulder and upper back pain. States that she overexerted herself. She states that she has not been feeling well since yesterday, had mild cough and fever upto 102F at home. Denies any shortness of breath, abdominal pain, nausea, vomiting, diarrhea, leg pain, swelling.   ED vitals:   T(F): 102.6 (05-26 @ 23:54), Max: 102.6 (05-26 @ 23:54)  HR: 110 (05-26 @ 23:54) (109 - 117)  BP: 117/55 (05-26 @ 23:54) (110/62 - 117/55)  RR: 18 (05-26 @ 23:54) (18 - 19)  SpO2: 95% (05-26 @ 23:54) (95% - 97%)    ED workup: Hb 7.8, WBC 23.58, Plt 650, Retics 14.9%. Mg 1.5.  She was given LR bolus, Magnesium and morphine in ED. Patient being admitted for management of sickle cell crisis, r/o pneumonia/Acute chest syndrome.  (26 May 2022 23:34)      PAST MEDICAL & SURGICAL HISTORY:  Sickle cell anemia  S/P cholecystectomy    SOCIAL HISTORY: Non smoker, no alcohol use    FAMILY HISTORY: non pertinent    Allergies  No Known Allergies  Intolerances      ROS:  Negative except for:  Pain in the shoulder, upper back, chest- more so right side      Height (cm): 165.1 (05-27-22 @ 02:00)  Weight (kg): 60.6 (05-27-22 @ 02:00)  BMI (kg/m2): 22.2 (05-27-22 @ 02:00)  BSA (m2): 1.67 (05-27-22 @ 02:00)      HOME MEDICATIONS:  folic acid 1 mg oral tablet: 1 tab(s) orally once a day (20 May 2022 16:12)  losartan 25 mg oral tablet: 1 tab(s) orally once a day (20 May 2022 16:12)      Vital Signs Last 24 Hrs  T(C): 37.1 (27 May 2022 05:00), Max: 39.2 (26 May 2022 23:54)  T(F): 98.7 (27 May 2022 05:00), Max: 102.6 (26 May 2022 23:54)  HR: 82 (27 May 2022 07:00) (76 - 117)  BP: 98/50 (27 May 2022 07:00) (98/50 - 118/62)  BP(mean): 66 (27 May 2022 07:00) (66 - 84)  RR: 27 (27 May 2022 07:00) (18 - 27)  SpO2: 100% (27 May 2022 09:07) (93% - 100%) : on room air    PHYSICAL EXAM  General: adult in NAD  HEENT: anicteric sclera, pink conjunctiva  Neck: supple  CV: normal S1/S2 with no murmur rubs or gallops  Lungs: positive air movement b/l ant lungs  Abdomen: soft non-tender non-distended  Ext: no clubbing cyanosis or edema  Skin: no rashes and no petechiae  Neuro: alert and oriented X 4, no focal deficits    MEDICATIONS  (STANDING):  azithromycin  IVPB      cefepime   IVPB      cefepime   IVPB 2000 milliGRAM(s) IV Intermittent once  cefepime   IVPB 2000 milliGRAM(s) IV Intermittent every 8 hours  chlorhexidine 4% Liquid 1 Application(s) Topical daily  dextrose 5% + sodium chloride 0.45%. 1000 milliLiter(s) (150 mL/Hr) IV Continuous <Continuous>  enoxaparin Injectable 40 milliGRAM(s) SubCutaneous every 24 hours  folic acid 1 milliGRAM(s) Oral daily  influenza   Vaccine 0.5 milliLiter(s) IntraMuscular once  losartan 25 milliGRAM(s) Oral daily  pantoprazole    Tablet 40 milliGRAM(s) Oral before breakfast  vancomycin  IVPB 1000 milliGRAM(s) IV Intermittent every 12 hours  acetaminophen     Tablet .. 650 milliGRAM(s) Oral every 6 hours PRN Temp greater or equal to 38C (100.4F), Mild Pain (1 - 3)  melatonin 3 milliGRAM(s) Oral at bedtime PRN Insomnia  ondansetron Injectable 4 milliGRAM(s) IV Push every 8 hours PRN Nausea and/or Vomiting  oxyCODONE    IR 5 milliGRAM(s) Oral every 4 hours PRN Severe Pain (7 - 10)      LABS:                          6.7    27.67 )-----------( 533      ( 27 May 2022 05:27 )             19.6         Mean Cell Volume : 96.1 fL  Mean Cell Hemoglobin : 32.8 pg  Mean Cell Hemoglobin Concentration : 34.2 g/dL  Auto Neutrophil # : 19.26 K/uL  Auto Lymphocyte # : 4.10 K/uL  Auto Monocyte # : 4.32 K/uL  Auto Eosinophil # : 0.00 K/uL  Auto Basophil # : 0.00 K/uL  Auto Neutrophil % : 69.6 %  Auto Lymphocyte % : 14.8 %  Auto Monocyte % : 15.6 %  Auto Eosinophil % : 0.0 %  Auto Basophil % : 0.0 %      Serial CBC's  05-27 @ 05:27  Hct-19.6 / Hgb-6.7 / Plat-533 / RBC-2.04 / WBC-27.67  Serial CBC's  05-26 @ 21:55  Hct-22.9 / Hgb-7.8 / Plat-650 / RBC-2.38 / WBC-23.58      05-27    133<L>  |  98  |  4<L>  ----------------------------<  107<H>  3.5   |  23  |  <0.5<L>    Ca    8.2<L>      27 May 2022 05:27  Mg     1.8     05-27    TPro  6.7  /  Alb  3.1<L>  /  TBili  3.1<H>  /  DBili  x   /  AST  24  /  ALT  12  /  AlkPhos  63  05-27      PT/INR - ( 27 May 2022 00:40 )   PT: 18.00 sec;   INR: 1.57 ratio    PTT - ( 27 May 2022 00:40 )  PTT:32.7 sec    Reticulocyte Percent: 14.9 % (05-26 @ 21:55)  Bilirubin Total, Serum: 3.1 mg/dL (05.27.22 @ 05:27)  Lactate Dehydrogenase, Serum: 447: Hemolyzed. Interpret with caution (05.27.22 @ 05:27)    Reticulocyte Percent: 13.6 % (05-22 @ 06:21)  Reticulocyte Percent: 16.9 % (05-20 @ 13:22)      RADIOLOGY & ADDITIONAL STUDIES:    < from: Xray Chest 1 View-PORTABLE IMMEDIATE (05.26.22 @ 22:03) >  Impression:    Stable left basilar opacity and worsening right basilar opacity/effusion.      < from: CT Chest No Cont (05.20.22 @ 17:21) >  FINDINGS:  Lungs/Airways/Pleura: Patchy bilateral lower lobe consolidations. No   pleural effusion or pneumothorax.    Heart/Vascular: Cardiomegaly.    Mediastinum/Lymph nodes: No definite lymphadenopathy though difficult to   assess without contrast    Visualized upper abdomen: No focal abnormality.    Bones/soft tissues: Unremarkable.    IMPRESSION:  1.  Patchy bilateral lower lobe consolidation suspicious for bilateral   pneumonia in the appropriate clinical setting.

## 2022-05-28 LAB
ALBUMIN SERPL ELPH-MCNC: 3 G/DL — LOW (ref 3.5–5.2)
ALP SERPL-CCNC: 76 U/L — SIGNIFICANT CHANGE UP (ref 30–115)
ALT FLD-CCNC: 16 U/L — SIGNIFICANT CHANGE UP (ref 0–41)
ANION GAP SERPL CALC-SCNC: 11 MMOL/L — SIGNIFICANT CHANGE UP (ref 7–14)
AST SERPL-CCNC: 31 U/L — SIGNIFICANT CHANGE UP (ref 0–41)
BASOPHILS # BLD AUTO: 0.11 K/UL — SIGNIFICANT CHANGE UP (ref 0–0.2)
BASOPHILS NFR BLD AUTO: 0.5 % — SIGNIFICANT CHANGE UP (ref 0–1)
BILIRUB SERPL-MCNC: 4.9 MG/DL — HIGH (ref 0.2–1.2)
BUN SERPL-MCNC: 5 MG/DL — LOW (ref 10–20)
CALCIUM SERPL-MCNC: 8.5 MG/DL — SIGNIFICANT CHANGE UP (ref 8.5–10.1)
CHLORIDE SERPL-SCNC: 106 MMOL/L — SIGNIFICANT CHANGE UP (ref 98–110)
CO2 SERPL-SCNC: 22 MMOL/L — SIGNIFICANT CHANGE UP (ref 17–32)
CREAT SERPL-MCNC: <0.5 MG/DL — LOW (ref 0.7–1.5)
EGFR: 142 ML/MIN/1.73M2 — SIGNIFICANT CHANGE UP
EOSINOPHIL # BLD AUTO: 0.04 K/UL — SIGNIFICANT CHANGE UP (ref 0–0.7)
EOSINOPHIL NFR BLD AUTO: 0.2 % — SIGNIFICANT CHANGE UP (ref 0–8)
GLUCOSE SERPL-MCNC: 100 MG/DL — HIGH (ref 70–99)
HCT VFR BLD CALC: 18 % — LOW (ref 37–47)
HCT VFR BLD CALC: 18.9 % — LOW (ref 37–47)
HGB BLD-MCNC: 6.2 G/DL — CRITICAL LOW (ref 12–16)
HGB BLD-MCNC: 6.6 G/DL — CRITICAL LOW (ref 12–16)
IMM GRANULOCYTES NFR BLD AUTO: 1.6 % — HIGH (ref 0.1–0.3)
LYMPHOCYTES # BLD AUTO: 14.7 % — LOW (ref 20.5–51.1)
LYMPHOCYTES # BLD AUTO: 3.31 K/UL — SIGNIFICANT CHANGE UP (ref 1.2–3.4)
MAGNESIUM SERPL-MCNC: 1.6 MG/DL — LOW (ref 1.8–2.4)
MCHC RBC-ENTMCNC: 31.9 PG — HIGH (ref 27–31)
MCHC RBC-ENTMCNC: 32.1 PG — HIGH (ref 27–31)
MCHC RBC-ENTMCNC: 34.4 G/DL — SIGNIFICANT CHANGE UP (ref 32–37)
MCHC RBC-ENTMCNC: 34.9 G/DL — SIGNIFICANT CHANGE UP (ref 32–37)
MCV RBC AUTO: 91.3 FL — SIGNIFICANT CHANGE UP (ref 81–99)
MCV RBC AUTO: 93.3 FL — SIGNIFICANT CHANGE UP (ref 81–99)
MONOCYTES # BLD AUTO: 2.91 K/UL — HIGH (ref 0.1–0.6)
MONOCYTES NFR BLD AUTO: 12.9 % — HIGH (ref 1.7–9.3)
MRSA PCR RESULT.: NEGATIVE — SIGNIFICANT CHANGE UP
NEUTROPHILS # BLD AUTO: 15.86 K/UL — HIGH (ref 1.4–6.5)
NEUTROPHILS NFR BLD AUTO: 70.1 % — SIGNIFICANT CHANGE UP (ref 42.2–75.2)
NRBC # BLD: 0 /100 WBCS — SIGNIFICANT CHANGE UP (ref 0–0)
NRBC # BLD: 0 /100 WBCS — SIGNIFICANT CHANGE UP (ref 0–0)
PLATELET # BLD AUTO: 490 K/UL — HIGH (ref 130–400)
PLATELET # BLD AUTO: 540 K/UL — HIGH (ref 130–400)
POTASSIUM SERPL-MCNC: 3.6 MMOL/L — SIGNIFICANT CHANGE UP (ref 3.5–5)
POTASSIUM SERPL-SCNC: 3.6 MMOL/L — SIGNIFICANT CHANGE UP (ref 3.5–5)
PROCALCITONIN SERPL-MCNC: 0.09 NG/ML — SIGNIFICANT CHANGE UP (ref 0.02–0.1)
PROT SERPL-MCNC: 6.8 G/DL — SIGNIFICANT CHANGE UP (ref 6–8)
RBC # BLD: 1.93 M/UL — LOW (ref 4.2–5.4)
RBC # BLD: 2.07 M/UL — LOW (ref 4.2–5.4)
RBC # FLD: 18.8 % — HIGH (ref 11.5–14.5)
RBC # FLD: 19.9 % — HIGH (ref 11.5–14.5)
SODIUM SERPL-SCNC: 139 MMOL/L — SIGNIFICANT CHANGE UP (ref 135–146)
VANCOMYCIN TROUGH SERPL-MCNC: 9.8 UG/ML — SIGNIFICANT CHANGE UP (ref 5–10)
WBC # BLD: 19.85 K/UL — HIGH (ref 4.8–10.8)
WBC # BLD: 22.59 K/UL — HIGH (ref 4.8–10.8)
WBC # FLD AUTO: 19.85 K/UL — HIGH (ref 4.8–10.8)
WBC # FLD AUTO: 22.59 K/UL — HIGH (ref 4.8–10.8)

## 2022-05-28 PROCEDURE — 99232 SBSQ HOSP IP/OBS MODERATE 35: CPT

## 2022-05-28 PROCEDURE — 71045 X-RAY EXAM CHEST 1 VIEW: CPT | Mod: 26

## 2022-05-28 PROCEDURE — 99233 SBSQ HOSP IP/OBS HIGH 50: CPT

## 2022-05-28 RX ORDER — MAGNESIUM SULFATE 500 MG/ML
2 VIAL (ML) INJECTION ONCE
Refills: 0 | Status: COMPLETED | OUTPATIENT
Start: 2022-05-28 | End: 2022-05-28

## 2022-05-28 RX ADMIN — Medication 650 MILLIGRAM(S): at 04:08

## 2022-05-28 RX ADMIN — Medication 650 MILLIGRAM(S): at 10:48

## 2022-05-28 RX ADMIN — Medication 1 MILLIGRAM(S): at 12:14

## 2022-05-28 RX ADMIN — Medication 250 MILLIGRAM(S): at 05:01

## 2022-05-28 RX ADMIN — ENOXAPARIN SODIUM 40 MILLIGRAM(S): 100 INJECTION SUBCUTANEOUS at 00:06

## 2022-05-28 RX ADMIN — HYDROMORPHONE HYDROCHLORIDE 1 MILLIGRAM(S): 2 INJECTION INTRAMUSCULAR; INTRAVENOUS; SUBCUTANEOUS at 22:55

## 2022-05-28 RX ADMIN — LOSARTAN POTASSIUM 25 MILLIGRAM(S): 100 TABLET, FILM COATED ORAL at 05:36

## 2022-05-28 RX ADMIN — HYDROMORPHONE HYDROCHLORIDE 1 MILLIGRAM(S): 2 INJECTION INTRAMUSCULAR; INTRAVENOUS; SUBCUTANEOUS at 10:02

## 2022-05-28 RX ADMIN — CEFEPIME 100 MILLIGRAM(S): 1 INJECTION, POWDER, FOR SOLUTION INTRAMUSCULAR; INTRAVENOUS at 05:00

## 2022-05-28 RX ADMIN — Medication 650 MILLIGRAM(S): at 19:53

## 2022-05-28 RX ADMIN — Medication 650 MILLIGRAM(S): at 20:53

## 2022-05-28 RX ADMIN — CEFEPIME 100 MILLIGRAM(S): 1 INJECTION, POWDER, FOR SOLUTION INTRAMUSCULAR; INTRAVENOUS at 21:24

## 2022-05-28 RX ADMIN — PANTOPRAZOLE SODIUM 40 MILLIGRAM(S): 20 TABLET, DELAYED RELEASE ORAL at 06:06

## 2022-05-28 RX ADMIN — HYDROMORPHONE HYDROCHLORIDE 1 MILLIGRAM(S): 2 INJECTION INTRAMUSCULAR; INTRAVENOUS; SUBCUTANEOUS at 17:36

## 2022-05-28 RX ADMIN — HYDROMORPHONE HYDROCHLORIDE 1 MILLIGRAM(S): 2 INJECTION INTRAMUSCULAR; INTRAVENOUS; SUBCUTANEOUS at 22:41

## 2022-05-28 RX ADMIN — SODIUM CHLORIDE 150 MILLILITER(S): 9 INJECTION, SOLUTION INTRAVENOUS at 10:10

## 2022-05-28 RX ADMIN — Medication 250 MILLIGRAM(S): at 15:30

## 2022-05-28 RX ADMIN — Medication 650 MILLIGRAM(S): at 03:38

## 2022-05-28 RX ADMIN — SODIUM CHLORIDE 150 MILLILITER(S): 9 INJECTION, SOLUTION INTRAVENOUS at 00:00

## 2022-05-28 RX ADMIN — HYDROMORPHONE HYDROCHLORIDE 1 MILLIGRAM(S): 2 INJECTION INTRAMUSCULAR; INTRAVENOUS; SUBCUTANEOUS at 00:11

## 2022-05-28 RX ADMIN — CEFEPIME 100 MILLIGRAM(S): 1 INJECTION, POWDER, FOR SOLUTION INTRAMUSCULAR; INTRAVENOUS at 16:43

## 2022-05-28 RX ADMIN — Medication 25 GRAM(S): at 12:27

## 2022-05-28 NOTE — PROGRESS NOTE ADULT - SUBJECTIVE AND OBJECTIVE BOX
24H events:  She is having significant pain today  Her O2% dropped to mid-high 80s on room air  Tmax 103  She agreed to simple transfusion    PAST MEDICAL & SURGICAL HISTORY  Sickle cell anemia    S/P cholecystectomy      SOCIAL HISTORY:  Negative for smoking/alcohol/drug use.     ALLERGIES:  No Known Allergies    MEDICATIONS:  STANDING MEDICATIONS  cefepime   IVPB 2000 milliGRAM(s) IV Intermittent every 8 hours  cefepime   IVPB      chlorhexidine 4% Liquid 1 Application(s) Topical daily  dextrose 5% + sodium chloride 0.45%. 1000 milliLiter(s) IV Continuous <Continuous>  enoxaparin Injectable 40 milliGRAM(s) SubCutaneous every 24 hours  folic acid 1 milliGRAM(s) Oral daily  influenza   Vaccine 0.5 milliLiter(s) IntraMuscular once  levoFLOXacin IVPB 750 milliGRAM(s) IV Intermittent every 24 hours  losartan 25 milliGRAM(s) Oral daily  magnesium sulfate  IVPB 2 Gram(s) IV Intermittent once  pantoprazole    Tablet 40 milliGRAM(s) Oral before breakfast  vancomycin  IVPB 1000 milliGRAM(s) IV Intermittent every 12 hours    PRN MEDICATIONS  acetaminophen     Tablet .. 650 milliGRAM(s) Oral every 6 hours PRN  HYDROmorphone  Injectable 1 milliGRAM(s) IV Push every 4 hours PRN  melatonin 3 milliGRAM(s) Oral at bedtime PRN  ondansetron Injectable 4 milliGRAM(s) IV Push every 8 hours PRN  oxyCODONE    IR 5 milliGRAM(s) Oral every 4 hours PRN    VITALS:   T(F): 99.6  HR: 100  BP: 108/59  RR: 18  SpO2: 98%    LABS:                        6.2    22.59 )-----------( 540      ( 28 May 2022 07:51 )             18.0     05-28    139  |  106  |  5<L>  ----------------------------<  100<H>  3.6   |  22  |  <0.5<L>    Ca    8.5      28 May 2022 07:51  Mg     1.6     05-28    TPro  6.8  /  Alb  3.0<L>  /  TBili  4.9<H>  /  DBili  x   /  AST  31  /  ALT  16  /  AlkPhos  76  05-28    PT/INR - ( 27 May 2022 00:40 )   PT: 18.00 sec;   INR: 1.57 ratio         PTT - ( 27 May 2022 00:40 )  PTT:32.7 sec          CARDIAC MARKERS ( 27 May 2022 05:27 )  x     / <0.01 ng/mL / x     / x     / x      CARDIAC MARKERS ( 26 May 2022 21:55 )  x     / <0.01 ng/mL / x     / x     / x          RADIOLOGY:    PHYSICAL EXAM:  GEN: She appears uncomfortable  HENT: NCAT, EOMI  LYMPH: No appreciable adenopathy  LUNGS: No respiratory distress, clear to auscultation bilaterally   HEART: tachycardic   ABD: Soft, non-tender, non-distended  SKIN: No rash  EXT: No edema  NEURO: AAOX3

## 2022-05-28 NOTE — CHART NOTE - NSCHARTNOTEFT_GEN_A_CORE
nurse called at 3:30am reporting temp of 103F  Patient examined at bedside     patient denies HA, dizziness, CP, SOB, wheezing, pruritus, nausea, itchy/watery eyes, swelling, hives     Vitals: 110/50, HR 78, SpO2 100% on RA   PE:  general: laying very still, occasional dry cough causes her to clutch rt lower rib cage and wince, any movement causes her to cough. hot to the touch   HEENT: oral mucosa w/o ulcers, lesions, erythema/discolorations or tenderness. nontender mobile posterior lymphadenopathy noted  CV: regular rhythm, S1 S2 heard, bounding radial pulses. No murmur appreciated. No vasculitis/induration/tenderness at IV site, no cords on posterior legs  pulm: rt low/mid dullness, w crackles. no wheezing   abdomen: soft non distended no rebound, no splenomegaly or tenderness over spleen  skin: no rash on legs, arms, abdomen, face, armpit or groin   neuro: CN II-XII intact, hand  +5, foot dorsiflexion and hip flexion +5    Patient given 650 tylenol and packed w ice in groin, armpit, back of neck and top of head  Will recheck temp in 45 min, if still high will give 1g IV tylenol. Repeat cultures not ordered for AM as patient has had cultures drawn 5/27, came in w low Hgb and has been on 3 broad spectrum Abx since first fever and would therefore have a low likely clinton of being positive even if patient is septic    Other then enoxaparin at midnight Patient has not received any medications in over 4 hours, due to timing and lack of findings on physical exam unlikely to be drug reaction, will continue with antibiotics started during the day 5/27 (patient was febrile to 102.3F on 5/27 around 11am) MICU UPGRADE    OVERNIGHT EVENTS:   nurse called at 3:30am reporting temp of 103F  Patient examined at bedside     patient denies HA, dizziness, CP, SOB, wheezing, pruritus, nausea, itchy/watery eyes, swelling, hives     Vitals: 110/50, HR 78, SpO2 100% on RA   PE:  general: laying very still, occasional dry cough causes her to clutch rt lower rib cage and wince, any movement causes her to cough. hot to the touch   HEENT: oral mucosa w/o ulcers, lesions, erythema/discolorations or tenderness. nontender mobile posterior lymphadenopathy noted  CV: regular rhythm, S1 S2 heard, bounding radial pulses. No murmur appreciated. No vasculitis/induration/tenderness at IV site, no cords on posterior legs  pulm: rt low/mid dullness, w crackles. no wheezing   abdomen: soft non distended no rebound, no splenomegaly or tenderness over spleen  skin: no rash on legs, arms, abdomen, face, armpit or groin   neuro: CN II-XII intact, hand  +5, foot dorsiflexion and hip flexion +5    Patient given 650 tylenol and packed w ice in groin, armpit, back of neck and top of head  Will recheck temp in 45 min, if still high will give 1g IV tylenol. Repeat cultures not ordered for AM as patient has had cultures drawn 5/27, came in w low Hgb and has been on 3 broad spectrum Abx since first fever and would therefore have a low likely clinton of being positive even if patient is septic    Other then enoxaparin at midnight Patient has not received any medications in over 4 hours, due to timing and lack of findings on physical exam unlikely to be drug reaction, will continue with antibiotics started during the day 5/27 (patient was febrile to 102.3F on 5/27 around 11am)    As of this AM: Pt evaluated by Hospitalist, Heme/Onc and ID both expressing concern for Acute Chest Syndrome, pt more dyspneic on O2 NC, pt refusing exchange transfusion but willing to accept simple transfusion. Case discussed with Dr. Mayorga Pulm/[facility] Fellow, will obtain stat Chest X-Ray per Dr. mayorga.    LABS/RADIOLOGY RESULTS:                          6.2    22.59 )-----------( 540      ( 28 May 2022 07:51 )             18.0   05-28    139  |  106  |  5<L>  ----------------------------<  100<H>  3.6   |  22  |  <0.5<L>    Ca    8.5      28 May 2022 07:51  Mg     1.6     05-28    TPro  6.8  /  Alb  3.0<L>  /  TBili  4.9<H>  /  DBili  x   /  AST  31  /  ALT  16  /  AlkPhos  76  05-28  PT/INR - ( 27 May 2022 00:40 )   PT: 18.00 sec;   INR: 1.57 ratio         PTT - ( 27 May 2022 00:40 )  PTT:32.7 secBlood Cultures

## 2022-05-28 NOTE — PROGRESS NOTE ADULT - ASSESSMENT
27yo  F with PMH of Sickle cell anemia (on oxbryta - followed by Hematologist - Dr. Jimenez in [place]), recently discharged after treatment of pneumonia and sickle cell crisis, presented to the ED with c/o right sided chest, shoulder and upper back pain.  She also reports fever at home 102F.  She was recently treated for sickle cell crisis and pneumonia and discharged last week on Levaquin PO.    Hematology consulted for r/o ACS with hx of sickle cell disease.     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 We recommended exchange transfusion however patient still does not want this. We then offered simple transfusion, she initially declined however after discussing that her ferritin was wnl, she is not at risk of iron overload at this time and that it is the best option other than exchange for improving her pain and respiratory status she agreed. Risks of blood transfusion explained to her and she consented.   -Baseline Hb 7-8  -She has had 2 crises far this year.   - Hb electrophoresis from 5/21 showed 90% HbS    #Chronic leukocytosis and thrombocytosis  - likely reactive     RECOMMENDATIONS:  - Transfuse 1 unit of PRBCs (simple transfusion), she only wants to do one unit for now and then reassess. We can give another unit if she agrees. If she is worsening she will need an exchange transfusion. We already recommended it now for her but she wants to hold off. If she agrees she will need a [place].   - Continue with IV hydration with 0.45% NS @100cc/Hr and encourage PO hydration.   - Increase pain meds to IV dilaudid 1mg q4hr. If pain still uncontrolled, please call pain management. Please ensure that patient is discharged on adequate pain control regimen.   - Antibiotics for multilobar opacities.   - C/w folic acid supplementation  - Upgrade to ICU is reasonable  - She can continue Oxbryta as outpatient and follow up with her primary hematologist post discharge.     DVT ppx: Lovenox

## 2022-05-28 NOTE — PROGRESS NOTE ADULT - SUBJECTIVE AND OBJECTIVE BOX
RAFAEFRENJAMES  26y  Female      Patient is a 26y old  Female who presents with a chief complaint of Acute chest syndrome (27 May 2022 10:00)      INTERVAL HPI/OVERNIGHT EVENTS:    pt seen and examined at bedside   -appears in labored breathing; fatigued (still have full conversation)  -discussed with hematology team at bedside - patient needs exchange transfusion (refusing) but now agreeable for one unit PRBC transfusion  -case discussed with critical care team - upgrade to ICU for current Acute Chest syndrome    REVIEW OF SYSTEMS:  -dyspneic     -Vital Signs Last 24 Hrs  T(C): 37.6 (28 May 2022 06:30), Max: 39.5 (28 May 2022 03:35)  T(F): 99.6 (28 May 2022 06:30), Max: 103.1 (28 May 2022 03:35)  HR: 100 (28 May 2022 05:00) (78 - 101)  BP: 108/59 (28 May 2022 05:00) (95/50 - 117/55)  BP(mean): 81 (27 May 2022 16:00) (62 - 81)  RR: 18 (28 May 2022 05:00) (17 - 34)  SpO2: 98% (28 May 2022 05:00) (96% - 100%)    PHYSICAL EXAM:  GENERAL: in resp distress; labored breathing but able to have full sentence conversation   HEAD:  Atraumatic, Normocephalic  EYES: EOMI, PERRLA, conjunctiva and sclera clear  NERVOUS SYSTEM:  Alert & Oriented X 4, Good concentration; comprehending her medical care   CHEST/LUNG: rhonchi b/l  CV/HEART: Regular rate and rhythm; No murmurs, rubs, or gallops  GI/ABDOMEN: Soft, Nontender, Nondistended; Bowel sounds present  EXTREMITIES:  2+ Peripheral Pulses, No clubbing, cyanosis, or edema  SKIN: No rashes or lesions    LAB:                        6.2    22.59 )-----------( 540      ( 28 May 2022 07:51 )             18.0     05-28    139  |  106  |  5<L>  ----------------------------<  100<H>  3.6   |  22  |  <0.5<L>    Ca    8.5      28 May 2022 07:51  Mg     1.6     05-28    TPro  6.8  /  Alb  3.0<L>  /  TBili  4.9<H>  /  DBili  x   /  AST  31  /  ALT  16  /  AlkPhos  76  05-28    CARDIAC MARKERS ( 27 May 2022 05:27 )  x     / <0.01 ng/mL / x     / x     / x      CARDIAC MARKERS ( 26 May 2022 21:55 )  x     / <0.01 ng/mL / x     / x     / x          Daily     Daily   CAPILLARY BLOOD GLUCOSE          LIVER FUNCTIONS - ( 28 May 2022 07:51 )  Alb: 3.0 g/dL / Pro: 6.8 g/dL / ALK PHOS: 76 U/L / ALT: 16 U/L / AST: 31 U/L / GGT: x               RADIOLOGY:    Imaging Personally visualized and Reviewed:  [y] YES  [ ] NO    HEALTH ISSUES - PROBLEM Dx:    MEDS:  acetaminophen     Tablet .. 650 milliGRAM(s) Oral every 6 hours PRN  cefepime   IVPB 2000 milliGRAM(s) IV Intermittent every 8 hours  cefepime   IVPB      chlorhexidine 4% Liquid 1 Application(s) Topical daily  dextrose 5% + sodium chloride 0.45%. 1000 milliLiter(s) IV Continuous <Continuous>  enoxaparin Injectable 40 milliGRAM(s) SubCutaneous every 24 hours  folic acid 1 milliGRAM(s) Oral daily  HYDROmorphone  Injectable 1 milliGRAM(s) IV Push every 4 hours PRN  influenza   Vaccine 0.5 milliLiter(s) IntraMuscular once  levoFLOXacin IVPB 750 milliGRAM(s) IV Intermittent every 24 hours  losartan 25 milliGRAM(s) Oral daily  melatonin 3 milliGRAM(s) Oral at bedtime PRN  ondansetron Injectable 4 milliGRAM(s) IV Push every 8 hours PRN  oxyCODONE    IR 5 milliGRAM(s) Oral every 4 hours PRN  pantoprazole    Tablet 40 milliGRAM(s) Oral before breakfast  vancomycin  IVPB 1000 milliGRAM(s) IV Intermittent every 12 hours       RAFAEFRENJAMES  26y  Female      Patient is a 26y old  Female who presents with a chief complaint of Acute chest syndrome (27 May 2022 10:00)      INTERVAL HPI/OVERNIGHT EVENTS:    pt seen and examined at bedside   -ill-appearing and with labored breathing; fatigued (still having full conversation)  -discussed with hematology team at bedside - patient needs exchange transfusion (patient refusing) but now agreeable for one unit PRBC transfusion  -case discussed with critical care team - upgrade to ICU for current Acute Chest syndrome    REVIEW OF SYSTEMS:  -dyspneic     -Vital Signs Last 24 Hrs  T(C): 37.6 (28 May 2022 06:30), Max: 39.5 (28 May 2022 03:35)  T(F): 99.6 (28 May 2022 06:30), Max: 103.1 (28 May 2022 03:35)  HR: 100 (28 May 2022 05:00) (78 - 101)  BP: 108/59 (28 May 2022 05:00) (95/50 - 117/55)  BP(mean): 81 (27 May 2022 16:00) (62 - 81)  RR: 18 (28 May 2022 05:00) (17 - 34)  SpO2: 98% (28 May 2022 05:00) (96% - 100%)    PHYSICAL EXAM:  GENERAL: in resp distress; labored breathing but able to have full sentence conversation   HEAD:  Atraumatic, Normocephalic  EYES: EOMI, PERRLA, conjunctiva and sclera clear  NERVOUS SYSTEM:  Alert & Oriented X 4, Good concentration; comprehending her medical care   CHEST/LUNG: rhonchi b/l  CV/HEART: Regular rate and rhythm; No murmurs, rubs, or gallops  GI/ABDOMEN: Soft, Nontender, Nondistended; Bowel sounds present  EXTREMITIES:  2+ Peripheral Pulses, No clubbing, cyanosis, or edema  SKIN: No rashes or lesions    LAB:                        6.2    22.59 )-----------( 540      ( 28 May 2022 07:51 )             18.0     05-28    139  |  106  |  5<L>  ----------------------------<  100<H>  3.6   |  22  |  <0.5<L>    Ca    8.5      28 May 2022 07:51  Mg     1.6     05-28    TPro  6.8  /  Alb  3.0<L>  /  TBili  4.9<H>  /  DBili  x   /  AST  31  /  ALT  16  /  AlkPhos  76  05-28    CARDIAC MARKERS ( 27 May 2022 05:27 )  x     / <0.01 ng/mL / x     / x     / x      CARDIAC MARKERS ( 26 May 2022 21:55 )  x     / <0.01 ng/mL / x     / x     / x          Daily     Daily   CAPILLARY BLOOD GLUCOSE          LIVER FUNCTIONS - ( 28 May 2022 07:51 )  Alb: 3.0 g/dL / Pro: 6.8 g/dL / ALK PHOS: 76 U/L / ALT: 16 U/L / AST: 31 U/L / GGT: x               RADIOLOGY:    Imaging Personally visualized and Reviewed:  [y] YES  [ ] NO    HEALTH ISSUES - PROBLEM Dx:    MEDS:  acetaminophen     Tablet .. 650 milliGRAM(s) Oral every 6 hours PRN  cefepime   IVPB 2000 milliGRAM(s) IV Intermittent every 8 hours  cefepime   IVPB      chlorhexidine 4% Liquid 1 Application(s) Topical daily  dextrose 5% + sodium chloride 0.45%. 1000 milliLiter(s) IV Continuous <Continuous>  enoxaparin Injectable 40 milliGRAM(s) SubCutaneous every 24 hours  folic acid 1 milliGRAM(s) Oral daily  HYDROmorphone  Injectable 1 milliGRAM(s) IV Push every 4 hours PRN  influenza   Vaccine 0.5 milliLiter(s) IntraMuscular once  levoFLOXacin IVPB 750 milliGRAM(s) IV Intermittent every 24 hours  losartan 25 milliGRAM(s) Oral daily  melatonin 3 milliGRAM(s) Oral at bedtime PRN  ondansetron Injectable 4 milliGRAM(s) IV Push every 8 hours PRN  oxyCODONE    IR 5 milliGRAM(s) Oral every 4 hours PRN  pantoprazole    Tablet 40 milliGRAM(s) Oral before breakfast  vancomycin  IVPB 1000 milliGRAM(s) IV Intermittent every 12 hours

## 2022-05-28 NOTE — PROGRESS NOTE ADULT - ASSESSMENT
patient is a Unit Downgrade; found to be in resp distress this am; will be upgraded to ICU this morning     ·	Sepsis / Multi Lobar PNA   ·	Acute Chest Syndrome / Vaso occlusive Pain crisis / Sickle Cell Crisis (Sickle Cell Anemia)  ·	Acute Anemia   ·	Suspected Magnesium and Folic acid Def     -patient needs exchange transfusion (refusing); one unit PRBC transfusion ordered- Hematology following (discussed the case at bedside)  -Abx regimen adjusted - Added IV levaquin, continue with IV vanco and IV cefepime; pancultures, daily cxr, check MRSA nares, procalcitonin   -ID follow up   -replete electrolytes as needed   -upgrade to ICU       # Progress Note Handoff  PENDING as follows  consults: critical care   Test: CBC, pan cultures   Family discussion: discussed with patient in length and agreeable for current plan of care including icu upgrade and blood transfusion   Disposition: Acute; not discharge ready    Attending Physician Dr. Jacquie Jordan # [id number]      patient is a Unit Downgrade; found to be in resp distress this am; will be upgraded to ICU this morning     ·	Sepsis / Multi Lobar PNA   ·	Acute Chest Syndrome / Vaso occlusive Pain crisis / Sickle Cell Crisis (Sickle Cell Anemia)  ·	Acute Anemia   ·	Suspected Magnesium and Folic acid Def     -patient needs exchange transfusion (refusing); one unit PRBC transfusion ordered- Hematology following (discussed the case at bedside)  -Abx regimen adjusted - Added IV levaquin, continue with IV vanco and IV cefepime; pancultures, daily cxr, check MRSA nares, procalcitonin   -ID follow up   -replete electrolytes as needed   -prn pain meds + pain management consult for dilaudid PCA pump  -upgrade to ICU   -FULL CODE     # Progress Note Handoff  PENDING as follows  consults: critical care   Test: CBC, pan cultures   Family discussion: discussed with patient in length and agreeable for current plan of care including icu upgrade and blood transfusion   Disposition: Acute; not discharge ready    Attending Physician Dr. Jacquie Jordan # [id number]

## 2022-05-29 LAB
ALBUMIN SERPL ELPH-MCNC: 2.8 G/DL — LOW (ref 3.5–5.2)
ALP SERPL-CCNC: 80 U/L — SIGNIFICANT CHANGE UP (ref 30–115)
ALT FLD-CCNC: 19 U/L — SIGNIFICANT CHANGE UP (ref 0–41)
ANION GAP SERPL CALC-SCNC: 11 MMOL/L — SIGNIFICANT CHANGE UP (ref 7–14)
AST SERPL-CCNC: 40 U/L — SIGNIFICANT CHANGE UP (ref 0–41)
BASOPHILS # BLD AUTO: 0.1 K/UL — SIGNIFICANT CHANGE UP (ref 0–0.2)
BASOPHILS NFR BLD AUTO: 0.6 % — SIGNIFICANT CHANGE UP (ref 0–1)
BILIRUB SERPL-MCNC: 5.7 MG/DL — HIGH (ref 0.2–1.2)
BUN SERPL-MCNC: 6 MG/DL — LOW (ref 10–20)
CALCIUM SERPL-MCNC: 8.3 MG/DL — LOW (ref 8.5–10.1)
CHLORIDE SERPL-SCNC: 100 MMOL/L — SIGNIFICANT CHANGE UP (ref 98–110)
CO2 SERPL-SCNC: 21 MMOL/L — SIGNIFICANT CHANGE UP (ref 17–32)
CREAT SERPL-MCNC: <0.5 MG/DL — LOW (ref 0.7–1.5)
EGFR: 142 ML/MIN/1.73M2 — SIGNIFICANT CHANGE UP
EOSINOPHIL # BLD AUTO: 0.25 K/UL — SIGNIFICANT CHANGE UP (ref 0–0.7)
EOSINOPHIL NFR BLD AUTO: 1.4 % — SIGNIFICANT CHANGE UP (ref 0–8)
GLUCOSE SERPL-MCNC: 87 MG/DL — SIGNIFICANT CHANGE UP (ref 70–99)
HCT VFR BLD CALC: 21.8 % — LOW (ref 37–47)
HCT VFR BLD CALC: 23.3 % — LOW (ref 37–47)
HGB BLD-MCNC: 7.4 G/DL — LOW (ref 12–16)
HGB BLD-MCNC: 7.9 G/DL — LOW (ref 12–16)
IMM GRANULOCYTES NFR BLD AUTO: 2.8 % — HIGH (ref 0.1–0.3)
LYMPHOCYTES # BLD AUTO: 17 % — LOW (ref 20.5–51.1)
LYMPHOCYTES # BLD AUTO: 3.09 K/UL — SIGNIFICANT CHANGE UP (ref 1.2–3.4)
MAGNESIUM SERPL-MCNC: 1.7 MG/DL — LOW (ref 1.8–2.4)
MCHC RBC-ENTMCNC: 30.5 PG — SIGNIFICANT CHANGE UP (ref 27–31)
MCHC RBC-ENTMCNC: 30.8 PG — SIGNIFICANT CHANGE UP (ref 27–31)
MCHC RBC-ENTMCNC: 33.9 G/DL — SIGNIFICANT CHANGE UP (ref 32–37)
MCHC RBC-ENTMCNC: 33.9 G/DL — SIGNIFICANT CHANGE UP (ref 32–37)
MCV RBC AUTO: 90 FL — SIGNIFICANT CHANGE UP (ref 81–99)
MCV RBC AUTO: 90.8 FL — SIGNIFICANT CHANGE UP (ref 81–99)
MONOCYTES # BLD AUTO: 2.37 K/UL — HIGH (ref 0.1–0.6)
MONOCYTES NFR BLD AUTO: 13.1 % — HIGH (ref 1.7–9.3)
NEUTROPHILS # BLD AUTO: 11.84 K/UL — HIGH (ref 1.4–6.5)
NEUTROPHILS NFR BLD AUTO: 65.1 % — SIGNIFICANT CHANGE UP (ref 42.2–75.2)
NRBC # BLD: 1 /100 WBCS — HIGH (ref 0–0)
NRBC # BLD: 3 /100 WBCS — HIGH (ref 0–0)
PHOSPHATE SERPL-MCNC: 4 MG/DL — SIGNIFICANT CHANGE UP (ref 2.1–4.9)
PLATELET # BLD AUTO: 314 K/UL — SIGNIFICANT CHANGE UP (ref 130–400)
PLATELET # BLD AUTO: 475 K/UL — HIGH (ref 130–400)
POTASSIUM SERPL-MCNC: 3.4 MMOL/L — LOW (ref 3.5–5)
POTASSIUM SERPL-SCNC: 3.4 MMOL/L — LOW (ref 3.5–5)
PROT SERPL-MCNC: 6.6 G/DL — SIGNIFICANT CHANGE UP (ref 6–8)
RBC # BLD: 2.4 M/UL — LOW (ref 4.2–5.4)
RBC # BLD: 2.59 M/UL — LOW (ref 4.2–5.4)
RBC # FLD: 18.3 % — HIGH (ref 11.5–14.5)
RBC # FLD: 19.1 % — HIGH (ref 11.5–14.5)
SODIUM SERPL-SCNC: 132 MMOL/L — LOW (ref 135–146)
WBC # BLD: 17.82 K/UL — HIGH (ref 4.8–10.8)
WBC # BLD: 18.16 K/UL — HIGH (ref 4.8–10.8)
WBC # FLD AUTO: 17.82 K/UL — HIGH (ref 4.8–10.8)
WBC # FLD AUTO: 18.16 K/UL — HIGH (ref 4.8–10.8)

## 2022-05-29 PROCEDURE — 99291 CRITICAL CARE FIRST HOUR: CPT

## 2022-05-29 PROCEDURE — 71045 X-RAY EXAM CHEST 1 VIEW: CPT | Mod: 26

## 2022-05-29 PROCEDURE — 99232 SBSQ HOSP IP/OBS MODERATE 35: CPT

## 2022-05-29 RX ORDER — MAGNESIUM SULFATE 500 MG/ML
2 VIAL (ML) INJECTION ONCE
Refills: 0 | Status: COMPLETED | OUTPATIENT
Start: 2022-05-29 | End: 2022-05-29

## 2022-05-29 RX ORDER — POTASSIUM CHLORIDE 20 MEQ
20 PACKET (EA) ORAL
Refills: 0 | Status: COMPLETED | OUTPATIENT
Start: 2022-05-29 | End: 2022-05-29

## 2022-05-29 RX ORDER — HYDROMORPHONE HYDROCHLORIDE 2 MG/ML
1 INJECTION INTRAMUSCULAR; INTRAVENOUS; SUBCUTANEOUS
Refills: 0 | Status: DISCONTINUED | OUTPATIENT
Start: 2022-05-29 | End: 2022-05-31

## 2022-05-29 RX ADMIN — ENOXAPARIN SODIUM 40 MILLIGRAM(S): 100 INJECTION SUBCUTANEOUS at 00:10

## 2022-05-29 RX ADMIN — Medication 650 MILLIGRAM(S): at 16:26

## 2022-05-29 RX ADMIN — Medication 650 MILLIGRAM(S): at 01:56

## 2022-05-29 RX ADMIN — Medication 650 MILLIGRAM(S): at 08:15

## 2022-05-29 RX ADMIN — ONDANSETRON 4 MILLIGRAM(S): 8 TABLET, FILM COATED ORAL at 13:16

## 2022-05-29 RX ADMIN — Medication 250 MILLIGRAM(S): at 18:38

## 2022-05-29 RX ADMIN — Medication 650 MILLIGRAM(S): at 23:57

## 2022-05-29 RX ADMIN — CEFEPIME 100 MILLIGRAM(S): 1 INJECTION, POWDER, FOR SOLUTION INTRAMUSCULAR; INTRAVENOUS at 16:26

## 2022-05-29 RX ADMIN — Medication 650 MILLIGRAM(S): at 08:30

## 2022-05-29 RX ADMIN — Medication 650 MILLIGRAM(S): at 16:00

## 2022-05-29 RX ADMIN — LOSARTAN POTASSIUM 25 MILLIGRAM(S): 100 TABLET, FILM COATED ORAL at 05:00

## 2022-05-29 RX ADMIN — PANTOPRAZOLE SODIUM 40 MILLIGRAM(S): 20 TABLET, DELAYED RELEASE ORAL at 09:21

## 2022-05-29 RX ADMIN — HYDROMORPHONE HYDROCHLORIDE 1 MILLIGRAM(S): 2 INJECTION INTRAMUSCULAR; INTRAVENOUS; SUBCUTANEOUS at 08:00

## 2022-05-29 RX ADMIN — HYDROMORPHONE HYDROCHLORIDE 1 MILLIGRAM(S): 2 INJECTION INTRAMUSCULAR; INTRAVENOUS; SUBCUTANEOUS at 20:54

## 2022-05-29 RX ADMIN — CEFEPIME 100 MILLIGRAM(S): 1 INJECTION, POWDER, FOR SOLUTION INTRAMUSCULAR; INTRAVENOUS at 21:02

## 2022-05-29 RX ADMIN — HYDROMORPHONE HYDROCHLORIDE 1 MILLIGRAM(S): 2 INJECTION INTRAMUSCULAR; INTRAVENOUS; SUBCUTANEOUS at 21:10

## 2022-05-29 RX ADMIN — HYDROMORPHONE HYDROCHLORIDE 1 MILLIGRAM(S): 2 INJECTION INTRAMUSCULAR; INTRAVENOUS; SUBCUTANEOUS at 23:55

## 2022-05-29 RX ADMIN — HYDROMORPHONE HYDROCHLORIDE 1 MILLIGRAM(S): 2 INJECTION INTRAMUSCULAR; INTRAVENOUS; SUBCUTANEOUS at 13:16

## 2022-05-29 RX ADMIN — Medication 1 MILLIGRAM(S): at 12:03

## 2022-05-29 RX ADMIN — Medication 20 MILLIEQUIVALENT(S): at 10:38

## 2022-05-29 RX ADMIN — CEFEPIME 100 MILLIGRAM(S): 1 INJECTION, POWDER, FOR SOLUTION INTRAMUSCULAR; INTRAVENOUS at 05:00

## 2022-05-29 RX ADMIN — Medication 250 MILLIGRAM(S): at 05:00

## 2022-05-29 RX ADMIN — ENOXAPARIN SODIUM 40 MILLIGRAM(S): 100 INJECTION SUBCUTANEOUS at 23:58

## 2022-05-29 RX ADMIN — HYDROMORPHONE HYDROCHLORIDE 1 MILLIGRAM(S): 2 INJECTION INTRAMUSCULAR; INTRAVENOUS; SUBCUTANEOUS at 13:31

## 2022-05-29 RX ADMIN — Medication 25 GRAM(S): at 08:00

## 2022-05-29 RX ADMIN — Medication 650 MILLIGRAM(S): at 23:42

## 2022-05-29 NOTE — PROGRESS NOTE ADULT - ASSESSMENT
25yo  F with PMH of Sickle cell anemia (on oxbryta - followed by Hematologist - Dr. Jimenez in [place]), recently discharged after treatment of pneumonia and sickle cell crisis, presented to the ED with c/o right sided chest, shoulder and upper back pain.  She also reports fever at home 102F.  She was recently treated for sickle cell crisis and pneumonia and discharged last week on Levaquin PO.    Hematology consulted for r/o ACS with hx of sickle cell disease.     IMPRESSION:    #ACS/Vaso-occlusive pain crisi  #Hx of Hb SS disease -On Oxbryta, just started in April 2022 but hasn't been routinely taking it due to her recent hospitalizations/infections; never was on hydrea or any other HbSS medications  - 5/29: Her o2 requirement has significantly improved after 2 units of PRBCs, now O2 sat is % on room air, however she is still in a lot of pain, particualrly when she moves. Since her respiratory status has improved, will hold off exchange transfusion for now  and manage her with pain meds, fluids and antibiotics. CXR is unchaged from yesterday. She was initially offered both exchange and simple transfusion and declined, eventualy she agreed to a simple transfusion.    -Baseline Hb 7-8  -She has had 2 crises far this year.   - Hb electrophoresis from 5/21 showed 90% HbS    #Chronic leukocytosis and thrombocytosis  - likely reactive     RECOMMENDATIONS:  - Since her o2 requirement has significantly improved, will hold off on exchange transfusion for now  - No need for another simple transfusion unless her clinical status is deteroriating, do NOT need to transfuse just for hemoglobin < 7 without clinical changes  - Continue with IV hydration with 0.45% NS @100cc/Hr and encourage PO hydration.   - Increase pain meds to IV dilaudid 1mg q3hr. Please ensure that patient is discharged on adequate pain control regimen.   - Antibiotics for multilobar opacities.   - C/w folic acid supplementation  - She remains in ICU  - She can continue Oxbryta as outpatient and follow up with her primary hematologist post discharge.     DVT ppx: Lovenox

## 2022-05-29 NOTE — PROGRESS NOTE ADULT - SUBJECTIVE AND OBJECTIVE BOX
24H events:    She received 2 units of PRBCs yesterday/overnight  Her O2 requirement sigifcantly improved, now % on room air  However, she is still in significant pain  Fevers are improving  PAST MEDICAL & SURGICAL HISTORY  Sickle cell anemia    S/P cholecystectomy      SOCIAL HISTORY:  Negative for smoking/alcohol/drug use.     ALLERGIES:  No Known Allergies    MEDICATIONS:  STANDING MEDICATIONS  cefepime   IVPB      cefepime   IVPB 2000 milliGRAM(s) IV Intermittent every 8 hours  chlorhexidine 4% Liquid 1 Application(s) Topical daily  dextrose 5% + sodium chloride 0.45%. 1000 milliLiter(s) IV Continuous <Continuous>  enoxaparin Injectable 40 milliGRAM(s) SubCutaneous every 24 hours  folic acid 1 milliGRAM(s) Oral daily  influenza   Vaccine 0.5 milliLiter(s) IntraMuscular once  levoFLOXacin IVPB 750 milliGRAM(s) IV Intermittent every 24 hours  losartan 25 milliGRAM(s) Oral daily  pantoprazole    Tablet 40 milliGRAM(s) Oral before breakfast  vancomycin  IVPB 1000 milliGRAM(s) IV Intermittent every 12 hours    PRN MEDICATIONS  acetaminophen     Tablet .. 650 milliGRAM(s) Oral every 6 hours PRN  HYDROmorphone  Injectable 1 milliGRAM(s) IV Push every 3 hours PRN  melatonin 3 milliGRAM(s) Oral at bedtime PRN  ondansetron Injectable 4 milliGRAM(s) IV Push every 8 hours PRN  oxyCODONE    IR 5 milliGRAM(s) Oral every 4 hours PRN    VITALS:   T(F): 99.8  HR: 84  BP: 113/58  RR: 29  SpO2: 99%    LABS:                        7.4    18.16 )-----------( 475      ( 29 May 2022 04:25 )             21.8     05-29    132<L>  |  100  |  6<L>  ----------------------------<  87  3.4<L>   |  21  |  <0.5<L>    Ca    8.3<L>      29 May 2022 04:25  Phos  4.0     05-29  Mg     1.7     05-29    TPro  6.6  /  Alb  2.8<L>  /  TBili  5.7<H>  /  DBili  x   /  AST  40  /  ALT  19  /  AlkPhos  80  05-29              Culture - Blood (collected 27 May 2022 11:46)  Source: .Blood None  Preliminary Report (28 May 2022 23:02):    No growth to date.          RADIOLOGY:    PHYSICAL EXAM:  GEN: No acute distress  HENT: NCAT, EOMI, scleral icterus  LYMPH: No appreciable adenopathy  LUNGS: No respiratory distress, clear to auscultation bilaterally   HEART: regular rate and rhythm  ABD: Soft, non-tender, non-distended  SKIN: No rash  EXT: No edema  NEURO: AAOX3

## 2022-05-29 NOTE — PROGRESS NOTE ADULT - SUBJECTIVE AND OBJECTIVE BOX
Patient is a 26y old  Female who presents with a chief complaint of Acute chest syndrome (29 May 2022 00:32)      HPI:  27yo  F with PMH of Sickle cell anemia (on oxbryta - followed by Hematologist - Dr. Jimenez in [place]), recently discharged after treatment of pneumonia and sickle cell crisis, presented to the ED with c/o right sided chest, shoulder and upper back pain. States that she overexerted herself. She states that she has not been feeling well since yesterday, had mild cough and fever upto 102F at home. Denies any shortness of breath, abdominal pain, nausea, vomiting, diarrhea, leg pain, swelling.     Pt w mild fever over night.  received PRBC's for dropping hgb, pain is well controlled    ED vitals:   T(F): 102.6 (05-26 @ 23:54), Max: 102.6 (05-26 @ 23:54)  HR: 110 (05-26 @ 23:54) (109 - 117)  BP: 117/55 (05-26 @ 23:54) (110/62 - 117/55)  RR: 18 (05-26 @ 23:54) (18 - 19)  SpO2: 95% (05-26 @ 23:54) (95% - 97%)    ED workup: Hb 7.8, WBC 23.58, Plt 650, Retics 14.9%. Mg 1.5.  She was given LR bolus, Magnesium and morphine in ED. Patient being admitted for management of sickle cell crisis, r/o pneumonia/Acute chest syndrome.  (26 May 2022 23:34)      PAST MEDICAL & SURGICAL HISTORY:  Sickle cell anemia      S/P cholecystectomy        Allergies    No Known Allergies    Intolerances      FAMILY HISTORY:    Home Medications:  folic acid 1 mg oral tablet: 1 tab(s) orally once a day (20 May 2022 16:12)  losartan 25 mg oral tablet: 1 tab(s) orally once a day (20 May 2022 16:12)    Occupation:  Alochol: Denied  Smoking: Denied  Drug Use: Denied  Marital Status:         ROS: as in HPI; All other systems reviewed are negative    ICU Vital Signs Last 24 Hrs  T(C): 37.3 (29 May 2022 04:00), Max: 38.3 (28 May 2022 20:00)  T(F): 99.2 (29 May 2022 04:00), Max: 101 (28 May 2022 20:00)  HR: 78 (29 May 2022 06:00) (76 - 98)  BP: 113/58 (29 May 2022 06:00) (104/52 - 128/63)  BP(mean): 74 (29 May 2022 06:00) (69 - 90)  ABP: --  ABP(mean): --  RR: 22 (29 May 2022 06:00) (18 - 35)  SpO2: 100% (29 May 2022 06:00) (99% - 100%)        Physical Examination:    General: No acute distress.  Alert, oriented, interactive, nonfocal    HEENT: Pupils equal, reactive to light.  Symmetric.    PULM: Clear to auscultation bilaterally, no significant sputum production    CVS: Regular rate and rhythm, no murmurs, rubs, or gallops    ABD: Soft, nondistended, nontender, normoactive bowel sounds, no masses    EXT: No edema, nontender    SKIN: Warm and well perfused, no rashes noted.              I&O's Detail    28 May 2022 07:01  -  29 May 2022 07:00  --------------------------------------------------------  IN:    dextrose 5% + sodium chloride 0.45%: 2250 mL    IV PiggyBack: 350 mL    PRBCs (Packed Red Blood Cells): 581 mL  Total IN: 3181 mL    OUT:    Voided (mL): 1400 mL  Total OUT: 1400 mL    Total NET: 1781 mL            LABS:                        7.4    18.16 )-----------( 475      ( 29 May 2022 04:25 )             21.8     29 May 2022 04:25    132    |  100    |  6      ----------------------------<  87     3.4     |  21     |  <0.5     Ca    8.3        29 May 2022 04:25  Phos  4.0       29 May 2022 04:25  Mg     1.7       29 May 2022 04:25    TPro  6.6    /  Alb  2.8    /  TBili  5.7    /  DBili  x      /  AST  40     /  ALT  19     /  AlkPhos  80     29 May 2022 04:25  Amylase x     lipase x              CAPILLARY BLOOD GLUCOSE            Culture    Culture - Blood (collected 27 May 2022 11:46)  Source: .Blood None  Preliminary Report (28 May 2022 23:02):    No growth to date.          MEDICATIONS  (STANDING):  cefepime   IVPB 2000 milliGRAM(s) IV Intermittent every 8 hours  cefepime   IVPB      chlorhexidine 4% Liquid 1 Application(s) Topical daily  dextrose 5% + sodium chloride 0.45%. 1000 milliLiter(s) (150 mL/Hr) IV Continuous <Continuous>  enoxaparin Injectable 40 milliGRAM(s) SubCutaneous every 24 hours  folic acid 1 milliGRAM(s) Oral daily  influenza   Vaccine 0.5 milliLiter(s) IntraMuscular once  levoFLOXacin IVPB 750 milliGRAM(s) IV Intermittent every 24 hours  losartan 25 milliGRAM(s) Oral daily  magnesium sulfate  IVPB 2 Gram(s) IV Intermittent once  pantoprazole    Tablet 40 milliGRAM(s) Oral before breakfast  potassium chloride    Tablet ER 20 milliEquivalent(s) Oral every 2 hours  vancomycin  IVPB 1000 milliGRAM(s) IV Intermittent every 12 hours    MEDICATIONS  (PRN):  acetaminophen     Tablet .. 650 milliGRAM(s) Oral every 6 hours PRN Temp greater or equal to 38C (100.4F), Mild Pain (1 - 3)  HYDROmorphone  Injectable 1 milliGRAM(s) IV Push every 4 hours PRN Pain  melatonin 3 milliGRAM(s) Oral at bedtime PRN Insomnia  ondansetron Injectable 4 milliGRAM(s) IV Push every 8 hours PRN Nausea and/or Vomiting  oxyCODONE    IR 5 milliGRAM(s) Oral every 4 hours PRN Severe Pain (7 - 10)

## 2022-05-29 NOTE — PROGRESS NOTE ADULT - SUBJECTIVE AND OBJECTIVE BOX
JAMES VÁSQUEZ 26y Female  MRN#: [id number]   CODE STATUS: Full     Hospital Day: 3d    Pt is currently admitted with the primary diagnosis of pna /acute chest syndrome    SUBJECTIVE  Hospital Course: 27yo  F with PMH of Sickle cell anemia (on oxbryta - followed by Hematologist - Dr. Jimenez in [place]), recently discharged after treatment of pneumonia and sickle cell crisis, presented to the ED with c/o right sided chest, shoulder and upper back pain. States that she overexerted herself. She states that she has not been feeling well since yesterday, had mild cough and fever upto 102F at home. Denies any shortness of breath, abdominal pain, nausea, vomiting, diarrhea, leg pain, swelling.    Patient was downgraded to the floor, and was upgraded back to ICU.    Pt evaluated by Hospitalist, Heme/Onc and ID both expressing concern for Acute Chest Syndrome, pt more dyspneic on O2 NC, pt refusing exchange transfusion but willing to accept simple transfusion.    Overnight events: Patient received 1PRBC due to low hemoglobin     Subjective complaints: None      Present Today:   - Oc:  No [  ], Yes [   ] : Indication:     - Type of IV Access:       .. CVC/Piccline:  No [  ], Yes [   ] : Indication:       .. Midline: No [  ], Yes [   ] : Indication:                                             ----------------------------------------------------------  OBJECTIVE  PAST MEDICAL & SURGICAL HISTORY  Sickle cell anemia    S/P cholecystectomy                                              -----------------------------------------------------------  ALLERGIES:  No Known Allergies                                            ------------------------------------------------------------    HOME MEDICATIONS  Home Medications:  folic acid 1 mg oral tablet: 1 tab(s) orally once a day (20 May 2022 16:12)  losartan 25 mg oral tablet: 1 tab(s) orally once a day (20 May 2022 16:12)                           MEDICATIONS:  STANDING MEDICATIONS  cefepime   IVPB      cefepime   IVPB 2000 milliGRAM(s) IV Intermittent every 8 hours  chlorhexidine 4% Liquid 1 Application(s) Topical daily  dextrose 5% + sodium chloride 0.45%. 1000 milliLiter(s) IV Continuous <Continuous>  enoxaparin Injectable 40 milliGRAM(s) SubCutaneous every 24 hours  folic acid 1 milliGRAM(s) Oral daily  influenza   Vaccine 0.5 milliLiter(s) IntraMuscular once  levoFLOXacin IVPB 750 milliGRAM(s) IV Intermittent every 24 hours  losartan 25 milliGRAM(s) Oral daily  pantoprazole    Tablet 40 milliGRAM(s) Oral before breakfast  vancomycin  IVPB 1000 milliGRAM(s) IV Intermittent every 12 hours    PRN MEDICATIONS  acetaminophen     Tablet .. 650 milliGRAM(s) Oral every 6 hours PRN  HYDROmorphone  Injectable 1 milliGRAM(s) IV Push every 4 hours PRN  melatonin 3 milliGRAM(s) Oral at bedtime PRN  ondansetron Injectable 4 milliGRAM(s) IV Push every 8 hours PRN  oxyCODONE    IR 5 milliGRAM(s) Oral every 4 hours PRN                                            ------------------------------------------------------------  VITAL SIGNS: Last 24 Hours  T(C): 37.9 (28 May 2022 21:30), Max: 39.5 (28 May 2022 03:35)  T(F): 100.3 (28 May 2022 21:30), Max: 103.1 (28 May 2022 03:35)  HR: 82 (28 May 2022 22:00) (78 - 100)  BP: 116/60 (28 May 2022 22:00) (104/52 - 128/63)  BP(mean): 79 (28 May 2022 22:00) (73 - 90)  RR: 22 (28 May 2022 22:00) (18 - 26)  SpO2: 100% (28 May 2022 22:00) (98% - 100%)      05-27-22 @ 07:01  -  05-28-22 @ 07:00  --------------------------------------------------------  IN: 1040 mL / OUT: 200 mL / NET: 840 mL    05-28-22 @ 07:01  -  05-29-22 @ 00:32  --------------------------------------------------------  IN: 1388 mL / OUT: 500 mL / NET: 888 mL                                             --------------------------------------------------------------  LABS:                        6.6    19.85 )-----------( 490      ( 28 May 2022 22:42 )             18.9     05-28    139  |  106  |  5<L>  ----------------------------<  100<H>  3.6   |  22  |  <0.5<L>    Ca    8.5      28 May 2022 07:51  Mg     1.6     05-28    TPro  6.8  /  Alb  3.0<L>  /  TBili  4.9<H>  /  DBili  x   /  AST  31  /  ALT  16  /  AlkPhos  76  05-28    PT/INR - ( 27 May 2022 00:40 )   PT: 18.00 sec;   INR: 1.57 ratio         PTT - ( 27 May 2022 00:40 )  PTT:32.7 sec            Culture - Blood (collected 27 May 2022 11:46)  Source: .Blood None  Preliminary Report (28 May 2022 23:02):    No growth to date.        CARDIAC MARKERS ( 27 May 2022 05:27 )  x     / <0.01 ng/mL / x     / x     / x                                                  -------------------------------------------------------------  RADIOLOGY:                                            --------------------------------------------------------------    PHYSICAL EXAM:  General:   HEENT:  LUNGS:  HEART:  ABDOMEN:  EXT:  NEURO:  SKIN:                                           --------------------------------------------------------------    ASSESSMENT & PLAN    Past medical history and hospital course                                                                                                           ----------------------------------------------------  # DVT prophylaxis     # GI prophylaxis     # Diet     # Activity Score (AM-PAC)    # Code status     # Disposition                                                                              --------------------------------------------------------    # Handoff      JAMES VÁSQUEZ 26y Female  MRN#: [id number]   CODE STATUS: Full     Hospital Day: 3d    Pt is currently admitted with the primary diagnosis of pna /acute chest syndrome    SUBJECTIVE  Hospital Course: 27yo  F with PMH of Sickle cell anemia (on oxbryta - followed by Hematologist - Dr. Jimenez in [place]), recently discharged after treatment of pneumonia and sickle cell crisis, presented to the ED with c/o right sided chest, shoulder and upper back pain. States that she overexerted herself. She states that she has not been feeling well since yesterday, had mild cough and fever upto 102F at home. Denies any shortness of breath, abdominal pain, nausea, vomiting, diarrhea, leg pain, swelling.    Patient was downgraded to the floor, and was upgraded back to ICU.    Pt evaluated by Hospitalist, Heme/Onc and ID both expressing concern for Acute Chest Syndrome, pt more dyspneic on O2 NC, pt refusing exchange transfusion but willing to accept simple transfusion.    Overnight events: Patient received 1PRBC due to low hemoglobin     Subjective complaints: None                                              ----------------------------------------------------------  OBJECTIVE  PAST MEDICAL & SURGICAL HISTORY  Sickle cell anemia    S/P cholecystectomy                                              -----------------------------------------------------------  ALLERGIES:  No Known Allergies                                            ------------------------------------------------------------    HOME MEDICATIONS  Home Medications:  folic acid 1 mg oral tablet: 1 tab(s) orally once a day (20 May 2022 16:12)  losartan 25 mg oral tablet: 1 tab(s) orally once a day (20 May 2022 16:12)                           MEDICATIONS:  STANDING MEDICATIONS  cefepime   IVPB      cefepime   IVPB 2000 milliGRAM(s) IV Intermittent every 8 hours  chlorhexidine 4% Liquid 1 Application(s) Topical daily  dextrose 5% + sodium chloride 0.45%. 1000 milliLiter(s) IV Continuous <Continuous>  enoxaparin Injectable 40 milliGRAM(s) SubCutaneous every 24 hours  folic acid 1 milliGRAM(s) Oral daily  influenza   Vaccine 0.5 milliLiter(s) IntraMuscular once  levoFLOXacin IVPB 750 milliGRAM(s) IV Intermittent every 24 hours  losartan 25 milliGRAM(s) Oral daily  pantoprazole    Tablet 40 milliGRAM(s) Oral before breakfast  vancomycin  IVPB 1000 milliGRAM(s) IV Intermittent every 12 hours    PRN MEDICATIONS  acetaminophen     Tablet .. 650 milliGRAM(s) Oral every 6 hours PRN  HYDROmorphone  Injectable 1 milliGRAM(s) IV Push every 4 hours PRN  melatonin 3 milliGRAM(s) Oral at bedtime PRN  ondansetron Injectable 4 milliGRAM(s) IV Push every 8 hours PRN  oxyCODONE    IR 5 milliGRAM(s) Oral every 4 hours PRN                                            ------------------------------------------------------------  VITAL SIGNS: Last 24 Hours  T(C): 37.9 (28 May 2022 21:30), Max: 39.5 (28 May 2022 03:35)  T(F): 100.3 (28 May 2022 21:30), Max: 103.1 (28 May 2022 03:35)  HR: 82 (28 May 2022 22:00) (78 - 100)  BP: 116/60 (28 May 2022 22:00) (104/52 - 128/63)  BP(mean): 79 (28 May 2022 22:00) (73 - 90)  RR: 22 (28 May 2022 22:00) (18 - 26)  SpO2: 100% (28 May 2022 22:00) (98% - 100%)      05-27-22 @ 07:01  -  05-28-22 @ 07:00  --------------------------------------------------------  IN: 1040 mL / OUT: 200 mL / NET: 840 mL    05-28-22 @ 07:01  -  05-29-22 @ 00:32  --------------------------------------------------------  IN: 1388 mL / OUT: 500 mL / NET: 888 mL                                             --------------------------------------------------------------  LABS:                        6.6    19.85 )-----------( 490      ( 28 May 2022 22:42 )             18.9     05-28    139  |  106  |  5<L>  ----------------------------<  100<H>  3.6   |  22  |  <0.5<L>    Ca    8.5      28 May 2022 07:51  Mg     1.6     05-28    TPro  6.8  /  Alb  3.0<L>  /  TBili  4.9<H>  /  DBili  x   /  AST  31  /  ALT  16  /  AlkPhos  76  05-28    PT/INR - ( 27 May 2022 00:40 )   PT: 18.00 sec;   INR: 1.57 ratio         PTT - ( 27 May 2022 00:40 )  PTT:32.7 sec            Culture - Blood (collected 27 May 2022 11:46)  Source: .Blood None  Preliminary Report (28 May 2022 23:02):    No growth to date.        CARDIAC MARKERS ( 27 May 2022 05:27 )  x     / <0.01 ng/mL / x     / x     / x                                                  -------------------------------------------------------------  RADIOLOGY:  EXAM:  XR CHEST PORTABLE IMMED 1V                        PROCEDURE DATE:  05/28/2022      Impression:    Right opacity/pleural effusion, increased. Stable left opacity and   cardiomegaly                                            --------------------------------------------------------------    PHYSICAL EXAM:  GENERAL: NAD, lying in bed comfortably  HEAD:  Atraumatic, Normocephalic  ENT: Moist mucous membranes  CHEST/LUNG: Clear to auscultation bilaterally  HEART: Regular rate and rhythm  ABDOMEN: Soft, Nontender, Nondistended.   EXTREMITIES:  No clubbing, cyanosis, or edema  NERVOUS SYSTEM:  Alert & Oriented X3, speech clear. No deficits   SKIN: No rashes or lesions                                           --------------------------------------------------------------    ASSESSMENT & PLAN  #Sickle cell crisis with hemolysis  #Vaso-occlusive pain crisis and underlying b/l pneumonia  #Hx of Hb SS disease -On Oxbryta   -Pt is hemodynamically stable: SpO2 100% on RA, no shortness of breath; Still febrile Tmax 102.6F   -CXR 5/26: Stable left basilar opacity and worsening right basilar opacity/effusion.   -Hemolysis labs: Hb 6.7, Retic count 14.9%,  (hemolyzed), T bili 3.1, hapto <20  -Baseline Hb 7-8  -She has had 3 crisis so far this year.   - C/w folic acid supplementation  - Hem :  will hold off on exchange transfusion as pt is hemodynamically stable with no signs/symptoms of hypoxemia. Recommend simple transfusion to keep Hb around pt's baseline.  -pt refused blood transfusion with Hg of 6.7 ,please follow up repeat CBC at 4pm and transfuse if necessary ,pt agreed for transfusion if persistently low.    #Sepsis secondary to multilobar bacterial PNA RLL/LLL  CXR new consolidation RLL  5/20 CT  Patchy bilateral lower lobe consolidation suspicious for bilateral pneumonia.  5/26 COVID-19 NG   5/20 Legionella , mycoplasma ,strep NG  ID following Vancomycin 1 gm iv q12h ( hold if nares ORSA NG )  Cefepime 2 gm iv q8h ,Levoquin 750 mg iv q24h  f/u Bcx ,procal     # DVT prophylaxis lovenox     # GI prophylaxis protonix     # Diet regular     # Code status Full    # Disposition possible downgrade       For Follow-Up:  f/u Bcx ,procal ,MRSA , CBC at 4pm  -pt refused blood transfusion with Hg of 6.7 ,please follow up repeat CBC at 4pm and transfuse if necessary ,pt agreed for transfusion later if persistently low. JAMES VÁSQUEZ 26y Female  MRN#: [id number]   CODE STATUS: Full     Hospital Day: 3d    Pt is currently admitted with the primary diagnosis of pna /acute chest syndrome    SUBJECTIVE  Hospital Course: 25yo  F with PMH of Sickle cell anemia (on oxbryta - followed by Hematologist - Dr. Jimenez in [place]), recently discharged after treatment of pneumonia and sickle cell crisis, presented to the ED with c/o right sided chest, shoulder and upper back pain. States that she overexerted herself. She states that she has not been feeling well since yesterday, had mild cough and fever upto 102F at home. Denies any shortness of breath, abdominal pain, nausea, vomiting, diarrhea, leg pain, swelling.    Patient was downgraded to the floor, and was upgraded back to ICU.    Pt evaluated by Hospitalist, Heme/Onc and ID both expressing concern for Acute Chest Syndrome, pt more dyspneic on O2 NC, pt refusing exchange transfusion but willing to accept simple transfusion.    Overnight events: Patient received 1PRBC due to low hemoglobin     Subjective complaints: None                                              ----------------------------------------------------------  OBJECTIVE  PAST MEDICAL & SURGICAL HISTORY  Sickle cell anemia    S/P cholecystectomy                                              -----------------------------------------------------------  ALLERGIES:  No Known Allergies                                            ------------------------------------------------------------    HOME MEDICATIONS  Home Medications:  folic acid 1 mg oral tablet: 1 tab(s) orally once a day (20 May 2022 16:12)  losartan 25 mg oral tablet: 1 tab(s) orally once a day (20 May 2022 16:12)                           MEDICATIONS:  STANDING MEDICATIONS  cefepime   IVPB      cefepime   IVPB 2000 milliGRAM(s) IV Intermittent every 8 hours  chlorhexidine 4% Liquid 1 Application(s) Topical daily  dextrose 5% + sodium chloride 0.45%. 1000 milliLiter(s) IV Continuous <Continuous>  enoxaparin Injectable 40 milliGRAM(s) SubCutaneous every 24 hours  folic acid 1 milliGRAM(s) Oral daily  influenza   Vaccine 0.5 milliLiter(s) IntraMuscular once  levoFLOXacin IVPB 750 milliGRAM(s) IV Intermittent every 24 hours  losartan 25 milliGRAM(s) Oral daily  pantoprazole    Tablet 40 milliGRAM(s) Oral before breakfast  vancomycin  IVPB 1000 milliGRAM(s) IV Intermittent every 12 hours    PRN MEDICATIONS  acetaminophen     Tablet .. 650 milliGRAM(s) Oral every 6 hours PRN  HYDROmorphone  Injectable 1 milliGRAM(s) IV Push every 4 hours PRN  melatonin 3 milliGRAM(s) Oral at bedtime PRN  ondansetron Injectable 4 milliGRAM(s) IV Push every 8 hours PRN  oxyCODONE    IR 5 milliGRAM(s) Oral every 4 hours PRN                                            ------------------------------------------------------------  VITAL SIGNS: Last 24 Hours  T(C): 37.9 (28 May 2022 21:30), Max: 39.5 (28 May 2022 03:35)  T(F): 100.3 (28 May 2022 21:30), Max: 103.1 (28 May 2022 03:35)  HR: 82 (28 May 2022 22:00) (78 - 100)  BP: 116/60 (28 May 2022 22:00) (104/52 - 128/63)  BP(mean): 79 (28 May 2022 22:00) (73 - 90)  RR: 22 (28 May 2022 22:00) (18 - 26)  SpO2: 100% (28 May 2022 22:00) (98% - 100%)      05-27-22 @ 07:01  -  05-28-22 @ 07:00  --------------------------------------------------------  IN: 1040 mL / OUT: 200 mL / NET: 840 mL    05-28-22 @ 07:01  -  05-29-22 @ 00:32  --------------------------------------------------------  IN: 1388 mL / OUT: 500 mL / NET: 888 mL                                             --------------------------------------------------------------  LABS:                        6.6    19.85 )-----------( 490      ( 28 May 2022 22:42 )             18.9     05-28    139  |  106  |  5<L>  ----------------------------<  100<H>  3.6   |  22  |  <0.5<L>    Ca    8.5      28 May 2022 07:51  Mg     1.6     05-28    TPro  6.8  /  Alb  3.0<L>  /  TBili  4.9<H>  /  DBili  x   /  AST  31  /  ALT  16  /  AlkPhos  76  05-28    PT/INR - ( 27 May 2022 00:40 )   PT: 18.00 sec;   INR: 1.57 ratio         PTT - ( 27 May 2022 00:40 )  PTT:32.7 sec            Culture - Blood (collected 27 May 2022 11:46)  Source: .Blood None  Preliminary Report (28 May 2022 23:02):    No growth to date.        CARDIAC MARKERS ( 27 May 2022 05:27 )  x     / <0.01 ng/mL / x     / x     / x                                                  -------------------------------------------------------------  RADIOLOGY:  EXAM:  XR CHEST PORTABLE IMMED 1V                        PROCEDURE DATE:  05/28/2022      Impression:    Right opacity/pleural effusion, increased. Stable left opacity and   cardiomegaly                                            --------------------------------------------------------------    PHYSICAL EXAM:  GENERAL: NAD, lying in bed comfortably  HEAD:  Atraumatic, Normocephalic  ENT: Moist mucous membranes  CHEST/LUNG: Clear to auscultation bilaterally  HEART: Regular rate and rhythm  ABDOMEN: Soft, Nontender, Nondistended.   EXTREMITIES:  No clubbing, cyanosis, or edema  NERVOUS SYSTEM:  Alert & Oriented X3, speech clear. No deficits   SKIN: No rashes or lesions                                           --------------------------------------------------------------    ASSESSMENT & PLAN  #Sickle cell crisis with hemolysis  #Vaso-occlusive pain crisis and underlying b/l pneumonia  #Hx of Hb SS disease -On Oxbryta   -Pt is hemodynamically stable: SpO2 100% on RA, no shortness of breath; Still febrile Tmax 102.6F   -CXR 5/26: Stable left basilar opacity and worsening right basilar opacity/effusion.   -Hemolysis labs: Hb 6.7, Retic count 14.9%,  (hemolyzed), T bili 3.1, hapto <20  -Baseline Hb 7-8  -She has had 3 crisis so far this year.   - C/w folic acid supplementation  - Hem :  will hold off on exchange transfusion as pt is hemodynamically stable with no signs/symptoms of hypoxemia. Recommend simple transfusion to keep Hb around pt's baseline.  - Patient downgraded to floor on 05/27/22 and upgraded back to ICU on 05/28/22  - Received 1 unit on floor and 1 in ICU  - If she is worsening she will need an exchange transfusion. We already recommended it now for her but she wants to hold off. If she agrees she will need a [place].   - Continue with IV hydration with 0.45% NS @150cc/Hr and encourage PO hydration.   - Increase pain meds to IV dilaudid 1mg q4hr. If pain still uncontrolled, please call pain management. Please ensure that patient is discharged on adequate pain control regimen.   - C/w folic acid supplementation    #Sepsis secondary to multilobar bacterial PNA RLL/LLL  CXR new consolidation RLL  5/20 CT  Patchy bilateral lower lobe consolidation suspicious for bilateral pneumonia.  5/26 COVID-19 NG   5/20 Legionella , mycoplasma ,strep NG  ID following Vancomycin 1 gm iv q12h ( hold if nares ORSA NG )  Cefepime 2 gm iv q8h ,Levoquin 750 mg iv q24h  Bcx NGTD, procal 0.09 stable    # DVT prophylaxis lovenox     # GI prophylaxis protonix     # Diet regular     # Code status Full    # Disposition Acute      For Follow-Up:  f/u Bcx ,procal ,MRSA , CBC at 4pm  -pt refused blood transfusion with Hg of 6.7 ,please follow up repeat CBC at 4pm and transfuse if necessary ,pt agreed for transfusion later if persistently low.

## 2022-05-29 NOTE — PROGRESS NOTE ADULT - ASSESSMENT
IMPRESSION/PLAN:  Sickle cell disease  chest syndrome  pneumonia    Monitor HGB and tyransfuse PRBC's prn.  Hem to follow.  If patient agrees, possible exchange transfusion.  Continue abx.  Continue pain control Incentive spirometer.  Supllemental O2.      CNS:pain control    HEENT: clear    PULMONARY:supplemental O2, incentive spirometer    CARDIOVASCULAR: monitor BP    GI: GI prophylaxis.  Feeding     RENAL: monitor uo    INFECTIOUS DISEASE: continue ABX, consider deescalating w/ ID f/u    HEMATOLOGICAL:  hem f/u    ENDOCRINE:  Follow up FS.  Insulin protocol if needed.    MUSCULOSKELETAL: pain control        CRITICAL CARE TIME SPENT: 35 minutes

## 2022-05-30 PROCEDURE — 99232 SBSQ HOSP IP/OBS MODERATE 35: CPT

## 2022-05-30 PROCEDURE — 99232 SBSQ HOSP IP/OBS MODERATE 35: CPT | Mod: GC

## 2022-05-30 RX ADMIN — LOSARTAN POTASSIUM 25 MILLIGRAM(S): 100 TABLET, FILM COATED ORAL at 05:02

## 2022-05-30 RX ADMIN — PANTOPRAZOLE SODIUM 40 MILLIGRAM(S): 20 TABLET, DELAYED RELEASE ORAL at 06:11

## 2022-05-30 RX ADMIN — Medication 250 MILLIGRAM(S): at 05:02

## 2022-05-30 RX ADMIN — HYDROMORPHONE HYDROCHLORIDE 1 MILLIGRAM(S): 2 INJECTION INTRAMUSCULAR; INTRAVENOUS; SUBCUTANEOUS at 09:15

## 2022-05-30 RX ADMIN — ENOXAPARIN SODIUM 40 MILLIGRAM(S): 100 INJECTION SUBCUTANEOUS at 21:54

## 2022-05-30 RX ADMIN — Medication 650 MILLIGRAM(S): at 13:30

## 2022-05-30 RX ADMIN — CHLORHEXIDINE GLUCONATE 1 APPLICATION(S): 213 SOLUTION TOPICAL at 12:56

## 2022-05-30 RX ADMIN — HYDROMORPHONE HYDROCHLORIDE 1 MILLIGRAM(S): 2 INJECTION INTRAMUSCULAR; INTRAVENOUS; SUBCUTANEOUS at 00:10

## 2022-05-30 RX ADMIN — HYDROMORPHONE HYDROCHLORIDE 1 MILLIGRAM(S): 2 INJECTION INTRAMUSCULAR; INTRAVENOUS; SUBCUTANEOUS at 09:00

## 2022-05-30 RX ADMIN — CEFEPIME 100 MILLIGRAM(S): 1 INJECTION, POWDER, FOR SOLUTION INTRAMUSCULAR; INTRAVENOUS at 17:12

## 2022-05-30 RX ADMIN — HYDROMORPHONE HYDROCHLORIDE 1 MILLIGRAM(S): 2 INJECTION INTRAMUSCULAR; INTRAVENOUS; SUBCUTANEOUS at 22:13

## 2022-05-30 RX ADMIN — Medication 1 MILLIGRAM(S): at 11:37

## 2022-05-30 RX ADMIN — HYDROMORPHONE HYDROCHLORIDE 1 MILLIGRAM(S): 2 INJECTION INTRAMUSCULAR; INTRAVENOUS; SUBCUTANEOUS at 21:50

## 2022-05-30 RX ADMIN — CEFEPIME 100 MILLIGRAM(S): 1 INJECTION, POWDER, FOR SOLUTION INTRAMUSCULAR; INTRAVENOUS at 21:50

## 2022-05-30 RX ADMIN — CEFEPIME 100 MILLIGRAM(S): 1 INJECTION, POWDER, FOR SOLUTION INTRAMUSCULAR; INTRAVENOUS at 05:02

## 2022-05-30 RX ADMIN — Medication 650 MILLIGRAM(S): at 13:00

## 2022-05-30 NOTE — CHART NOTE - NSCHARTNOTEFT_GEN_A_CORE
Transfer Note    Transfer from: ICU  Transfer to: Floor       HOSPITAL COURSE:    Hospital Course: 25yo  F with PMH of Sickle cell anemia (on oxbryta - followed by Hematologist - Dr. Jimenez in [place]), recently discharged after treatment of pneumonia and sickle cell crisis, presented to the ED with c/o right sided chest, shoulder and upper back pain. States that she overexerted herself. She states that she has not been feeling well since yesterday, had mild cough and fever upto 102F at home. Denies any shortness of breath, abdominal pain, nausea, vomiting, diarrhea, leg pain, swelling.      Patient downgraded to floor on 05/27/22 and upgraded back to ICU on 05/28/22 for possible exchange transfusion ,hem held off on exchange transfusion so pt will be downgraded again.  Pt still febrile ,consider CT chest to r/o empyema ,cont cefepime and levoquin per ID    #Sickle cell crisis with hemolysis  #Vaso-occlusive pain crisis and underlying b/l pneumonia  #Hx of Hb SS disease -On Oxbryta   -Pt is hemodynamically stable: SpO2 100% on RA, no shortness of breath; Still febrile Tmax 102.6F   -CXR 5/26: Stable left basilar opacity and worsening right basilar opacity/effusion.   -Hemolysis labs: Hb 6.7, Retic count 14.9%,  (hemolyzed), T bili 3.1, hapto <20  -Baseline Hb 7-8  -She has had 3 crisis so far this year.   - C/w folic acid supplementation   -No longer requiring O2, will hold off on exchange transfusion for now  - No need for another simple transfusion unless her clinical status is deteroriating, do NOT need to transfuse just for hemoglobin < 7 without clinical changes  - Continue with IV hydration with 0.45% NS @100cc/Hr and encourage PO hydration.   - C/w pain meds to IV dilaudid 1mg q3hr. Please ensure that patient is discharged on adequate pain control regimen.   -Hem : She can continue Oxbryta as outpatient and follow up with her primary hematologist post discharge.   - Patient downgraded to floor on 05/27/22 and upgraded back to ICU on 05/28/22 for possible exchange transfusion ,hem held off on exchange transfusion so pt will be downgraded again.  - Received 2 unit of pRBC ,one on floor and 1 in ICU 5/28 , 5/29  - C/w folic acid supplementation      #Sepsis secondary to multilobar bacterial PNA RLL/LLL  CXR new consolidation RLL  5/20 CT  Patchy bilateral lower lobe consolidation suspicious for bilateral pneumonia.  5/26 COVID-19 NG   5/20 Legionella , mycoplasma ,strep NG  ID following  Cefepime 2 gm iv q8h ,Levoquin 750 mg iv q24h  Vancomycin was dc ,MRSA neg  fevers 101-100.4 over the last 48h ,consider CT chest to r/o empyema ,IR consult if present   Bcx NGTD, procal 0.09 stable    # DVT prophylaxis lovenox     # GI prophylaxis protonix     # Diet regular     # Code status Full    # Disposition Acute      For Follow-Up:  persistent fevers despite abx ,consider CT chest to r/o empyema

## 2022-05-30 NOTE — PROGRESS NOTE ADULT - SUBJECTIVE AND OBJECTIVE BOX
HPI:  25yo  F with PMH of Sickle cell anemia (on oxbryta - followed by Hematologist - Dr. Jimenez in [place]), recently discharged after treatment of pneumonia and sickle cell crisis, presented to the ED with c/o right sided chest, shoulder and upper back pain. States that she overexerted herself. She states that she has not been feeling well since yesterday, had mild cough and fever upto 102F at home. Denies any shortness of breath, abdominal pain, nausea, vomiting, diarrhea, leg pain, swelling.     ED vitals:   T(F): 102.6 (05-26 @ 23:54), Max: 102.6 (05-26 @ 23:54)  HR: 110 (05-26 @ 23:54) (109 - 117)  BP: 117/55 (05-26 @ 23:54) (110/62 - 117/55)  RR: 18 (05-26 @ 23:54) (18 - 19)  SpO2: 95% (05-26 @ 23:54) (95% - 97%)    ED workup: Hb 7.8, WBC 23.58, Plt 650, Retics 14.9%. Mg 1.5.  She was given LR bolus, Magnesium and morphine in ED. Patient being admitted for management of sickle cell crisis, r/o pneumonia/Acute chest syndrome.  (26 May 2022 23:34)        Over Night Events:  Patient seen and examined.  had fever 101.3       ROS:  See HPI    PHYSICAL EXAM    ICU Vital Signs Last 24 Hrs  T(C): 36 (30 May 2022 04:00), Max: 38.5 (29 May 2022 16:00)  T(F): 96.8 (30 May 2022 04:00), Max: 101.3 (29 May 2022 16:00)  HR: 84 (30 May 2022 07:00) (70 - 102)  BP: 116/63 (30 May 2022 07:00) (101/59 - 116/63)  BP(mean): 81 (30 May 2022 07:00) (72 - 83)  ABP: --  ABP(mean): --  RR: 23 (30 May 2022 07:00) (17 - 32)  SpO2: 98% (30 May 2022 07:00) (97% - 100%)      General:  HEENT:       NCAT         Lymph Nodes: NO cervical LN   Lungs: Bilateral BS  Cardiovascular: Regular   Abdomen: Soft, Positive BS  Extremities: No clubbing   Skin: intact   Neurological: grossly intact    I&O's Detail    29 May 2022 07:01  -  30 May 2022 07:00  --------------------------------------------------------  IN:    dextrose 5% + sodium chloride 0.45%: 2800 mL    IV PiggyBack: 350 mL  Total IN: 3150 mL    OUT:    Voided (mL): 4500 mL  Total OUT: 4500 mL    Total NET: -1350 mL          LABS:                          7.9    17.82 )-----------( 314      ( 29 May 2022 20:45 )             23.3         29 May 2022 04:25    132    |  100    |  6      ----------------------------<  87     3.4     |  21     |  <0.5     Ca    8.3        29 May 2022 04:25  Phos  4.0       29 May 2022 04:25  Mg     1.7       29 May 2022 04:25                                                                                                                                                  Culture - Blood (collected 27 May 2022 11:46)  Source: .Blood None  Preliminary Report (28 May 2022 23:02):    No growth to date.                                                                                           MEDICATIONS  (STANDING):  cefepime   IVPB 2000 milliGRAM(s) IV Intermittent every 8 hours  cefepime   IVPB      chlorhexidine 4% Liquid 1 Application(s) Topical daily  dextrose 5% + sodium chloride 0.45%. 1000 milliLiter(s) (100 mL/Hr) IV Continuous <Continuous>  enoxaparin Injectable 40 milliGRAM(s) SubCutaneous every 24 hours  folic acid 1 milliGRAM(s) Oral daily  influenza   Vaccine 0.5 milliLiter(s) IntraMuscular once  levoFLOXacin IVPB 750 milliGRAM(s) IV Intermittent every 24 hours  losartan 25 milliGRAM(s) Oral daily  pantoprazole    Tablet 40 milliGRAM(s) Oral before breakfast    MEDICATIONS  (PRN):  acetaminophen     Tablet .. 650 milliGRAM(s) Oral every 6 hours PRN Temp greater or equal to 38C (100.4F), Mild Pain (1 - 3)  HYDROmorphone  Injectable 1 milliGRAM(s) IV Push every 3 hours PRN Severe Pain (7 - 10)  melatonin 3 milliGRAM(s) Oral at bedtime PRN Insomnia  ondansetron Injectable 4 milliGRAM(s) IV Push every 8 hours PRN Nausea and/or Vomiting          Xrays:     < from: Xray Chest 1 View- PORTABLE-Routine (05.29.22 @ 06:19) >  Findings:    Support devices: None.    Cardiac/mediastinum/hilum: Obscured    Lung parenchyma/Pleura: Bilateral opacities/effusion, right greater than   left, unchanged. No pneumothorax.    Skeleton/soft tissues: Unchanged.    Impression:    Bilateral opacities/effusions, right greater than left, unchanged.    < end of copied text >        IMPRESSION/PLAN:  Sickle cell disease with anemia  fever  pneumonia  hypokalemia    Monitor HGB and tyransfuse PRBC's prn.  Hem follow up - no exchange transfusion planned.  Continue abx.  Continue pain control Incentive spirometer.  Supllemental O2.      CNS: pain control    HEENT: oral care    PULMONARY: on RA , incentive spirometer    CARDIOVASCULAR: not on pressors    GI: GI prophylaxis.  Feeding     RENAL: replace and follow up K+    INFECTIOUS DISEASE: IV ABX    HEMATOLOGICAL:  hem f/u    ENDOCRINE:  Follow up FS.  Insulin protocol if needed.    MUSCULOSKELETAL: OOB, ambulate

## 2022-05-30 NOTE — PROGRESS NOTE ADULT - SUBJECTIVE AND OBJECTIVE BOX
24H events:    24 hr Tmax 101.3  O2 sat % on room air  Leukocytosis and thrombocytosis improving, awaiting today's bilirubin     PAST MEDICAL & SURGICAL HISTORY  Sickle cell anemia    S/P cholecystectomy      SOCIAL HISTORY:  Negative for smoking/alcohol/drug use.     ALLERGIES:  No Known Allergies    MEDICATIONS:  STANDING MEDICATIONS  cefepime   IVPB 2000 milliGRAM(s) IV Intermittent every 8 hours  cefepime   IVPB      chlorhexidine 4% Liquid 1 Application(s) Topical daily  dextrose 5% + sodium chloride 0.45%. 1000 milliLiter(s) IV Continuous <Continuous>  enoxaparin Injectable 40 milliGRAM(s) SubCutaneous every 24 hours  folic acid 1 milliGRAM(s) Oral daily  influenza   Vaccine 0.5 milliLiter(s) IntraMuscular once  levoFLOXacin IVPB 750 milliGRAM(s) IV Intermittent every 24 hours  losartan 25 milliGRAM(s) Oral daily  pantoprazole    Tablet 40 milliGRAM(s) Oral before breakfast  vancomycin  IVPB 1000 milliGRAM(s) IV Intermittent every 12 hours    PRN MEDICATIONS  acetaminophen     Tablet .. 650 milliGRAM(s) Oral every 6 hours PRN  HYDROmorphone  Injectable 1 milliGRAM(s) IV Push every 3 hours PRN  melatonin 3 milliGRAM(s) Oral at bedtime PRN  ondansetron Injectable 4 milliGRAM(s) IV Push every 8 hours PRN  oxyCODONE    IR 5 milliGRAM(s) Oral every 4 hours PRN    VITALS:   T(F): 96.8  HR: 80  BP: 111/62  RR: 21  SpO2: 100%    LABS:                        7.9    17.82 )-----------( 314      ( 29 May 2022 20:45 )             23.3     05-29    132<L>  |  100  |  6<L>  ----------------------------<  87  3.4<L>   |  21  |  <0.5<L>    Ca    8.3<L>      29 May 2022 04:25  Phos  4.0     05-29  Mg     1.7     05-29    TPro  6.6  /  Alb  2.8<L>  /  TBili  5.7<H>  /  DBili  x   /  AST  40  /  ALT  19  /  AlkPhos  80  05-29              Culture - Blood (collected 27 May 2022 11:46)  Source: .Blood None  Preliminary Report (28 May 2022 23:02):    No growth to date.          RADIOLOGY:    PHYSICAL EXAM:  GEN: No acute distress  HENT: NCAT, EOMI  LYMPH: No appreciable adenopathy  LUNGS: No respiratory distress, clear to auscultation bilaterally   HEART: regular rate and rhythm  ABD: Soft, non-tender, non-distended  SKIN: No rash  EXT: No edema  NEURO: AAOX3

## 2022-05-30 NOTE — PROGRESS NOTE ADULT - ASSESSMENT
27yo  F with PMH of Sickle cell anemia (on oxbryta - followed by Hematologist - Dr. Jimenez in [place]), recently discharged after treatment of pneumonia and sickle cell crisis, presented to the ED with c/o right sided chest, shoulder and upper back pain.  She also reports fever at home 102F.  She was recently treated for sickle cell crisis and pneumonia and discharged last week on Levaquin PO.    Hematology consulted for r/o ACS with hx of sickle cell disease.     IMPRESSION:    #ACS/Vaso-occlusive pain crisis/PNA  #Hx of Hb SS disease - Was on Oxbryta, just started in April 2022 but hasn't been routinely taking it due to her recent hospitalizations/infections; never was on hydrea or any other HbSS medications  - 5/30: Remains febrile but not as high grade. SHe is no longer requiring o2 after the simple transfusions, and her leukocytosis and thrombocytosis is improving, bili still pending. Recommend to continue with IV pain meds, IVF and will hold off on further exchanges. Continue with abx per ID and critical care.   - Initially offered both exchange as patient was in high 80s on RA and simple transfusion however she declined, eventualy she agreed to a simple transfusion.    -Baseline Hb 7-8  -She has had 2 crises far this year.   - Hb electrophoresis from 5/21 showed 90% HbS    #Chronic leukocytosis and thrombocytosis: Resolving  - likely reactive     RECOMMENDATIONS:  - No longer requiring O2, will hold off on exchange transfusion for now  - No need for another simple transfusion unless her clinical status is deteroriating, do NOT need to transfuse just for hemoglobin < 7 without clinical changes  - Continue with IV hydration with 0.45% NS @100cc/Hr and encourage PO hydration.   - C/w pain meds to IV dilaudid 1mg q3hr. Please ensure that patient is discharged on adequate pain control regimen.   - Antibiotics for multilobar opacities.   - C/w folic acid supplementation  - She remains in ICU  - She can continue Oxbryta as outpatient and follow up with her primary hematologist post discharge.     DVT ppx: Lovenox      25yo  F with PMH of Sickle cell anemia (on oxbryta - followed by Hematologist - Dr. Jimenez in [place]), recently discharged after treatment of pneumonia and sickle cell crisis, presented to the ED with c/o right sided chest, shoulder and upper back pain.  She also reports fever at home 102F.  She was recently treated for sickle cell crisis and pneumonia and discharged last week on Levaquin PO.    Hematology consulted for r/o ACS with hx of sickle cell disease.     IMPRESSION:    #ACS/Vaso-occlusive pain crisis/PNA  #Hx of Hb SS disease - Was on Oxbryta, just started in April 2022 but hasn't been routinely taking it due to her recent hospitalizations/infections; never was on hydrea or any other HbSS medications    5/30: Remains febrile but not as high grade. She is no longer requiring o2 after the simple transfusions, and her leukocytosis and thrombocytosis is improving, bili still pending. Her pain seems better controlled but she feels vey fatigued. Recommend to continue with IV pain meds and supportive care. We do not recommend an exchange or simple transfusion at this time.  Continue with abx per ID and critical care.   - Initially offered both exchange as patient was in high 80s on RA and simple transfusion however she declined, eventualy she agreed to a simple transfusion.    -Baseline Hb 7-8  -She has had 2 crises far this year.   - Hb electrophoresis from 5/21 showed 90% HbS    #Chronic leukocytosis and thrombocytosis: Resolving  - likely reactive     RECOMMENDATIONS:  - No longer requiring O2, will hold off on exchange transfusion for now  - No need for another simple transfusion unless her clinical status is deteriorating; do NOT need to transfuse just for hemoglobin < 7 without clinical changes  - C/w pain meds to IV dilaudid 1mg q3hr. Please ensure that patient is discharged on adequate pain control regimen.   - Antibiotics for multilobar opacities.   - C/w folic acid supplementation  - She can continue Oxbryta as outpatient and follow up with her primary hematologist post discharge.  - Monitor CBC, bili, retic, LDH     DVT ppx: Lovenox

## 2022-05-31 LAB
ALBUMIN SERPL ELPH-MCNC: 2.7 G/DL — LOW (ref 3.5–5.2)
ALBUMIN SERPL ELPH-MCNC: 2.9 G/DL — LOW (ref 3.5–5.2)
ALLERGY+IMMUNOLOGY DIAG STUDY NOTE: SIGNIFICANT CHANGE UP
ALP SERPL-CCNC: 82 U/L — SIGNIFICANT CHANGE UP (ref 30–115)
ALP SERPL-CCNC: 86 U/L — SIGNIFICANT CHANGE UP (ref 30–115)
ALT FLD-CCNC: 20 U/L — SIGNIFICANT CHANGE UP (ref 0–41)
ALT FLD-CCNC: 22 U/L — SIGNIFICANT CHANGE UP (ref 0–41)
ANION GAP SERPL CALC-SCNC: 11 MMOL/L — SIGNIFICANT CHANGE UP (ref 7–14)
ANION GAP SERPL CALC-SCNC: 13 MMOL/L — SIGNIFICANT CHANGE UP (ref 7–14)
AST SERPL-CCNC: 31 U/L — SIGNIFICANT CHANGE UP (ref 0–41)
AST SERPL-CCNC: 33 U/L — SIGNIFICANT CHANGE UP (ref 0–41)
BASOPHILS # BLD AUTO: 0.06 K/UL — SIGNIFICANT CHANGE UP (ref 0–0.2)
BASOPHILS # BLD AUTO: 0.07 K/UL — SIGNIFICANT CHANGE UP (ref 0–0.2)
BASOPHILS NFR BLD AUTO: 0.4 % — SIGNIFICANT CHANGE UP (ref 0–1)
BASOPHILS NFR BLD AUTO: 0.5 % — SIGNIFICANT CHANGE UP (ref 0–1)
BILIRUB SERPL-MCNC: 3.2 MG/DL — HIGH (ref 0.2–1.2)
BILIRUB SERPL-MCNC: 3.4 MG/DL — HIGH (ref 0.2–1.2)
BLD GP AB SCN SERPL QL: SIGNIFICANT CHANGE UP
BUN SERPL-MCNC: 3 MG/DL — LOW (ref 10–20)
BUN SERPL-MCNC: <3 MG/DL — LOW (ref 10–20)
CALCIUM SERPL-MCNC: 8.4 MG/DL — LOW (ref 8.5–10.1)
CALCIUM SERPL-MCNC: 8.7 MG/DL — SIGNIFICANT CHANGE UP (ref 8.5–10.1)
CHLORIDE SERPL-SCNC: 105 MMOL/L — SIGNIFICANT CHANGE UP (ref 98–110)
CHLORIDE SERPL-SCNC: 105 MMOL/L — SIGNIFICANT CHANGE UP (ref 98–110)
CO2 SERPL-SCNC: 21 MMOL/L — SIGNIFICANT CHANGE UP (ref 17–32)
CO2 SERPL-SCNC: 22 MMOL/L — SIGNIFICANT CHANGE UP (ref 17–32)
CREAT SERPL-MCNC: <0.5 MG/DL — LOW (ref 0.7–1.5)
CREAT SERPL-MCNC: <0.5 MG/DL — LOW (ref 0.7–1.5)
DAT IGG-SP REAG RBC-IMP: ABNORMAL
DIR ANTIGLOB POLYSPECIFIC INTERPRETATION: ABNORMAL
EGFR: 142 ML/MIN/1.73M2 — SIGNIFICANT CHANGE UP
EGFR: 152 ML/MIN/1.73M2 — SIGNIFICANT CHANGE UP
EOSINOPHIL # BLD AUTO: 0.25 K/UL — SIGNIFICANT CHANGE UP (ref 0–0.7)
EOSINOPHIL # BLD AUTO: 0.26 K/UL — SIGNIFICANT CHANGE UP (ref 0–0.7)
EOSINOPHIL NFR BLD AUTO: 1.7 % — SIGNIFICANT CHANGE UP (ref 0–8)
EOSINOPHIL NFR BLD AUTO: 1.8 % — SIGNIFICANT CHANGE UP (ref 0–8)
GLUCOSE SERPL-MCNC: 87 MG/DL — SIGNIFICANT CHANGE UP (ref 70–99)
GLUCOSE SERPL-MCNC: 92 MG/DL — SIGNIFICANT CHANGE UP (ref 70–99)
HCT VFR BLD CALC: 17.3 % — LOW (ref 37–47)
HCT VFR BLD CALC: 17.5 % — LOW (ref 37–47)
HGB BLD-MCNC: 5.7 G/DL — CRITICAL LOW (ref 12–16)
HGB BLD-MCNC: 5.8 G/DL — CRITICAL LOW (ref 12–16)
IAT COMP-SP REAG SERPL QL: SIGNIFICANT CHANGE UP
IMM GRANULOCYTES NFR BLD AUTO: 1.6 % — HIGH (ref 0.1–0.3)
IMM GRANULOCYTES NFR BLD AUTO: 2.1 % — HIGH (ref 0.1–0.3)
LYMPHOCYTES # BLD AUTO: 2.86 K/UL — SIGNIFICANT CHANGE UP (ref 1.2–3.4)
LYMPHOCYTES # BLD AUTO: 20.3 % — LOW (ref 20.5–51.1)
LYMPHOCYTES # BLD AUTO: 23 % — SIGNIFICANT CHANGE UP (ref 20.5–51.1)
LYMPHOCYTES # BLD AUTO: 3.44 K/UL — HIGH (ref 1.2–3.4)
MAGNESIUM SERPL-MCNC: 1.7 MG/DL — LOW (ref 1.8–2.4)
MCHC RBC-ENTMCNC: 30.6 PG — SIGNIFICANT CHANGE UP (ref 27–31)
MCHC RBC-ENTMCNC: 31 PG — SIGNIFICANT CHANGE UP (ref 27–31)
MCHC RBC-ENTMCNC: 32.9 G/DL — SIGNIFICANT CHANGE UP (ref 32–37)
MCHC RBC-ENTMCNC: 33.1 G/DL — SIGNIFICANT CHANGE UP (ref 32–37)
MCV RBC AUTO: 93 FL — SIGNIFICANT CHANGE UP (ref 81–99)
MCV RBC AUTO: 93.6 FL — SIGNIFICANT CHANGE UP (ref 81–99)
MONOCYTES # BLD AUTO: 1.33 K/UL — HIGH (ref 0.1–0.6)
MONOCYTES # BLD AUTO: 1.43 K/UL — HIGH (ref 0.1–0.6)
MONOCYTES NFR BLD AUTO: 10.2 % — HIGH (ref 1.7–9.3)
MONOCYTES NFR BLD AUTO: 8.9 % — SIGNIFICANT CHANGE UP (ref 1.7–9.3)
NEUTROPHILS # BLD AUTO: 9.23 K/UL — HIGH (ref 1.4–6.5)
NEUTROPHILS # BLD AUTO: 9.56 K/UL — HIGH (ref 1.4–6.5)
NEUTROPHILS NFR BLD AUTO: 63.8 % — SIGNIFICANT CHANGE UP (ref 42.2–75.2)
NEUTROPHILS NFR BLD AUTO: 65.7 % — SIGNIFICANT CHANGE UP (ref 42.2–75.2)
NRBC # BLD: 5 /100 WBCS — HIGH (ref 0–0)
NRBC # BLD: 5 /100 WBCS — HIGH (ref 0–0)
PLATELET # BLD AUTO: 488 K/UL — HIGH (ref 130–400)
PLATELET # BLD AUTO: 503 K/UL — HIGH (ref 130–400)
POTASSIUM SERPL-MCNC: 3.5 MMOL/L — SIGNIFICANT CHANGE UP (ref 3.5–5)
POTASSIUM SERPL-MCNC: 3.7 MMOL/L — SIGNIFICANT CHANGE UP (ref 3.5–5)
POTASSIUM SERPL-SCNC: 3.5 MMOL/L — SIGNIFICANT CHANGE UP (ref 3.5–5)
POTASSIUM SERPL-SCNC: 3.7 MMOL/L — SIGNIFICANT CHANGE UP (ref 3.5–5)
PROT SERPL-MCNC: 6.7 G/DL — SIGNIFICANT CHANGE UP (ref 6–8)
PROT SERPL-MCNC: 7 G/DL — SIGNIFICANT CHANGE UP (ref 6–8)
RBC # BLD: 1.86 M/UL — LOW (ref 4.2–5.4)
RBC # BLD: 1.86 M/UL — LOW (ref 4.2–5.4)
RBC # BLD: 1.87 M/UL — LOW (ref 4.2–5.4)
RBC # FLD: 22.1 % — HIGH (ref 11.5–14.5)
RBC # FLD: 22.6 % — HIGH (ref 11.5–14.5)
RETICS #: 249.8 K/UL — HIGH (ref 25–125)
RETICS/RBC NFR: 13.4 % — HIGH (ref 0.5–1.5)
SODIUM SERPL-SCNC: 138 MMOL/L — SIGNIFICANT CHANGE UP (ref 135–146)
SODIUM SERPL-SCNC: 139 MMOL/L — SIGNIFICANT CHANGE UP (ref 135–146)
WBC # BLD: 14.06 K/UL — HIGH (ref 4.8–10.8)
WBC # BLD: 14.97 K/UL — HIGH (ref 4.8–10.8)
WBC # FLD AUTO: 14.06 K/UL — HIGH (ref 4.8–10.8)
WBC # FLD AUTO: 14.97 K/UL — HIGH (ref 4.8–10.8)

## 2022-05-31 PROCEDURE — 86077 PHYS BLOOD BANK SERV XMATCH: CPT

## 2022-05-31 PROCEDURE — 99233 SBSQ HOSP IP/OBS HIGH 50: CPT

## 2022-05-31 PROCEDURE — 71045 X-RAY EXAM CHEST 1 VIEW: CPT | Mod: 26

## 2022-05-31 RX ORDER — MAGNESIUM SULFATE 500 MG/ML
2 VIAL (ML) INJECTION ONCE
Refills: 0 | Status: COMPLETED | OUTPATIENT
Start: 2022-05-31 | End: 2022-06-01

## 2022-05-31 RX ORDER — HYDROMORPHONE HYDROCHLORIDE 2 MG/ML
1 INJECTION INTRAMUSCULAR; INTRAVENOUS; SUBCUTANEOUS EVERY 6 HOURS
Refills: 0 | Status: DISCONTINUED | OUTPATIENT
Start: 2022-05-31 | End: 2022-06-01

## 2022-05-31 RX ORDER — HYDROMORPHONE HYDROCHLORIDE 2 MG/ML
2 INJECTION INTRAMUSCULAR; INTRAVENOUS; SUBCUTANEOUS ONCE
Refills: 0 | Status: DISCONTINUED | OUTPATIENT
Start: 2022-05-31 | End: 2022-05-31

## 2022-05-31 RX ADMIN — HYDROMORPHONE HYDROCHLORIDE 2 MILLIGRAM(S): 2 INJECTION INTRAMUSCULAR; INTRAVENOUS; SUBCUTANEOUS at 17:35

## 2022-05-31 RX ADMIN — HYDROMORPHONE HYDROCHLORIDE 1 MILLIGRAM(S): 2 INJECTION INTRAMUSCULAR; INTRAVENOUS; SUBCUTANEOUS at 06:28

## 2022-05-31 RX ADMIN — CEFEPIME 100 MILLIGRAM(S): 1 INJECTION, POWDER, FOR SOLUTION INTRAMUSCULAR; INTRAVENOUS at 18:18

## 2022-05-31 RX ADMIN — HYDROMORPHONE HYDROCHLORIDE 1 MILLIGRAM(S): 2 INJECTION INTRAMUSCULAR; INTRAVENOUS; SUBCUTANEOUS at 06:02

## 2022-05-31 RX ADMIN — SODIUM CHLORIDE 100 MILLILITER(S): 9 INJECTION, SOLUTION INTRAVENOUS at 22:27

## 2022-05-31 RX ADMIN — CEFEPIME 100 MILLIGRAM(S): 1 INJECTION, POWDER, FOR SOLUTION INTRAMUSCULAR; INTRAVENOUS at 05:58

## 2022-05-31 RX ADMIN — PANTOPRAZOLE SODIUM 40 MILLIGRAM(S): 20 TABLET, DELAYED RELEASE ORAL at 05:58

## 2022-05-31 RX ADMIN — Medication 1 MILLIGRAM(S): at 13:11

## 2022-05-31 RX ADMIN — Medication 50 MILLIGRAM(S): at 18:56

## 2022-05-31 RX ADMIN — CEFEPIME 100 MILLIGRAM(S): 1 INJECTION, POWDER, FOR SOLUTION INTRAMUSCULAR; INTRAVENOUS at 22:26

## 2022-05-31 RX ADMIN — CHLORHEXIDINE GLUCONATE 1 APPLICATION(S): 213 SOLUTION TOPICAL at 13:11

## 2022-05-31 RX ADMIN — HYDROMORPHONE HYDROCHLORIDE 2 MILLIGRAM(S): 2 INJECTION INTRAMUSCULAR; INTRAVENOUS; SUBCUTANEOUS at 17:05

## 2022-05-31 RX ADMIN — LOSARTAN POTASSIUM 25 MILLIGRAM(S): 100 TABLET, FILM COATED ORAL at 05:58

## 2022-05-31 NOTE — PROGRESS NOTE ADULT - ASSESSMENT
IMPRESSION/PLAN:  Sickle cell crisis  Fever  Acute chest syndrome, bacterial pneumonia unlikely  Hypokalemia      CNS: pain control    HEENT: oral care    PULMONARY: on RA , Incentive spirometry    CARDIOVASCULAR: C/w D5 1/2 NS at 100 cc/hr    GI: GI prophylaxis.  Feeding     RENAL: replace and follow up K+    INFECTIOUS DISEASE: IV ABX. Cultures negative to date, Procal 0.09. Finish course of atbx.    HEMATOLOGICAL:  hem f/u. Pain control. Transfuse to target >8    ENDOCRINE:  Follow up FS.  Insulin protocol if needed.    MUSCULOSKELETAL: OOB, ambulate as tolerated     IMPRESSION    Sickle cell crisis  Acute chest syndrome refusing exchange transfusion   The present CXR findings are more likely to be related to ACS than a bacterial infection.    SP ABX therapy   Hypokalemia    Plan     CNS: pain control    HEENT: oral care    PULMONARY: On RA. Encourage Incentive spirometry    CARDIOVASCULAR: C/w D5 1/2 NS at 100 cc/hr    GI: GI prophylaxis.  Feeding     RENAL: replace and follow up K+    INFECTIOUS DISEASE: IV ABX. Cultures negative to date, Procal 0.09. Finish course of ABX.  One more day     HEMATOLOGICAL:  hem f/u. Pain control. Transfuse to target >8    ENDOCRINE:  Follow up FS.     MUSCULOSKELETAL: OOB, ambulate as tolerated

## 2022-05-31 NOTE — PROGRESS NOTE ADULT - SUBJECTIVE AND OBJECTIVE BOX
JAMES VÁSQUEZ 26y Female  MRN#: [id number]   Hospital Day: 5d    SUBJECTIVE  Patient is a 26y old Female who presents with a chief complaint of Acute chest syndrome (30 May 2022 11:17)  Currently admitted to medicine with the primary diagnosis of Sickle cell disease, with acute chest syndrome      INTERVAL HPI AND OVERNIGHT EVENTS:  Patient was examined and seen at bedside. This morning she is resting comfortably in bed and reports no issues or overnight events.    REVIEW OF SYMPTOMS:  CONSTITUTIONAL: No weakness, fevers or chills; No headaches  EYES: No visual changes, eye pain, or discharge  ENT: No vertigo; No ear pain or change in hearing; No sore throat or difficulty swallowing  NECK: No pain or stiffness  RESPIRATORY: No cough, wheezing, or hemoptysis; No shortness of breath  CARDIOVASCULAR: No chest pain or palpitations  GASTROINTESTINAL: No abdominal or epigastric pain; No nausea, vomiting, or hematemesis; No diarrhea or constipation; No melena or hematochezia  GENITOURINARY: No dysuria, frequency or hematuria  MUSCULOSKELETAL: No joint pain, no muscle pain, no weakness  NEUROLOGICAL: No numbness or weakness  SKIN: No itching or rashes    OBJECTIVE  PAST MEDICAL & SURGICAL HISTORY  Sickle cell anemia    S/P cholecystectomy      ALLERGIES:  No Known Allergies    MEDICATIONS:  STANDING MEDICATIONS  cefepime   IVPB 2000 milliGRAM(s) IV Intermittent every 8 hours  cefepime   IVPB      chlorhexidine 4% Liquid 1 Application(s) Topical daily  dextrose 5% + sodium chloride 0.45%. 1000 milliLiter(s) IV Continuous <Continuous>  enoxaparin Injectable 40 milliGRAM(s) SubCutaneous every 24 hours  folic acid 1 milliGRAM(s) Oral daily  influenza   Vaccine 0.5 milliLiter(s) IntraMuscular once  levoFLOXacin IVPB 750 milliGRAM(s) IV Intermittent every 24 hours  losartan 25 milliGRAM(s) Oral daily  pantoprazole    Tablet 40 milliGRAM(s) Oral before breakfast    PRN MEDICATIONS  acetaminophen     Tablet .. 650 milliGRAM(s) Oral every 6 hours PRN  HYDROmorphone  Injectable 1 milliGRAM(s) IV Push every 3 hours PRN  melatonin 3 milliGRAM(s) Oral at bedtime PRN  ondansetron Injectable 4 milliGRAM(s) IV Push every 8 hours PRN      VITAL SIGNS: Last 24 Hours  T(C): 37.3 (31 May 2022 05:00), Max: 38 (30 May 2022 12:00)  T(F): 99.1 (31 May 2022 05:00), Max: 100.4 (30 May 2022 12:00)  HR: 85 (31 May 2022 05:00) (84 - 85)  BP: 107/56 (31 May 2022 05:00) (107/56 - 118/56)  BP(mean): --  RR: 18 (31 May 2022 05:00) (18 - 19)  SpO2: 99% (31 May 2022 05:00) (98% - 99%)    LABS:                        7.9    17.82 )-----------( 314      ( 29 May 2022 20:45 )             23.3                         RADIOLOGY:  No new imaging since 5/29     PHYSICAL EXAM:  CONSTITUTIONAL: No acute distress, well-developed, well-groomed, AAOx3  HEAD: Atraumatic, normocephalic  EYES: EOM intact, PERRLA, conjunctiva and sclera clear  ENT: Supple, no masses, no thyromegaly, no bruits, no JVD; moist mucous membranes  PULMONARY: Clear to auscultation bilaterally; no wheezes, rales, or rhonchi  CARDIOVASCULAR: Regular rate and rhythm; no murmurs, rubs, or gallops  GASTROINTESTINAL: Soft, non-tender, non-distended; bowel sounds present  MUSCULOSKELETAL: 2+ peripheral pulses; no clubbing, no cyanosis, no edema  NEUROLOGY: non-focal  SKIN: No rashes or lesions; warm and dry     JAMES VÁSQUEZ 26y Female  MRN#: [id number]   Hospital Day: 5d    SUBJECTIVE  Patient is a 26y old Female who presents with a chief complaint of Acute chest syndrome (30 May 2022 11:17)  Currently admitted to medicine with the primary diagnosis of Sickle cell disease, with acute chest syndrome      INTERVAL HPI AND OVERNIGHT EVENTS:  Patient was examined and seen at bedside. This morning she is resting comfortably in bed and reports SHE HAD PAIN OVERNIGHT BUT IS FEELING BETTER TODAY     REVIEW OF SYMPTOMS:  CONSTITUTIONAL: No weakness, fevers or chills; No headaches  EYES: No visual changes, eye pain, or discharge  ENT: No vertigo; No ear pain or change in hearing; No sore throat or difficulty swallowing  NECK: No pain or stiffness  RESPIRATORY: No cough, wheezing, or hemoptysis; No shortness of breath  CARDIOVASCULAR: RIGHT SIDED CP IMPROVING   GASTROINTESTINAL: No abdominal or epigastric pain; No nausea, vomiting, or hematemesis; No diarrhea or constipation; No melena or hematochezia  GENITOURINARY: No dysuria, frequency or hematuria  MUSCULOSKELETAL: RT SHOULDER PAIN IMPROVED   NEUROLOGICAL: No numbness or weakness  SKIN: No itching or rashes    OBJECTIVE  PAST MEDICAL & SURGICAL HISTORY  Sickle cell anemia    S/P cholecystectomy      ALLERGIES:  No Known Allergies    MEDICATIONS:  STANDING MEDICATIONS  cefepime   IVPB 2000 milliGRAM(s) IV Intermittent every 8 hours  cefepime   IVPB      chlorhexidine 4% Liquid 1 Application(s) Topical daily  dextrose 5% + sodium chloride 0.45%. 1000 milliLiter(s) IV Continuous <Continuous>  enoxaparin Injectable 40 milliGRAM(s) SubCutaneous every 24 hours  folic acid 1 milliGRAM(s) Oral daily  influenza   Vaccine 0.5 milliLiter(s) IntraMuscular once  levoFLOXacin IVPB 750 milliGRAM(s) IV Intermittent every 24 hours  losartan 25 milliGRAM(s) Oral daily  pantoprazole    Tablet 40 milliGRAM(s) Oral before breakfast    PRN MEDICATIONS  acetaminophen     Tablet .. 650 milliGRAM(s) Oral every 6 hours PRN  HYDROmorphone  Injectable 1 milliGRAM(s) IV Push every 3 hours PRN  melatonin 3 milliGRAM(s) Oral at bedtime PRN  ondansetron Injectable 4 milliGRAM(s) IV Push every 8 hours PRN      VITAL SIGNS: Last 24 Hours  T(C): 37.3 (31 May 2022 05:00), Max: 38 (30 May 2022 12:00)  T(F): 99.1 (31 May 2022 05:00), Max: 100.4 (30 May 2022 12:00)  HR: 85 (31 May 2022 05:00) (84 - 85)  BP: 107/56 (31 May 2022 05:00) (107/56 - 118/56)  BP(mean): --  RR: 18 (31 May 2022 05:00) (18 - 19)  SpO2: 99% (31 May 2022 05:00) (98% - 99%)    LABS:                        7.9    17.82 )-----------( 314      ( 29 May 2022 20:45 )             23.3                         RADIOLOGY:  No new imaging since 5/29     PHYSICAL EXAM:  CONSTITUTIONAL: No acute distress, well-developed, well-groomed  HEAD: Atraumatic, normocephalic  EYES: NO CONJUNCTIVAL PALLOR conjunctiva and sclera clear  ENT: Supple, no masses, no thyromegaly, no bruits, no JVD; moist mucous membranes  PULMONARY: RIGHT LOWER/MID LOBE DECREASED BREATH SOUNDS W SOME CRACKLES   CARDIOVASCULAR: Regular rate and rhythm; no murmurs, rubs, or gallops  GASTROINTESTINAL: Soft, non-tender, non-distended; bowel sounds present  MUSCULOSKELETAL: 2+ peripheral pulses; no clubbing, no cyanosis, no edema  NEUROLOGY: A&OX4  SKIN: No rashes or lesions; warm and dry

## 2022-05-31 NOTE — PROGRESS NOTE ADULT - SUBJECTIVE AND OBJECTIVE BOX
JAMES VÁSQUEZ  26y  Female      Patient is a 26y old  Female who presents with a chief complaint of Acute chest syndrome.      INTERVAL HPI/OVERNIGHT EVENTS:      ******************************* REVIEW OF SYSTEMS:*********************************************    All other review of systems negative    *********************** VITALS ******************************************    T(F): 98.3 (05-31-22 @ 13:01)  HR: 95 (05-31-22 @ 13:01) (84 - 95)  BP: 115/54 (05-31-22 @ 13:01) (107/56 - 118/56)  RR: 18 (05-31-22 @ 13:01) (18 - 18)  SpO2: 99% (05-31-22 @ 07:30) (99% - 99%)    05-30-22 @ 07:01  -  05-31-22 @ 07:00  --------------------------------------------------------  IN: 600 mL / OUT: 0 mL / NET: 600 mL            05-30-22 @ 07:01  -  05-31-22 @ 07:00  --------------------------------------------------------  IN: 600 mL / OUT: 0 mL / NET: 600 mL        ******************************** PHYSICAL EXAM:**************************************************  GENERAL: NAD    PSYCH: no agitation, baseline mentation  HEENT:     NERVOUS SYSTEM:  Alert & Oriented X3,     PULMONARY: PRABHJOT, decreased on Right side     CARDIOVASCULAR: S1S2 RRR    GI: Soft, NT, ND; BS present.    EXTREMITIES:  2+ Peripheral Pulses, No clubbing, cyanosis, or edema    LYMPH: No lymphadenopathy noted    SKIN: No rashes or lesions      **************************** LABS *******************************************************                          5.8    14.97 )-----------( 503      ( 31 May 2022 11:44 )             17.5     05-31    138  |  105  |  3<L>  ----------------------------<  92  3.7   |  22  |  <0.5<L>    Ca    8.7      31 May 2022 11:44  Mg     1.7     05-31    TPro  7.0  /  Alb  2.9<L>  /  TBili  3.4<H>  /  DBili  x   /  AST  33  /  ALT  22  /  AlkPhos  86  05-31          Lactate Trend        CAPILLARY BLOOD GLUCOSE              **************************Active Medications *******************************************  No Known Allergies      acetaminophen     Tablet .. 650 milliGRAM(s) Oral every 6 hours PRN  cefepime   IVPB 2000 milliGRAM(s) IV Intermittent every 8 hours  cefepime   IVPB      chlorhexidine 4% Liquid 1 Application(s) Topical daily  dextrose 5% + sodium chloride 0.45%. 1000 milliLiter(s) IV Continuous <Continuous>  enoxaparin Injectable 40 milliGRAM(s) SubCutaneous every 24 hours  folic acid 1 milliGRAM(s) Oral daily  HYDROmorphone  Injectable 1 milliGRAM(s) IV Push every 6 hours PRN  influenza   Vaccine 0.5 milliLiter(s) IntraMuscular once  levoFLOXacin IVPB 750 milliGRAM(s) IV Intermittent every 24 hours  losartan 25 milliGRAM(s) Oral daily  melatonin 3 milliGRAM(s) Oral at bedtime PRN  ondansetron Injectable 4 milliGRAM(s) IV Push every 8 hours PRN  pantoprazole    Tablet 40 milliGRAM(s) Oral before breakfast      ***************************************************  RADIOLOGY & ADDITIONAL TESTS:    Imaging Personally Reviewed:  [ ] YES  [ ] NO    HEALTH ISSUES - PROBLEM Dx:

## 2022-05-31 NOTE — PROGRESS NOTE ADULT - ASSESSMENT
· Assessment	  27yo  F with PMH of Sickle cell anemia (on oxbryta - followed by Hematologist - Dr. Jimenez in [place]), recently discharged after treatment of pneumonia and sickle cell crisis, presented to the ED with c/o right sided chest, shoulder and upper back pain. States that she overexerted herself. She states that she has not been feeling well since yesterday, had mild cough and fever upto 102F at home. Denies any shortness of breath, abdominal pain, nausea, vomiting, diarrhea, leg pain, swelling.     IMPRESSION;   Resolved sepsis secondary to multilobar bacterial PNA RLL/LLL  CXR increased opacity  RLL  5/20 CT bibasilar consolidation  Sickle cell vasoocclusive crisis  5/26 COVID-19 NG   5/20 Legionella NG  5/28 Nares ORSA NG  5/27 BCx NG    RECOMMENDATIONS;  Pulmonay evaluation for possible diagnostic right thoracocentesis  Cefepime 2 gm iv q8h  Levoquin 750 mg iv q24h  Off loading to prevent pressure sores and preventive measures to avoid aspiration

## 2022-05-31 NOTE — PROGRESS NOTE ADULT - ASSESSMENT
25yo  F with PMH of Sickle cell anemia (on oxbryta - followed by Hematologist - Dr. Jimenez in [place]), recently discharged after treatment of pneumonia and sickle cell crisis, presented to the ED with c/o right sided chest, shoulder and upper back pain.  She also reports fever at home 102F.  She was recently treated for sickle cell crisis and pneumonia and discharged last week on Levaquin PO.    Hematology consulted for r/o ACS with hx of sickle cell disease.     IMPRESSION:    #ACS/Vaso-occlusive pain crisis/PNA  #Hx of Hb SS disease - Was on Oxbryta, just started in April 2022 but hasn't been routinely taking it due to her recent hospitalizations/infections; never was on hydrea or any other HbSS medications    5/30: Remains febrile but not as high grade. She is no longer requiring o2 after the simple transfusions, and her leukocytosis and thrombocytosis is improving, bili still pending. Her pain seems better controlled but she feels vey fatigued. Recommend to continue with IV pain meds and supportive care. We do not recommend an exchange or simple transfusion at this time.  Continue with abx per ID and critical care.   - Initially offered both exchange as patient was in high 80s on RA and simple transfusion however she declined, eventualy she agreed to a simple transfusion.    -Baseline Hb 7-8  -She has had 2 crises far this year.   - Hb electrophoresis from 5/21 showed 90% HbS    #Possible autoimmune hemolytic anemia     #Chronic leukocytosis and thrombocytosis: Resolving  - likely reactive     RECOMMENDATIONS:  - No longer requiring O2, will hold off on exchange transfusion for now  - Give 1U PRBC today for Hb 5.8  - We will confirm with blood bank about possible hemolysis.   - Start Prednisone 50mg daily with GI ppx.   - Check daily LDH.   - C/w pain meds to IV dilaudid 1mg q3hr. Please ensure that patient is discharged on adequate pain control regimen.   - Antibiotics for multilobar opacities.   - C/w folic acid supplementation  - She can continue Oxbryta as outpatient and follow up with her primary hematologist post discharge.  - Monitor CBC, bili, retic,    DVT ppx: Lovenox        25yo  F with PMH of Sickle cell anemia (on oxbryta - followed by Hematologist - Dr. Jimenez in [place]), recently discharged after treatment of pneumonia and sickle cell crisis, presented to the ED with c/o right sided chest, shoulder and upper back pain.  She also reports fever at home 102F.  She was recently treated for sickle cell crisis and pneumonia and discharged last week on Levaquin PO.    Hematology consulted for r/o ACS with hx of sickle cell disease.     IMPRESSION:    #ACS/Vaso-occlusive pain crisis/PNA  #Hx of Hb SS disease - Was on Oxbryta, just started in April 2022 but hasn't been routinely taking it due to her recent hospitalizations/infections; never was on hydrea or any other HbSS medications    5/30: Remains febrile but not as high grade. She is no longer requiring o2 after the simple transfusions, and her leukocytosis and thrombocytosis is improving, bili still pending. Her pain seems better controlled but she feels vey fatigued. Recommend to continue with IV pain meds and supportive care. We do not recommend an exchange or simple transfusion at this time.  Continue with abx per ID and critical care.   - Initially offered both exchange as patient was in high 80s on RA and simple transfusion however she declined, eventualy she agreed to a simple transfusion.    -Baseline Hb 7-8  -She has had 2 crises far this year.   - Hb electrophoresis from 5/21 showed 90% HbS    #Possible autoimmune hemolytic anemia   Direct Lori Profile (05.31.22 @ 08:00)    Dir Antiglob Polyspecific Interpretation: POS    #Chronic leukocytosis and thrombocytosis: Resolving  - likely reactive     RECOMMENDATIONS:  - No longer requiring O2, will hold off on exchange transfusion for now  - Give 1U PRBC today for Hb 5.8  - We will confirm with blood bank about possible hemolysis.   - Start Prednisone 50mg daily with GI ppx.   - Please check viral panel.   - Check daily LDH.   - C/w pain meds to IV Dilaudid 1mg q3hr. Please ensure that patient is discharged on adequate pain control regimen.   - Antibiotics for multilobar opacities.   - C/w folic acid supplementation  - She can continue Oxbryta as outpatient and follow up with her primary hematologist post discharge.  - Monitor CBC, bili, retic, as well    DVT ppx: Lovenox        25yo  F with PMH of Sickle cell anemia (on oxbryta - followed by Hematologist - Dr. Jimenez in [place]), recently discharged after treatment of pneumonia and sickle cell crisis, presented to the ED with c/o right sided chest, shoulder and upper back pain.  She also reports fever at home 102F.  She was recently treated for sickle cell crisis and pneumonia and discharged last week on Levaquin PO.    Hematology consulted for r/o ACS with hx of sickle cell disease.     IMPRESSION:    #ACS/Vaso-occlusive pain crisis/PNA  #Hx of Hb SS disease - Was on Oxbryta, just started in April 2022 but hasn't been routinely taking it due to her recent hospitalizations/infections; never was on hydrea or any other HbSS medications    5/30: Remains febrile but not as high grade. She is no longer requiring o2 after the simple transfusions, and her leukocytosis and thrombocytosis is improving, bili still pending. Her pain seems better controlled but she feels vey fatigued. Recommend to continue with IV pain meds and supportive care. We do not recommend an exchange or simple transfusion at this time.  Continue with abx per ID and critical care.   - Initially offered both exchange as patient was in high 80s on RA and simple transfusion however she declined, eventualy she agreed to a simple transfusion.    -Baseline Hb 7-8  -She has had 2 crises far this year.   - Hb electrophoresis from 5/21 showed 90% HbS    #Possible autoimmune hemolytic anemia   Direct Lori Profile (05.31.22 @ 08:00)    Dir Antiglob Polyspecific Interpretation: POS    #Chronic leukocytosis and thrombocytosis: Resolving  - likely reactive     RECOMMENDATIONS:  - No longer requiring O2, will hold off on exchange transfusion for now  - Give 1U PRBC today for Hb 5.8  - We will confirm with blood bank about the possibility of autoantibody rather than an alloantibody  - Start Prednisone 50mg daily with GI ppx.   - Please check viral panel.   - Check daily LDH.   - C/w pain meds to IV Dilaudid 1mg q3hr. Please ensure that patient is discharged on adequate pain control regimen.   - Antibiotics for multilobar opacities.   - C/w folic acid supplementation  - She can continue Oxbryta as outpatient and follow up with her primary hematologist post discharge.  - Monitor CBC, bili, retic, as well    DVT ppx: Lovenox

## 2022-05-31 NOTE — PROGRESS NOTE ADULT - ASSESSMENT
ASSESSMENT & PLAN:  Ms Gonzalez is a 26 RENEE w a PMH of Sickle Cell disease (on oxbryta) w a recent dc following treatment for PNA w sickle cell crisis presented to the ED May 26th for Rt sided chest, shoulder and back pain w mild cough and fever (has been febrile to 103F). Patient was upgraded to ICU on 5/28 for exchange transfusion now s/p 2 U PRBC. Patient was downgraded back to floors and now on Cefepime and levofloxacin w resolving pyrexia.     Problem List:  #Vaso-occlusive pain crisis w multilobar PNA  no longer requiring o2 after the simple transfusions,   leukocytosis and thrombocytosis improving, bili still pending.   Pain improving but she feels very fatigued.    -Cultures so far have been negative  -Received 2 units of blood on 5/28/2022 and clinically she is doing much better   -Continue with hydration at one half NS at 100 cc  -c/w Cefepime IV 2g q8hr (started 5/27)  -c/w Levofloxacin IV 750mg once daily (started 5/28)  -Tylenol 650 mg for fever >101F      #Chronic leukocytosis and thrombocytosis likely reactive  Resolving  #Hx of Hb SS disease -   Was on Oxbryta, just started in April 2022 but hasn't been routinely taking it due to her recent hospitalizations/infections; never was on hydrea or any other HbSS medications  Baseline Hb 7-8, do NOT need to transfuse just for hemoglobin < 7 without clinical changes  She has had 2 crises far this year.   Hb electrophoresis from 5/21 showed 90% HbS  -c/w IV dilaudid 1mg q3hr for pain control   - C/w folic acid supplementation  - c/w Oxbryta as outpatient and follow up with her primary hematologist post discharge.  - Monitor CBC, bili, retic, LDH             #Misc  - DVT Prophylaxis: lovenox   - GI Prophylaxis:NA   - Diet: regular  - Activity: as tolerated   - IV Fluids: D5 1/2 NS at 100cc/hr   - Code Status: Full     Dispo: ASSESSMENT & PLAN:  Ms Gonzalez is a 26 RENEE w a PMH of Sickle Cell disease (on oxbryta) w a recent dc following treatment for PNA w sickle cell crisis presented to the ED May 26th for Rt sided chest, shoulder and back pain w mild cough and fever (has been febrile to 103F). Patient was upgraded to ICU on 5/28 for exchange transfusion now s/p 2 U PRBC. Patient was downgraded back to floors and now on Cefepime and levofloxacin w resolving pyrexia.     Problem List:  #Vaso-occlusive pain crisis w multilobar PNA  no longer requiring o2 after the simple transfusions,   met criteria for exchange transfusion initially (acute symptomatic anemia) however patient declined and improved w 2 U prbc   leukocytosis and thrombocytosis improving, bili still pending.   Pain improving but she feels very fatigued.    Pulmonary consult pending given bibasilar consolidation and RT sided opacification w meniscus   -Cultures NGTD  -Received 2 units of blood on 5/28/2022 and clinically she is doing much better   -Continue with hydration at one half NS at 100 cc  Given recent hospitalization concern for Pseudomonas will c/w dual coverage  -c/w Cefepime IV 2g q8hr (started 5/27)   -c/w Levofloxacin IV 750mg once daily (started 5/28)  -Tylenol 650 mg for fever >101F  -f/u Pulm consult for possible thoracocentesis       #Chronic leukocytosis and thrombocytosis likely reactive  Resolving  #Hx of Hb SS disease -   Was on Oxbryta, just started in April 2022 but hasn't been routinely taking it due to her recent hospitalizations/infections; never was on hydrea or any other HbSS medications  Baseline Hb 7-8, do NOT need to transfuse just for hemoglobin < 7 without clinical changes  She has had 2 crises far this year.   Hb electrophoresis from 5/21 showed 90% HbS  - change IV dilaudid 1mg to q 6hr for pain control   - C/w folic acid supplementation  - c/w Oxbryta as outpatient and follow up with her primary hematologist post discharge.  - Monitor CBC, bili, retic, LDH   -Hgb reported low today (5.6). Given patients clinical improvement, lack of pallor or increased O2 demand this is likely dilutional. will f/u 11am H&H            #Misc  - DVT Prophylaxis: lovenox   - GI Prophylaxis:NA   - Diet: regular  - Activity: as tolerated   - IV Fluids: D5 1/2 NS at 100cc/hr   - Code Status: Full     Dispo:

## 2022-05-31 NOTE — PROGRESS NOTE ADULT - ASSESSMENT
Ms Gonzalez is a 26 RENEE w a PMH of Sickle Cell disease (on oxbryta) w a recent dc following treatment for PNA w sickle cell crisis presented to the ED May 26th for Rt sided chest, shoulder and back pain w mild cough and fever (has been febrile to 103F). Patient was upgraded to ICU on 5/28 for exchange transfusion now s/p 2 U PRBC. Patient was downgraded back to floors and now on Cefepime and levofloxacin w resolving pyrexia.     #Vaso-occlusive pain crisis w Possible Acute Chest Syndrome  no longer requiring o2 after the simple transfusions,   met criteria for exchange transfusion initially (acute symptomatic anemia) however patient declined and improved w 2 U prbc   leukocytosis and thrombocytosis improving.   Pain improving but she feels very fatigued.    Pulmonary eval noted re  bibasilar consolidation /  RT sided ? Pleural effusion   -Cultures NGTD  -Received 2 units of blood on 5/28/2022 and clinically she is doing much better   -Continue with hydration at one half NS at 100 cc  Given recent hospitalization concern for Pseudomonas will c/w dual coverage  -c/w Cefepime IV 2g q8hr (started 5/27)   -c/w Levofloxacin IV 750mg once daily (started 5/28)  - Procal 0.09  -Tylenol 650 mg for fever >101F  - Pending ECHO  - Noted LDH/Retic     #Chronic leukocytosis and thrombocytosis likely reactive  Resolving  #Hx of Hb SS disease -   Was on Oxbryta, just started in April 2022 but hasn't been routinely taking it due to her recent hospitalizations/infections;  never was on Hydroxyurea   Baseline Hb 7-8, do NOT need to transfuse just for hemoglobin < 7 without clinical changes  She has had 2 crises far this year.   Hb electrophoresis from 5/21 showed 90% HbS  - change IV dilaudid 1mg to q 6hr for pain control   - C/w folic acid supplementation  - c/w Oxbryta as outpatient and follow up with her primary hematologist post discharge.  - Monitor CBC, bili, retic, LDH   -Hgb reported low today (5.6).    11am H&H >>> 5.8 >> Will transfuse with 1 more unit.     #Misc  - DVT Prophylaxis: lovenox   - GI Prophylaxis:NA   - Diet: regular  - Activity: as tolerated   - IV Fluids: D5 1/2 NS at 100cc/hr   - Code Status: Full     #Progress Note Handoff  Pending (specify):  SCC / ? eXCHANGE Trans/ Stable Hb / Echo   Family discussion:  Disposition: Home

## 2022-05-31 NOTE — PROGRESS NOTE ADULT - SUBJECTIVE AND OBJECTIVE BOX
JAMES VÁSQUEZ  26y, Female    All available historical data reviewed    OVERNIGHT EVENTS:  low grade fevers  right CP decreased    ROS:  General: Denies rigors, nightsweats  HEENT: Denies headache, rhinorrhea, sore throat, eye pain  CV: Denies CP, palpitations  PULM: Denies wheezing, hemoptysis  GI: Denies hematemesis, hematochezia, melena  : Denies discharge, hematuria  MSK: Denies arthralgias, myalgias  SKIN: Denies rash, lesions  NEURO: Denies paresthesias, weakness  PSYCH: Denies depression, anxiety    VITALS:  T(F): 99.1, Max: 100.4 (05-30-22 @ 12:00)  HR: 85  BP: 107/56  RR: 18Vital Signs Last 24 Hrs  T(C): 37.3 (31 May 2022 05:00), Max: 38 (30 May 2022 12:00)  T(F): 99.1 (31 May 2022 05:00), Max: 100.4 (30 May 2022 12:00)  HR: 85 (31 May 2022 05:00) (84 - 85)  BP: 107/56 (31 May 2022 05:00) (107/56 - 118/56)  BP(mean): --  RR: 18 (31 May 2022 05:00) (18 - 19)  SpO2: 99% (31 May 2022 05:00) (98% - 99%)    TESTS & MEASUREMENTS:                        5.7    14.06 )-----------( 488      ( 31 May 2022 08:00 )             17.3     05-31    139  |  105  |  <3<L>  ----------------------------<  87  3.5   |  21  |  <0.5<L>    Ca    8.4<L>      31 May 2022 08:00  Mg     1.7     05-31    TPro  6.7  /  Alb  2.7<L>  /  TBili  3.2<H>  /  DBili  x   /  AST  31  /  ALT  20  /  AlkPhos  82  05-31    LIVER FUNCTIONS - ( 31 May 2022 08:00 )  Alb: 2.7 g/dL / Pro: 6.7 g/dL / ALK PHOS: 82 U/L / ALT: 20 U/L / AST: 31 U/L / GGT: x             Culture - Blood (collected 05-27-22 @ 11:46)  Source: .Blood None  Preliminary Report (05-28-22 @ 23:02):    No growth to date.            RADIOLOGY & ADDITIONAL TESTS:  Personal review of radiological diagnostics performed  Echo and EKG results noted when applicable.     MEDICATIONS:  acetaminophen     Tablet .. 650 milliGRAM(s) Oral every 6 hours PRN  cefepime   IVPB 2000 milliGRAM(s) IV Intermittent every 8 hours  cefepime   IVPB      chlorhexidine 4% Liquid 1 Application(s) Topical daily  dextrose 5% + sodium chloride 0.45%. 1000 milliLiter(s) IV Continuous <Continuous>  enoxaparin Injectable 40 milliGRAM(s) SubCutaneous every 24 hours  folic acid 1 milliGRAM(s) Oral daily  HYDROmorphone  Injectable 1 milliGRAM(s) IV Push every 6 hours PRN  influenza   Vaccine 0.5 milliLiter(s) IntraMuscular once  levoFLOXacin IVPB 750 milliGRAM(s) IV Intermittent every 24 hours  losartan 25 milliGRAM(s) Oral daily  melatonin 3 milliGRAM(s) Oral at bedtime PRN  ondansetron Injectable 4 milliGRAM(s) IV Push every 8 hours PRN  pantoprazole    Tablet 40 milliGRAM(s) Oral before breakfast      ANTIBIOTICS:  cefepime   IVPB 2000 milliGRAM(s) IV Intermittent every 8 hours  cefepime   IVPB      levoFLOXacin IVPB 750 milliGRAM(s) IV Intermittent every 24 hours

## 2022-05-31 NOTE — PROGRESS NOTE ADULT - SUBJECTIVE AND OBJECTIVE BOX
Patient is a 26y old  Female who presents with a chief complaint of Acute chest syndrome (31 May 2022 10:36)        Over Night Events:    Had low grade fever, intermittent right pleuritic chest pain. No dyspnea.    ROS:  See HPI    PHYSICAL EXAM    ICU Vital Signs Last 24 Hrs  T(C): 37.3 (31 May 2022 05:00), Max: 38 (30 May 2022 12:00)  T(F): 99.1 (31 May 2022 05:00), Max: 100.4 (30 May 2022 12:00)  HR: 85 (31 May 2022 05:00) (84 - 85)  BP: 107/56 (31 May 2022 05:00) (107/56 - 118/56)  BP(mean): --  ABP: --  ABP(mean): --  RR: 18 (31 May 2022 05:00) (18 - 19)  SpO2: 99% (31 May 2022 05:00) (98% - 99%)      05-30-22 @ 07:01  -  05-31-22 @ 07:00  --------------------------------------------------------  IN:    dextrose 5% + sodium chloride 0.45%: 600 mL  Total IN: 600 mL    OUT:  Total OUT: 0 mL    Total NET: 600 mL            CONSTITUTIONAL:  Well nourished.  NAD    ENT:   Airway patent,   No thrush    EYES:   Clear bilaterally,   pupils equal,   round and reactive to light.    CARDIAC:   Normal rate,   regular rhythm.    no edema      CAROTID:   normal systolic impulse  no bruits    RESPIRATORY:   Decreased bibasilar breath sounds  No wheezing  Normal chest expansion  Not tachypneic,  No use of accessory muscles    GASTROINTESTINAL:  Abdomen soft,   non-tender,   no guarding,   + BS    MUSCULOSKELETAL:   range of motion is not limited,  no clubbing, cyanosis    NEUROLOGICAL:   Alert and oriented   no motor deficits.    SKIN:   Skin normal color for race,   No evidence of rash.    PSYCHIATRIC:   normal mood and affect.   no apparent risk to self or others.        LABS:                            5.7    14.06 )-----------( 488      ( 31 May 2022 08:00 )             17.3                                               05-31    139  |  105  |  <3<L>  ----------------------------<  87  3.5   |  21  |  <0.5<L>    Ca    8.4<L>      31 May 2022 08:00  Mg     1.7     05-31    TPro  6.7  /  Alb  2.7<L>  /  TBili  3.2<H>  /  DBili  x   /  AST  31  /  ALT  20  /  AlkPhos  82  05-31                                                                                           LIVER FUNCTIONS - ( 31 May 2022 08:00 )  Alb: 2.7 g/dL / Pro: 6.7 g/dL / ALK PHOS: 82 U/L / ALT: 20 U/L / AST: 31 U/L / GGT: x                    Procalcitonin, Serum: 0.09 ng/mL (05-27-22 @ 11:46)  D-Dimer Assay, Quantitative: 1093 ng/mL DDU (05-27-22 @ 00:40)  Procalcitonin, Serum: 0.09 ng/mL (05-21-22 @ 12:12)  D-Dimer Assay, Quantitative: 727 ng/mL DDU (05-20-22 @ 21:41)                                                                                                                             MEDICATIONS  (STANDING):  cefepime   IVPB 2000 milliGRAM(s) IV Intermittent every 8 hours  cefepime   IVPB      chlorhexidine 4% Liquid 1 Application(s) Topical daily  dextrose 5% + sodium chloride 0.45%. 1000 milliLiter(s) (100 mL/Hr) IV Continuous <Continuous>  enoxaparin Injectable 40 milliGRAM(s) SubCutaneous every 24 hours  folic acid 1 milliGRAM(s) Oral daily  influenza   Vaccine 0.5 milliLiter(s) IntraMuscular once  levoFLOXacin IVPB 750 milliGRAM(s) IV Intermittent every 24 hours  losartan 25 milliGRAM(s) Oral daily  pantoprazole    Tablet 40 milliGRAM(s) Oral before breakfast    MEDICATIONS  (PRN):  acetaminophen     Tablet .. 650 milliGRAM(s) Oral every 6 hours PRN Temp greater or equal to 38C (100.4F), Mild Pain (1 - 3)  HYDROmorphone  Injectable 1 milliGRAM(s) IV Push every 6 hours PRN Severe Pain (7 - 10)  melatonin 3 milliGRAM(s) Oral at bedtime PRN Insomnia  ondansetron Injectable 4 milliGRAM(s) IV Push every 8 hours PRN Nausea and/or Vomiting      Xrays:                                                                                     ECHO    Lung ultrasound: trace bilateral free flowing pleural effusion. Bilateral atelectasis R > L

## 2022-05-31 NOTE — PROGRESS NOTE ADULT - SUBJECTIVE AND OBJECTIVE BOX
Patient is a 26y old  Female who presents with a chief complaint of Acute chest syndrome (31 May 2022 15:43)      Subjective:      Vital Signs Last 24 Hrs  T(C): 36.8 (31 May 2022 13:01), Max: 37.3 (31 May 2022 05:00)  T(F): 98.3 (31 May 2022 13:01), Max: 99.1 (31 May 2022 05:00)  HR: 95 (31 May 2022 13:01) (84 - 95)  BP: 115/54 (31 May 2022 13:01) (107/56 - 118/56)  BP(mean): --  RR: 18 (31 May 2022 13:01) (18 - 18)  SpO2: 99% (31 May 2022 07:30) (99% - 99%)    PHYSICAL EXAM  General: adult in NAD  HEENT: clear oropharynx, anicteric sclera, pink conjunctiva  Neck: supple  CV: normal S1/S2 with no murmur rubs or gallops  Lungs: positive air movement b/l ant lungs  Abdomen: soft non-tender non-distended  Ext: no clubbing cyanosis or edema  Skin: no rashes and no petechiae  Neuro: alert and oriented X 4, no focal deficits    MEDICATIONS  (STANDING):  cefepime   IVPB      cefepime   IVPB 2000 milliGRAM(s) IV Intermittent every 8 hours  chlorhexidine 4% Liquid 1 Application(s) Topical daily  dextrose 5% + sodium chloride 0.45%. 1000 milliLiter(s) (100 mL/Hr) IV Continuous <Continuous>  enoxaparin Injectable 40 milliGRAM(s) SubCutaneous every 24 hours  folic acid 1 milliGRAM(s) Oral daily  influenza   Vaccine 0.5 milliLiter(s) IntraMuscular once  levoFLOXacin IVPB 750 milliGRAM(s) IV Intermittent every 24 hours  losartan 25 milliGRAM(s) Oral daily  magnesium sulfate  IVPB 2 Gram(s) IV Intermittent once  pantoprazole    Tablet 40 milliGRAM(s) Oral before breakfast  predniSONE   Tablet 50 milliGRAM(s) Oral daily    MEDICATIONS  (PRN):  acetaminophen     Tablet .. 650 milliGRAM(s) Oral every 6 hours PRN Temp greater or equal to 38C (100.4F), Mild Pain (1 - 3)  HYDROmorphone  Injectable 1 milliGRAM(s) IV Push every 6 hours PRN Severe Pain (7 - 10)  melatonin 3 milliGRAM(s) Oral at bedtime PRN Insomnia  ondansetron Injectable 4 milliGRAM(s) IV Push every 8 hours PRN Nausea and/or Vomiting      LABS:                          5.8    14.97 )-----------( 503      ( 31 May 2022 11:44 )             17.5         Mean Cell Volume : 93.6 fL  Mean Cell Hemoglobin : 31.0 pg  Mean Cell Hemoglobin Concentration : 33.1 g/dL  Auto Neutrophil # : 9.56 K/uL  Auto Lymphocyte # : 3.44 K/uL  Auto Monocyte # : 1.33 K/uL  Auto Eosinophil # : 0.26 K/uL  Auto Basophil # : 0.07 K/uL  Auto Neutrophil % : 63.8 %  Auto Lymphocyte % : 23.0 %  Auto Monocyte % : 8.9 %  Auto Eosinophil % : 1.7 %  Auto Basophil % : 0.5 %      Serial CBC's  05-31 @ 11:44  Hct-17.5 / Hgb-5.8 / Plat-503 / RBC-1.87 / WBC-14.97  Serial CBC's  05-31 @ 08:00  Hct-17.3 / Hgb-5.7 / Plat-488 / RBC-1.86 / WBC-14.06  Serial CBC's  05-29 @ 20:45  Hct-23.3 / Hgb-7.9 / Plat-314 / RBC-2.59 / WBC-17.82  Serial CBC's  05-29 @ 04:25  Hct-21.8 / Hgb-7.4 / Plat-475 / RBC-2.40 / WBC-18.16  Serial CBC's  05-28 @ 22:42  Hct-18.9 / Hgb-6.6 / Plat-490 / RBC-2.07 / WBC-19.85  Serial CBC's  05-28 @ 07:51  Hct-18.0 / Hgb-6.2 / Plat-540 / RBC-1.93 / WBC-22.59  Serial CBC's  05-27 @ 21:30  Hct-19.2 / Hgb-6.5 / Plat-520 / RBC-2.01 / WBC-25.67      05-31    138  |  105  |  3<L>  ----------------------------<  92  3.7   |  22  |  <0.5<L>    Ca    8.7      31 May 2022 11:44  Mg     1.7     05-31    TPro  7.0  /  Alb  2.9<L>  /  TBili  3.4<H>  /  DBili  x   /  AST  33  /  ALT  22  /  AlkPhos  86  05-31      Reticulocyte Percent: 13.4 % (05-31 @ 08:00)  Reticulocyte Percent: 14.9 % (05-26 @ 21:55)     Patient is a 26y old  Female who presents with a chief complaint of Acute chest syndrome (31 May 2022 15:43)      Subjective: Pt feels well, denied any breathing issues. pain controlled.       Vital Signs Last 24 Hrs  T(C): 36.8 (31 May 2022 13:01), Max: 37.3 (31 May 2022 05:00)  T(F): 98.3 (31 May 2022 13:01), Max: 99.1 (31 May 2022 05:00)  HR: 95 (31 May 2022 13:01) (84 - 95)  BP: 115/54 (31 May 2022 13:01) (107/56 - 118/56)  BP(mean): --  RR: 18 (31 May 2022 13:01) (18 - 18)  SpO2: 99% (31 May 2022 07:30) (99% - 99%)    PHYSICAL EXAM  General: adult in NAD  HEENT: clear oropharynx, anicteric sclera, pink conjunctiva  Neck: supple  CV: normal S1/S2 with no murmur rubs or gallops  Lungs: positive air movement b/l ant lungs  Abdomen: soft non-tender non-distended  Ext: no clubbing cyanosis or edema  Skin: no rashes and no petechiae  Neuro: alert and oriented X 4, no focal deficits    MEDICATIONS  (STANDING):  cefepime   IVPB      cefepime   IVPB 2000 milliGRAM(s) IV Intermittent every 8 hours  chlorhexidine 4% Liquid 1 Application(s) Topical daily  dextrose 5% + sodium chloride 0.45%. 1000 milliLiter(s) (100 mL/Hr) IV Continuous <Continuous>  enoxaparin Injectable 40 milliGRAM(s) SubCutaneous every 24 hours  folic acid 1 milliGRAM(s) Oral daily  influenza   Vaccine 0.5 milliLiter(s) IntraMuscular once  levoFLOXacin IVPB 750 milliGRAM(s) IV Intermittent every 24 hours  losartan 25 milliGRAM(s) Oral daily  magnesium sulfate  IVPB 2 Gram(s) IV Intermittent once  pantoprazole    Tablet 40 milliGRAM(s) Oral before breakfast  predniSONE   Tablet 50 milliGRAM(s) Oral daily    MEDICATIONS  (PRN):  acetaminophen     Tablet .. 650 milliGRAM(s) Oral every 6 hours PRN Temp greater or equal to 38C (100.4F), Mild Pain (1 - 3)  HYDROmorphone  Injectable 1 milliGRAM(s) IV Push every 6 hours PRN Severe Pain (7 - 10)  melatonin 3 milliGRAM(s) Oral at bedtime PRN Insomnia  ondansetron Injectable 4 milliGRAM(s) IV Push every 8 hours PRN Nausea and/or Vomiting      LABS:                          5.8    14.97 )-----------( 503      ( 31 May 2022 11:44 )             17.5         Mean Cell Volume : 93.6 fL  Mean Cell Hemoglobin : 31.0 pg  Mean Cell Hemoglobin Concentration : 33.1 g/dL  Auto Neutrophil # : 9.56 K/uL  Auto Lymphocyte # : 3.44 K/uL  Auto Monocyte # : 1.33 K/uL  Auto Eosinophil # : 0.26 K/uL  Auto Basophil # : 0.07 K/uL  Auto Neutrophil % : 63.8 %  Auto Lymphocyte % : 23.0 %  Auto Monocyte % : 8.9 %  Auto Eosinophil % : 1.7 %  Auto Basophil % : 0.5 %      Serial CBC's  05-31 @ 11:44  Hct-17.5 / Hgb-5.8 / Plat-503 / RBC-1.87 / WBC-14.97  Serial CBC's  05-31 @ 08:00  Hct-17.3 / Hgb-5.7 / Plat-488 / RBC-1.86 / WBC-14.06  Serial CBC's  05-29 @ 20:45  Hct-23.3 / Hgb-7.9 / Plat-314 / RBC-2.59 / WBC-17.82  Serial CBC's  05-29 @ 04:25  Hct-21.8 / Hgb-7.4 / Plat-475 / RBC-2.40 / WBC-18.16  Serial CBC's  05-28 @ 22:42  Hct-18.9 / Hgb-6.6 / Plat-490 / RBC-2.07 / WBC-19.85  Serial CBC's  05-28 @ 07:51  Hct-18.0 / Hgb-6.2 / Plat-540 / RBC-1.93 / WBC-22.59  Serial CBC's  05-27 @ 21:30  Hct-19.2 / Hgb-6.5 / Plat-520 / RBC-2.01 / WBC-25.67      05-31    138  |  105  |  3<L>  ----------------------------<  92  3.7   |  22  |  <0.5<L>    Ca    8.7      31 May 2022 11:44  Mg     1.7     05-31    TPro  7.0  /  Alb  2.9<L>  /  TBili  3.4<H>  /  DBili  x   /  AST  33  /  ALT  22  /  AlkPhos  86  05-31      Reticulocyte Percent: 13.4 % (05-31 @ 08:00)  Reticulocyte Percent: 14.9 % (05-26 @ 21:55)

## 2022-06-01 LAB
ALBUMIN SERPL ELPH-MCNC: 3.2 G/DL — LOW (ref 3.5–5.2)
ALLERGY+IMMUNOLOGY DIAG STUDY NOTE: SIGNIFICANT CHANGE UP
ALP SERPL-CCNC: 88 U/L — SIGNIFICANT CHANGE UP (ref 30–115)
ALT FLD-CCNC: 22 U/L — SIGNIFICANT CHANGE UP (ref 0–41)
ANION GAP SERPL CALC-SCNC: 13 MMOL/L — SIGNIFICANT CHANGE UP (ref 7–14)
AST SERPL-CCNC: 32 U/L — SIGNIFICANT CHANGE UP (ref 0–41)
BASOPHILS # BLD AUTO: 0.05 K/UL — SIGNIFICANT CHANGE UP (ref 0–0.2)
BASOPHILS NFR BLD AUTO: 0.3 % — SIGNIFICANT CHANGE UP (ref 0–1)
BILIRUB SERPL-MCNC: 3.2 MG/DL — HIGH (ref 0.2–1.2)
BLD GP AB SCN SERPL QL: SIGNIFICANT CHANGE UP
BUN SERPL-MCNC: 3 MG/DL — LOW (ref 10–20)
CALCIUM SERPL-MCNC: 9 MG/DL — SIGNIFICANT CHANGE UP (ref 8.5–10.1)
CHLORIDE SERPL-SCNC: 99 MMOL/L — SIGNIFICANT CHANGE UP (ref 98–110)
CO2 SERPL-SCNC: 22 MMOL/L — SIGNIFICANT CHANGE UP (ref 17–32)
CREAT SERPL-MCNC: <0.5 MG/DL — LOW (ref 0.7–1.5)
CULTURE RESULTS: SIGNIFICANT CHANGE UP
DAT IGG-SP REAG RBC-IMP: ABNORMAL
DIR ANTIGLOB POLYSPECIFIC INTERPRETATION: ABNORMAL
EBV EA AB SER IA-ACNC: 11.2 U/ML — HIGH
EBV EA AB TITR SER IF: POSITIVE
EBV EA IGG SER-ACNC: POSITIVE
EBV NA IGG SER IA-ACNC: >600 U/ML — HIGH
EBV PATRN SPEC IB-IMP: SIGNIFICANT CHANGE UP
EBV VCA IGG AVIDITY SER QL IA: POSITIVE
EBV VCA IGM SER IA-ACNC: <10 U/ML — SIGNIFICANT CHANGE UP
EBV VCA IGM SER IA-ACNC: >750 U/ML — HIGH
EBV VCA IGM TITR FLD: NEGATIVE — SIGNIFICANT CHANGE UP
EGFR: 152 ML/MIN/1.73M2 — SIGNIFICANT CHANGE UP
EOSINOPHIL # BLD AUTO: 0.2 K/UL — SIGNIFICANT CHANGE UP (ref 0–0.7)
EOSINOPHIL NFR BLD AUTO: 1.1 % — SIGNIFICANT CHANGE UP (ref 0–8)
GLUCOSE SERPL-MCNC: 140 MG/DL — HIGH (ref 70–99)
HCT VFR BLD CALC: 21.4 % — LOW (ref 37–47)
HCT VFR BLD CALC: 22.1 % — LOW (ref 37–47)
HEMOGLOBIN INTERPRETATION: SIGNIFICANT CHANGE UP
HGB A MFR BLD: 12.4 % — LOW (ref 95.8–98)
HGB A2 MFR BLD: 3.5 % — HIGH (ref 2–3.2)
HGB BLD-MCNC: 7.4 G/DL — LOW (ref 12–16)
HGB BLD-MCNC: 7.5 G/DL — LOW (ref 12–16)
HGB F MFR BLD: 5 % — HIGH (ref 0–1)
HGB S MFR BLD: 79.1 % — HIGH
IAT COMP-SP REAG SERPL QL: SIGNIFICANT CHANGE UP
IMM GRANULOCYTES NFR BLD AUTO: 2.3 % — HIGH (ref 0.1–0.3)
LDH SERPL L TO P-CCNC: 811 — HIGH (ref 50–242)
LYMPHOCYTES # BLD AUTO: 1.46 K/UL — SIGNIFICANT CHANGE UP (ref 1.2–3.4)
LYMPHOCYTES # BLD AUTO: 8.2 % — LOW (ref 20.5–51.1)
MCHC RBC-ENTMCNC: 30.9 PG — SIGNIFICANT CHANGE UP (ref 27–31)
MCHC RBC-ENTMCNC: 31.4 PG — HIGH (ref 27–31)
MCHC RBC-ENTMCNC: 33.9 G/DL — SIGNIFICANT CHANGE UP (ref 32–37)
MCHC RBC-ENTMCNC: 34.6 G/DL — SIGNIFICANT CHANGE UP (ref 32–37)
MCV RBC AUTO: 90.7 FL — SIGNIFICANT CHANGE UP (ref 81–99)
MCV RBC AUTO: 90.9 FL — SIGNIFICANT CHANGE UP (ref 81–99)
MONOCYTES # BLD AUTO: 0.51 K/UL — SIGNIFICANT CHANGE UP (ref 0.1–0.6)
MONOCYTES NFR BLD AUTO: 2.9 % — SIGNIFICANT CHANGE UP (ref 1.7–9.3)
NEUTROPHILS # BLD AUTO: 15.11 K/UL — HIGH (ref 1.4–6.5)
NEUTROPHILS NFR BLD AUTO: 85.2 % — HIGH (ref 42.2–75.2)
NRBC # BLD: 6 /100 WBCS — HIGH (ref 0–0)
NRBC # BLD: 6 /100 WBCS — HIGH (ref 0–0)
PLATELET # BLD AUTO: 504 K/UL — HIGH (ref 130–400)
PLATELET # BLD AUTO: 511 K/UL — HIGH (ref 130–400)
POTASSIUM SERPL-MCNC: 4.4 MMOL/L — SIGNIFICANT CHANGE UP (ref 3.5–5)
POTASSIUM SERPL-SCNC: 4.4 MMOL/L — SIGNIFICANT CHANGE UP (ref 3.5–5)
PROT SERPL-MCNC: 7.7 G/DL — SIGNIFICANT CHANGE UP (ref 6–8)
RBC # BLD: 2.36 M/UL — LOW (ref 4.2–5.4)
RBC # BLD: 2.36 M/UL — LOW (ref 4.2–5.4)
RBC # BLD: 2.43 M/UL — LOW (ref 4.2–5.4)
RBC # FLD: 23.9 % — HIGH (ref 11.5–14.5)
RBC # FLD: 24 % — HIGH (ref 11.5–14.5)
RETICS #: 321.4 K/UL — HIGH (ref 25–125)
RETICS/RBC NFR: 13.6 % — HIGH (ref 0.5–1.5)
SODIUM SERPL-SCNC: 134 MMOL/L — LOW (ref 135–146)
SPECIMEN SOURCE: SIGNIFICANT CHANGE UP
WBC # BLD: 17.74 K/UL — HIGH (ref 4.8–10.8)
WBC # BLD: 19.08 K/UL — HIGH (ref 4.8–10.8)
WBC # FLD AUTO: 17.74 K/UL — HIGH (ref 4.8–10.8)
WBC # FLD AUTO: 19.08 K/UL — HIGH (ref 4.8–10.8)

## 2022-06-01 PROCEDURE — 71045 X-RAY EXAM CHEST 1 VIEW: CPT | Mod: 26

## 2022-06-01 PROCEDURE — 86077 PHYS BLOOD BANK SERV XMATCH: CPT

## 2022-06-01 PROCEDURE — 99232 SBSQ HOSP IP/OBS MODERATE 35: CPT

## 2022-06-01 PROCEDURE — 99231 SBSQ HOSP IP/OBS SF/LOW 25: CPT | Mod: GC

## 2022-06-01 PROCEDURE — 99233 SBSQ HOSP IP/OBS HIGH 50: CPT

## 2022-06-01 PROCEDURE — 93306 TTE W/DOPPLER COMPLETE: CPT | Mod: 26

## 2022-06-01 RX ORDER — SENNA PLUS 8.6 MG/1
2 TABLET ORAL AT BEDTIME
Refills: 0 | Status: DISCONTINUED | OUTPATIENT
Start: 2022-06-01 | End: 2022-06-10

## 2022-06-01 RX ORDER — HYDROMORPHONE HYDROCHLORIDE 2 MG/ML
2 INJECTION INTRAMUSCULAR; INTRAVENOUS; SUBCUTANEOUS EVERY 6 HOURS
Refills: 0 | Status: DISCONTINUED | OUTPATIENT
Start: 2022-06-01 | End: 2022-06-02

## 2022-06-01 RX ORDER — LIDOCAINE 4 G/100G
1 CREAM TOPICAL DAILY
Refills: 0 | Status: DISCONTINUED | OUTPATIENT
Start: 2022-06-01 | End: 2022-06-10

## 2022-06-01 RX ORDER — HYDROMORPHONE HYDROCHLORIDE 2 MG/ML
0.5 INJECTION INTRAMUSCULAR; INTRAVENOUS; SUBCUTANEOUS ONCE
Refills: 0 | Status: DISCONTINUED | OUTPATIENT
Start: 2022-06-01 | End: 2022-06-01

## 2022-06-01 RX ADMIN — HYDROMORPHONE HYDROCHLORIDE 1 MILLIGRAM(S): 2 INJECTION INTRAMUSCULAR; INTRAVENOUS; SUBCUTANEOUS at 00:11

## 2022-06-01 RX ADMIN — CEFEPIME 100 MILLIGRAM(S): 1 INJECTION, POWDER, FOR SOLUTION INTRAMUSCULAR; INTRAVENOUS at 05:10

## 2022-06-01 RX ADMIN — Medication 3 MILLIGRAM(S): at 22:06

## 2022-06-01 RX ADMIN — Medication 650 MILLIGRAM(S): at 23:49

## 2022-06-01 RX ADMIN — SENNA PLUS 2 TABLET(S): 8.6 TABLET ORAL at 22:05

## 2022-06-01 RX ADMIN — HYDROMORPHONE HYDROCHLORIDE 2 MILLIGRAM(S): 2 INJECTION INTRAMUSCULAR; INTRAVENOUS; SUBCUTANEOUS at 21:37

## 2022-06-01 RX ADMIN — Medication 50 MILLIGRAM(S): at 05:09

## 2022-06-01 RX ADMIN — LOSARTAN POTASSIUM 25 MILLIGRAM(S): 100 TABLET, FILM COATED ORAL at 05:09

## 2022-06-01 RX ADMIN — PANTOPRAZOLE SODIUM 40 MILLIGRAM(S): 20 TABLET, DELAYED RELEASE ORAL at 05:09

## 2022-06-01 RX ADMIN — Medication 650 MILLIGRAM(S): at 22:05

## 2022-06-01 RX ADMIN — HYDROMORPHONE HYDROCHLORIDE 1 MILLIGRAM(S): 2 INJECTION INTRAMUSCULAR; INTRAVENOUS; SUBCUTANEOUS at 00:41

## 2022-06-01 RX ADMIN — SODIUM CHLORIDE 100 MILLILITER(S): 9 INJECTION, SOLUTION INTRAVENOUS at 21:58

## 2022-06-01 RX ADMIN — Medication 25 GRAM(S): at 05:10

## 2022-06-01 RX ADMIN — SODIUM CHLORIDE 100 MILLILITER(S): 9 INJECTION, SOLUTION INTRAVENOUS at 13:51

## 2022-06-01 RX ADMIN — ENOXAPARIN SODIUM 40 MILLIGRAM(S): 100 INJECTION SUBCUTANEOUS at 00:09

## 2022-06-01 RX ADMIN — HYDROMORPHONE HYDROCHLORIDE 0.5 MILLIGRAM(S): 2 INJECTION INTRAMUSCULAR; INTRAVENOUS; SUBCUTANEOUS at 23:49

## 2022-06-01 RX ADMIN — CEFEPIME 100 MILLIGRAM(S): 1 INJECTION, POWDER, FOR SOLUTION INTRAMUSCULAR; INTRAVENOUS at 22:05

## 2022-06-01 RX ADMIN — CEFEPIME 100 MILLIGRAM(S): 1 INJECTION, POWDER, FOR SOLUTION INTRAMUSCULAR; INTRAVENOUS at 13:51

## 2022-06-01 RX ADMIN — HYDROMORPHONE HYDROCHLORIDE 2 MILLIGRAM(S): 2 INJECTION INTRAMUSCULAR; INTRAVENOUS; SUBCUTANEOUS at 19:26

## 2022-06-01 RX ADMIN — HYDROMORPHONE HYDROCHLORIDE 0.5 MILLIGRAM(S): 2 INJECTION INTRAMUSCULAR; INTRAVENOUS; SUBCUTANEOUS at 20:25

## 2022-06-01 NOTE — PROGRESS NOTE ADULT - SUBJECTIVE AND OBJECTIVE BOX
JAMES VÁSQUEZ 26y Female  MRN#: [id number]   Hospital Day: 6d    SUBJECTIVE  Patient is a 26y old Female who presents with a chief complaint of Acute chest syndrome (31 May 2022 15:43)  Currently admitted to medicine with the primary diagnosis of Sickle cell disease, with acute chest syndrome      INTERVAL HPI AND OVERNIGHT EVENTS:  Patient was examined and seen at bedside. This morning she is resting comfortably in bed and reports pain w the insertion of the midline but now functioning well.     REVIEW OF SYMPTOMS:  CONSTITUTIONAL: emotionally overwhelmed   EYES: No visual changes, eye pain, or discharge  ENT: No vertigo; No ear pain or change in hearing; No sore throat or difficulty swallowing  NECK: No pain or stiffness  RESPIRATORY: non productive cough w right lower rib/flank pain w cough   CARDIOVASCULAR: No chest pain or palpitations  GASTROINTESTINAL: No abdominal or epigastric pain; No nausea, vomiting, or hematemesis; No diarrhea or constipation; No melena or hematochezia  GENITOURINARY: No dysuria, frequency or hematuria  MUSCULOSKELETAL: as above   NEUROLOGICAL: No numbness or weakness  SKIN: No itching or rashes    OBJECTIVE  PAST MEDICAL & SURGICAL HISTORY  Sickle cell anemia    S/P cholecystectomy      ALLERGIES:  No Known Allergies    MEDICATIONS:  STANDING MEDICATIONS  cefepime   IVPB      cefepime   IVPB 2000 milliGRAM(s) IV Intermittent every 8 hours  chlorhexidine 4% Liquid 1 Application(s) Topical daily  dextrose 5% + sodium chloride 0.45%. 1000 milliLiter(s) IV Continuous <Continuous>  enoxaparin Injectable 40 milliGRAM(s) SubCutaneous every 24 hours  folic acid 1 milliGRAM(s) Oral daily  influenza   Vaccine 0.5 milliLiter(s) IntraMuscular once  levoFLOXacin IVPB 750 milliGRAM(s) IV Intermittent every 24 hours  losartan 25 milliGRAM(s) Oral daily  pantoprazole    Tablet 40 milliGRAM(s) Oral before breakfast  predniSONE   Tablet 50 milliGRAM(s) Oral daily    PRN MEDICATIONS  acetaminophen     Tablet .. 650 milliGRAM(s) Oral every 6 hours PRN  HYDROmorphone  Injectable 1 milliGRAM(s) IV Push every 6 hours PRN  melatonin 3 milliGRAM(s) Oral at bedtime PRN  ondansetron Injectable 4 milliGRAM(s) IV Push every 8 hours PRN      VITAL SIGNS: Last 24 Hours  T(C): 36.3 (01 Jun 2022 06:00), Max: 37.6 (31 May 2022 19:35)  T(F): 97.4 (01 Jun 2022 06:00), Max: 99.6 (31 May 2022 19:35)  HR: 70 (01 Jun 2022 06:00) (70 - 102)  BP: 114/61 (01 Jun 2022 06:00) (103/55 - 116/56)  BP(mean): --  RR: 18 (01 Jun 2022 06:00) (18 - 19)  SpO2: 98% (01 Jun 2022 06:00) (96% - 100%)    LABS:                        7.5    19.08 )-----------( 504      ( 01 Jun 2022 00:56 )             22.1     05-31    138  |  105  |  3<L>  ----------------------------<  92  3.7   |  22  |  <0.5<L>    Ca    8.7      31 May 2022 11:44  Mg     1.7     05-31    TPro  7.0  /  Alb  2.9<L>  /  TBili  3.4<H>  /  DBili  x   /  AST  33  /  ALT  22  /  AlkPhos  86  05-31                  RADIOLOGY:  < from: Xray Chest 1 View-PORTABLE IMMEDIATE (Xray Chest 1 View-PORTABLE IMMEDIATE .) (05.31.22 @ 10:15) >  Impression:    Stable right greater than left bibasilar opacities/effusions.          PHYSICAL EXAM:  CONSTITUTIONAL: No acute distress, well-developed, well-groomed, anxious about her diagnoses   HEAD: Atraumatic, normocephalic  EYES: mild scleral icterus unchanged from yesterday   ENT: Supple, no lymphadenopathy, no JVD; moist mucous membranes  PULMONARY: decreased breath sounds at right lung base w mild crackles.   CARDIOVASCULAR: Regular rate and rhythm; no murmurs, rubs, or gallops  GASTROINTESTINAL: Soft, non-tender, non-distended; bowel sounds present  MUSCULOSKELETAL: 2+ peripheral pulses; no clubbing, no cyanosis, no edema  NEUROLOGY: A&ox4  SKIN: No rashes or lesions; warm and dry

## 2022-06-01 NOTE — PROGRESS NOTE ADULT - SUBJECTIVE AND OBJECTIVE BOX
JAMES VÁSQUEZ  26y, Female    All available historical data reviewed    OVERNIGHT EVENTS:  no fevers  feels well and has no new complaints   ambulating    ROS:  General: Denies rigors, nightsweats  HEENT: Denies headache, rhinorrhea, sore throat, eye pain  CV: Denies CP, palpitations  PULM: Denies wheezing, hemoptysis  GI: Denies hematemesis, hematochezia, melena  : Denies discharge, hematuria  MSK: Denies arthralgias, myalgias  SKIN: Denies rash, lesions  NEURO: Denies paresthesias, weakness  PSYCH: Denies depression, anxiety    VITALS:  T(F): 97.4, Max: 99.6 (05-31-22 @ 19:35)  HR: 70  BP: 114/61  RR: 18Vital Signs Last 24 Hrs  T(C): 36.3 (01 Jun 2022 06:00), Max: 37.6 (31 May 2022 19:35)  T(F): 97.4 (01 Jun 2022 06:00), Max: 99.6 (31 May 2022 19:35)  HR: 70 (01 Jun 2022 06:00) (70 - 102)  BP: 114/61 (01 Jun 2022 06:00) (103/55 - 116/56)  BP(mean): --  RR: 18 (01 Jun 2022 06:00) (18 - 19)  SpO2: 98% (01 Jun 2022 06:00) (96% - 100%)    TESTS & MEASUREMENTS:                        7.4    17.74 )-----------( 511      ( 01 Jun 2022 06:11 )             21.4     06-01    134<L>  |  99  |  3<L>  ----------------------------<  140<H>  4.4   |  22  |  <0.5<L>    Ca    9.0      01 Jun 2022 06:11  Mg     1.7     05-31    TPro  7.7  /  Alb  3.2<L>  /  TBili  3.2<H>  /  DBili  x   /  AST  32  /  ALT  22  /  AlkPhos  88  06-01    LIVER FUNCTIONS - ( 01 Jun 2022 06:11 )  Alb: 3.2 g/dL / Pro: 7.7 g/dL / ALK PHOS: 88 U/L / ALT: 22 U/L / AST: 32 U/L / GGT: x             Culture - Blood (collected 05-27-22 @ 11:46)  Source: .Blood None  Preliminary Report (05-28-22 @ 23:02):    No growth to date.            RADIOLOGY & ADDITIONAL TESTS:  Personal review of radiological diagnostics performed  Echo and EKG results noted when applicable.     MEDICATIONS:  acetaminophen     Tablet .. 650 milliGRAM(s) Oral every 6 hours PRN  cefepime   IVPB 2000 milliGRAM(s) IV Intermittent every 8 hours  cefepime   IVPB      chlorhexidine 4% Liquid 1 Application(s) Topical daily  dextrose 5% + sodium chloride 0.45%. 1000 milliLiter(s) IV Continuous <Continuous>  enoxaparin Injectable 40 milliGRAM(s) SubCutaneous every 24 hours  folic acid 1 milliGRAM(s) Oral daily  HYDROmorphone  Injectable 1 milliGRAM(s) IV Push every 6 hours PRN  influenza   Vaccine 0.5 milliLiter(s) IntraMuscular once  levoFLOXacin IVPB 750 milliGRAM(s) IV Intermittent every 24 hours  losartan 25 milliGRAM(s) Oral daily  melatonin 3 milliGRAM(s) Oral at bedtime PRN  ondansetron Injectable 4 milliGRAM(s) IV Push every 8 hours PRN  pantoprazole    Tablet 40 milliGRAM(s) Oral before breakfast  predniSONE   Tablet 50 milliGRAM(s) Oral daily      ANTIBIOTICS:  cefepime   IVPB 2000 milliGRAM(s) IV Intermittent every 8 hours  cefepime   IVPB      levoFLOXacin IVPB 750 milliGRAM(s) IV Intermittent every 24 hours

## 2022-06-01 NOTE — PROGRESS NOTE ADULT - ASSESSMENT
· Assessment	  27yo  F with PMH of Sickle cell anemia (on oxbryta - followed by Hematologist - Dr. Jimenez in [place]), recently discharged after treatment of pneumonia and sickle cell crisis, presented to the ED with c/o right sided chest, shoulder and upper back pain. States that she overexerted herself. She states that she has not been feeling well since yesterday, had mild cough and fever upto 102F at home. Denies any shortness of breath, abdominal pain, nausea, vomiting, diarrhea, leg pain, swelling.     IMPRESSION;   Resolved sepsis secondary to multilobar bacterial PNA RLL/LLL  CXR increased opacity  RLL  5/20 CT bibasilar consolidation  Sickle cell vasoocclusive crisis  Pulmonary evaluation : minimal free fluid. Changes secondary to vasoocclusive crisis  5/26 COVID-19 NG   5/20 Legionella NG  5/28 Nares ORSA NG  5/27 BCx NG    RECOMMENDATIONS;  Duration of ABx 7 days  Cefepime 2 gm iv q8h  Levoquin 750 mg iv q24h  Please do not hesitate to recall ID if any questions arise either through [id number] or through [facility] teams

## 2022-06-01 NOTE — PROGRESS NOTE ADULT - ASSESSMENT
25yo  F with PMH of Sickle cell anemia (on oxbryta - followed by Hematologist - Dr. Jimenez in [place]), recently discharged after treatment of pneumonia and sickle cell crisis, presented to the ED with c/o right sided chest, shoulder and upper back pain.  She also reports fever at home 102F.  She was recently treated for sickle cell crisis and pneumonia and discharged last week on Levaquin PO.    Hematology consulted for r/o ACS with hx of sickle cell disease.     IMPRESSION:    #ACS/Vaso-occlusive pain crisis/PNA  #Hx of Hb SS disease - Was on Oxbryta, just started in April 2022 but hasn't been routinely taking it due to her recent hospitalizations/infections; never was on hydrea or any other HbSS medications    5/30: Remains febrile but not as high grade. She is no longer requiring o2 after the simple transfusions, and her leukocytosis and thrombocytosis is improving, bili still pending. Her pain seems better controlled but she feels vey fatigued. Recommend to continue with IV pain meds and supportive care. We do not recommend an exchange or simple transfusion at this time.  Continue with abx per ID and critical care.   - Initially offered both exchange as patient was in high 80s on RA and simple transfusion however she declined, eventualy she agreed to a simple transfusion.    -Baseline Hb 7-8  -She has had 2 crises far this year.   - Hb electrophoresis from 5/21 showed 90% HbS    #Possible autoimmune hemolytic anemia   Direct Lori Profile (05.31.22 @ 08:00)    Dir Antiglob Polyspecific Interpretation: POS  - Per blood bank, pt has Anti C, S and Anti E, and warm autoantibody    #Chronic leukocytosis and thrombocytosis: Resolving  - likely reactive     RECOMMENDATIONS:  - No longer requiring O2, will hold off on exchange transfusion for now  - s/p 1U PRBC on 5/31 with repeat Hb 7.4  - Started Prednisone 50mg daily with GI ppx on 5/31.   - Please check viral panel: CMV, EBV, hepatitis  - Check daily LDH. LDH 5/31: 811  - C/w pain meds to IV Dilaudid 1mg q3hr. Please ensure that patient is discharged on adequate pain control regimen.   - Antibiotics for multilobar opacities.   - C/w folic acid supplementation  - She can continue Oxbryta as outpatient and follow up with her primary hematologist post discharge.  - Monitor hemolysis labs and CBC    DVT ppx: Lovenox        27yo  F with PMH of Sickle cell anemia (on oxbryta - followed by Hematologist - Dr. Jimenez in [place]), recently discharged after treatment of pneumonia and sickle cell crisis, presented to the ED with c/o right sided chest, shoulder and upper back pain.  She also reports fever at home 102F.  She was recently treated for sickle cell crisis and pneumonia and discharged last week on Levaquin PO.    Hematology consulted for r/o ACS with hx of sickle cell disease.     IMPRESSION:    #ACS/Vaso-occlusive pain crisis/PNA  #Hx of Hb SS disease - Was on Oxbryta, just started in April 2022 but hasn't been routinely taking it due to her recent hospitalizations/infections; never was on hydrea or any other HbSS medications    5/30: Remains febrile but not as high grade. She is no longer requiring o2 after the simple transfusions, and her leukocytosis and thrombocytosis is improving, bili still pending. Her pain seems better controlled but she feels vey fatigued. Recommend to continue with IV pain meds and supportive care. We do not recommend an exchange or simple transfusion at this time.  Continue with abx per ID and critical care.   - Initially offered both exchange as patient was in high 80s on RA and simple transfusion however she declined, eventualy she agreed to a simple transfusion.    -Baseline Hb 7-8  -She has had 2 crises far this year.   - Hb electrophoresis from 5/21 showed 90% HbS    #Possible autoimmune hemolytic anemia   Direct Lori Profile (05.31.22 @ 08:00)    Dir Antiglob Polyspecific Interpretation: POS  - Spoke with Dr Burroughs from transfusion med, it is hard to call if pt has alloAb or autoab given chronic hemolysis    #Chronic leukocytosis and thrombocytosis: Resolving  - likely reactive     RECOMMENDATIONS:  - No longer requiring O2, will hold off on exchange transfusion for now  - s/p 1U PRBC on 5/31 with repeat Hb 7.4  - Started Prednisone 50mg daily with GI ppx on 5/31: Continue  - Please check viral panel: CMV, EBV, hepatitis  - Check daily LDH. LDH 5/31: 811  - C/w pain meds to IV Dilaudid 1mg q3hr. Please ensure that patient is discharged on adequate pain control regimen.   - Antibiotics for multilobar opacities.   - C/w folic acid supplementation  - She can continue Oxbryta as outpatient and follow up with her primary hematologist post discharge.  - Monitor hemolysis labs and CBC    DVT ppx: Lovenox

## 2022-06-01 NOTE — PROGRESS NOTE ADULT - ASSESSMENT
IMPRESSION    Sickle cell crisis  Acute chest syndrome refusing exchange transfusion   The present CXR findings are more likely to be related to ACS than a bacterial infection.    SP ABX therapy   Hypokalemia    Plan     CNS: pain control    HEENT: oral care    PULMONARY: On RA. Encourage Incentive spirometry    CARDIOVASCULAR: C/w D5 1/2 NS at 100 cc/hr    GI: GI prophylaxis.  Feeding     RENAL: replace and follow up K+    INFECTIOUS DISEASE: IV ABX. Cultures negative to date, Procal 0.09. Finish course of ABX.  One more day     HEMATOLOGICAL:  hem f/u. Pain control. Transfuse to target >8. S/P 1 u PRBC with appropriate response.    ENDOCRINE:  Follow up FS.     MUSCULOSKELETAL: OOB, ambulate as tolerated       IMPRESSION    Sickle cell crisis  Acute chest syndrome refusing exchange transfusion   The present CXR findings are more likely to be related to ACS than a bacterial infection.    SP ABX therapy   Hypokalemia    Plan     CNS: pain control    HEENT: oral care    PULMONARY: On RA. Encourage Incentive spirometry    CARDIOVASCULAR: C/w D5 1/2 NS at 100 cc/hr    GI: GI prophylaxis.  Feeding     RENAL: f/u lytes and replete    INFECTIOUS DISEASE: IV ABX. Cultures negative to date, Procal 0.09. Finish course of ABX.  Today last day.    HEMATOLOGICAL:  hem f/u. Pain control. Transfuse to target >8. S/P 1 u PRBC with appropriate response.    ENDOCRINE:  Follow up FS.     MUSCULOSKELETAL: OOB, ambulate as tolerated       IMPRESSION    Sickle cell crisis improving clinically    Acute chest syndrome refused exchange transfusion.  SP one Unit PRBC    The present CXR findings are more likely to be related to ACS than a bacterial infection.    SP ABX therapy     Plan     CNS: pain control    HEENT: oral care    PULMONARY: On RA. Encourage Incentive spirometry    CARDIOVASCULAR: C/w D5 1/2 NS at 100 cc/hr    GI: GI prophylaxis.  Feeding     RENAL: f/u lytes and replete    INFECTIOUS DISEASE: IV ABX. Cultures negative to date, Procal 0.09. Finish course of ABX.  Today last day.  ID follow up appreciated     HEMATOLOGICAL:  hem f/u. Pain control. Transfuse to target >8. S/P 1 u PRBC with appropriate response.    ENDOCRINE:  Follow up FS.     MUSCULOSKELETAL: OOB, ambulate as tolerated

## 2022-06-01 NOTE — PROGRESS NOTE ADULT - SUBJECTIVE AND OBJECTIVE BOX
Patient is a 26y old  Female who presents with a chief complaint of Acute chest syndrome (31 May 2022 15:43)        SUBJECTIVE:    S/p 1 u PRBC overnight.    REVIEW OF SYSTEMS:    All Pertinent ROS are negative except per HPI       PHYSICAL EXAM  Vital Signs Last 24 Hrs  T(C): 36.3 (01 Jun 2022 06:00), Max: 37.6 (31 May 2022 19:35)  T(F): 97.4 (01 Jun 2022 06:00), Max: 99.6 (31 May 2022 19:35)  HR: 70 (01 Jun 2022 06:00) (70 - 102)  BP: 114/61 (01 Jun 2022 06:00) (103/55 - 116/56)  BP(mean): --  RR: 18 (01 Jun 2022 06:00) (18 - 19)  SpO2: 98% (01 Jun 2022 06:00) (96% - 100%)    CONSTITUTIONAL:  Well nourished.  NAD    ENT:   Airway patent,   No thrush    CARDIAC:   Normal rate,   regular rhythm.    no edema      RESPIRATORY:   No Wheezing  Normal chest expansion  Not tachypneic,  No use of accessory muscles    GASTROINTESTINAL:  Abdomen soft,   non-tender,   no guarding,   + BS    MUSCULOSKELETAL:   range of motion is not limited,  no clubbing, cyanosis    NEUROLOGICAL:   Alert and oriented   no motor or deficits.    SKIN:   Skin normal color for race,   warm, dry   No evidence of rash.        LABS:                          7.4    17.74 )-----------( 511      ( 01 Jun 2022 06:11 )             21.4                                               06-01    134<L>  |  99  |  3<L>  ----------------------------<  140<H>  4.4   |  22  |  <0.5<L>    Ca    9.0      01 Jun 2022 06:11  Mg     1.7     05-31    TPro  7.7  /  Alb  3.2<L>  /  TBili  3.2<H>  /  DBili  x   /  AST  32  /  ALT  22  /  AlkPhos  88  06-01                                                                                           LIVER FUNCTIONS - ( 01 Jun 2022 06:11 )  Alb: 3.2 g/dL / Pro: 7.7 g/dL / ALK PHOS: 88 U/L / ALT: 22 U/L / AST: 32 U/L / GGT: x                                                                                                MEDICATIONS  (STANDING):  cefepime   IVPB 2000 milliGRAM(s) IV Intermittent every 8 hours  cefepime   IVPB      chlorhexidine 4% Liquid 1 Application(s) Topical daily  dextrose 5% + sodium chloride 0.45%. 1000 milliLiter(s) (100 mL/Hr) IV Continuous <Continuous>  enoxaparin Injectable 40 milliGRAM(s) SubCutaneous every 24 hours  folic acid 1 milliGRAM(s) Oral daily  influenza   Vaccine 0.5 milliLiter(s) IntraMuscular once  levoFLOXacin IVPB 750 milliGRAM(s) IV Intermittent every 24 hours  losartan 25 milliGRAM(s) Oral daily  pantoprazole    Tablet 40 milliGRAM(s) Oral before breakfast  predniSONE   Tablet 50 milliGRAM(s) Oral daily    MEDICATIONS  (PRN):  acetaminophen     Tablet .. 650 milliGRAM(s) Oral every 6 hours PRN Temp greater or equal to 38C (100.4F), Mild Pain (1 - 3)  HYDROmorphone  Injectable 1 milliGRAM(s) IV Push every 6 hours PRN Severe Pain (7 - 10)  melatonin 3 milliGRAM(s) Oral at bedtime PRN Insomnia  ondansetron Injectable 4 milliGRAM(s) IV Push every 8 hours PRN Nausea and/or Vomiting      X-Rays reviewed    CXR interpreted by me: Patient is a 26y old  Female who presents with a chief complaint of Acute chest syndrome (31 May 2022 15:43)        SUBJECTIVE:    S/p 1 u PRBC overnight. Patient feels much better, no difficulty breathingm resolved pleuritic chest pain. More compliant with incentive spirometry.    REVIEW OF SYSTEMS:    All Pertinent ROS are negative except per HPI       PHYSICAL EXAM  Vital Signs Last 24 Hrs  T(C): 36.3 (01 Jun 2022 06:00), Max: 37.6 (31 May 2022 19:35)  T(F): 97.4 (01 Jun 2022 06:00), Max: 99.6 (31 May 2022 19:35)  HR: 70 (01 Jun 2022 06:00) (70 - 102)  BP: 114/61 (01 Jun 2022 06:00) (103/55 - 116/56)  BP(mean): --  RR: 18 (01 Jun 2022 06:00) (18 - 19)  SpO2: 98% (01 Jun 2022 06:00) (96% - 100%)    CONSTITUTIONAL:  Well nourished.  NAD    ENT:   Airway patent,   No thrush    CARDIAC:   Normal rate,   regular rhythm.    no edema      RESPIRATORY:   Decreased right breath sounds  No Wheezing  Normal chest expansion  Not tachypneic,  No use of accessory muscles    GASTROINTESTINAL:  Abdomen soft,   non-tender,   no guarding,   + BS    MUSCULOSKELETAL:   range of motion is not limited,  no clubbing, cyanosis    NEUROLOGICAL:   Alert and oriented   no motor or deficits.    SKIN:   Skin normal color for race,   warm, dry   No evidence of rash.        LABS:                          7.4    17.74 )-----------( 511      ( 01 Jun 2022 06:11 )             21.4                                               06-01    134<L>  |  99  |  3<L>  ----------------------------<  140<H>  4.4   |  22  |  <0.5<L>    Ca    9.0      01 Jun 2022 06:11  Mg     1.7     05-31    TPro  7.7  /  Alb  3.2<L>  /  TBili  3.2<H>  /  DBili  x   /  AST  32  /  ALT  22  /  AlkPhos  88  06-01                                                                                           LIVER FUNCTIONS - ( 01 Jun 2022 06:11 )  Alb: 3.2 g/dL / Pro: 7.7 g/dL / ALK PHOS: 88 U/L / ALT: 22 U/L / AST: 32 U/L / GGT: x                                                                                                MEDICATIONS  (STANDING):  cefepime   IVPB 2000 milliGRAM(s) IV Intermittent every 8 hours  cefepime   IVPB      chlorhexidine 4% Liquid 1 Application(s) Topical daily  dextrose 5% + sodium chloride 0.45%. 1000 milliLiter(s) (100 mL/Hr) IV Continuous <Continuous>  enoxaparin Injectable 40 milliGRAM(s) SubCutaneous every 24 hours  folic acid 1 milliGRAM(s) Oral daily  influenza   Vaccine 0.5 milliLiter(s) IntraMuscular once  levoFLOXacin IVPB 750 milliGRAM(s) IV Intermittent every 24 hours  losartan 25 milliGRAM(s) Oral daily  pantoprazole    Tablet 40 milliGRAM(s) Oral before breakfast  predniSONE   Tablet 50 milliGRAM(s) Oral daily    MEDICATIONS  (PRN):  acetaminophen     Tablet .. 650 milliGRAM(s) Oral every 6 hours PRN Temp greater or equal to 38C (100.4F), Mild Pain (1 - 3)  HYDROmorphone  Injectable 1 milliGRAM(s) IV Push every 6 hours PRN Severe Pain (7 - 10)  melatonin 3 milliGRAM(s) Oral at bedtime PRN Insomnia  ondansetron Injectable 4 milliGRAM(s) IV Push every 8 hours PRN Nausea and/or Vomiting      X-Rays reviewed    CXR interpreted by me:

## 2022-06-01 NOTE — PROGRESS NOTE ADULT - ASSESSMENT
Ms Gonzalez is a 26 RENEE w a PMH of Sickle Cell disease (on oxbryta) w a recent dc following treatment for PNA w sickle cell crisis presented to the ED May 26th for Rt sided chest, shoulder and back pain w mild cough and fever (has been febrile to 103F). Patient was upgraded to ICU on 5/28 for exchange transfusion now s/p 2 U PRBC. Patient was downgraded back to floors and now on Cefepime and levofloxacin w resolving pyrexia. Dr Hobbs performed bedside US to assess for fluid in her lungs on may 31 and stated there was nothing to tap, most likely atalectasis. Heme/Onc saw patient, assessing for warm hemolytic antibodies in blood, started patient on prednsione 50mg.     Problem List:  #Vaso-occlusive pain crisis w multilobar PNA  no longer requiring o2 after the simple transfusions,   met criteria for exchange transfusion initially (acute symptomatic anemia) however patient declined and improved w 2 U prbc   leukocytosis and thrombocytosis improving, bili still pending.   Pain improving but she feels very fatigued.    Pulmonary: no need for thoracocentesis at this time, likely atelectasis   -Cultures NGTD  -Received 2 units of blood on 5/28/2022 and clinically she is doing much better   -Continue with hydration at one half NS at 100 cc  Given recent hospitalization concern for Pseudomonas will c/w dual coverage  -c/w Cefepime IV 2g q8hr (started 5/27)   -c/w Levofloxacin IV 750mg once daily (started 5/28)  -Tylenol 650 mg for fever >101F  incentive spirometry q1hr      #Chronic leukocytosis and thrombocytosis likely reactive  Resolving  #Hx of Hb SS disease -   Was on Oxbryta, just started in April 2022 but hasn't been routinely taking it due to her recent hospitalizations/infections; never was on hydrea or any other HbSS medications  Baseline Hb 7-8, do NOT need to transfuse just for hemoglobin < 7 without clinical changes  She has had 2 crises far this year.   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  - Monitor CBC, bili, retic, LDH  - patient received another U PRBC on May 31 (total of 3 on this admit), Hgb now 7.5  - LDH = 811 from 447, start Prednisone 50mg qd to diminish warm hemolysis response   - trend Hgb (to see if she hemolyses the unit she received 5/31)   - given history of SSDz and continued reticulocyte production (though it is down trending since May 14) may represents HYPERHEMOLYTIC CRISIS (associated w Acute Chest Syndrome and Painful episodes (vaso-occlusive episodes) and infection of which she is experiencing. Consult Heme/Onc, if present consider IVIG 0.4g/kg for 5 days + IV methylprednisolone 500mg once daily for 2 days. MUST R/O  -f/u hepC/ebv/hiv/linh/ccp  -presence of AntiRh antibody in favor of alloimunization instead           #Misc  - DVT Prophylaxis: lovenox   - GI Prophylaxis:NA   - Diet: regular  - Activity: as tolerated   - IV Fluids: D5 1/2 NS at 100cc/hr   - Code Status: Full     Dispo:     Ms Gonzalez is a 26 RENEE w a PMH of Sickle Cell disease (on oxbryta) w a recent dc following treatment for PNA w sickle cell crisis presented to the ED May 26th for Rt sided chest, shoulder and back pain w mild cough and fever (has been febrile to 103F). Patient was upgraded to ICU on 5/28 for exchange transfusion now s/p 2 U PRBC. Patient was downgraded back to floors and now on Cefepime and levofloxacin w resolving pyrexia. Dr Hobbs performed bedside US to assess for fluid in her lungs on may 31 and stated there was nothing to tap, most likely atalectasis. Heme/Onc saw patient, assessing for warm hemolytic antibodies in blood, started patient on prednsione 50mg.     Problem List:  #Vaso-occlusive pain crisis w multilobar PNA  no longer requiring o2 after the simple transfusions,   met criteria for exchange transfusion initially (acute symptomatic anemia) however patient declined and improved w 2 U prbc   leukocytosis and thrombocytosis improving, bili still pending.   Pain improving but she feels very fatigued.    Pulmonary: no need for thoracocentesis at this time, likely atelectasis   -Cultures NGTD  -Received 2 units of blood on 5/28/2022 and clinically she is doing much better   -Continue with hydration at one half NS at 100 cc  Given recent hospitalization concern for Pseudomonas will c/w dual coverage  -c/w Cefepime IV 2g q8hr (started 5/27)   -c/w Levofloxacin IV 750mg once daily (started 5/28)  -Tylenol 650 mg for fever >101F  -incentive spirometry q1hr      #Chronic leukocytosis and thrombocytosis likely reactive Stable   #Hx of Hb SS disease -   Was on Oxbryta, just started in April 2022 but hasn't been routinely taking it due to her recent hospitalizations/infections; never was on hydrea or any other HbSS medications  Baseline Hb 7-8, do NOT need to transfuse just for hemoglobin < 7 without clinical changes  She has had 2 crises far this year.   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  - Monitor CBC, bili, retic, LDH  - patient received another U PRBC on May 31 (total of 3 on this admit), Hgb now 7.5  - LDH = 811 from 447, start Prednisone 50mg qd to diminish warm hemolysis response   - trend Hgb (to see if she hemolyses the unit she received 5/31)   - Lori+   -presence of AntiRh antibody in favor of alloimunization instead   -f/u CMV, Hepatitis and EBV for source of warm Ab          #Misc  - DVT Prophylaxis: lovenox   - GI Prophylaxis:NA   - Diet: regular  - Activity: as tolerated   - IV Fluids: D5 1/2 NS at 100cc/hr   - Code Status: Full     Dispo:

## 2022-06-01 NOTE — PROGRESS NOTE ADULT - SUBJECTIVE AND OBJECTIVE BOX
Patient is a 26y old  Female who presents with a chief complaint of Acute chest syndrome (01 Jun 2022 08:21)      Subjective:      Vital Signs Last 24 Hrs  T(C): 36.3 (01 Jun 2022 06:00), Max: 37.6 (31 May 2022 19:35)  T(F): 97.4 (01 Jun 2022 06:00), Max: 99.6 (31 May 2022 19:35)  HR: 70 (01 Jun 2022 06:00) (70 - 102)  BP: 114/61 (01 Jun 2022 06:00) (103/55 - 116/56)  BP(mean): --  RR: 18 (01 Jun 2022 06:00) (18 - 19)  SpO2: 98% (01 Jun 2022 06:00) (96% - 100%)    PHYSICAL EXAM  General: adult in NAD  HEENT: clear oropharynx, anicteric sclera, pink conjunctiva  Neck: supple  CV: normal S1/S2 with no murmur rubs or gallops  Lungs: positive air movement b/l ant lungs,clear to auscultation, no wheezes, no rales  Abdomen: soft non-tender non-distended, no hepatosplenomegaly  Ext: no clubbing cyanosis or edema  Skin: no rashes and no petechiae  Neuro: alert and oriented X 4, no focal deficits    MEDICATIONS  (STANDING):  cefepime   IVPB 2000 milliGRAM(s) IV Intermittent every 8 hours  cefepime   IVPB      chlorhexidine 4% Liquid 1 Application(s) Topical daily  dextrose 5% + sodium chloride 0.45%. 1000 milliLiter(s) (100 mL/Hr) IV Continuous <Continuous>  enoxaparin Injectable 40 milliGRAM(s) SubCutaneous every 24 hours  folic acid 1 milliGRAM(s) Oral daily  influenza   Vaccine 0.5 milliLiter(s) IntraMuscular once  levoFLOXacin IVPB 750 milliGRAM(s) IV Intermittent every 24 hours  losartan 25 milliGRAM(s) Oral daily  pantoprazole    Tablet 40 milliGRAM(s) Oral before breakfast  predniSONE   Tablet 50 milliGRAM(s) Oral daily    MEDICATIONS  (PRN):  acetaminophen     Tablet .. 650 milliGRAM(s) Oral every 6 hours PRN Temp greater or equal to 38C (100.4F), Mild Pain (1 - 3)  HYDROmorphone  Injectable 1 milliGRAM(s) IV Push every 6 hours PRN Severe Pain (7 - 10)  melatonin 3 milliGRAM(s) Oral at bedtime PRN Insomnia  ondansetron Injectable 4 milliGRAM(s) IV Push every 8 hours PRN Nausea and/or Vomiting      LABS:                          7.4    17.74 )-----------( 511      ( 01 Jun 2022 06:11 )             21.4         Mean Cell Volume : 90.7 fL  Mean Cell Hemoglobin : 31.4 pg  Mean Cell Hemoglobin Concentration : 34.6 g/dL  Auto Neutrophil # : 15.11 K/uL  Auto Lymphocyte # : 1.46 K/uL  Auto Monocyte # : 0.51 K/uL  Auto Eosinophil # : 0.20 K/uL  Auto Basophil # : 0.05 K/uL  Auto Neutrophil % : 85.2 %  Auto Lymphocyte % : 8.2 %  Auto Monocyte % : 2.9 %  Auto Eosinophil % : 1.1 %  Auto Basophil % : 0.3 %      Serial CBC's  06-01 @ 06:11  Hct-21.4 / Hgb-7.4 / Plat-511 / RBC-2.36 / WBC-17.74  Serial CBC's  06-01 @ 00:56  Hct-22.1 / Hgb-7.5 / Plat-504 / RBC-2.43 / WBC-19.08  Serial CBC's  05-31 @ 11:44  Hct-17.5 / Hgb-5.8 / Plat-503 / RBC-1.87 / WBC-14.97  Serial CBC's  05-31 @ 08:00  Hct-17.3 / Hgb-5.7 / Plat-488 / RBC-1.86 / WBC-14.06  Serial CBC's  05-29 @ 20:45  Hct-23.3 / Hgb-7.9 / Plat-314 / RBC-2.59 / WBC-17.82  Serial CBC's  05-29 @ 04:25  Hct-21.8 / Hgb-7.4 / Plat-475 / RBC-2.40 / WBC-18.16  Serial CBC's  05-28 @ 22:42  Hct-18.9 / Hgb-6.6 / Plat-490 / RBC-2.07 / WBC-19.85      06-01    134<L>  |  99  |  3<L>  ----------------------------<  140<H>  4.4   |  22  |  <0.5<L>    Ca    9.0      01 Jun 2022 06:11  Mg     1.7     05-31    TPro  7.7  /  Alb  3.2<L>  /  TBili  3.2<H>  /  DBili  x   /  AST  32  /  ALT  22  /  AlkPhos  88  06-01      Reticulocyte Percent: 13.6 % (06-01 @ 06:11)  Reticulocyte Percent: 13.4 % (05-31 @ 08:00)  Reticulocyte Percent: 14.9 % (05-26 @ 21:55)    Direct Lori IgG (06.01.22 @ 06:11)    Dir Antiglob IgG Interpretation: POS    Dir Antiglob Polyspecific Interpretation: POS (06.01.22 @ 06:11)    RADIOLOGY & ADDITIONAL STUDIES:     Patient is a 26y old  Female who presents with a chief complaint of Acute chest syndrome (01 Jun 2022 08:21)      Subjective: Pt feels well, breathing is better      Vital Signs Last 24 Hrs  T(C): 36.3 (01 Jun 2022 06:00), Max: 37.6 (31 May 2022 19:35)  T(F): 97.4 (01 Jun 2022 06:00), Max: 99.6 (31 May 2022 19:35)  HR: 70 (01 Jun 2022 06:00) (70 - 102)  BP: 114/61 (01 Jun 2022 06:00) (103/55 - 116/56)  BP(mean): --  RR: 18 (01 Jun 2022 06:00) (18 - 19)  SpO2: 98% (01 Jun 2022 06:00) (96% - 100%)    PHYSICAL EXAM  General: adult in NAD  HEENT: clear oropharynx, anicteric sclera, pink conjunctiva  Neck: supple  CV: normal S1/S2 with no murmur rubs or gallops  Lungs: positive air movement b/l ant lungs,clear to auscultation, no wheezes, no rales  Abdomen: soft non-tender non-distended, no hepatosplenomegaly  Ext: no clubbing cyanosis or edema  Skin: no rashes and no petechiae  Neuro: alert and oriented X 4, no focal deficits    MEDICATIONS  (STANDING):  cefepime   IVPB 2000 milliGRAM(s) IV Intermittent every 8 hours  cefepime   IVPB      chlorhexidine 4% Liquid 1 Application(s) Topical daily  dextrose 5% + sodium chloride 0.45%. 1000 milliLiter(s) (100 mL/Hr) IV Continuous <Continuous>  enoxaparin Injectable 40 milliGRAM(s) SubCutaneous every 24 hours  folic acid 1 milliGRAM(s) Oral daily  influenza   Vaccine 0.5 milliLiter(s) IntraMuscular once  levoFLOXacin IVPB 750 milliGRAM(s) IV Intermittent every 24 hours  losartan 25 milliGRAM(s) Oral daily  pantoprazole    Tablet 40 milliGRAM(s) Oral before breakfast  predniSONE   Tablet 50 milliGRAM(s) Oral daily    MEDICATIONS  (PRN):  acetaminophen     Tablet .. 650 milliGRAM(s) Oral every 6 hours PRN Temp greater or equal to 38C (100.4F), Mild Pain (1 - 3)  HYDROmorphone  Injectable 1 milliGRAM(s) IV Push every 6 hours PRN Severe Pain (7 - 10)  melatonin 3 milliGRAM(s) Oral at bedtime PRN Insomnia  ondansetron Injectable 4 milliGRAM(s) IV Push every 8 hours PRN Nausea and/or Vomiting      LABS:                          7.4    17.74 )-----------( 511      ( 01 Jun 2022 06:11 )             21.4         Mean Cell Volume : 90.7 fL  Mean Cell Hemoglobin : 31.4 pg  Mean Cell Hemoglobin Concentration : 34.6 g/dL  Auto Neutrophil # : 15.11 K/uL  Auto Lymphocyte # : 1.46 K/uL  Auto Monocyte # : 0.51 K/uL  Auto Eosinophil # : 0.20 K/uL  Auto Basophil # : 0.05 K/uL  Auto Neutrophil % : 85.2 %  Auto Lymphocyte % : 8.2 %  Auto Monocyte % : 2.9 %  Auto Eosinophil % : 1.1 %  Auto Basophil % : 0.3 %      Serial CBC's  06-01 @ 06:11  Hct-21.4 / Hgb-7.4 / Plat-511 / RBC-2.36 / WBC-17.74  Serial CBC's  06-01 @ 00:56  Hct-22.1 / Hgb-7.5 / Plat-504 / RBC-2.43 / WBC-19.08  Serial CBC's  05-31 @ 11:44  Hct-17.5 / Hgb-5.8 / Plat-503 / RBC-1.87 / WBC-14.97  Serial CBC's  05-31 @ 08:00  Hct-17.3 / Hgb-5.7 / Plat-488 / RBC-1.86 / WBC-14.06  Serial CBC's  05-29 @ 20:45  Hct-23.3 / Hgb-7.9 / Plat-314 / RBC-2.59 / WBC-17.82  Serial CBC's  05-29 @ 04:25  Hct-21.8 / Hgb-7.4 / Plat-475 / RBC-2.40 / WBC-18.16  Serial CBC's  05-28 @ 22:42  Hct-18.9 / Hgb-6.6 / Plat-490 / RBC-2.07 / WBC-19.85      06-01    134<L>  |  99  |  3<L>  ----------------------------<  140<H>  4.4   |  22  |  <0.5<L>    Ca    9.0      01 Jun 2022 06:11  Mg     1.7     05-31    TPro  7.7  /  Alb  3.2<L>  /  TBili  3.2<H>  /  DBili  x   /  AST  32  /  ALT  22  /  AlkPhos  88  06-01      Reticulocyte Percent: 13.6 % (06-01 @ 06:11)  Reticulocyte Percent: 13.4 % (05-31 @ 08:00)  Reticulocyte Percent: 14.9 % (05-26 @ 21:55)    Direct Lori IgG (06.01.22 @ 06:11)    Dir Antiglob IgG Interpretation: POS    Dir Antiglob Polyspecific Interpretation: POS (06.01.22 @ 06:11)    RADIOLOGY & ADDITIONAL STUDIES:

## 2022-06-02 LAB
ANION GAP SERPL CALC-SCNC: 14 MMOL/L — SIGNIFICANT CHANGE UP (ref 7–14)
BUN SERPL-MCNC: 3 MG/DL — LOW (ref 10–20)
CALCIUM SERPL-MCNC: 8.9 MG/DL — SIGNIFICANT CHANGE UP (ref 8.5–10.1)
CHLORIDE SERPL-SCNC: 97 MMOL/L — LOW (ref 98–110)
CMV DNA CSF QL NAA+PROBE: SIGNIFICANT CHANGE UP
CMV DNA SPEC NAA+PROBE-LOG#: SIGNIFICANT CHANGE UP LOG10IU/ML
CO2 SERPL-SCNC: 21 MMOL/L — SIGNIFICANT CHANGE UP (ref 17–32)
CREAT SERPL-MCNC: <0.5 MG/DL — LOW (ref 0.7–1.5)
D DIMER BLD IA.RAPID-MCNC: 7185 NG/ML DDU — HIGH (ref 0–230)
EGFR: 152 ML/MIN/1.73M2 — SIGNIFICANT CHANGE UP
GLUCOSE SERPL-MCNC: 140 MG/DL — HIGH (ref 70–99)
HAV IGM SER-ACNC: SIGNIFICANT CHANGE UP
HBV CORE IGM SER-ACNC: SIGNIFICANT CHANGE UP
HBV SURFACE AG SER-ACNC: SIGNIFICANT CHANGE UP
HCT VFR BLD CALC: 20.5 % — LOW (ref 37–47)
HCV AB S/CO SERPL IA: 0.19 S/CO — SIGNIFICANT CHANGE UP (ref 0–0.99)
HCV AB SERPL-IMP: SIGNIFICANT CHANGE UP
HGB BLD-MCNC: 7 G/DL — LOW (ref 12–16)
MCHC RBC-ENTMCNC: 30.8 PG — SIGNIFICANT CHANGE UP (ref 27–31)
MCHC RBC-ENTMCNC: 34.1 G/DL — SIGNIFICANT CHANGE UP (ref 32–37)
MCV RBC AUTO: 90.3 FL — SIGNIFICANT CHANGE UP (ref 81–99)
NRBC # BLD: 7 /100 WBCS — HIGH (ref 0–0)
PLATELET # BLD AUTO: 531 K/UL — HIGH (ref 130–400)
POTASSIUM SERPL-MCNC: 3.9 MMOL/L — SIGNIFICANT CHANGE UP (ref 3.5–5)
POTASSIUM SERPL-SCNC: 3.9 MMOL/L — SIGNIFICANT CHANGE UP (ref 3.5–5)
RBC # BLD: 2.27 M/UL — LOW (ref 4.2–5.4)
RBC # FLD: 25.6 % — HIGH (ref 11.5–14.5)
SODIUM SERPL-SCNC: 132 MMOL/L — LOW (ref 135–146)
WBC # BLD: 38.46 K/UL — HIGH (ref 4.8–10.8)
WBC # FLD AUTO: 38.46 K/UL — HIGH (ref 4.8–10.8)

## 2022-06-02 PROCEDURE — 93010 ELECTROCARDIOGRAM REPORT: CPT

## 2022-06-02 PROCEDURE — 73560 X-RAY EXAM OF KNEE 1 OR 2: CPT | Mod: 26,50

## 2022-06-02 PROCEDURE — 99233 SBSQ HOSP IP/OBS HIGH 50: CPT

## 2022-06-02 PROCEDURE — 71045 X-RAY EXAM CHEST 1 VIEW: CPT | Mod: 26

## 2022-06-02 PROCEDURE — 71250 CT THORAX DX C-: CPT | Mod: 26

## 2022-06-02 RX ORDER — HYDROMORPHONE HYDROCHLORIDE 2 MG/ML
0.25 INJECTION INTRAMUSCULAR; INTRAVENOUS; SUBCUTANEOUS ONCE
Refills: 0 | Status: DISCONTINUED | OUTPATIENT
Start: 2022-06-02 | End: 2022-06-02

## 2022-06-02 RX ORDER — VANCOMYCIN HCL 1 G
1000 VIAL (EA) INTRAVENOUS EVERY 12 HOURS
Refills: 0 | Status: DISCONTINUED | OUTPATIENT
Start: 2022-06-02 | End: 2022-06-03

## 2022-06-02 RX ORDER — HYDROMORPHONE HYDROCHLORIDE 2 MG/ML
2 INJECTION INTRAMUSCULAR; INTRAVENOUS; SUBCUTANEOUS EVERY 4 HOURS
Refills: 0 | Status: DISCONTINUED | OUTPATIENT
Start: 2022-06-02 | End: 2022-06-02

## 2022-06-02 RX ORDER — HYDROMORPHONE HYDROCHLORIDE 2 MG/ML
1 INJECTION INTRAMUSCULAR; INTRAVENOUS; SUBCUTANEOUS ONCE
Refills: 0 | Status: DISCONTINUED | OUTPATIENT
Start: 2022-06-02 | End: 2022-06-02

## 2022-06-02 RX ORDER — HYDROMORPHONE HYDROCHLORIDE 2 MG/ML
2 INJECTION INTRAMUSCULAR; INTRAVENOUS; SUBCUTANEOUS EVERY 4 HOURS
Refills: 0 | Status: DISCONTINUED | OUTPATIENT
Start: 2022-06-02 | End: 2022-06-03

## 2022-06-02 RX ORDER — HYDROMORPHONE HYDROCHLORIDE 2 MG/ML
1 INJECTION INTRAMUSCULAR; INTRAVENOUS; SUBCUTANEOUS EVERY 4 HOURS
Refills: 0 | Status: DISCONTINUED | OUTPATIENT
Start: 2022-06-02 | End: 2022-06-02

## 2022-06-02 RX ADMIN — HYDROMORPHONE HYDROCHLORIDE 2 MILLIGRAM(S): 2 INJECTION INTRAMUSCULAR; INTRAVENOUS; SUBCUTANEOUS at 15:21

## 2022-06-02 RX ADMIN — Medication 50 MILLIGRAM(S): at 05:39

## 2022-06-02 RX ADMIN — LIDOCAINE 1 PATCH: 4 CREAM TOPICAL at 22:14

## 2022-06-02 RX ADMIN — Medication 650 MILLIGRAM(S): at 04:26

## 2022-06-02 RX ADMIN — HYDROMORPHONE HYDROCHLORIDE 1 MILLIGRAM(S): 2 INJECTION INTRAMUSCULAR; INTRAVENOUS; SUBCUTANEOUS at 07:39

## 2022-06-02 RX ADMIN — Medication 650 MILLIGRAM(S): at 22:15

## 2022-06-02 RX ADMIN — HYDROMORPHONE HYDROCHLORIDE 2 MILLIGRAM(S): 2 INJECTION INTRAMUSCULAR; INTRAVENOUS; SUBCUTANEOUS at 20:03

## 2022-06-02 RX ADMIN — SODIUM CHLORIDE 100 MILLILITER(S): 9 INJECTION, SOLUTION INTRAVENOUS at 20:57

## 2022-06-02 RX ADMIN — HYDROMORPHONE HYDROCHLORIDE 2 MILLIGRAM(S): 2 INJECTION INTRAMUSCULAR; INTRAVENOUS; SUBCUTANEOUS at 02:49

## 2022-06-02 RX ADMIN — HYDROMORPHONE HYDROCHLORIDE 2 MILLIGRAM(S): 2 INJECTION INTRAMUSCULAR; INTRAVENOUS; SUBCUTANEOUS at 00:25

## 2022-06-02 RX ADMIN — Medication 650 MILLIGRAM(S): at 05:38

## 2022-06-02 RX ADMIN — HYDROMORPHONE HYDROCHLORIDE 1 MILLIGRAM(S): 2 INJECTION INTRAMUSCULAR; INTRAVENOUS; SUBCUTANEOUS at 09:33

## 2022-06-02 RX ADMIN — HYDROMORPHONE HYDROCHLORIDE 0.25 MILLIGRAM(S): 2 INJECTION INTRAMUSCULAR; INTRAVENOUS; SUBCUTANEOUS at 01:46

## 2022-06-02 RX ADMIN — Medication 650 MILLIGRAM(S): at 20:52

## 2022-06-02 RX ADMIN — HYDROMORPHONE HYDROCHLORIDE 2 MILLIGRAM(S): 2 INJECTION INTRAMUSCULAR; INTRAVENOUS; SUBCUTANEOUS at 20:43

## 2022-06-02 RX ADMIN — PANTOPRAZOLE SODIUM 40 MILLIGRAM(S): 20 TABLET, DELAYED RELEASE ORAL at 05:39

## 2022-06-02 RX ADMIN — HYDROMORPHONE HYDROCHLORIDE 2 MILLIGRAM(S): 2 INJECTION INTRAMUSCULAR; INTRAVENOUS; SUBCUTANEOUS at 15:06

## 2022-06-02 RX ADMIN — CEFEPIME 100 MILLIGRAM(S): 1 INJECTION, POWDER, FOR SOLUTION INTRAMUSCULAR; INTRAVENOUS at 22:15

## 2022-06-02 RX ADMIN — Medication 1 MILLIGRAM(S): at 12:14

## 2022-06-02 RX ADMIN — HYDROMORPHONE HYDROCHLORIDE 1 MILLIGRAM(S): 2 INJECTION INTRAMUSCULAR; INTRAVENOUS; SUBCUTANEOUS at 07:54

## 2022-06-02 RX ADMIN — ENOXAPARIN SODIUM 40 MILLIGRAM(S): 100 INJECTION SUBCUTANEOUS at 00:25

## 2022-06-02 RX ADMIN — LIDOCAINE 1 PATCH: 4 CREAM TOPICAL at 12:14

## 2022-06-02 RX ADMIN — HYDROMORPHONE HYDROCHLORIDE 1 MILLIGRAM(S): 2 INJECTION INTRAMUSCULAR; INTRAVENOUS; SUBCUTANEOUS at 12:13

## 2022-06-02 RX ADMIN — HYDROMORPHONE HYDROCHLORIDE 1 MILLIGRAM(S): 2 INJECTION INTRAMUSCULAR; INTRAVENOUS; SUBCUTANEOUS at 12:28

## 2022-06-02 RX ADMIN — CEFEPIME 100 MILLIGRAM(S): 1 INJECTION, POWDER, FOR SOLUTION INTRAMUSCULAR; INTRAVENOUS at 05:39

## 2022-06-02 RX ADMIN — HYDROMORPHONE HYDROCHLORIDE 1 MILLIGRAM(S): 2 INJECTION INTRAMUSCULAR; INTRAVENOUS; SUBCUTANEOUS at 09:47

## 2022-06-02 RX ADMIN — HYDROMORPHONE HYDROCHLORIDE 2 MILLIGRAM(S): 2 INJECTION INTRAMUSCULAR; INTRAVENOUS; SUBCUTANEOUS at 04:20

## 2022-06-02 RX ADMIN — SENNA PLUS 2 TABLET(S): 8.6 TABLET ORAL at 22:15

## 2022-06-02 RX ADMIN — Medication 250 MILLIGRAM(S): at 18:55

## 2022-06-02 RX ADMIN — HYDROMORPHONE HYDROCHLORIDE 2 MILLIGRAM(S): 2 INJECTION INTRAMUSCULAR; INTRAVENOUS; SUBCUTANEOUS at 05:38

## 2022-06-02 NOTE — PROGRESS NOTE ADULT - SUBJECTIVE AND OBJECTIVE BOX
JAMES VÁSQUEZ 26y Female  MRN#: [id number]   Hospital Day: 7d    SUBJECTIVE  Patient is a 26y old Female who presents with a chief complaint of Acute chest syndrome (01 Jun 2022 10:52)  Currently admitted to medicine with the primary diagnosis of Sickle cell disease, with acute chest syndrome      INTERVAL HPI AND OVERNIGHT EVENTS:  Patient was examined and seen at bedside. This morning she is writing around in bed, shivering and sweating and reports severe BL LE pain     REVIEW OF SYMPTOMS:  CONSTITUTIONAL: as above No headaches  EYES: No visual changes, eye pain, or discharge  ENT: No vertigo; No ear pain or change in hearing; No sore throat or difficulty swallowing  NECK: No pain or stiffness  RESPIRATORY: No cough, wheezing, or hemoptysis; No shortness of breath  CARDIOVASCULAR: No chest pain or palpitations  GASTROINTESTINAL: No abdominal or epigastric pain; No nausea, vomiting, or hematemesis; No diarrhea or constipation; No melena or hematochezia  GENITOURINARY: No dysuria, frequency or hematuria  MUSCULOSKELETAL: as above  NEUROLOGICAL: No numbness or weakness  SKIN: No itching or rashes    OBJECTIVE  PAST MEDICAL & SURGICAL HISTORY  Sickle cell anemia    S/P cholecystectomy      ALLERGIES:  No Known Allergies    MEDICATIONS:  STANDING MEDICATIONS  cefepime   IVPB      cefepime   IVPB 2000 milliGRAM(s) IV Intermittent every 8 hours  chlorhexidine 4% Liquid 1 Application(s) Topical daily  dextrose 5% + sodium chloride 0.45%. 1000 milliLiter(s) IV Continuous <Continuous>  enoxaparin Injectable 40 milliGRAM(s) SubCutaneous every 24 hours  folic acid 1 milliGRAM(s) Oral daily  influenza   Vaccine 0.5 milliLiter(s) IntraMuscular once  levoFLOXacin IVPB 750 milliGRAM(s) IV Intermittent every 24 hours  lidocaine   4% Patch 1 Patch Transdermal daily  losartan 25 milliGRAM(s) Oral daily  pantoprazole    Tablet 40 milliGRAM(s) Oral before breakfast  predniSONE   Tablet 50 milliGRAM(s) Oral daily  senna 2 Tablet(s) Oral at bedtime    PRN MEDICATIONS  acetaminophen     Tablet .. 650 milliGRAM(s) Oral every 6 hours PRN  HYDROmorphone  Injectable 1 milliGRAM(s) IV Push every 4 hours PRN  melatonin 3 milliGRAM(s) Oral at bedtime PRN  ondansetron Injectable 4 milliGRAM(s) IV Push every 8 hours PRN      VITAL SIGNS: Last 24 Hours  T(C): 36.4 (02 Jun 2022 05:00), Max: 36.4 (02 Jun 2022 05:00)  T(F): 97.6 (02 Jun 2022 05:00), Max: 97.6 (02 Jun 2022 05:00)  HR: 69 (02 Jun 2022 05:00) (64 - 80)  BP: 101/63 (02 Jun 2022 05:00) (101/63 - 104/51)  BP(mean): --  RR: 19 (02 Jun 2022 05:00) (18 - 19)  SpO2: 100% (02 Jun 2022 05:00) (98% - 100%)    LABS:                        7.4    17.74 )-----------( 511      ( 01 Jun 2022 06:11 )             21.4     06-01    134<L>  |  99  |  3<L>  ----------------------------<  140<H>  4.4   |  22  |  <0.5<L>    Ca    9.0      01 Jun 2022 06:11    TPro  7.7  /  Alb  3.2<L>  /  TBili  3.2<H>  /  DBili  x   /  AST  32  /  ALT  22  /  AlkPhos  88  06-01      Marvin-Barr Virus Capsid Antigen IgG: Positive  Marvin-Hollis Nuclear Antigen: Positive  Marvin-Barr Virus Early Antigen: Positive    Hepatitis C Virus Interpretation: Nonreact: Hepatitis C AB             RADIOLOGY:  no new imaging    < from: TTE Echo Complete w/o Contrast w/ Doppler (06.01.22 @ 16:34) >  Summary:   1. Normal global left ventricular systolic function.   2. LV Ejection Fraction by Crar's Method with a biplane EF of 63 %.   3. Normal left ventricular internal cavity size.   4. The mean global longitudinal peak strain by speckle tracking is   -20.8% which is normal.   5. Normal left atrial size.   6. Myxomatous mitral valve.   7. Trace mitral valve regurgitation.   8. LA volume Index is 22.0 ml/m² ml/m2.    < end of copied text >       PHYSICAL EXAM:  CONSTITUTIONAL: restless, writing, teeth chattering, diaphoretic  HEAD: Atraumatic, normocephalic  EYES: mildly icteric conjunctiva   ENT: Supple, no masses, no thyromegaly, no bruits, no JVD; moist mucous membranes  PULMONARY: unchanged right sided deminished breath sounds in mid/lower lobes   CARDIOVASCULAR: tachycardica, Regular rhythm; no murmurs, rubs, or gallops  GASTROINTESTINAL: Soft, non-tender, non-distended; bowel sounds present  MUSCULOSKELETAL: 2+ peripheral pulses; no clubbing, no cyanosis, no edema, no erythema/swelling/warmth/decrease rom in knees, hips or ankles. Homman sign negative, no cords or induration  NEUROLOGY: A&Ox4  SKIN: No rashes or lesions; warm and dry. SKIN IS SENSITIVE TO THE TOUCH      JAMES VÁSQUEZ 26y Female  MRN#: [id number]   Hospital Day: 7d    SUBJECTIVE  Patient is a 26y old Female who presents with a chief complaint of Acute chest syndrome (01 Jun 2022 10:52)  Currently admitted to medicine with the primary diagnosis of Sickle cell disease, with acute chest syndrome      INTERVAL HPI AND OVERNIGHT EVENTS:  Patient was examined and seen at bedside. This morning she is writing around in bed, shivering and sweating and reports severe BL LE pain     REVIEW OF SYMPTOMS:  CONSTITUTIONAL: as above No headaches  EYES: No visual changes, eye pain, or discharge  ENT: No vertigo; No ear pain or change in hearing; No sore throat or difficulty swallowing  NECK: No pain or stiffness  RESPIRATORY: No cough, wheezing, or hemoptysis; No shortness of breath  CARDIOVASCULAR: No chest pain or palpitations  GASTROINTESTINAL: No abdominal or epigastric pain; No nausea, vomiting, or hematemesis; No diarrhea or constipation; No melena or hematochezia  GENITOURINARY: No dysuria, frequency or hematuria  MUSCULOSKELETAL: as above  NEUROLOGICAL: No numbness or weakness  SKIN: No itching or rashes    OBJECTIVE  PAST MEDICAL & SURGICAL HISTORY  Sickle cell anemia    S/P cholecystectomy      ALLERGIES:  No Known Allergies    MEDICATIONS:  STANDING MEDICATIONS  cefepime   IVPB      cefepime   IVPB 2000 milliGRAM(s) IV Intermittent every 8 hours  chlorhexidine 4% Liquid 1 Application(s) Topical daily  dextrose 5% + sodium chloride 0.45%. 1000 milliLiter(s) IV Continuous <Continuous>  enoxaparin Injectable 40 milliGRAM(s) SubCutaneous every 24 hours  folic acid 1 milliGRAM(s) Oral daily  influenza   Vaccine 0.5 milliLiter(s) IntraMuscular once  levoFLOXacin IVPB 750 milliGRAM(s) IV Intermittent every 24 hours  lidocaine   4% Patch 1 Patch Transdermal daily  losartan 25 milliGRAM(s) Oral daily  pantoprazole    Tablet 40 milliGRAM(s) Oral before breakfast  predniSONE   Tablet 50 milliGRAM(s) Oral daily  senna 2 Tablet(s) Oral at bedtime    PRN MEDICATIONS  acetaminophen     Tablet .. 650 milliGRAM(s) Oral every 6 hours PRN  HYDROmorphone  Injectable 1 milliGRAM(s) IV Push every 4 hours PRN  melatonin 3 milliGRAM(s) Oral at bedtime PRN  ondansetron Injectable 4 milliGRAM(s) IV Push every 8 hours PRN      VITAL SIGNS: Last 24 Hours  T(C): 36.4 (02 Jun 2022 05:00), Max: 36.4 (02 Jun 2022 05:00)  T(F): 97.6 (02 Jun 2022 05:00), Max: 97.6 (02 Jun 2022 05:00)  HR: 69 (02 Jun 2022 05:00) (64 - 80)  BP: 101/63 (02 Jun 2022 05:00) (101/63 - 104/51)  BP(mean): --  RR: 19 (02 Jun 2022 05:00) (18 - 19)  SpO2: 100% (02 Jun 2022 05:00) (98% - 100%)    LABS:                        7.4    17.74 )-----------( 511      ( 01 Jun 2022 06:11 )             21.4                           7.0    38.46 )-----------( 531      ( 02 Jun 2022 12:12 )             20.5       06-01    134<L>  |  99  |  3<L>  ----------------------------<  140<H>  4.4   |  22  |  <0.5<L>    06-02    132<L>  |  97<L>  |  3<L>  ----------------------------<  140<H>  3.9   |  21  |  <0.5<L>    Ca    8.9      02 Jun 2022 12:12    TPro  7.7  /  Alb  3.2<L>  /  TBili  3.2<H>  /  DBili  x   /  AST  32  /  ALT  22  /  AlkPhos  88  06-01      Ca    9.0      01 Jun 2022 06:11    TPro  7.7  /  Alb  3.2<L>  /  TBili  3.2<H>  /  DBili  x   /  AST  32  /  ALT  22  /  AlkPhos  88  06-01      Marvin-Barr Virus Capsid Antigen IgG: Positive  Marvin-Hollis Nuclear Antigen: Positive  Marvin-Barr Virus Early Antigen: Positive    Hepatitis C Virus Interpretation: Nonreact: Hepatitis C AB             RADIOLOGY:  no new imaging    < from: TTE Echo Complete w/o Contrast w/ Doppler (06.01.22 @ 16:34) >  Summary:   1. Normal global left ventricular systolic function.   2. LV Ejection Fraction by Carr's Method with a biplane EF of 63 %.   3. Normal left ventricular internal cavity size.   4. The mean global longitudinal peak strain by speckle tracking is   -20.8% which is normal.   5. Normal left atrial size.   6. Myxomatous mitral valve.   7. Trace mitral valve regurgitation.   8. LA volume Index is 22.0 ml/m² ml/m2.    < end of copied text >       PHYSICAL EXAM:  CONSTITUTIONAL: restless, writing, teeth chattering, diaphoretic  HEAD: Atraumatic, normocephalic  EYES: mildly icteric conjunctiva   ENT: Supple, no masses, no thyromegaly, no bruits, no JVD; moist mucous membranes  PULMONARY: unchanged right sided deminished breath sounds in mid/lower lobes   CARDIOVASCULAR: tachycardica, Regular rhythm; no murmurs, rubs, or gallops  GASTROINTESTINAL: Soft, non-tender, non-distended; bowel sounds present  MUSCULOSKELETAL: 2+ peripheral pulses; no clubbing, no cyanosis, no edema, no erythema/swelling/warmth/decrease rom in knees, hips or ankles. Homman sign negative, no cords or induration  NEUROLOGY: A&Ox4  SKIN: No rashes or lesions; warm and dry. SKIN IS SENSITIVE TO THE TOUCH

## 2022-06-02 NOTE — PROGRESS NOTE ADULT - SUBJECTIVE AND OBJECTIVE BOX
Patient is a 26y old  Female who presents with a chief complaint of Acute chest syndrome (01 Jun 2022 10:52)      Subjective:      Vital Signs Last 24 Hrs  T(C): 36.4 (02 Jun 2022 05:00), Max: 36.4 (02 Jun 2022 05:00)  T(F): 97.6 (02 Jun 2022 05:00), Max: 97.6 (02 Jun 2022 05:00)  HR: 69 (02 Jun 2022 05:00) (64 - 80)  BP: 101/63 (02 Jun 2022 05:00) (101/63 - 104/51)  BP(mean): --  RR: 19 (02 Jun 2022 05:00) (18 - 19)  SpO2: 100% (02 Jun 2022 05:00) (98% - 100%)    PHYSICAL EXAM  General: adult in NAD  HEENT: clear oropharynx, anicteric sclera, pink conjunctiva  Neck: supple  CV: normal S1/S2 with no murmur rubs or gallops  Lungs: positive air movement b/l ant lungs,clear to auscultation, no wheezes, no rales  Abdomen: soft non-tender non-distended, no hepatosplenomegaly  Ext: no clubbing cyanosis or edema  Skin: no rashes and no petechiae  Neuro: alert and oriented X 4, no focal deficits    MEDICATIONS  (STANDING):  cefepime   IVPB      cefepime   IVPB 2000 milliGRAM(s) IV Intermittent every 8 hours  chlorhexidine 4% Liquid 1 Application(s) Topical daily  dextrose 5% + sodium chloride 0.45%. 1000 milliLiter(s) (100 mL/Hr) IV Continuous <Continuous>  enoxaparin Injectable 40 milliGRAM(s) SubCutaneous every 24 hours  folic acid 1 milliGRAM(s) Oral daily  influenza   Vaccine 0.5 milliLiter(s) IntraMuscular once  levoFLOXacin IVPB 750 milliGRAM(s) IV Intermittent every 24 hours  lidocaine   4% Patch 1 Patch Transdermal daily  losartan 25 milliGRAM(s) Oral daily  pantoprazole    Tablet 40 milliGRAM(s) Oral before breakfast  predniSONE   Tablet 50 milliGRAM(s) Oral daily  senna 2 Tablet(s) Oral at bedtime    MEDICATIONS  (PRN):  acetaminophen     Tablet .. 650 milliGRAM(s) Oral every 6 hours PRN Temp greater or equal to 38C (100.4F), Mild Pain (1 - 3)  HYDROmorphone  Injectable 1 milliGRAM(s) IV Push every 4 hours PRN Severe Pain (7 - 10)  melatonin 3 milliGRAM(s) Oral at bedtime PRN Insomnia  ondansetron Injectable 4 milliGRAM(s) IV Push every 8 hours PRN Nausea and/or Vomiting      LABS:                          7.4    17.74 )-----------( 511      ( 01 Jun 2022 06:11 )             21.4         Mean Cell Volume : 90.7 fL  Mean Cell Hemoglobin : 31.4 pg  Mean Cell Hemoglobin Concentration : 34.6 g/dL  Auto Neutrophil # : 15.11 K/uL  Auto Lymphocyte # : 1.46 K/uL  Auto Monocyte # : 0.51 K/uL  Auto Eosinophil # : 0.20 K/uL  Auto Basophil # : 0.05 K/uL  Auto Neutrophil % : 85.2 %  Auto Lymphocyte % : 8.2 %  Auto Monocyte % : 2.9 %  Auto Eosinophil % : 1.1 %  Auto Basophil % : 0.3 %      Serial CBC's  06-01 @ 06:11  Hct-21.4 / Hgb-7.4 / Plat-511 / RBC-2.36 / WBC-17.74  Serial CBC's  06-01 @ 00:56  Hct-22.1 / Hgb-7.5 / Plat-504 / RBC-2.43 / WBC-19.08  Serial CBC's  05-31 @ 11:44  Hct-17.5 / Hgb-5.8 / Plat-503 / RBC-1.87 / WBC-14.97  Serial CBC's  05-31 @ 08:00  Hct-17.3 / Hgb-5.7 / Plat-488 / RBC-1.86 / WBC-14.06  Serial CBC's  05-29 @ 20:45  Hct-23.3 / Hgb-7.9 / Plat-314 / RBC-2.59 / WBC-17.82      06-01    134<L>  |  99  |  3<L>  ----------------------------<  140<H>  4.4   |  22  |  <0.5<L>    Ca    9.0      01 Jun 2022 06:11    TPro  7.7  /  Alb  3.2<L>  /  TBili  3.2<H>  /  DBili  x   /  AST  32  /  ALT  22  /  AlkPhos  88  06-01          Reticulocyte Percent: 13.6 % (06-01 @ 06:11)  Reticulocyte Percent: 13.4 % (05-31 @ 08:00)  Reticulocyte Percent: 14.9 % (05-26 @ 21:55)         Patient is a 26y old  Female who presents with a chief complaint of Acute chest syndrome (01 Jun 2022 10:52)      Subjective: Pt feels worse today with hypoxia and knee and back pain.       Vital Signs Last 24 Hrs  T(C): 36.4 (02 Jun 2022 05:00), Max: 36.4 (02 Jun 2022 05:00)  T(F): 97.6 (02 Jun 2022 05:00), Max: 97.6 (02 Jun 2022 05:00)  HR: 69 (02 Jun 2022 05:00) (64 - 80)  BP: 101/63 (02 Jun 2022 05:00) (101/63 - 104/51)  BP(mean): --  RR: 19 (02 Jun 2022 05:00) (18 - 19)  SpO2: 100% (02 Jun 2022 05:00) (98% - 100%)    PHYSICAL EXAM  General: adult in NAD  HEENT:  anicteric sclera, pink conjunctiva  Neck: supple  CV: normal S1/S2 with no murmur rubs or gallops  Lungs: positive air movement b/l ant lungs       Abdomen: soft non-tender non-distended   Ext: no clubbing cyanosis or edema  Skin: no rashes and no petechiae  Neuro: alert and oriented X 4    MEDICATIONS  (STANDING):  cefepime   IVPB      cefepime   IVPB 2000 milliGRAM(s) IV Intermittent every 8 hours  chlorhexidine 4% Liquid 1 Application(s) Topical daily  dextrose 5% + sodium chloride 0.45%. 1000 milliLiter(s) (100 mL/Hr) IV Continuous <Continuous>  enoxaparin Injectable 40 milliGRAM(s) SubCutaneous every 24 hours  folic acid 1 milliGRAM(s) Oral daily  influenza   Vaccine 0.5 milliLiter(s) IntraMuscular once  levoFLOXacin IVPB 750 milliGRAM(s) IV Intermittent every 24 hours  lidocaine   4% Patch 1 Patch Transdermal daily  losartan 25 milliGRAM(s) Oral daily  pantoprazole    Tablet 40 milliGRAM(s) Oral before breakfast  predniSONE   Tablet 50 milliGRAM(s) Oral daily  senna 2 Tablet(s) Oral at bedtime    MEDICATIONS  (PRN):  acetaminophen     Tablet .. 650 milliGRAM(s) Oral every 6 hours PRN Temp greater or equal to 38C (100.4F), Mild Pain (1 - 3)  HYDROmorphone  Injectable 1 milliGRAM(s) IV Push every 4 hours PRN Severe Pain (7 - 10)  melatonin 3 milliGRAM(s) Oral at bedtime PRN Insomnia  ondansetron Injectable 4 milliGRAM(s) IV Push every 8 hours PRN Nausea and/or Vomiting      LABS:                          7.4    17.74 )-----------( 511      ( 01 Jun 2022 06:11 )             21.4         Mean Cell Volume : 90.7 fL  Mean Cell Hemoglobin : 31.4 pg  Mean Cell Hemoglobin Concentration : 34.6 g/dL  Auto Neutrophil # : 15.11 K/uL  Auto Lymphocyte # : 1.46 K/uL  Auto Monocyte # : 0.51 K/uL  Auto Eosinophil # : 0.20 K/uL  Auto Basophil # : 0.05 K/uL  Auto Neutrophil % : 85.2 %  Auto Lymphocyte % : 8.2 %  Auto Monocyte % : 2.9 %  Auto Eosinophil % : 1.1 %  Auto Basophil % : 0.3 %      Serial CBC's  06-01 @ 06:11  Hct-21.4 / Hgb-7.4 / Plat-511 / RBC-2.36 / WBC-17.74  Serial CBC's  06-01 @ 00:56  Hct-22.1 / Hgb-7.5 / Plat-504 / RBC-2.43 / WBC-19.08  Serial CBC's  05-31 @ 11:44  Hct-17.5 / Hgb-5.8 / Plat-503 / RBC-1.87 / WBC-14.97  Serial CBC's  05-31 @ 08:00  Hct-17.3 / Hgb-5.7 / Plat-488 / RBC-1.86 / WBC-14.06  Serial CBC's  05-29 @ 20:45  Hct-23.3 / Hgb-7.9 / Plat-314 / RBC-2.59 / WBC-17.82      06-01    134<L>  |  99  |  3<L>  ----------------------------<  140<H>  4.4   |  22  |  <0.5<L>    Ca    9.0      01 Jun 2022 06:11    TPro  7.7  /  Alb  3.2<L>  /  TBili  3.2<H>  /  DBili  x   /  AST  32  /  ALT  22  /  AlkPhos  88  06-01          Reticulocyte Percent: 13.6 % (06-01 @ 06:11)  Reticulocyte Percent: 13.4 % (05-31 @ 08:00)  Reticulocyte Percent: 14.9 % (05-26 @ 21:55)

## 2022-06-02 NOTE — PROGRESS NOTE ADULT - ASSESSMENT
Ms Gonzalez is a 26 RENEE w a PMH of Sickle Cell disease (on oxbryta) w a recent dc following treatment for PNA w sickle cell crisis presented to the ED May 26th for Rt sided chest, shoulder and back pain w mild cough and fever (has been febrile to 103F). Patient was upgraded to ICU on 5/28 for exchange transfusion now s/p 2 U PRBC. Patient was downgraded back to floors and now on Cefepime and levofloxacin w resolving pyrexia. Dr Hobbs performed bedside US to assess for fluid in her lungs on may 31 and stated there was nothing to tap, most likely atalectasis. Heme/Onc saw patient, assessing for warm hemolytic antibodies in blood, started patient on prednsione 50mg. Patient's pain medications were decreased last night. Patient reports crying from pain most of the night and is hyperalgesic today also reporting BL LE pain. Will obtain    Problem List:  #Vaso-occlusive pain crisis w multilobar PNA  no longer requiring o2 after the simple transfusions,   met criteria for exchange transfusion initially (acute symptomatic anemia) however patient declined and improved w 2 U prbc   leukocytosis and thrombocytosis improving, bili still pending.   Pain improving but she feels very fatigued.    Pulmonary: no need for thoracocentesis at this time, likely atelectasis   -Cultures NGTD  -Received 2 units of blood on 5/28/2022 and clinically she is doing much better   -Continue with hydration at one half NS at 100 cc  Given recent hospitalization concern for Pseudomonas will c/w dual coverage  -c/w Cefepime IV 2g q8hr (started 5/27)   -c/w Levofloxacin IV 750mg once daily (started 5/28)  -Tylenol 650 mg for fever >101F  -incentive spirometry q1hr      #Chronic leukocytosis and thrombocytosis likely reactive Stable   #Hx of Hb SS disease -   Was on Oxbryta, just started in April 2022 but hasn't been routinely taking it due to her recent hospitalizations/infections; never was on hydrea or any other HbSS medications  Baseline Hb 7-8, do NOT need to transfuse just for hemoglobin < 7 without clinical changes  She has had 2 crises far this year.   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  - Monitor CBC, bili, retic, LDH  - patient received another U PRBC on May 31 (total of 3 on this admit), Hgb now 7.5  - LDH = 811 from 447, start Prednisone 50mg qd to diminish warm hemolysis response   - trend Hgb (to see if she hemolyses the unit she received 5/31)   - Lori+   -presence of AntiRh antibody in favor of alloimunization instead   -f/u CMV, Hepatitis and EBV for source of warm Ab          #Misc  - DVT Prophylaxis: lovenox   - GI Prophylaxis:NA   - Diet: regular  - Activity: as tolerated   - IV Fluids: D5 1/2 NS at 100cc/hr   - Code Status: Full     Dispo: Ms Gonzalez is a 26 RENEE w a PMH of Sickle Cell disease (on oxbryta) w a recent dc following treatment for PNA w sickle cell crisis presented to the ED May 26th for Rt sided chest, shoulder and back pain w mild cough and fever (has been febrile to 103F). Patient was upgraded to ICU on 5/28 for exchange transfusion now s/p 2 U PRBC. Patient was downgraded back to floors and now on Cefepime and levofloxacin w resolving pyrexia. Dr Hobbs performed bedside US to assess for fluid in her lungs on may 31 and stated there was nothing to tap, most likely atalectasis. Heme/Onc saw patient, assessing for warm hemolytic antibodies in blood, started patient on prednsione 50mg. Patient's pain medications were decreased last night. Patient reports crying from pain most of the night and is hyperalgesic today also reporting BL LE pain. Will obtain BL LE US to r/o dvt (unlikely given BL nature)    Problem List:  #Vaso-occlusive pain crisis w multilobar PNA  no longer requiring o2 after the simple transfusions,   met criteria for exchange transfusion initially (acute symptomatic anemia) however patient declined and improved w 2 U prbc   leukocytosis and thrombocytosis improving, bili still pending.   Pain improving but she feels very fatigued.    Pulmonary: no need for thoracocentesis at this time, likely atelectasis   -Cultures NGTD  -Received 2 units of blood on 5/28/2022 and clinically she is doing much better   -Continue with hydration at one half NS at 100 cc  Given recent hospitalization concern for Pseudomonas will c/w dual coverage  -c/w Cefepime IV 2g q8hr (started 5/27)   -c/w Levofloxacin IV 750mg once daily (started 5/28)  -Tylenol 650 mg for fever >101F  -incentive spirometry q1hr  - BL LE pain June 2, f/u Duplex US to r/o dvt  - Dilaudid IV 1mg q6 PRN       #Chronic leukocytosis and thrombocytosis likely reactive Stable   #Hx of Hb SS disease -   Was on Oxbryta, just started in April 2022 but hasn't been routinely taking it due to her recent hospitalizations/infections; never was on hydrea or any other HbSS medications  Baseline Hb 7-8, do NOT need to transfuse just for hemoglobin < 7 without clinical changes  She has had 2 crises far this year.   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  - Monitor CBC, bili, retic, LDH  - patient received another U PRBC on May 31 (total of 3 on this admit), Hgb now 7.5  - LDH = 811 from 447, start Prednisone 50mg qd to diminish warm hemolysis response   - trend Hgb (to see if she hemolyses the unit she received 5/31)   - Lori+   -presence of AntiRh antibody in favor of alloimunization instead   -f/u CMV  - Hepatitis Negative    - EBV POSITIVE           #Misc  - DVT Prophylaxis: lovenox   - GI Prophylaxis:NA   - Diet: regular  - Activity: as tolerated   - IV Fluids: D5 1/2 NS at 100cc/hr   - Code Status: Full     Dispo: Ms Gonzalez is a 26 RENEE w a PMH of Sickle Cell disease (on oxbryta) w a recent dc following treatment for PNA w sickle cell crisis presented to the ED May 26th for Rt sided chest, shoulder and back pain w mild cough and fever (has been febrile to 103F). Patient was upgraded to ICU on 5/28 for exchange transfusion now s/p 2 U PRBC. Patient was downgraded back to floors and now on Cefepime and levofloxacin w resolving pyrexia. Dr Hobbs performed bedside US to assess for fluid in her lungs on may 31 and stated there was nothing to tap, most likely atalectasis. Heme/Onc saw patient, assessing for warm hemolytic antibodies in blood, started patient on prednsione 50mg. Patient's pain medications were decreased last night. Patient reports crying from pain most of the night and is hyperalgesic today also reporting BL LE pain. Will obtain BL LE US to r/o dvt (unlikely given BL nature). Given WBC 38.46 have reconsulted Heme/Onc and Pulm. Unlikely to be reactive or secondary to steroids. Patient is on adequate pseudomonal coverage      Problem List:  #Vaso-occlusive pain crisis w multilobar PNA  no longer requiring o2 after the simple transfusions,   met criteria for exchange transfusion initially (acute symptomatic anemia) however patient declined and improved w 2 U prbc   leukocytosis and thrombocytosis improving, bili still pending.   Pain improving but she feels very fatigued.    Pulmonary: no need for thoracocentesis at this time, likely atelectasis   -Cultures NGTD  -Received 2 units of blood on 5/28/2022 and clinically she is doing much better   -Continue with hydration at one half NS at 100 cc  Given recent hospitalization concern for Pseudomonas will c/w dual coverage  -c/w Cefepime IV 2g q8hr (started 5/27)   -c/w Levofloxacin IV 750mg once daily (started 5/28)  -Tylenol 650 mg for fever >101F  -incentive spirometry q1hr  - BL LE pain June 2, f/u Duplex US to r/o dvt  - Dilaudid IV 1mg q6 PRN       #Chronic leukocytosis and thrombocytosis likely reactive Stable   #Hx of Hb SS disease -   Was on Oxbryta, just started in April 2022 but hasn't been routinely taking it due to her recent hospitalizations/infections; never was on hydrea or any other HbSS medications  Baseline Hb 7-8, do NOT need to transfuse just for hemoglobin < 7 without clinical changes  She has had 2 crises far this year.   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  - Monitor CBC, bili, retic, LDH  - patient received another U PRBC on May 31 (total of 3 on this admit), Hgb now 7.5  - LDH = 811 from 447, start Prednisone 50mg qd to diminish warm hemolysis response   - trend Hgb (to see if she hemolyses the unit she received 5/31)   - Lori+   -presence of AntiRh antibody in favor of alloimunization instead   -f/u CMV  - Hepatitis Negative    - EBV POSITIVE           #Misc  - DVT Prophylaxis: lovenox   - GI Prophylaxis:NA   - Diet: regular  - Activity: as tolerated   - IV Fluids: D5 1/2 NS at 100cc/hr   - Code Status: Full     Dispo:

## 2022-06-02 NOTE — PROGRESS NOTE ADULT - ASSESSMENT
27yo  F with PMH of Sickle cell anemia (on oxbryta - followed by Hematologist - Dr. Jimenez in [place]), recently discharged after treatment of pneumonia and sickle cell crisis, presented to the ED with c/o right sided chest, shoulder and upper back pain.  She also reports fever at home 102F.  She was recently treated for sickle cell crisis and pneumonia and discharged last week on Levaquin PO.    Hematology consulted for r/o ACS with hx of sickle cell disease.     IMPRESSION:    #ACS/Vaso-occlusive pain crisis/PNA  #Hx of Hb SS disease - Was on Oxbryta, just started in April 2022 but hasn't been routinely taking it due to her recent hospitalizations/infections; never was on hydrea or any other HbSS medications    5/30: Remains febrile but not as high grade. She is no longer requiring o2 after the simple transfusions, and her leukocytosis and thrombocytosis is improving, bili still pending. Her pain seems better controlled but she feels vey fatigued. Recommend to continue with IV pain meds and supportive care. We do not recommend an exchange or simple transfusion at this time.  Continue with abx per ID and critical care.   - Initially offered both exchange as patient was in high 80s on RA and simple transfusion however she declined, eventualy she agreed to a simple transfusion.    -Baseline Hb 7-8  -She has had 2 crises far this year.   - Hb electrophoresis from 5/21 showed 90% HbS    #Possible autoimmune hemolytic anemia   Direct Lori Profile (05.31.22 @ 08:00)    Dir Antiglob Polyspecific Interpretation: POS  - Spoke with Dr Burroughs from transfusion med, it is hard to call if pt has alloAb or autoab given chronic hemolysis    #Chronic leukocytosis and thrombocytosis: Resolving  - likely reactive     RECOMMENDATIONS:  - No longer requiring O2, will hold off on exchange transfusion for now  - s/p 1U PRBC on 5/31 with repeat Hb 7.4  - Started Prednisone 50mg daily with GI ppx on 5/31: Continue  - Please check viral panel: CMV, EBV, hepatitis  - Check daily LDH. LDH 5/31: 811  - C/w pain meds to IV Dilaudid 1mg q3hr. Please ensure that patient is discharged on adequate pain control regimen.   - Antibiotics for multilobar opacities.   - C/w folic acid supplementation  - She can continue Oxbryta as outpatient and follow up with her primary hematologist post discharge.  - Monitor hemolysis labs and CBC    DVT ppx: Lovenox        25yo  F with PMH of Sickle cell anemia (on oxbryta - followed by Hematologist - Dr. Jimenez in [place]), recently discharged after treatment of pneumonia and sickle cell crisis, presented to the ED with c/o right sided chest, shoulder and upper back pain.  She also reports fever at home 102F.  She was recently treated for sickle cell crisis and pneumonia and discharged last week on Levaquin PO.    Hematology consulted for r/o ACS with hx of sickle cell disease.     IMPRESSION:    #ACS/Vaso-occlusive pain crisis/PNA  #Hx of Hb SS disease - Was on Oxbryta, just started in April 2022 but hasn't been routinely taking it due to her recent hospitalizations/infections; never was on hydrea or any other HbSS medications    5/30: Remains febrile but not as high grade. She is no longer requiring o2 after the simple transfusions, and her leukocytosis and thrombocytosis is improving, bili still pending. Her pain seems better controlled but she feels vey fatigued. Recommend to continue with IV pain meds and supportive care. We do not recommend an exchange or simple transfusion at this time.  Continue with abx per ID and critical care.   - Initially offered both exchange as patient was in high 80s on RA and simple transfusion however she declined, eventualy she agreed to a simple transfusion.    -Baseline Hb 7-8  -She has had 2 crises far this year.   - Hb electrophoresis from 5/21 showed 90% HbS    #Possible autoimmune hemolytic anemia   Direct Lori Profile (05.31.22 @ 08:00)    Dir Antiglob Polyspecific Interpretation: POS  - Spoke with Dr Burroughs from transfusion med, it is hard to call if pt has alloAb or autoab given chronic hemolysis    #Chronic leukocytosis and thrombocytosis: Resolving  - likely reactive     RECOMMENDATIONS:  - If hypoxia worsens and CXR shows worse infiltrates, we would recommend udall placement and RBC exchange. Pt agreeable. Please let hematology know if pt's clinical condition worsens.   - C/w Prednisone 50mg daily with GI ppx; started on 5/31: will likely do 2 weeks of 1mg/kg dose followed by quick taper.   - Viral panel noted: past EBV infection, hep panel non reactive  - Check daily LDH. LDH trending up  - C/w pain meds:  IV Dilaudid 2mg q4hr.   - Antibiotics for multilobar opacities.   - C/w folic acid supplementation  - She can continue Oxbryta as outpatient and follow up with her primary hematologist post discharge.  - Monitor hemolysis labs and CBC    DVT ppx: Lovenox

## 2022-06-03 ENCOUNTER — RESULT REVIEW (OUTPATIENT)
Age: 27
End: 2022-06-03

## 2022-06-03 LAB
ANION GAP SERPL CALC-SCNC: 14 MMOL/L — SIGNIFICANT CHANGE UP (ref 7–14)
ANISOCYTOSIS BLD QL: SIGNIFICANT CHANGE UP
BASOPHILS # BLD AUTO: 0 K/UL — SIGNIFICANT CHANGE UP (ref 0–0.2)
BASOPHILS NFR BLD AUTO: 0 % — SIGNIFICANT CHANGE UP (ref 0–1)
BUN SERPL-MCNC: 4 MG/DL — LOW (ref 10–20)
CALCIUM SERPL-MCNC: 8.8 MG/DL — SIGNIFICANT CHANGE UP (ref 8.5–10.1)
CHLORIDE SERPL-SCNC: 95 MMOL/L — LOW (ref 98–110)
CHOLEST SERPL-MCNC: 88 MG/DL — SIGNIFICANT CHANGE UP
CK MB CFR SERPL CALC: <1 NG/ML — SIGNIFICANT CHANGE UP (ref 0.6–6.3)
CO2 SERPL-SCNC: 22 MMOL/L — SIGNIFICANT CHANGE UP (ref 17–32)
CREAT SERPL-MCNC: <0.5 MG/DL — LOW (ref 0.7–1.5)
EGFR: 142 ML/MIN/1.73M2 — SIGNIFICANT CHANGE UP
EOSINOPHIL # BLD AUTO: 0 K/UL — SIGNIFICANT CHANGE UP (ref 0–0.7)
EOSINOPHIL NFR BLD AUTO: 0 % — SIGNIFICANT CHANGE UP (ref 0–8)
ERYTHROCYTE [SEDIMENTATION RATE] IN BLOOD: >140 MM/HR — HIGH (ref 0–20)
GIANT PLATELETS BLD QL SMEAR: PRESENT — SIGNIFICANT CHANGE UP
GLUCOSE SERPL-MCNC: 127 MG/DL — HIGH (ref 70–99)
HCT VFR BLD CALC: 20.6 % — LOW (ref 37–47)
HDLC SERPL-MCNC: 57 MG/DL — SIGNIFICANT CHANGE UP
HGB BLD-MCNC: 7.3 G/DL — LOW (ref 12–16)
LDH SERPL L TO P-CCNC: 1244 — HIGH (ref 50–242)
LIPID PNL WITH DIRECT LDL SERPL: 19 MG/DL — SIGNIFICANT CHANGE UP
LYMPHOCYTES # BLD AUTO: 3.62 K/UL — HIGH (ref 1.2–3.4)
LYMPHOCYTES # BLD AUTO: 9.7 % — LOW (ref 20.5–51.1)
MACROCYTES BLD QL: SIGNIFICANT CHANGE UP
MANUAL SMEAR VERIFICATION: SIGNIFICANT CHANGE UP
MCHC RBC-ENTMCNC: 32.3 PG — HIGH (ref 27–31)
MCHC RBC-ENTMCNC: 35.4 G/DL — SIGNIFICANT CHANGE UP (ref 32–37)
MCV RBC AUTO: 91.2 FL — SIGNIFICANT CHANGE UP (ref 81–99)
MICROCYTES BLD QL: SLIGHT — SIGNIFICANT CHANGE UP
MONOCYTES # BLD AUTO: 1.64 K/UL — HIGH (ref 0.1–0.6)
MONOCYTES NFR BLD AUTO: 4.4 % — SIGNIFICANT CHANGE UP (ref 1.7–9.3)
MYELOCYTES NFR BLD: 0.9 % — HIGH (ref 0–0)
NEUTROPHILS # BLD AUTO: 31.74 K/UL — HIGH (ref 1.4–6.5)
NEUTROPHILS NFR BLD AUTO: 85 % — HIGH (ref 42.2–75.2)
NON HDL CHOLESTEROL: 31 MG/DL — SIGNIFICANT CHANGE UP
NRBC # BLD: 9 /100 — HIGH (ref 0–0)
NRBC # BLD: SIGNIFICANT CHANGE UP /100 WBCS (ref 0–0)
NT-PROBNP SERPL-SCNC: 935 PG/ML — HIGH (ref 0–300)
PLAT MORPH BLD: ABNORMAL
PLATELET # BLD AUTO: 447 K/UL — HIGH (ref 130–400)
POLYCHROMASIA BLD QL SMEAR: SLIGHT — SIGNIFICANT CHANGE UP
POTASSIUM SERPL-MCNC: 3.3 MMOL/L — LOW (ref 3.5–5)
POTASSIUM SERPL-SCNC: 3.3 MMOL/L — LOW (ref 3.5–5)
PROCALCITONIN SERPL-MCNC: 0.1 NG/ML — SIGNIFICANT CHANGE UP (ref 0.02–0.1)
RBC # BLD: 2.26 M/UL — LOW (ref 4.2–5.4)
RBC # BLD: 2.26 M/UL — LOW (ref 4.2–5.4)
RBC # FLD: 26.2 % — HIGH (ref 11.5–14.5)
RBC BLD AUTO: ABNORMAL
RETICS #: 346.2 K/UL — HIGH (ref 25–125)
RETICS/RBC NFR: 15.3 % — HIGH (ref 0.5–1.5)
SODIUM SERPL-SCNC: 131 MMOL/L — LOW (ref 135–146)
TRIGL SERPL-MCNC: 59 MG/DL — SIGNIFICANT CHANGE UP
TROPONIN T SERPL-MCNC: <0.01 NG/ML — SIGNIFICANT CHANGE UP
WBC # BLD: 37.34 K/UL — HIGH (ref 4.8–10.8)
WBC # FLD AUTO: 37.34 K/UL — HIGH (ref 4.8–10.8)

## 2022-06-03 PROCEDURE — 93970 EXTREMITY STUDY: CPT | Mod: 26

## 2022-06-03 PROCEDURE — 99233 SBSQ HOSP IP/OBS HIGH 50: CPT

## 2022-06-03 PROCEDURE — 71045 X-RAY EXAM CHEST 1 VIEW: CPT | Mod: 26

## 2022-06-03 RX ORDER — HYDROMORPHONE HYDROCHLORIDE 2 MG/ML
2 INJECTION INTRAMUSCULAR; INTRAVENOUS; SUBCUTANEOUS
Refills: 0 | Status: DISCONTINUED | OUTPATIENT
Start: 2022-06-03 | End: 2022-06-09

## 2022-06-03 RX ORDER — LINEZOLID 600 MG/300ML
600 INJECTION, SOLUTION INTRAVENOUS EVERY 12 HOURS
Refills: 0 | Status: DISCONTINUED | OUTPATIENT
Start: 2022-06-03 | End: 2022-06-07

## 2022-06-03 RX ORDER — MEROPENEM 1 G/30ML
1000 INJECTION INTRAVENOUS EVERY 8 HOURS
Refills: 0 | Status: DISCONTINUED | OUTPATIENT
Start: 2022-06-03 | End: 2022-06-04

## 2022-06-03 RX ADMIN — PANTOPRAZOLE SODIUM 40 MILLIGRAM(S): 20 TABLET, DELAYED RELEASE ORAL at 05:44

## 2022-06-03 RX ADMIN — Medication 250 MILLIGRAM(S): at 05:45

## 2022-06-03 RX ADMIN — SODIUM CHLORIDE 100 MILLILITER(S): 9 INJECTION, SOLUTION INTRAVENOUS at 08:08

## 2022-06-03 RX ADMIN — LINEZOLID 300 MILLIGRAM(S): 600 INJECTION, SOLUTION INTRAVENOUS at 17:26

## 2022-06-03 RX ADMIN — CEFEPIME 100 MILLIGRAM(S): 1 INJECTION, POWDER, FOR SOLUTION INTRAMUSCULAR; INTRAVENOUS at 05:45

## 2022-06-03 RX ADMIN — HYDROMORPHONE HYDROCHLORIDE 2 MILLIGRAM(S): 2 INJECTION INTRAMUSCULAR; INTRAVENOUS; SUBCUTANEOUS at 11:45

## 2022-06-03 RX ADMIN — Medication 650 MILLIGRAM(S): at 14:51

## 2022-06-03 RX ADMIN — HYDROMORPHONE HYDROCHLORIDE 2 MILLIGRAM(S): 2 INJECTION INTRAMUSCULAR; INTRAVENOUS; SUBCUTANEOUS at 14:24

## 2022-06-03 RX ADMIN — HYDROMORPHONE HYDROCHLORIDE 2 MILLIGRAM(S): 2 INJECTION INTRAMUSCULAR; INTRAVENOUS; SUBCUTANEOUS at 22:57

## 2022-06-03 RX ADMIN — HYDROMORPHONE HYDROCHLORIDE 2 MILLIGRAM(S): 2 INJECTION INTRAMUSCULAR; INTRAVENOUS; SUBCUTANEOUS at 01:05

## 2022-06-03 RX ADMIN — SODIUM CHLORIDE 100 MILLILITER(S): 9 INJECTION, SOLUTION INTRAVENOUS at 21:43

## 2022-06-03 RX ADMIN — LIDOCAINE 1 PATCH: 4 CREAM TOPICAL at 22:55

## 2022-06-03 RX ADMIN — HYDROMORPHONE HYDROCHLORIDE 2 MILLIGRAM(S): 2 INJECTION INTRAMUSCULAR; INTRAVENOUS; SUBCUTANEOUS at 00:03

## 2022-06-03 RX ADMIN — HYDROMORPHONE HYDROCHLORIDE 2 MILLIGRAM(S): 2 INJECTION INTRAMUSCULAR; INTRAVENOUS; SUBCUTANEOUS at 12:01

## 2022-06-03 RX ADMIN — ENOXAPARIN SODIUM 40 MILLIGRAM(S): 100 INJECTION SUBCUTANEOUS at 01:06

## 2022-06-03 RX ADMIN — Medication 650 MILLIGRAM(S): at 11:46

## 2022-06-03 RX ADMIN — HYDROMORPHONE HYDROCHLORIDE 2 MILLIGRAM(S): 2 INJECTION INTRAMUSCULAR; INTRAVENOUS; SUBCUTANEOUS at 04:23

## 2022-06-03 RX ADMIN — HYDROMORPHONE HYDROCHLORIDE 2 MILLIGRAM(S): 2 INJECTION INTRAMUSCULAR; INTRAVENOUS; SUBCUTANEOUS at 14:09

## 2022-06-03 RX ADMIN — CEFEPIME 100 MILLIGRAM(S): 1 INJECTION, POWDER, FOR SOLUTION INTRAMUSCULAR; INTRAVENOUS at 13:23

## 2022-06-03 RX ADMIN — HYDROMORPHONE HYDROCHLORIDE 2 MILLIGRAM(S): 2 INJECTION INTRAMUSCULAR; INTRAVENOUS; SUBCUTANEOUS at 08:05

## 2022-06-03 RX ADMIN — HYDROMORPHONE HYDROCHLORIDE 2 MILLIGRAM(S): 2 INJECTION INTRAMUSCULAR; INTRAVENOUS; SUBCUTANEOUS at 16:54

## 2022-06-03 RX ADMIN — HYDROMORPHONE HYDROCHLORIDE 2 MILLIGRAM(S): 2 INJECTION INTRAMUSCULAR; INTRAVENOUS; SUBCUTANEOUS at 23:08

## 2022-06-03 RX ADMIN — CHLORHEXIDINE GLUCONATE 1 APPLICATION(S): 213 SOLUTION TOPICAL at 11:49

## 2022-06-03 RX ADMIN — Medication 50 MILLIGRAM(S): at 05:44

## 2022-06-03 RX ADMIN — HYDROMORPHONE HYDROCHLORIDE 2 MILLIGRAM(S): 2 INJECTION INTRAMUSCULAR; INTRAVENOUS; SUBCUTANEOUS at 20:42

## 2022-06-03 RX ADMIN — HYDROMORPHONE HYDROCHLORIDE 2 MILLIGRAM(S): 2 INJECTION INTRAMUSCULAR; INTRAVENOUS; SUBCUTANEOUS at 16:38

## 2022-06-03 RX ADMIN — LOSARTAN POTASSIUM 25 MILLIGRAM(S): 100 TABLET, FILM COATED ORAL at 05:44

## 2022-06-03 RX ADMIN — HYDROMORPHONE HYDROCHLORIDE 2 MILLIGRAM(S): 2 INJECTION INTRAMUSCULAR; INTRAVENOUS; SUBCUTANEOUS at 18:41

## 2022-06-03 RX ADMIN — HYDROMORPHONE HYDROCHLORIDE 2 MILLIGRAM(S): 2 INJECTION INTRAMUSCULAR; INTRAVENOUS; SUBCUTANEOUS at 18:56

## 2022-06-03 RX ADMIN — LIDOCAINE 1 PATCH: 4 CREAM TOPICAL at 20:32

## 2022-06-03 RX ADMIN — SENNA PLUS 2 TABLET(S): 8.6 TABLET ORAL at 21:31

## 2022-06-03 RX ADMIN — LIDOCAINE 1 PATCH: 4 CREAM TOPICAL at 01:07

## 2022-06-03 RX ADMIN — HYDROMORPHONE HYDROCHLORIDE 2 MILLIGRAM(S): 2 INJECTION INTRAMUSCULAR; INTRAVENOUS; SUBCUTANEOUS at 04:09

## 2022-06-03 RX ADMIN — Medication 1 MILLIGRAM(S): at 11:48

## 2022-06-03 RX ADMIN — HYDROMORPHONE HYDROCHLORIDE 2 MILLIGRAM(S): 2 INJECTION INTRAMUSCULAR; INTRAVENOUS; SUBCUTANEOUS at 08:20

## 2022-06-03 RX ADMIN — LIDOCAINE 1 PATCH: 4 CREAM TOPICAL at 11:54

## 2022-06-03 RX ADMIN — HYDROMORPHONE HYDROCHLORIDE 2 MILLIGRAM(S): 2 INJECTION INTRAMUSCULAR; INTRAVENOUS; SUBCUTANEOUS at 20:30

## 2022-06-03 NOTE — PROGRESS NOTE ADULT - SUBJECTIVE AND OBJECTIVE BOX
Patient is a 26y old  Female who presents with a chief complaint of Acute chest syndrome (03 Jun 2022 08:33)      HPI:  25yo  F with PMH of Sickle cell anemia (on oxbryta - followed by Hematologist - Dr. Jimenez in [place]), recently discharged after treatment of pneumonia and sickle cell crisis, presented to the ED with c/o right sided chest, shoulder and upper back pain. States that she overexerted herself. She states that she has not been feeling well since yesterday, had mild cough and fever upto 102F at home. Denies any shortness of breath, abdominal pain, nausea, vomiting, diarrhea, leg pain, swelling.     ED vitals:   T(F): 102.6 (05-26 @ 23:54), Max: 102.6 (05-26 @ 23:54)  HR: 110 (05-26 @ 23:54) (109 - 117)  BP: 117/55 (05-26 @ 23:54) (110/62 - 117/55)  RR: 18 (05-26 @ 23:54) (18 - 19)  SpO2: 95% (05-26 @ 23:54) (95% - 97%)    ED workup: Hb 7.8, WBC 23.58, Plt 650, Retics 14.9%. Mg 1.5.  She was given LR bolus, Magnesium and morphine in ED. Patient being admitted for management of sickle cell crisis, r/o pneumonia/Acute chest syndrome.  (26 May 2022 23:34)      Occupational Hx:    Social Hx:    PAST MEDICAL & SURGICAL HISTORY:  Sickle cell anemia      S/P cholecystectomy          FAMILY HISTORY:  .  No cardiovascular or pulmonary family history     REVIEW OF SYSTEMS:    All ROS are negative except per HPI     Allergies    No Known Allergies    Intolerances          PHYSICAL EXAM  Vital Signs Last 24 Hrs  T(C): 38.8 (03 Jun 2022 12:48), Max: 38.8 (03 Jun 2022 12:48)  T(F): 101.9 (03 Jun 2022 12:48), Max: 101.9 (03 Jun 2022 12:48)  HR: 91 (03 Jun 2022 12:00) (74 - 92)  BP: 131/67 (03 Jun 2022 12:00) (126/59 - 134/68)  BP(mean): --  RR: 18 (03 Jun 2022 05:17) (18 - 18)  SpO2: 95% (03 Jun 2022 08:05) (94% - 96%)    CONSTITUTIONAL:  In NAD    ENT:   Airway patent,   No thrush    EYES:   Clear bilaterally,   pupils equal,   round and reactive to light.    CARDIAC:   Normal rate,   regular rhythm.    no edema      CAROTID:   normal systolic impulse  no bruits    RESPIRATORY:   No wheezing  Normal chest expansion  Not tachypneic,  No use of accessory muscles    GASTROINTESTINAL:  Abdomen soft,   non-tender,   no guarding,   + BS    MUSCULOSKELETAL:   range of motion is not limited,  no clubbing, cyanosis    NEUROLOGICAL:   Alert and oriented   no motor deficits.    SKIN:   Skin normal color for race,   No evidence of rash.    PSYCHIATRIC:   normal mood and affect.   no apparent risk to self or others.          LABS:                          7.3    37.34 )-----------( 447      ( 03 Jun 2022 06:22 )             20.6                                               06-03    131<L>  |  95<L>  |  4<L>  ----------------------------<  127<H>  3.3<L>   |  22  |  <0.5<L>    Ca    8.8      03 Jun 2022 06:22                                                                                                                                                                                    MEDICATIONS  (STANDING):  cefepime   IVPB      cefepime   IVPB 2000 milliGRAM(s) IV Intermittent every 8 hours  chlorhexidine 4% Liquid 1 Application(s) Topical daily  dextrose 5% + sodium chloride 0.45%. 1000 milliLiter(s) (100 mL/Hr) IV Continuous <Continuous>  enoxaparin Injectable 40 milliGRAM(s) SubCutaneous every 24 hours  folic acid 1 milliGRAM(s) Oral daily  influenza   Vaccine 0.5 milliLiter(s) IntraMuscular once  levoFLOXacin IVPB 750 milliGRAM(s) IV Intermittent every 24 hours  lidocaine   4% Patch 1 Patch Transdermal daily  losartan 25 milliGRAM(s) Oral daily  pantoprazole    Tablet 40 milliGRAM(s) Oral before breakfast  predniSONE   Tablet 50 milliGRAM(s) Oral daily  senna 2 Tablet(s) Oral at bedtime  vancomycin  IVPB 1000 milliGRAM(s) IV Intermittent every 12 hours    MEDICATIONS  (PRN):  acetaminophen     Tablet .. 650 milliGRAM(s) Oral every 6 hours PRN Temp greater or equal to 38C (100.4F), Mild Pain (1 - 3)  HYDROmorphone  Injectable 2 milliGRAM(s) IV Push every 2 hours PRN Severe Pain (7 - 10)  melatonin 3 milliGRAM(s) Oral at bedtime PRN Insomnia  ondansetron Injectable 4 milliGRAM(s) IV Push every 8 hours PRN Nausea and/or Vomiting      X-Rays reviewed:    CXR interpreted by me:    BNP: Serum Pro-Brain Natriuretic Peptide: 71 pg/mL (05-20-22 @ 13:22)    D-Dimer: D-Dimer Assay, Quantitative: 7185 ng/mL DDU (06-02-22 @ 17:19)  D-Dimer Assay, Quantitative: 1093 ng/mL DDU (05-27-22 @ 00:40)  D-Dimer Assay, Quantitative: 727 ng/mL DDU (05-20-22 @ 21:41)    VDUS: NO DVT Patient is a 26y old  Female who presents with a chief complaint of Acute chest syndrome (03 Jun 2022 13:02)        Over Night Events:        ROS:  See HPI    PHYSICAL EXAM    ICU Vital Signs Last 24 Hrs  T(C): 38.8 (03 Jun 2022 12:48), Max: 38.8 (03 Jun 2022 12:48)  T(F): 101.9 (03 Jun 2022 12:48), Max: 101.9 (03 Jun 2022 12:48)  HR: 91 (03 Jun 2022 12:00) (74 - 92)  BP: 131/67 (03 Jun 2022 12:00) (126/59 - 134/68)  BP(mean): --  ABP: --  ABP(mean): --  RR: 18 (03 Jun 2022 05:17) (18 - 18)  SpO2: 95% (03 Jun 2022 08:05) (94% - 96%)          CONSTITUTIONAL:  Ill appearing    ENT:   Airway patent,   No thrush    EYES:   Clear bilaterally,   pupils equal,   round and reactive to light.    CARDIAC:   Normal rate,   regular rhythm.    no edema      CAROTID:   normal systolic impulse  no bruits    RESPIRATORY:   Tachypneic  No wheezing  Normal chest expansion  No use of accessory muscles    GASTROINTESTINAL:  Abdomen soft,   non-tender,   no guarding,   + BS    MUSCULOSKELETAL:   tender knees to palpation  range of motion is not limited,  no clubbing, cyanosis    NEUROLOGICAL:   Alert and oriented , lethargic  no motor deficits.    SKIN:   Skin normal color for race,   No evidence of rash.    PSYCHIATRIC:   normal mood and affect.   no apparent risk to self or others.        LABS:                            7.3    37.34 )-----------( 447      ( 03 Jun 2022 06:22 )             20.6                                               06-03    131<L>  |  95<L>  |  4<L>  ----------------------------<  127<H>  3.3<L>   |  22  |  <0.5<L>    Ca    8.8      03 Jun 2022 06:22                                                   CARDIAC MARKERS ( 03 Jun 2022 11:04 )  x     / <0.01 ng/mL / x     / x     / <1.0 ng/mL                                                       D-Dimer Assay, Quantitative: 7185 ng/mL DDU (06-02-22 @ 17:19)  Procalcitonin, Serum: 0.09 ng/mL (05-27-22 @ 11:46)  D-Dimer Assay, Quantitative: 1093 ng/mL DDU (05-27-22 @ 00:40)  Procalcitonin, Serum: 0.09 ng/mL (05-21-22 @ 12:12)                                                                                                                             MEDICATIONS  (STANDING):  cefepime   IVPB      cefepime   IVPB 2000 milliGRAM(s) IV Intermittent every 8 hours  chlorhexidine 4% Liquid 1 Application(s) Topical daily  dextrose 5% + sodium chloride 0.45%. 1000 milliLiter(s) (100 mL/Hr) IV Continuous <Continuous>  enoxaparin Injectable 40 milliGRAM(s) SubCutaneous every 24 hours  folic acid 1 milliGRAM(s) Oral daily  influenza   Vaccine 0.5 milliLiter(s) IntraMuscular once  levoFLOXacin IVPB 750 milliGRAM(s) IV Intermittent every 24 hours  lidocaine   4% Patch 1 Patch Transdermal daily  losartan 25 milliGRAM(s) Oral daily  pantoprazole    Tablet 40 milliGRAM(s) Oral before breakfast  predniSONE   Tablet 50 milliGRAM(s) Oral daily  senna 2 Tablet(s) Oral at bedtime  vancomycin  IVPB 1000 milliGRAM(s) IV Intermittent every 12 hours    MEDICATIONS  (PRN):  acetaminophen     Tablet .. 650 milliGRAM(s) Oral every 6 hours PRN Temp greater or equal to 38C (100.4F), Mild Pain (1 - 3)  HYDROmorphone  Injectable 2 milliGRAM(s) IV Push every 2 hours PRN Severe Pain (7 - 10)  melatonin 3 milliGRAM(s) Oral at bedtime PRN Insomnia  ondansetron Injectable 4 milliGRAM(s) IV Push every 8 hours PRN Nausea and/or Vomiting      Xrays:                                                                                     ECHO     Patient is a 26y old  Female who presents with a chief complaint of Acute chest syndrome (03 Jun 2022 13:02)        Over Night Events:  Febrile.  NO SOB.  Off pressors.  on RA         ROS:  See HPI    PHYSICAL EXAM    ICU Vital Signs Last 24 Hrs  T(C): 38.8 (03 Jun 2022 12:48), Max: 38.8 (03 Jun 2022 12:48)  T(F): 101.9 (03 Jun 2022 12:48), Max: 101.9 (03 Jun 2022 12:48)  HR: 91 (03 Jun 2022 12:00) (74 - 92)  BP: 131/67 (03 Jun 2022 12:00) (126/59 - 134/68)  BP(mean): --  ABP: --  ABP(mean): --  RR: 18 (03 Jun 2022 05:17) (18 - 18)  SpO2: 95% (03 Jun 2022 08:05) (94% - 96%)          CONSTITUTIONAL:  Ill appearing    ENT:   Airway patent,   No thrush    EYES:   Clear bilaterally,   pupils equal,   round and reactive to light.    CARDIAC:   Normal rate,   regular rhythm.    no edema      RESPIRATORY:   Tachypneic  No wheezing  Normal chest expansion  No use of accessory muscles    GASTROINTESTINAL:  Abdomen soft,   non-tender,   no guarding,   + BS    MUSCULOSKELETAL:   tender knees to palpation  range of motion is not limited,  no clubbing, cyanosis    NEUROLOGICAL:   Alert and oriented , lethargic  no motor deficits.    SKIN:   Skin normal color for race,   No evidence of rash.    PSYCHIATRIC:   normal mood and affect.   no apparent risk to self or others.        LABS:                            7.3    37.34 )-----------( 447      ( 03 Jun 2022 06:22 )             20.6                                               06-03    131<L>  |  95<L>  |  4<L>  ----------------------------<  127<H>  3.3<L>   |  22  |  <0.5<L>    Ca    8.8      03 Jun 2022 06:22                                                   CARDIAC MARKERS ( 03 Jun 2022 11:04 )  x     / <0.01 ng/mL / x     / x     / <1.0 ng/mL                                                       D-Dimer Assay, Quantitative: 7185 ng/mL DDU (06-02-22 @ 17:19)  Procalcitonin, Serum: 0.09 ng/mL (05-27-22 @ 11:46)  D-Dimer Assay, Quantitative: 1093 ng/mL DDU (05-27-22 @ 00:40)  Procalcitonin, Serum: 0.09 ng/mL (05-21-22 @ 12:12)                                                                                                                             MEDICATIONS  (STANDING):  cefepime   IVPB      cefepime   IVPB 2000 milliGRAM(s) IV Intermittent every 8 hours  chlorhexidine 4% Liquid 1 Application(s) Topical daily  dextrose 5% + sodium chloride 0.45%. 1000 milliLiter(s) (100 mL/Hr) IV Continuous <Continuous>  enoxaparin Injectable 40 milliGRAM(s) SubCutaneous every 24 hours  folic acid 1 milliGRAM(s) Oral daily  influenza   Vaccine 0.5 milliLiter(s) IntraMuscular once  levoFLOXacin IVPB 750 milliGRAM(s) IV Intermittent every 24 hours  lidocaine   4% Patch 1 Patch Transdermal daily  losartan 25 milliGRAM(s) Oral daily  pantoprazole    Tablet 40 milliGRAM(s) Oral before breakfast  predniSONE   Tablet 50 milliGRAM(s) Oral daily  senna 2 Tablet(s) Oral at bedtime  vancomycin  IVPB 1000 milliGRAM(s) IV Intermittent every 12 hours    MEDICATIONS  (PRN):  acetaminophen     Tablet .. 650 milliGRAM(s) Oral every 6 hours PRN Temp greater or equal to 38C (100.4F), Mild Pain (1 - 3)  HYDROmorphone  Injectable 2 milliGRAM(s) IV Push every 2 hours PRN Severe Pain (7 - 10)  melatonin 3 milliGRAM(s) Oral at bedtime PRN Insomnia  ondansetron Injectable 4 milliGRAM(s) IV Push every 8 hours PRN Nausea and/or Vomiting      Xrays:                                                                                     ECHO

## 2022-06-03 NOTE — CHART NOTE - NSCHARTNOTEFT_GEN_A_CORE
Transfer Note    Transfer from: 3A  Transfer to:  (  ) Medicine    (  ) Telemetry    (  ) RCU    (  ) Palliative    (  ) Stroke Unit    ( X ) __MICU_____________  Accepting physican:    MEDICATIONS:  STANDING MEDICATIONS  cefepime   IVPB      cefepime   IVPB 2000 milliGRAM(s) IV Intermittent every 8 hours  chlorhexidine 4% Liquid 1 Application(s) Topical daily  dextrose 5% + sodium chloride 0.45%. 1000 milliLiter(s) IV Continuous <Continuous>  enoxaparin Injectable 40 milliGRAM(s) SubCutaneous every 24 hours  folic acid 1 milliGRAM(s) Oral daily  influenza   Vaccine 0.5 milliLiter(s) IntraMuscular once  levoFLOXacin IVPB 750 milliGRAM(s) IV Intermittent every 24 hours  lidocaine   4% Patch 1 Patch Transdermal daily  losartan 25 milliGRAM(s) Oral daily  pantoprazole    Tablet 40 milliGRAM(s) Oral before breakfast  predniSONE   Tablet 50 milliGRAM(s) Oral daily  senna 2 Tablet(s) Oral at bedtime  vancomycin  IVPB 1000 milliGRAM(s) IV Intermittent every 12 hours    PRN MEDICATIONS  acetaminophen     Tablet .. 650 milliGRAM(s) Oral every 6 hours PRN  HYDROmorphone  Injectable 2 milliGRAM(s) IV Push every 2 hours PRN  melatonin 3 milliGRAM(s) Oral at bedtime PRN  ondansetron Injectable 4 milliGRAM(s) IV Push every 8 hours PRN      VITAL SIGNS: Last 24 Hours  T(C): 38.8 (03 Jun 2022 12:48), Max: 38.8 (03 Jun 2022 12:48)  T(F): 101.9 (03 Jun 2022 12:48), Max: 101.9 (03 Jun 2022 12:48)  HR: 91 (03 Jun 2022 12:00) (74 - 92)  BP: 131/67 (03 Jun 2022 12:00) (126/59 - 134/68)  BP(mean): --  RR: 18 (03 Jun 2022 05:17) (18 - 18)  SpO2: 95% (03 Jun 2022 08:05) (94% - 96%)    LABS:                        7.3    37.34 )-----------( 447      ( 03 Jun 2022 06:22 )             20.6     06-03    131<L>  |  95<L>  |  4<L>  ----------------------------<  127<H>  3.3<L>   |  22  |  <0.5<L>    Ca    8.8      03 Jun 2022 06:22              CARDIAC MARKERS ( 03 Jun 2022 11:04 )  x     / <0.01 ng/mL / x     / x     / <1.0 ng/mL      RADIOLOGY:  < from: [facility] Duplex Lower Ext Vein Scan, Bilat (06.03.22 @ 10:09) >    Impression:    No evidence of deep venous thrombosis or superficial thrombophlebitis in   the bilateral lower extremities.    < end of copied text >    < from: Xray Chest 1 View- PORTABLE-Routine (Xray Chest 1 View- PORTABLE-Routine in AM.) (06.03.22 @ 05:46) >    Impression:    Bibasilar opacities redemonstrated.    < end of copied text >    < from: CT Chest No Cont (06.02.22 @ 18:35) >    IMPRESSION:    Redemonstration of patchy bibasilar opacities (suspected to be infectious   in the appropriate clinical setting), noting decreasing left lower lobe   opacities. Increasing small right pleural effusion and increasing right   lower opacities, with likely a component of atelectasis.    Cardiomegaly. Small pericardial effusion.    Previously described prominent hilar lymph nodes on 3/14/2022 not well   evaluated without contrast.    < end of copied text >    < from: TTE Echo Complete w/o Contrast w/ Doppler (06.01.22 @ 16:34) >    Summary:   1. Normal global left ventricular systolic function.   2. LV Ejection Fraction by Carr's Method with a biplane EF of 63 %.   3. Normal left ventricular internal cavity size.   4. The mean global longitudinal peak strain by speckle tracking is   -20.8% which is normal.   5. Normal left atrial size.   6. Myxomatous mitral valve.   7. Trace mitral valve regurgitation.   8. LA volume Index is 22.0 ml/m² ml/m2.    PHYSICIAN INTERPRETATION:  Left Ventricle: The left ventricular internal cavity size is normal. Left   ventricular wall thickness is normal. There is no left ventricular   hypertrophy. Global LV systolic function was normal. Spectral Doppler   shows normal pattern of LV diastolic filling. Normal LV filling   pressures. The mean global longitudinal peak strain by speckle tracking   is -20.8% which is normal.    < end of copied text >                  ASSESSMENT & PLAN:   Ms Gonzalez is a 26 RENEE w a PMH of Sickle Cell disease (on oxbryta) w a recent dc following treatment for PNA w sickle cell crisis presented to the ED May 26th for Rt sided chest, shoulder and back pain w mild cough and fever (has been febrile to 103F). Patient was upgraded to ICU on 5/28 for exchange transfusion now s/p 2 U PRBC. Patient was downgraded back to floors and now on Cefepime and levofloxacin w resolving pyrexia. Dr Hobbs performed bedside US to assess for fluid in her lungs on may 31 and stated there was nothing to tap, most likely atalectasis. Heme/Onc saw patient, assessing for warm hemolytic antibodies in blood, started patient on prednsione 50mg. Patient's pain medications were decreased last night. Patient reports crying from pain most of the night and is hyperalgesic today also reporting BL LE pain. Will obtain BL LE US to r/o dvt (unlikely given BL nature). Given WBC 38.46 have reconsulted Heme/Onc and Pulm. Heme/Onc felt leukocytosis was secondary to steroids and reactive to sickle cell crisis, she is not a candidate at this time for exchange transfusion due to her lack of Hypoxia; suggested increasing IV dilaudid to 2mg q2hr PRN . Patient is on adequate pseudomonal coverage, ID reconsulted. As of June 2 pain crisis isolated to BL knees. June 3rd patient has temp of 101.2 w/ increasing proinflamary markers.     Problem List:  #Vaso-occlusive pain crisis w multilobar PNA  no longer requiring o2 after the simple transfusions,   met criteria for exchange transfusion initially (acute symptomatic anemia) however patient declined and improved w 2 U prbc   leukocytosis and thrombocytosis improving, bili still pending.   Pulmonary: no need for thoracocentesis at this time, likely atelectasis   -Cultures NGTD  -Received 2 units of blood on 5/28/2022 and clinically she is doing much better   -Continue with hydration at one half NS at 100 cc  Given recent hospitalization concern for Pseudomonas will c/w dual coverage  -c/w Cefepime IV 2g q8hr (started 5/27)   -c/w Levofloxacin IV 750mg once daily (started 5/28)  -c/w Vancomycin 1g q 12hr  -Tylenol 650 mg for fever >101F  -incentive spirometry q1hr  - BL LE pain June 2,   - Duplex Negative for DVT  - XR for BL knee where pain crisis is occuring no effusion or isela changes   - Dilaudid IV 2mg q2 PRN for pain crisis      #Chronic leukocytosis and thrombocytosis likely reactive Stable   #Hx of Hb SS disease -   Was on Oxbryta, just started in April 2022 but hasn't been routinely taking it due to her recent hospitalizations/infections; never was on hydrea or any other HbSS medications  Baseline Hb 7-8, do NOT need to transfuse just for hemoglobin < 7 without clinical changes  She has had 2 crises far this year.   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  - Monitor CBC, bili, retic, LDH  - patient received another U PRBC on May 31 (total of 3 on this admit), Hgb now 7.5  - LDH = 811 from 447, start Prednisone 50mg qd to diminish warm hemolysis response   - trend Hgb (to see if she hemolyses the unit she received 5/31)   - Lori+   -presence of AntiRh antibody in favor of alloimunization instead   -f/u CMV  - Hepatitis Negative    - EBV POSITIVE   - D-Dimer 7185  - ESR>140          #Misc  - DVT Prophylaxis: lovenox   - GI Prophylaxis:NA   - Diet: regular  - Activity: as tolerated   - IV Fluids: D5 1/2 NS at 100cc/hr   - Code Status: Full         For Follow-Up:  -f/u Heme/Onc recs (following)  -f/u ID (following)  -f/u Triglyceride, ferratin, HIV, coags  -f/u Troponin, CKMB, BNP  - trend LDH, Hgb, WBC, temp

## 2022-06-03 NOTE — PROGRESS NOTE ADULT - ASSESSMENT
27yo  F with PMH of Sickle cell anemia (on oxbryta - followed by Hematologist - Dr. Jimenez in [place]), recently discharged after treatment of pneumonia and sickle cell crisis, presented to the ED with c/o right sided chest, shoulder and upper back pain.  She also reports fever at home 102F.  She was recently treated for sickle cell crisis and pneumonia and discharged last week on Levaquin PO.    Hematology consulted for r/o ACS with hx of sickle cell disease.     IMPRESSION:    #ACS/Vaso-occlusive pain crisis/PNA  #Hx of Hb SS disease   -Was on Oxbryta, just started in April 2022 but hasn't been routinely taking it due to her recent hospitalizations/infections; never was on hydrea or any other HbSS medications  -Initially offered both exchange as patient was in high 80s on RA and simple transfusion however she declined, eventually she agreed to a simple transfusion.    -Baseline Hb 7-8  -She has had 2 crises far this year.   -Hb electrophoresis from 5/21 showed 90% HbS    #Possible autoimmune hemolytic anemia   Direct Lori Profile (05.31.22 @ 08:00)    Dir Antiglob Polyspecific Interpretation: POS  - Spoke with Dr Burroughs from transfusion med, it is hard to call if pt has alloAb or autoab given chronic hemolysis    #Chronic leukocytosis and thrombocytosis: Resolving  - likely reactive     #Acute right knee pain    RECOMMENDATIONS:  - If hypoxia worsens and CXR shows worse infiltrates, we would recommend udall placement and RBC exchange. Pt agreeable. Please let hematology know if pt's clinical condition worsens.   - C/w Prednisone 50mg daily with GI ppx; started on 5/31: will likely do 2 weeks of 1mg/kg dose followed by quick taper.   - Viral panel noted: past EBV infection, hep panel non reactive  - Check daily LDH. LDH trending up  - C/w pain meds: IV Dilaudid 2mg q4hr. pain mx follow up  - Antibiotics for multilobar opacities.   - C/w folic acid supplementation  - She can continue Oxbryta as outpatient and follow up with her primary hematologist post discharge.  - Monitor hemolysis labs and CBC    DVT ppx: Lovenox      25yo  F with PMH of Sickle cell anemia (on oxbryta - followed by Hematologist - Dr. Jimenez in [place]), recently discharged after treatment of pneumonia and sickle cell crisis, presented to the ED with c/o right sided chest, shoulder and upper back pain.  She also reports fever at home 102F.  She was recently treated for sickle cell crisis and pneumonia and discharged last week on Levaquin PO.    Hematology consulted for r/o ACS with hx of sickle cell disease.     IMPRESSION:    #ACS/Vaso-occlusive pain crisis/PNA  #Hx of Hb SS disease   -Was on Oxbryta, just started in April 2022 but hasn't been routinely taking it due to her recent hospitalizations/infections; never was on hydrea or any other HbSS medications  -Initially offered both exchange as patient was in high 80s on RA and simple transfusion however she declined, eventually she agreed to a simple transfusion.    -Baseline Hb 7-8  -She has had 2 crises far this year.   -Hb electrophoresis from 5/21 showed 90% HbS    #Possible autoimmune hemolytic anemia   Direct Lori Profile (05.31.22 @ 08:00)    Dir Antiglob Polyspecific Interpretation: POS  - Spoke with Dr Burroughs from transfusion med, it is hard to call if pt has alloAb or autoab given chronic hemolysis    #Chronic leukocytosis and thrombocytosis: Resolving  - likely reactive     #Acute right knee pain    RECOMMENDATIONS:  - If hypoxia worsens and CXR shows worse infiltrates, we would recommend udall placement and RBC exchange. Pt agreeable. Please let hematology know if pt's clinical condition worsens.   - C/w Prednisone 50mg daily with GI ppx; started on 5/31: will likely do 2 weeks of 1mg/kg dose followed by quick taper.   - Viral panel noted: past EBV infection, hep panel non reactive  - Check daily LDH. LDH trending up  - C/w pain meds: Increase IV Dilaudid 2mg to q2hr. pain mx follow up  - Antibiotics for multilobar opacities.   - C/w folic acid supplementation  - She can continue Oxbryta as outpatient and follow up with her primary hematologist post discharge.  - Monitor hemolysis labs and CBC    DVT ppx: Lovenox      27yo  F with PMH of Sickle cell anemia (on oxbryta - followed by Hematologist - Dr. Jimenez in [place]), recently discharged after treatment of pneumonia and sickle cell crisis, presented to the ED with c/o right sided chest, shoulder and upper back pain.  She also reports fever at home 102F.  She was recently treated for sickle cell crisis and pneumonia and discharged last week on Levaquin PO.    Hematology consulted for r/o ACS with hx of sickle cell disease.     IMPRESSION:    #ACS/Vaso-occlusive pain crisis/PNA  #Hx of Hb SS disease   -Was on Oxbryta, just started in April 2022 but hasn't been routinely taking it due to her recent hospitalizations/infections; never was on hydrea or any other HbSS medications  -Initially offered both exchange as patient was in high 80s on RA and simple transfusion however she declined, eventually she agreed to a simple transfusion.    -Baseline Hb 7-8  -She has had 2 crises far this year.   -Hb electrophoresis from 5/21 showed 90% HbS    #Possible autoimmune hemolytic anemia   Direct Lori Profile (05.31.22 @ 08:00)    Dir Antiglob Polyspecific Interpretation: POS  - Spoke with Dr Burroughs from transfusion med, it is hard to call if pt has alloAb or autoab given chronic hemolysis    #Chronic leukocytosis and thrombocytosis: Resolving  - likely reactive     #Acute right knee pain    RECOMMENDATIONS:  - If hypoxia worsens and CXR shows worse infiltrates, we would recommend udall placement and RBC exchange. Pt agreeable. Please let hematology know if pt's clinical condition worsens.   - C/w Prednisone 50mg daily with GI ppx; started on 5/31: will likely do 2 weeks of 1mg/kg dose followed by quick taper.   - Viral panel noted: past EBV infection, hep panel non reactive; Check HIV  - Check daily LDH. LDH trending up  - C/w pain meds: Increase IV Dilaudid 2mg to q2hr prn. pain mx follow up  - Antibiotics for multilobar opacities. Pulmonary follow up for increasing b/l opacities/effusions on CT Chest  - C/w folic acid supplementation  - She can continue Oxbryta as outpatient and follow up with her primary hematologist post discharge.  - Monitor hemolysis labs and CBC    DVT ppx: Lovenox

## 2022-06-03 NOTE — PROGRESS NOTE ADULT - ASSESSMENT
IMPRESSION    Sickle cell crisis with new fever  Acute chest syndrome refused exchange transfusion.  SP one Unit PRBC    The present CXR findings are more likely to be related to ACS than a bacterial infection.     Plan     CNS: pain control    HEENT: oral care    PULMONARY: On RA. Encourage Incentive spirometry    CARDIOVASCULAR: C/w D5 1/2 NS at 100 cc/hr    GI: GI prophylaxis.  Feeding     RENAL: f/u lytes and replete    INFECTIOUS DISEASE: IV ABX. Cultures negative to date, repeat procal. Finish course of ABX.  Today last day.  ID follow up. Vancomycin, Cefepime, Levaquin.    HEMATOLOGICAL:  hem f/u. Pain control. Transfuse to target >8.    ENDOCRINE:  Follow up FS.     MUSCULOSKELETAL: OOB, ambulate as tolerated    MICU upgrade IMPRESSION    Sickle cell crisis with new fever and elevetad inflammatory markers ?HLH vs sepsis  Acute chest syndrome refused exchange transfusion    Plan     CNS: pain control    HEENT: oral care    PULMONARY: On RA. Encourage Incentive spirometry    CARDIOVASCULAR: C/w D5 1/2 NS at 100 cc/hr    GI: GI prophylaxis.  Feeding . NPO after midnight for possible bronchoscopy.    RENAL: f/u lytes and replete    INFECTIOUS DISEASE: IV ABX. Cultures negative to date, repeat procal. ID follow up. Vancomycin, Cefepime, Levaquin.    HEMATOLOGICAL:  hem f/u. Pain control. Transfuse to target >8. Check     ENDOCRINE:  Follow up FS.     MUSCULOSKELETAL: OOB, ambulate as tolerated    MICU upgrade IMPRESSION    Sickle cell crisis  Possible superimposed bacterial infection   Acute chest syndrome refused exchange transfusion    Plan     CNS: pain control as needed     HEENT: oral care    PULMONARY: On RA. Encourage Incentive spirometry.  Possible bronchoscopy in am.      CARDIOVASCULAR: C/w D5 1/2 NS at 100 cc/hr    GI: GI prophylaxis.  Feeding . NPO after midnight for possible bronchoscopy.    RENAL: f/u lytes and replete    INFECTIOUS DISEASE: IV ABX. Cultures negative to date, repeat procal. ID follow up. DCW with Dr. Chavis.  Jessica, Zyvox, and Levaquin    HEMATOLOGICAL:  hem f/u. Pain control. Transfuse to target >8. Ferritin level.  DW Dr. Godinez     ENDOCRINE:  Follow up FS.     MUSCULOSKELETAL: OOB    MICU upgrade

## 2022-06-03 NOTE — PROGRESS NOTE ADULT - SUBJECTIVE AND OBJECTIVE BOX
Patient is a 26y old  Female who presents with a chief complaint of Acute chest syndrome (02 Jun 2022 12:21)      Subjective:      Vital Signs Last 24 Hrs  T(C): 37.6 (03 Jun 2022 05:17), Max: 37.8 (02 Jun 2022 20:38)  T(F): 99.6 (03 Jun 2022 05:17), Max: 100 (02 Jun 2022 20:38)  HR: 92 (03 Jun 2022 05:17) (74 - 92)  BP: 134/68 (03 Jun 2022 05:17) (126/59 - 134/68)  BP(mean): --  RR: 18 (03 Jun 2022 05:17) (18 - 18)  SpO2: 94% (03 Jun 2022 05:17) (94% - 96%)    PHYSICAL EXAM  General: adult in NAD  HEENT: anicteric sclera, pink conjunctiva  Neck: supple  CV: normal S1/S2  Lungs: positive air movement b/l ant lungs  Abdomen: soft non-tender non-distended  Ext: no clubbing cyanosis or edema, right knee ttp  Skin: no rashes and no petechiae  Neuro: alert and oriented X 4, no focal deficits    MEDICATIONS  (STANDING):  cefepime   IVPB 2000 milliGRAM(s) IV Intermittent every 8 hours  cefepime   IVPB      chlorhexidine 4% Liquid 1 Application(s) Topical daily  dextrose 5% + sodium chloride 0.45%. 1000 milliLiter(s) (100 mL/Hr) IV Continuous <Continuous>  enoxaparin Injectable 40 milliGRAM(s) SubCutaneous every 24 hours  folic acid 1 milliGRAM(s) Oral daily  influenza   Vaccine 0.5 milliLiter(s) IntraMuscular once  levoFLOXacin IVPB 750 milliGRAM(s) IV Intermittent every 24 hours  lidocaine   4% Patch 1 Patch Transdermal daily  losartan 25 milliGRAM(s) Oral daily  pantoprazole    Tablet 40 milliGRAM(s) Oral before breakfast  predniSONE   Tablet 50 milliGRAM(s) Oral daily  senna 2 Tablet(s) Oral at bedtime  vancomycin  IVPB 1000 milliGRAM(s) IV Intermittent every 12 hours    MEDICATIONS  (PRN):  acetaminophen     Tablet .. 650 milliGRAM(s) Oral every 6 hours PRN Temp greater or equal to 38C (100.4F), Mild Pain (1 - 3)  HYDROmorphone  Injectable 2 milliGRAM(s) IV Push every 4 hours PRN Severe Pain (7 - 10)  melatonin 3 milliGRAM(s) Oral at bedtime PRN Insomnia  ondansetron Injectable 4 milliGRAM(s) IV Push every 8 hours PRN Nausea and/or Vomiting      LABS:                          7.3    37.34 )-----------( 447      ( 03 Jun 2022 06:22 )             20.6         Mean Cell Volume : 91.2 fL  Mean Cell Hemoglobin : 32.3 pg  Mean Cell Hemoglobin Concentration : 35.4 g/dL  Auto Neutrophil # : 31.74 K/uL  Auto Lymphocyte # : 3.62 K/uL  Auto Monocyte # : 1.64 K/uL  Auto Eosinophil # : 0.00 K/uL  Auto Basophil # : 0.00 K/uL  Auto Neutrophil % : 85.0 %  Auto Lymphocyte % : 9.7 %  Auto Monocyte % : 4.4 %  Auto Eosinophil % : 0.0 %  Auto Basophil % : 0.0 %      Serial CBC's  06-03 @ 06:22  Hct-20.6 / Hgb-7.3 / Plat-447 / RBC-2.26 / WBC-37.34  Serial CBC's  06-02 @ 12:12  Hct-20.5 / Hgb-7.0 / Plat-531 / RBC-2.27 / WBC-38.46  Serial CBC's  06-01 @ 06:11  Hct-21.4 / Hgb-7.4 / Plat-511 / RBC-2.36 / WBC-17.74  Serial CBC's  06-01 @ 00:56  Hct-22.1 / Hgb-7.5 / Plat-504 / RBC-2.43 / WBC-19.08  Serial CBC's  05-31 @ 11:44  Hct-17.5 / Hgb-5.8 / Plat-503 / RBC-1.87 / WBC-14.97  Serial CBC's  05-31 @ 08:00  Hct-17.3 / Hgb-5.7 / Plat-488 / RBC-1.86 / WBC-14.06      06-03    131<L>  |  95<L>  |  4<L>  ----------------------------<  127<H>  3.3<L>   |  22  |  <0.5<L>    Ca    8.8      03 Jun 2022 06:22    Reticulocyte Percent: 15.3 % (06-03 @ 06:22)  Reticulocyte Percent: 13.6 % (06-01 @ 06:11)  Reticulocyte Percent: 13.4 % (05-31 @ 08:00)      RADIOLOGY & ADDITIONAL STUDIES:    < from: Xray Chest 1 View- PORTABLE-Urgent (Xray Chest 1 View- PORTABLE-Urgent .) (06.02.22 @ 01:38) >  Impression:    Improving left basilar opacity. Stable right basilar opacity/effusion.       Patient is a 26y old  Female who presents with a chief complaint of Acute chest syndrome (02 Jun 2022 12:21)      Subjective: Pt stil complaining of right knee pain. She is on room air otherwise.       Vital Signs Last 24 Hrs  T(C): 37.6 (03 Jun 2022 05:17), Max: 37.8 (02 Jun 2022 20:38)  T(F): 99.6 (03 Jun 2022 05:17), Max: 100 (02 Jun 2022 20:38)  HR: 92 (03 Jun 2022 05:17) (74 - 92)  BP: 134/68 (03 Jun 2022 05:17) (126/59 - 134/68)  BP(mean): --  RR: 18 (03 Jun 2022 05:17) (18 - 18)  SpO2: 94% (03 Jun 2022 05:17) (94% - 96%)    PHYSICAL EXAM  General: adult in NAD  HEENT: anicteric sclera, pink conjunctiva  Neck: supple  CV: normal S1/S2  Lungs: positive air movement b/l ant lungs  Abdomen: soft non-tender non-distended  Ext: no clubbing cyanosis or edema, right knee ttp  Skin: no rashes and no petechiae  Neuro: alert and oriented X 4, no focal deficits    MEDICATIONS  (STANDING):  cefepime   IVPB 2000 milliGRAM(s) IV Intermittent every 8 hours  cefepime   IVPB      chlorhexidine 4% Liquid 1 Application(s) Topical daily  dextrose 5% + sodium chloride 0.45%. 1000 milliLiter(s) (100 mL/Hr) IV Continuous <Continuous>  enoxaparin Injectable 40 milliGRAM(s) SubCutaneous every 24 hours  folic acid 1 milliGRAM(s) Oral daily  influenza   Vaccine 0.5 milliLiter(s) IntraMuscular once  levoFLOXacin IVPB 750 milliGRAM(s) IV Intermittent every 24 hours  lidocaine   4% Patch 1 Patch Transdermal daily  losartan 25 milliGRAM(s) Oral daily  pantoprazole    Tablet 40 milliGRAM(s) Oral before breakfast  predniSONE   Tablet 50 milliGRAM(s) Oral daily  senna 2 Tablet(s) Oral at bedtime  vancomycin  IVPB 1000 milliGRAM(s) IV Intermittent every 12 hours    MEDICATIONS  (PRN):  acetaminophen     Tablet .. 650 milliGRAM(s) Oral every 6 hours PRN Temp greater or equal to 38C (100.4F), Mild Pain (1 - 3)  HYDROmorphone  Injectable 2 milliGRAM(s) IV Push every 4 hours PRN Severe Pain (7 - 10)  melatonin 3 milliGRAM(s) Oral at bedtime PRN Insomnia  ondansetron Injectable 4 milliGRAM(s) IV Push every 8 hours PRN Nausea and/or Vomiting      LABS:                          7.3    37.34 )-----------( 447      ( 03 Jun 2022 06:22 )             20.6         Mean Cell Volume : 91.2 fL  Mean Cell Hemoglobin : 32.3 pg  Mean Cell Hemoglobin Concentration : 35.4 g/dL  Auto Neutrophil # : 31.74 K/uL  Auto Lymphocyte # : 3.62 K/uL  Auto Monocyte # : 1.64 K/uL  Auto Eosinophil # : 0.00 K/uL  Auto Basophil # : 0.00 K/uL  Auto Neutrophil % : 85.0 %  Auto Lymphocyte % : 9.7 %  Auto Monocyte % : 4.4 %  Auto Eosinophil % : 0.0 %  Auto Basophil % : 0.0 %      Serial CBC's  06-03 @ 06:22  Hct-20.6 / Hgb-7.3 / Plat-447 / RBC-2.26 / WBC-37.34  Serial CBC's  06-02 @ 12:12  Hct-20.5 / Hgb-7.0 / Plat-531 / RBC-2.27 / WBC-38.46  Serial CBC's  06-01 @ 06:11  Hct-21.4 / Hgb-7.4 / Plat-511 / RBC-2.36 / WBC-17.74  Serial CBC's  06-01 @ 00:56  Hct-22.1 / Hgb-7.5 / Plat-504 / RBC-2.43 / WBC-19.08  Serial CBC's  05-31 @ 11:44  Hct-17.5 / Hgb-5.8 / Plat-503 / RBC-1.87 / WBC-14.97  Serial CBC's  05-31 @ 08:00  Hct-17.3 / Hgb-5.7 / Plat-488 / RBC-1.86 / WBC-14.06      06-03    131<L>  |  95<L>  |  4<L>  ----------------------------<  127<H>  3.3<L>   |  22  |  <0.5<L>    Ca    8.8      03 Jun 2022 06:22    Reticulocyte Percent: 15.3 % (06-03 @ 06:22)  Reticulocyte Percent: 13.6 % (06-01 @ 06:11)  Reticulocyte Percent: 13.4 % (05-31 @ 08:00)      RADIOLOGY & ADDITIONAL STUDIES:    < from: Xray Chest 1 View- PORTABLE-Urgent (Xray Chest 1 View- PORTABLE-Urgent .) (06.02.22 @ 01:38) >  Impression:    Improving left basilar opacity. Stable right basilar opacity/effusion.

## 2022-06-04 LAB
ALBUMIN SERPL ELPH-MCNC: 3.1 G/DL — LOW (ref 3.5–5.2)
ALP SERPL-CCNC: 231 U/L — HIGH (ref 30–115)
ALT FLD-CCNC: 22 U/L — SIGNIFICANT CHANGE UP (ref 0–41)
ANION GAP SERPL CALC-SCNC: 15 MMOL/L — HIGH (ref 7–14)
APTT BLD: 34.8 SEC — SIGNIFICANT CHANGE UP (ref 27–39.2)
AST SERPL-CCNC: 31 U/L — SIGNIFICANT CHANGE UP (ref 0–41)
BILIRUB SERPL-MCNC: 4.3 MG/DL — HIGH (ref 0.2–1.2)
BUN SERPL-MCNC: 5 MG/DL — LOW (ref 10–20)
CALCIUM SERPL-MCNC: 9 MG/DL — SIGNIFICANT CHANGE UP (ref 8.5–10.1)
CHLORIDE SERPL-SCNC: 100 MMOL/L — SIGNIFICANT CHANGE UP (ref 98–110)
CO2 SERPL-SCNC: 22 MMOL/L — SIGNIFICANT CHANGE UP (ref 17–32)
CREAT SERPL-MCNC: <0.5 MG/DL — LOW (ref 0.7–1.5)
EGFR: 142 ML/MIN/1.73M2 — SIGNIFICANT CHANGE UP
FERRITIN SERPL-MCNC: 2493 NG/ML — HIGH (ref 15–150)
FERRITIN SERPL-MCNC: 2669 NG/ML — HIGH (ref 15–150)
GLUCOSE SERPL-MCNC: 179 MG/DL — HIGH (ref 70–99)
HCT VFR BLD CALC: 22.3 % — LOW (ref 37–47)
HGB BLD-MCNC: 7.5 G/DL — LOW (ref 12–16)
HIV 1+2 AB+HIV1 P24 AG SERPL QL IA: SIGNIFICANT CHANGE UP
INR BLD: 1.58 RATIO — HIGH (ref 0.65–1.3)
MAGNESIUM SERPL-MCNC: 1.7 MG/DL — LOW (ref 1.8–2.4)
MAGNESIUM SERPL-MCNC: 1.9 MG/DL — SIGNIFICANT CHANGE UP (ref 1.8–2.4)
MCHC RBC-ENTMCNC: 31 PG — SIGNIFICANT CHANGE UP (ref 27–31)
MCHC RBC-ENTMCNC: 33.6 G/DL — SIGNIFICANT CHANGE UP (ref 32–37)
MCV RBC AUTO: 92.1 FL — SIGNIFICANT CHANGE UP (ref 81–99)
NRBC # BLD: 9 /100 WBCS — HIGH (ref 0–0)
PLATELET # BLD AUTO: 391 K/UL — SIGNIFICANT CHANGE UP (ref 130–400)
POTASSIUM SERPL-MCNC: 3.9 MMOL/L — SIGNIFICANT CHANGE UP (ref 3.5–5)
POTASSIUM SERPL-SCNC: 3.9 MMOL/L — SIGNIFICANT CHANGE UP (ref 3.5–5)
PROT SERPL-MCNC: 7.1 G/DL — SIGNIFICANT CHANGE UP (ref 6–8)
PROTHROM AB SERPL-ACNC: 18.1 SEC — HIGH (ref 9.95–12.87)
RBC # BLD: 2.42 M/UL — LOW (ref 4.2–5.4)
RBC # FLD: 24 % — HIGH (ref 11.5–14.5)
SODIUM SERPL-SCNC: 137 MMOL/L — SIGNIFICANT CHANGE UP (ref 135–146)
TRIGL SERPL-MCNC: 71 MG/DL — SIGNIFICANT CHANGE UP
WBC # BLD: 33.27 K/UL — HIGH (ref 4.8–10.8)
WBC # FLD AUTO: 33.27 K/UL — HIGH (ref 4.8–10.8)

## 2022-06-04 PROCEDURE — 99232 SBSQ HOSP IP/OBS MODERATE 35: CPT

## 2022-06-04 PROCEDURE — 88112 CYTOPATH CELL ENHANCE TECH: CPT | Mod: 26

## 2022-06-04 PROCEDURE — 71045 X-RAY EXAM CHEST 1 VIEW: CPT | Mod: 26

## 2022-06-04 PROCEDURE — 88312 SPECIAL STAINS GROUP 1: CPT | Mod: 26

## 2022-06-04 PROCEDURE — 99233 SBSQ HOSP IP/OBS HIGH 50: CPT | Mod: 25

## 2022-06-04 PROCEDURE — 31624 DX BRONCHOSCOPE/LAVAGE: CPT

## 2022-06-04 RX ORDER — MEROPENEM 1 G/30ML
INJECTION INTRAVENOUS
Refills: 0 | Status: COMPLETED | OUTPATIENT
Start: 2022-06-04 | End: 2022-06-09

## 2022-06-04 RX ORDER — MEROPENEM 1 G/30ML
1000 INJECTION INTRAVENOUS EVERY 8 HOURS
Refills: 0 | Status: COMPLETED | OUTPATIENT
Start: 2022-06-04 | End: 2022-06-09

## 2022-06-04 RX ORDER — MEROPENEM 1 G/30ML
1000 INJECTION INTRAVENOUS ONCE
Refills: 0 | Status: COMPLETED | OUTPATIENT
Start: 2022-06-04 | End: 2022-06-04

## 2022-06-04 RX ORDER — IBUPROFEN 200 MG
400 TABLET ORAL ONCE
Refills: 0 | Status: COMPLETED | OUTPATIENT
Start: 2022-06-04 | End: 2022-06-04

## 2022-06-04 RX ADMIN — HYDROMORPHONE HYDROCHLORIDE 2 MILLIGRAM(S): 2 INJECTION INTRAMUSCULAR; INTRAVENOUS; SUBCUTANEOUS at 05:04

## 2022-06-04 RX ADMIN — SENNA PLUS 2 TABLET(S): 8.6 TABLET ORAL at 21:22

## 2022-06-04 RX ADMIN — Medication 400 MILLIGRAM(S): at 04:25

## 2022-06-04 RX ADMIN — HYDROMORPHONE HYDROCHLORIDE 2 MILLIGRAM(S): 2 INJECTION INTRAMUSCULAR; INTRAVENOUS; SUBCUTANEOUS at 13:43

## 2022-06-04 RX ADMIN — Medication 50 MILLIGRAM(S): at 05:05

## 2022-06-04 RX ADMIN — HYDROMORPHONE HYDROCHLORIDE 2 MILLIGRAM(S): 2 INJECTION INTRAMUSCULAR; INTRAVENOUS; SUBCUTANEOUS at 11:15

## 2022-06-04 RX ADMIN — HYDROMORPHONE HYDROCHLORIDE 2 MILLIGRAM(S): 2 INJECTION INTRAMUSCULAR; INTRAVENOUS; SUBCUTANEOUS at 19:21

## 2022-06-04 RX ADMIN — Medication 400 MILLIGRAM(S): at 04:59

## 2022-06-04 RX ADMIN — LINEZOLID 300 MILLIGRAM(S): 600 INJECTION, SOLUTION INTRAVENOUS at 05:04

## 2022-06-04 RX ADMIN — MEROPENEM 100 MILLIGRAM(S): 1 INJECTION INTRAVENOUS at 21:22

## 2022-06-04 RX ADMIN — HYDROMORPHONE HYDROCHLORIDE 2 MILLIGRAM(S): 2 INJECTION INTRAMUSCULAR; INTRAVENOUS; SUBCUTANEOUS at 00:55

## 2022-06-04 RX ADMIN — HYDROMORPHONE HYDROCHLORIDE 2 MILLIGRAM(S): 2 INJECTION INTRAMUSCULAR; INTRAVENOUS; SUBCUTANEOUS at 13:28

## 2022-06-04 RX ADMIN — LIDOCAINE 1 PATCH: 4 CREAM TOPICAL at 12:15

## 2022-06-04 RX ADMIN — HYDROMORPHONE HYDROCHLORIDE 2 MILLIGRAM(S): 2 INJECTION INTRAMUSCULAR; INTRAVENOUS; SUBCUTANEOUS at 17:07

## 2022-06-04 RX ADMIN — MEROPENEM 100 MILLIGRAM(S): 1 INJECTION INTRAVENOUS at 16:41

## 2022-06-04 RX ADMIN — LOSARTAN POTASSIUM 25 MILLIGRAM(S): 100 TABLET, FILM COATED ORAL at 05:05

## 2022-06-04 RX ADMIN — Medication 650 MILLIGRAM(S): at 01:02

## 2022-06-04 RX ADMIN — PANTOPRAZOLE SODIUM 40 MILLIGRAM(S): 20 TABLET, DELAYED RELEASE ORAL at 07:19

## 2022-06-04 RX ADMIN — HYDROMORPHONE HYDROCHLORIDE 2 MILLIGRAM(S): 2 INJECTION INTRAMUSCULAR; INTRAVENOUS; SUBCUTANEOUS at 21:36

## 2022-06-04 RX ADMIN — HYDROMORPHONE HYDROCHLORIDE 2 MILLIGRAM(S): 2 INJECTION INTRAMUSCULAR; INTRAVENOUS; SUBCUTANEOUS at 21:55

## 2022-06-04 RX ADMIN — Medication 650 MILLIGRAM(S): at 08:42

## 2022-06-04 RX ADMIN — Medication 650 MILLIGRAM(S): at 02:00

## 2022-06-04 RX ADMIN — Medication 650 MILLIGRAM(S): at 08:12

## 2022-06-04 RX ADMIN — HYDROMORPHONE HYDROCHLORIDE 2 MILLIGRAM(S): 2 INJECTION INTRAMUSCULAR; INTRAVENOUS; SUBCUTANEOUS at 05:18

## 2022-06-04 RX ADMIN — MEROPENEM 100 MILLIGRAM(S): 1 INJECTION INTRAVENOUS at 10:50

## 2022-06-04 RX ADMIN — LIDOCAINE 1 PATCH: 4 CREAM TOPICAL at 21:31

## 2022-06-04 RX ADMIN — LINEZOLID 300 MILLIGRAM(S): 600 INJECTION, SOLUTION INTRAVENOUS at 17:05

## 2022-06-04 RX ADMIN — Medication 1 MILLIGRAM(S): at 12:14

## 2022-06-04 RX ADMIN — HYDROMORPHONE HYDROCHLORIDE 2 MILLIGRAM(S): 2 INJECTION INTRAMUSCULAR; INTRAVENOUS; SUBCUTANEOUS at 01:45

## 2022-06-04 RX ADMIN — HYDROMORPHONE HYDROCHLORIDE 2 MILLIGRAM(S): 2 INJECTION INTRAMUSCULAR; INTRAVENOUS; SUBCUTANEOUS at 18:54

## 2022-06-04 RX ADMIN — ENOXAPARIN SODIUM 40 MILLIGRAM(S): 100 INJECTION SUBCUTANEOUS at 00:46

## 2022-06-04 RX ADMIN — HYDROMORPHONE HYDROCHLORIDE 2 MILLIGRAM(S): 2 INJECTION INTRAMUSCULAR; INTRAVENOUS; SUBCUTANEOUS at 11:00

## 2022-06-04 NOTE — PROGRESS NOTE ADULT - ASSESSMENT
27yo  F with PMH of Sickle cell anemia (on oxbryta - followed by Hematologist - Dr. Jimenez in [place]), recently discharged after treatment of pneumonia and sickle cell crisis, presented to the ED with c/o right sided chest, shoulder and upper back pain.  She also reports fever at home 102F.  She was recently treated for sickle cell crisis and pneumonia and discharged last week on Levaquin PO.    Hematology consulted for r/o ACS with hx of sickle cell disease.     IMPRESSION:    #ACS/Vaso-occlusive pain crisis/PNA  #Hx of Hb SS disease   -Was on Oxbryta, just started in April 2022 but hasn't been routinely taking it due to her recent hospitalizations/infections; never was on hydrea or any other HbSS medications  -Initially offered both exchange as patient was in high 80s on RA and simple transfusion however she declined, eventually she agreed to a simple transfusion.    -Baseline Hb 7-8  -She has had 2 crises far this year.   -Hb electrophoresis from 5/21 showed 90% HbS    #Possible autoimmune hemolytic anemia   Direct Lori Profile (05.31.22 @ 08:00)    Dir Antiglob Polyspecific Interpretation: POS  - Spoke with Dr Burroughs from transfusion med, it is hard to call if pt has alloAb or autoab given chronic hemolysis    #Chronic leukocytosis and thrombocytosis: Resolving  - likely reactive     #Acute right knee pain    RECOMMENDATIONS:  - If hypoxia worsens and CXR shows worse infiltrates, we would recommend udall placement and RBC exchange. Pt agreeable. Please let hematology know if pt's clinical condition worsens.   - C/w Prednisone 50mg daily with GI ppx; started on 5/31: will likely do 2 weeks of 1mg/kg dose followed by quick taper.   - Viral panel noted: past EBV infection, hep panel non reactive; Check HIV  - Check daily LDH. LDH trending up  - C/w pain meds: Increase IV Dilaudid 2mg to q2hr prn. pain mx follow up  - Antibiotics for multilobar opacities. Pulmonary follow up for increasing b/l opacities/effusions on CT Chest  - C/w folic acid supplementation  - She can continue Oxbryta as outpatient and follow up with her primary hematologist post discharge.  - Monitor hemolysis labs and CBC    DVT ppx: Lovenox    27yo  F with PMH of Sickle cell anemia (on oxbryta - followed by Hematologist - Dr. Jimenez in [place]), recently discharged after treatment of pneumonia and sickle cell crisis, presented to the ED with c/o right sided chest, shoulder and upper back pain.  She also reports fever at home 102F.  She was recently treated for sickle cell crisis and pneumonia and discharged last week on Levaquin PO.    Hematology consulted for r/o ACS with hx of sickle cell disease.     IMPRESSION:    #ACS/Vaso-occlusive pain crisis/PNA  #Hx of Hb SS disease   -Was on Oxbryta, just started in April 2022 but hasn't been routinely taking it due to her recent hospitalizations/infections; never was on hydrea or any other HbSS medications  -Initially offered both exchange as patient was in high 80s on RA and simple transfusion however she declined, eventually she agreed to a simple transfusion.    -Baseline Hb 7-8  -She has had 2 crises far this year.   -Hb electrophoresis from 5/21 showed 90% HbS    #Possible autoimmune hemolytic anemia   Direct Lori Profile (05.31.22 @ 08:00)    Dir Antiglob Polyspecific Interpretation: POS  - Spoke with Dr Burroughs from transfusion med, it is hard to call if pt has alloAb or autoab given chronic hemolysis    #Chronic leukocytosis and thrombocytosis: Resolving  - likely reactive     #Acute right knee pain -improved  - likely 2/2 sickle cell crisis. No evidence of effusion or AVN on Xray    RECOMMENDATIONS:  - Given worsening hypoxia and persistent fever with opacities on CT, we will proceed with RBC exchange: Please place [facility] and obtain consent. We will inform blood bank once udall is in place.   - F/u results from bronchoscopy  - C/w Prednisone 50mg daily with GI ppx; started on 5/31: will likely do 2 weeks of 1mg/kg dose followed by quick taper.   - Viral panel noted: past EBV infection, hep panel non reactive; Check HIV  - C/w pain meds: IV Dilaudid 2mg q2hr prn. pain mx follow up  - IV Abx per ID  - C/w folic acid supplementation  - She can continue Oxbryta as outpatient and follow up with her primary hematologist post discharge.  - Monitor hemolysis labs and CBC. Check daily LDH    DVT ppx: Lovenox

## 2022-06-04 NOTE — PRE-ANESTHESIA EVALUATION ADULT - NSANTHOSAYNRD_GEN_A_CORE
No. RUFINO screening performed.  STOP BANG Legend: 0-2 = LOW Risk; 3-4 = INTERMEDIATE Risk; 5-8 = HIGH Risk

## 2022-06-04 NOTE — PROGRESS NOTE ADULT - SUBJECTIVE AND OBJECTIVE BOX
PREOPERATIVE DIAGNOSIS:     POSTOPERATIVE DIAGNOSES:       PROCEDURE PERFORMED:  Bronchoscopy, BAL, and washing.    CONSENT: After explanining the risk and benefit the consent was obtained from     The  fiberoptic bronchoscope was introduced orally.     The Right tracheobronchial tree was inspected closely to the level of the subsegmental bronchi. All bronchi are patent with no endobronchial lesions and no mucosal lesions noted.   The Left tracheobronchial tree was also patent and intact with the mucosa normal   The bronchoscope was then introduced to the Right lower lobe and BAL was obtained.  The procedure was completed and all samples were submitted for appropriate studies  After adequate clearing of secretions was accomplished, the bronchoscope was removed from the patient and the procedure was terminated.   The patient tolerated the procedure well and there were no complications.

## 2022-06-04 NOTE — PHARMACOTHERAPY INTERVENTION NOTE - COMMENTS
meropenem 1g IV x1 ~9am, pending ID approval  -Dr Pham approved  -1500 d/w team to order 1g IV q8h
Order for hydromorphone IM was questioned. Resident stated that patient did have an IV access but nurse told her to order IM because it was a fast acting route. Md was informed that IV - the fastest route and the safest, since IM had an erratic absorption. The order was changed to IV.

## 2022-06-04 NOTE — PROGRESS NOTE ADULT - SUBJECTIVE AND OBJECTIVE BOX
Patient is a 26y old  Female who presents with a chief complaint of Acute chest syndrome (04 Jun 2022 07:49)        Over Night Events:  Continues wiht pain.  Febrile.  on 2 liters O2.  off pressors.          ROS:     All ROS are negative except HPI         PHYSICAL EXAM    ICU Vital Signs Last 24 Hrs  T(C): 37.7 (04 Jun 2022 08:00), Max: 39.3 (04 Jun 2022 00:30)  T(F): 99.8 (04 Jun 2022 08:00), Max: 102.7 (04 Jun 2022 00:30)  HR: 106 (04 Jun 2022 08:00) (86 - 128)  BP: 124/76 (04 Jun 2022 08:00) (117/65 - 151/82)  BP(mean): 92 (04 Jun 2022 08:00) (91 - 111)  ABP: --  ABP(mean): --  RR: 19 (04 Jun 2022 08:00) (18 - 22)  SpO2: 100% (04 Jun 2022 08:00) (93% - 100%)      CONSTITUTIONAL:  Ill NAD    ENT:   Airway patent,   Mouth with normal mucosa.     EYES:   Pupils equal,   Round and reactive to light.    CARDIAC:   Tachycardic   Regular rhythm.          RESPIRATORY:   No wheezing  Bilateral crackles   Normal chest expansion  Not tachypneic,  No use of accessory muscles    GASTROINTESTINAL:  Abdomen soft,   Non-tender,   No guarding,   + BS    MUSCULOSKELETAL:   Range of motion is not limited,  No clubbing, cyanosis    NEUROLOGICAL:   Alert and oriented   No motor  deficits.    SKIN:   Skin normal color for race,   No evidence of rash.    PSYCHIATRIC:   No apparent risk to self or others.      06-03-22 @ 07:01  -  06-04-22 @ 07:00  --------------------------------------------------------  IN:    dextrose 5% + sodium chloride 0.45%: 700 mL    Oral Fluid: 100 mL  Total IN: 800 mL    OUT:    Voided (mL): 800 mL  Total OUT: 800 mL    Total NET: 0 mL          LABS:                            7.3    37.34 )-----------( 447      ( 03 Jun 2022 06:22 )             20.6                                               06-03    131<L>  |  95<L>  |  4<L>  ----------------------------<  127<H>  3.3<L>   |  22  |  <0.5<L>    Ca    8.8      03 Jun 2022 06:22  Mg     1.7     06-04        PT/INR - ( 04 Jun 2022 04:36 )   PT: 18.10 sec;   INR: 1.58 ratio         PTT - ( 04 Jun 2022 04:36 )  PTT:34.8 sec                                           CARDIAC MARKERS ( 03 Jun 2022 11:04 )  x     / <0.01 ng/mL / x     / x     / <1.0 ng/mL                                                                                     Culture - Blood (collected 02 Jun 2022 20:33)  Source: .Blood None  Preliminary Report (04 Jun 2022 03:01):    No growth to date.                                                                                           MEDICATIONS  (STANDING):  chlorhexidine 4% Liquid 1 Application(s) Topical daily  dextrose 5% + sodium chloride 0.45%. 1000 milliLiter(s) (100 mL/Hr) IV Continuous <Continuous>  enoxaparin Injectable 40 milliGRAM(s) SubCutaneous every 24 hours  folic acid 1 milliGRAM(s) Oral daily  influenza   Vaccine 0.5 milliLiter(s) IntraMuscular once  levoFLOXacin IVPB 750 milliGRAM(s) IV Intermittent every 24 hours  lidocaine   4% Patch 1 Patch Transdermal daily  linezolid  IVPB 600 milliGRAM(s) IV Intermittent every 12 hours  losartan 25 milliGRAM(s) Oral daily  meropenem  IVPB 1000 milliGRAM(s) IV Intermittent once  pantoprazole    Tablet 40 milliGRAM(s) Oral before breakfast  predniSONE   Tablet 50 milliGRAM(s) Oral daily  senna 2 Tablet(s) Oral at bedtime    MEDICATIONS  (PRN):  acetaminophen     Tablet .. 650 milliGRAM(s) Oral every 6 hours PRN Temp greater or equal to 38C (100.4F), Mild Pain (1 - 3)  HYDROmorphone  Injectable 2 milliGRAM(s) IV Push every 2 hours PRN Severe Pain (7 - 10)  melatonin 3 milliGRAM(s) Oral at bedtime PRN Insomnia  ondansetron Injectable 4 milliGRAM(s) IV Push every 8 hours PRN Nausea and/or Vomiting      New X-rays reviewed:                                                                                  ECHO

## 2022-06-04 NOTE — PROGRESS NOTE ADULT - SUBJECTIVE AND OBJECTIVE BOX
Patient is a 26y old  Female who presents with a chief complaint of Acute chest syndrome (03 Jun 2022 13:02)      Subjective:      Vital Signs Last 24 Hrs  T(C): 38.7 (04 Jun 2022 07:00), Max: 39.3 (04 Jun 2022 00:30)  T(F): 101.6 (04 Jun 2022 07:00), Max: 102.7 (04 Jun 2022 00:30)  HR: 96 (04 Jun 2022 07:00) (86 - 128)  BP: 133/75 (04 Jun 2022 07:00) (117/65 - 151/82)  BP(mean): 91 (04 Jun 2022 07:00) (91 - 111)  RR: 18 (04 Jun 2022 07:00) (18 - 22)  SpO2: 100% (04 Jun 2022 07:00) (93% - 100%)    PHYSICAL EXAM  General: adult in NAD  HEENT: clear oropharynx, anicteric sclera, pink conjunctiva  Neck: supple  CV: normal S1/S2 with no murmur rubs or gallops  Lungs: positive air movement b/l ant lungs,clear to auscultation, no wheezes, no rales  Abdomen: soft non-tender non-distended, no hepatosplenomegaly  Ext: no clubbing cyanosis or edema  Skin: no rashes and no petechiae  Neuro: alert and oriented X 4, no focal deficits    MEDICATIONS  (STANDING):  chlorhexidine 4% Liquid 1 Application(s) Topical daily  dextrose 5% + sodium chloride 0.45%. 1000 milliLiter(s) (100 mL/Hr) IV Continuous <Continuous>  enoxaparin Injectable 40 milliGRAM(s) SubCutaneous every 24 hours  folic acid 1 milliGRAM(s) Oral daily  influenza   Vaccine 0.5 milliLiter(s) IntraMuscular once  levoFLOXacin IVPB 750 milliGRAM(s) IV Intermittent every 24 hours  lidocaine   4% Patch 1 Patch Transdermal daily  linezolid  IVPB 600 milliGRAM(s) IV Intermittent every 12 hours  losartan 25 milliGRAM(s) Oral daily  meropenem  IVPB 1000 milliGRAM(s) IV Intermittent once  pantoprazole    Tablet 40 milliGRAM(s) Oral before breakfast  predniSONE   Tablet 50 milliGRAM(s) Oral daily  senna 2 Tablet(s) Oral at bedtime    MEDICATIONS  (PRN):  acetaminophen     Tablet .. 650 milliGRAM(s) Oral every 6 hours PRN Temp greater or equal to 38C (100.4F), Mild Pain (1 - 3)  HYDROmorphone  Injectable 2 milliGRAM(s) IV Push every 2 hours PRN Severe Pain (7 - 10)  melatonin 3 milliGRAM(s) Oral at bedtime PRN Insomnia  ondansetron Injectable 4 milliGRAM(s) IV Push every 8 hours PRN Nausea and/or Vomiting      LABS:                          7.3    37.34 )-----------( 447      ( 03 Jun 2022 06:22 )             20.6         Mean Cell Volume : 91.2 fL  Mean Cell Hemoglobin : 32.3 pg  Mean Cell Hemoglobin Concentration : 35.4 g/dL  Auto Neutrophil # : 31.74 K/uL  Auto Lymphocyte # : 3.62 K/uL  Auto Monocyte # : 1.64 K/uL  Auto Eosinophil # : 0.00 K/uL  Auto Basophil # : 0.00 K/uL  Auto Neutrophil % : 85.0 %  Auto Lymphocyte % : 9.7 %  Auto Monocyte % : 4.4 %  Auto Eosinophil % : 0.0 %  Auto Basophil % : 0.0 %      Serial CBC's  06-03 @ 06:22  Hct-20.6 / Hgb-7.3 / Plat-447 / RBC-2.26 / WBC-37.34  Serial CBC's  06-02 @ 12:12  Hct-20.5 / Hgb-7.0 / Plat-531 / RBC-2.27 / WBC-38.46  Serial CBC's  06-01 @ 06:11  Hct-21.4 / Hgb-7.4 / Plat-511 / RBC-2.36 / WBC-17.74  Serial CBC's  06-01 @ 00:56  Hct-22.1 / Hgb-7.5 / Plat-504 / RBC-2.43 / WBC-19.08  Serial CBC's  05-31 @ 11:44  Hct-17.5 / Hgb-5.8 / Plat-503 / RBC-1.87 / WBC-14.97  Serial CBC's  05-31 @ 08:00  Hct-17.3 / Hgb-5.7 / Plat-488 / RBC-1.86 / WBC-14.06      06-03    131<L>  |  95<L>  |  4<L>  ----------------------------<  127<H>  3.3<L>   |  22  |  <0.5<L>    Ca    8.8      03 Jun 2022 06:22  Mg     1.7     06-04        PT/INR - ( 04 Jun 2022 04:36 )   PT: 18.10 sec;   INR: 1.58 ratio         PTT - ( 04 Jun 2022 04:36 )  PTT:34.8 sec    Ferritin, Serum: 2493 ng/mL (06-03 @ 18:55)  Reticulocyte Percent: 15.3 % (06-03 @ 06:22)  Reticulocyte Percent: 13.6 % (06-01 @ 06:11)  Reticulocyte Percent: 13.4 % (05-31 @ 08:00)        Culture - Blood (collected 02 Jun 2022 20:33)  Source: .Blood None  Preliminary Report (04 Jun 2022 03:01):    No growth to date.      RADIOLOGY & ADDITIONAL STUDIES:    < from: [facility] Duplex Lower Ext Vein Scan, Bilat (06.03.22 @ 10:09) >  Impression:    No evidence of deep venous thrombosis or superficial thrombophlebitis in   the bilateral lower extremities.    < from: CT Chest No Cont (06.02.22 @ 18:35) >    IMPRESSION:    Redemonstration of patchy bibasilar opacities (suspected to be infectious   in the appropriate clinical setting), noting decreasing left lower lobe   opacities. Increasing small right pleural effusion and increasing right   lower opacities, with likely a component of atelectasis.    Cardiomegaly. Small pericardial effusion.    Previously described prominent hilar lymph nodes on 3/14/2022 not well   evaluated without contrast.    --- End of Report ---    < end of copied text >   Patient is a 26y old  Female who presents with a chief complaint of Acute chest syndrome (03 Jun 2022 13:02)      Subjective: Pt is on 2L O2 today, still spiking fever, She had bronchoscopy done today.       Vital Signs Last 24 Hrs  T(C): 38.7 (04 Jun 2022 07:00), Max: 39.3 (04 Jun 2022 00:30)  T(F): 101.6 (04 Jun 2022 07:00), Max: 102.7 (04 Jun 2022 00:30)  HR: 96 (04 Jun 2022 07:00) (86 - 128)  BP: 133/75 (04 Jun 2022 07:00) (117/65 - 151/82)  BP(mean): 91 (04 Jun 2022 07:00) (91 - 111)  RR: 18 (04 Jun 2022 07:00) (18 - 22)  SpO2: 100% (04 Jun 2022 07:00) (93% - 100%)    PHYSICAL EXAM  General: adult in NAD  HEENT:  anicteric sclera, pink conjunctiva  Neck: supple  CV: normal S1/S2 with no murmur rubs or gallops  Lungs: positive air movement b/l ant lungs  Abdomen: soft non-tender non-distended  Ext: no clubbing cyanosis or edema  Skin: no rashes and no petechiae  Neuro: alert and oriented X 4, no focal deficits    MEDICATIONS  (STANDING):  chlorhexidine 4% Liquid 1 Application(s) Topical daily  dextrose 5% + sodium chloride 0.45%. 1000 milliLiter(s) (100 mL/Hr) IV Continuous <Continuous>  enoxaparin Injectable 40 milliGRAM(s) SubCutaneous every 24 hours  folic acid 1 milliGRAM(s) Oral daily  influenza   Vaccine 0.5 milliLiter(s) IntraMuscular once  levoFLOXacin IVPB 750 milliGRAM(s) IV Intermittent every 24 hours  lidocaine   4% Patch 1 Patch Transdermal daily  linezolid  IVPB 600 milliGRAM(s) IV Intermittent every 12 hours  losartan 25 milliGRAM(s) Oral daily  meropenem  IVPB 1000 milliGRAM(s) IV Intermittent once  pantoprazole    Tablet 40 milliGRAM(s) Oral before breakfast  predniSONE   Tablet 50 milliGRAM(s) Oral daily  senna 2 Tablet(s) Oral at bedtime    MEDICATIONS  (PRN):  acetaminophen     Tablet .. 650 milliGRAM(s) Oral every 6 hours PRN Temp greater or equal to 38C (100.4F), Mild Pain (1 - 3)  HYDROmorphone  Injectable 2 milliGRAM(s) IV Push every 2 hours PRN Severe Pain (7 - 10)  melatonin 3 milliGRAM(s) Oral at bedtime PRN Insomnia  ondansetron Injectable 4 milliGRAM(s) IV Push every 8 hours PRN Nausea and/or Vomiting      LABS:                          7.3    37.34 )-----------( 447      ( 03 Jun 2022 06:22 )             20.6         Mean Cell Volume : 91.2 fL  Mean Cell Hemoglobin : 32.3 pg  Mean Cell Hemoglobin Concentration : 35.4 g/dL  Auto Neutrophil # : 31.74 K/uL  Auto Lymphocyte # : 3.62 K/uL  Auto Monocyte # : 1.64 K/uL  Auto Eosinophil # : 0.00 K/uL  Auto Basophil # : 0.00 K/uL  Auto Neutrophil % : 85.0 %  Auto Lymphocyte % : 9.7 %  Auto Monocyte % : 4.4 %  Auto Eosinophil % : 0.0 %  Auto Basophil % : 0.0 %      Serial CBC's  06-03 @ 06:22  Hct-20.6 / Hgb-7.3 / Plat-447 / RBC-2.26 / WBC-37.34  Serial CBC's  06-02 @ 12:12  Hct-20.5 / Hgb-7.0 / Plat-531 / RBC-2.27 / WBC-38.46  Serial CBC's  06-01 @ 06:11  Hct-21.4 / Hgb-7.4 / Plat-511 / RBC-2.36 / WBC-17.74  Serial CBC's  06-01 @ 00:56  Hct-22.1 / Hgb-7.5 / Plat-504 / RBC-2.43 / WBC-19.08  Serial CBC's  05-31 @ 11:44  Hct-17.5 / Hgb-5.8 / Plat-503 / RBC-1.87 / WBC-14.97  Serial CBC's  05-31 @ 08:00  Hct-17.3 / Hgb-5.7 / Plat-488 / RBC-1.86 / WBC-14.06      06-03    131<L>  |  95<L>  |  4<L>  ----------------------------<  127<H>  3.3<L>   |  22  |  <0.5<L>    Ca    8.8      03 Jun 2022 06:22  Mg     1.7     06-04        PT/INR - ( 04 Jun 2022 04:36 )   PT: 18.10 sec;   INR: 1.58 ratio         PTT - ( 04 Jun 2022 04:36 )  PTT:34.8 sec    Ferritin, Serum: 2493 ng/mL (06-03 @ 18:55)  Reticulocyte Percent: 15.3 % (06-03 @ 06:22)  Reticulocyte Percent: 13.6 % (06-01 @ 06:11)  Reticulocyte Percent: 13.4 % (05-31 @ 08:00)        Culture - Blood (collected 02 Jun 2022 20:33)  Source: .Blood None  Preliminary Report (04 Jun 2022 03:01):    No growth to date.      RADIOLOGY & ADDITIONAL STUDIES:    < from: [facility] Duplex Lower Ext Vein Scan, Bilat (06.03.22 @ 10:09) >  Impression:    No evidence of deep venous thrombosis or superficial thrombophlebitis in   the bilateral lower extremities.    < from: CT Chest No Cont (06.02.22 @ 18:35) >    IMPRESSION:    Redemonstration of patchy bibasilar opacities (suspected to be infectious   in the appropriate clinical setting), noting decreasing left lower lobe   opacities. Increasing small right pleural effusion and increasing right   lower opacities, with likely a component of atelectasis.    Cardiomegaly. Small pericardial effusion.    Previously described prominent hilar lymph nodes on 3/14/2022 not well   evaluated without contrast.    --- End of Report ---    < end of copied text >

## 2022-06-04 NOTE — CHART NOTE - NSCHARTNOTEFT_GEN_A_CORE
plan of care discussed with patient, she would like to wait for exchange for when her parents arrive on Monday June 6th. Will hold on placing udall today. Informed patient and brother that medically she  could worsen without the exchange transfusion, they still would prefer to wait until patients parents arrive before doing the exchange transfusion.

## 2022-06-04 NOTE — PROGRESS NOTE ADULT - SUBJECTIVE AND OBJECTIVE BOX
ARNOLDCANDACEEFRENJAMES  26y, Female  Allergy: No Known Allergies      LOS  9d    CHIEF COMPLAINT: Acute chest syndrome (04 Jun 2022 08:29)      INTERVAL EVENTS/HPI  - No acute events overnight  - T(F): , Max: 102.7 (06-04-22 @ 00:30)  - transferred to Unit for worsenign WBC and fevers   - denies respiratory complaints -- reports pain in lower shins bilaterally  - denies abdominal pain, nausea, vomiting, coughing, congestion, sore throat   - WBC Count: 37.34 (06-03-22 @ 06:22)  WBC Count: 38.46 (06-02-22 @ 12:12)     - Creatinine, Serum: <0.5 (06-03-22 @ 06:22)  Creatinine, Serum: <0.5 (06-02-22 @ 12:12)       ROS  General: Denies rigors, nightsweats  HEENT: Denies headache, rhinorrhea, sore throat, eye pain  CV: Denies CP, palpitations  PULM: Denies wheezing, hemoptysis  GI: Denies hematemesis, hematochezia, melena  : Denies discharge, hematuria  MSK: Denies arthralgias, myalgias  SKIN: Denies rash, lesions  NEURO: Denies paresthesias, weakness  PSYCH: Denies depression, anxiety    VITALS:  T(F): 99.8, Max: 102.7 (06-04-22 @ 00:30)  HR: 106  BP: 124/76  RR: 19Vital Signs Last 24 Hrs  T(C): 37.7 (04 Jun 2022 08:57), Max: 39.3 (04 Jun 2022 00:30)  T(F): 99.8 (04 Jun 2022 08:00), Max: 102.7 (04 Jun 2022 00:30)  HR: 106 (04 Jun 2022 08:57) (86 - 128)  BP: 124/76 (04 Jun 2022 08:57) (117/65 - 151/82)  BP(mean): 92 (04 Jun 2022 08:57) (91 - 111)  RR: 19 (04 Jun 2022 08:57) (18 - 22)  SpO2: 100% (04 Jun 2022 08:57) (93% - 100%)    PHYSICAL EXAM:  Gen: NAD, resting in bed  HEENT: Normocephalic, atraumatic  Neck: supple, no lymphadenopathy  CV: Regular rate & regular rhythm  Lungs: decreased BS at bases, no fremitus  Abdomen: Soft, BS present  Ext: Warm, well perfused  Neuro: non focal, awake  Skin: no rash, no erythema  Lines: no phlebitis    FH: Non-contributory  Social Hx: Non-contributory    TESTS & MEASUREMENTS:                        7.3    37.34 )-----------( 447      ( 03 Jun 2022 06:22 )             20.6     06-03    131<L>  |  95<L>  |  4<L>  ----------------------------<  127<H>  3.3<L>   |  22  |  <0.5<L>    Ca    8.8      03 Jun 2022 06:22  Mg     1.7     06-04              Culture - Blood (collected 06-02-22 @ 20:33)  Source: .Blood None  Preliminary Report (06-04-22 @ 03:01):    No growth to date.    Culture - Blood (collected 05-27-22 @ 11:46)  Source: .Blood None  Final Report (06-01-22 @ 23:00):    No Growth Final    Culture - Blood (collected 05-20-22 @ 21:41)  Source: .Blood Blood-Peripheral  Final Report (05-26-22 @ 09:01):    No Growth Final    Culture - Blood (collected 05-14-22 @ 16:45)  Source: .Blood Blood  Final Report (05-20-22 @ 01:01):    No Growth Final    Culture - Blood (collected 05-14-22 @ 16:45)  Source: .Blood Blood  Final Report (05-20-22 @ 02:01):    No Growth Final            INFECTIOUS DISEASES TESTING  HIV-1/2 Combo Result: Nonreact (06-03-22 @ 18:55)  Procalcitonin, Serum: 0.10 (06-03-22 @ 06:22)  COVID-19 PCR: NotDetec (06-02-22 @ 06:30)  MRSA PCR Result.: Negative (05-28-22 @ 11:50)  Procalcitonin, Serum: 0.09 (05-27-22 @ 11:46)  Rapid RVP Result: NotDetec (05-26-22 @ 21:55)  Legionella Antigen, Urine: Negative (05-22-22 @ 12:50)  Procalcitonin, Serum: 0.09 (05-21-22 @ 12:12)  Rapid RVP Result: NotDetec (05-20-22 @ 13:28)  Rapid RVP Result: NotDetec (05-14-22 @ 14:33)  Procalcitonin, Serum: <0.02 (03-15-22 @ 04:30)  COVID-19 PCR: NotDetec (03-14-22 @ 09:27)      INFLAMMATORY MARKERS  Sedimentation Rate, Erythrocyte: >140 mm/Hr (06-03-22 @ 06:22)  C-Reactive Protein, Serum: 8 mg/L (03-15-22 @ 04:30)      RADIOLOGY & ADDITIONAL TESTS:  I have personally reviewed the last available Chest xray  CXR  Xray Chest 1 View- PORTABLE-Urgent:   ACC: [id number] EXAM:  XR CHEST PORTABLE URGENT 1V                          PROCEDURE DATE:  06/02/2022          INTERPRETATION:  Clinical History / Reason for exam: History of sickle   cell with chest pain.    Comparison : Chest radiograph 6/1/2022.    Technique/Positioning: Single AP chest radiograph.    Findings:    Support devices: None.    Cardiac/mediastinum/hilum: Stable cardiomegaly.    Lung parenchyma/Pleura: Right basilar opacity/effusion. Improving left   basilar opacity. No pneumothorax.    Skeleton/soft tissues: Stable.    Impression:    Improving left basilar opacity. Stable right basilar opacity/effusion.        --- End of Report ---            MINNA CONTI MD; Attending Radiologist  This document has been electronically signed. Jun 2 2022  9:35AM (06-02-22 @ 01:38)      CT  CT Chest No Cont:   ACC: [id number] EXAM:  CT CHEST                          PROCEDURE DATE:  06/02/2022          INTERPRETATION:  HISTORY: Acute chest syndrome. Pain.    TECHNIQUE: A noncontrast CT of the thorax was performed. Sagittal and   coronal reformats as well as MIP images were performed.    COMPARISON: CT chest 5/20/2022.    FINDINGS:    LUNGS, PLEURA, AND AIRWAYS: Redemonstration of patchy bibasilar   opacities, noting decreasing left lower lobe opacities. Increasing small   right pleural effusion and increasing right lower opacities, with likely   a component of atelectasis. No pneumothorax.    HEART/GREAT VESSELS: Cardiomegaly. Small pericardial effusion. Relative   hypodensity of the intracardiac blood pool suggestive of anemia.    MEDIASTINUM/LYMPH NODES: No definite enlarged mediastinal or axillary   lymph nodes. Previously described prominent hilar lymph nodes on   3/14/2022 not well evaluated without contrast.    UPPER ABDOMEN: Hyperdense focus adjacent to or arising from the right   kidney, difficult to determine, may represent proteinaceous cyst    BONES AND SOFT TISSUES: Unremarkable        IMPRESSION:    Redemonstration of patchy bibasilar opacities (suspected to be infectious   in the appropriate clinical setting), noting decreasing left lower lobe   opacities. Increasing small right pleural effusion and increasing right   lower opacities, with likely a component of atelectasis.    Cardiomegaly. Small pericardial effusion.    Previously described prominent hilar lymph nodes on 3/14/2022 not well   evaluated without contrast.    --- End of Report ---          AYLA OLIVEROS MD; Resident Radiologist  This document has been electronically signed.  AURY SOTO MD; Attending Radiologist  This document has been electronically signed. [place]  3 2022 10:08AM (06-02-22 @ 18:35)      CARDIOLOGY TESTING  12 Lead ECG:   Ventricular Rate 53 BPM    Atrial Rate 53 BPM    P-R Interval 272 ms    QRS Duration 86 ms    Q-T Interval 426 ms    QTC Calculation(Bazett) 399 ms    P Axis 118 degrees    R Axis 37 degrees    T Axis -1 degrees    Diagnosis Line Sinus bradycardia with marked sinus arrhythmia with 1st degree A-V block  T wave abnormality, consider anterior ischemia  Abnormal ECG    Confirmed by Deepak Bridges ([id number]) on 6/2/2022 11:14:08 AM (06-02-22 @ 01:26)  12 Lead ECG:   Ventricular Rate 119 BPM    Atrial Rate 119 BPM    P-R Interval 148 ms    QRS Duration 76 ms    Q-T Interval 290 ms    QTC Calculation(Bazett) 407 ms    P Axis 42 degrees    R Axis 47 degrees    T Axis -20 degrees    Diagnosis Line *** Poor data quality, interpretation may be adversely affected  Sinus tachycardia  T wave abnormality, consider inferior ischemia  Baseline artifact  Abnormal ECG    Confirmed by Karlee Frank MD ([id number]) on 5/27/2022 8:13:30 AM (05-26-22 @ 19:21)      MEDICATIONS  chlorhexidine 4% Liquid 1 Topical daily  dextrose 5% + sodium chloride 0.45%. 1000 IV Continuous <Continuous>  enoxaparin Injectable 40 SubCutaneous every 24 hours  folic acid 1 Oral daily  influenza   Vaccine 0.5 IntraMuscular once  levoFLOXacin IVPB 750 IV Intermittent every 24 hours  lidocaine   4% Patch 1 Transdermal daily  linezolid  IVPB 600 IV Intermittent every 12 hours  losartan 25 Oral daily  meropenem  IVPB 1000 IV Intermittent once  pantoprazole    Tablet 40 Oral before breakfast  predniSONE   Tablet 50 Oral daily  senna 2 Oral at bedtime      WEIGHT  Weight (kg): 56.5 (06-04-22 @ 08:57)      ANTIBIOTICS:  levoFLOXacin IVPB 750 milliGRAM(s) IV Intermittent every 24 hours  linezolid  IVPB 600 milliGRAM(s) IV Intermittent every 12 hours  meropenem  IVPB 1000 milliGRAM(s) IV Intermittent once      All available historical records have been reviewed

## 2022-06-04 NOTE — CHART NOTE - NSCHARTNOTEFT_GEN_A_CORE
PACU ANESTHESIA PACU ADMISSION NOTE      Procedure:  Post op diagnosis    ____ Intubated  TV:______       Rate: ______      FiO2: ______    _x___ Patent Airway    __x__ Full return of protective reflexes    __x__ Full recovery from anesthesia / sedation to baseline status    Viitals:  see anesthesia record            Mental Status:  _x___ Awake   _____ Alert   _____ Drowsy   _____ Sedated    Nausea/Vomiting: ____ Yes, See Post - Op Orders      ___x_ No    Pain Scale (0-10): _____    Treatment: ____ None    __x_ See Post - Op/PCA Orders    Post - Operative Fluids:   ____ Oral   __x__ See Post - Op Orders    Plan:         Discharge:   ____Home       _____Floor         ___x__Critical Care    _____Other:_________________    Comments: s/p bedside bronch; no s/s of anesthesia complications noted; uneventful perioperative course

## 2022-06-04 NOTE — PROGRESS NOTE ADULT - ASSESSMENT
Please call or message on Microsoft Teams if with any questions.  Spectra [id number]    · Assessment	  27yo  F with PMH of Sickle cell anemia (on oxbryta - followed by Hematologist - Dr. Jimenez in [place]), recently discharged after treatment of pneumonia and sickle cell crisis, presented to the ED with c/o right sided chest, shoulder and upper back pain. States that she overexerted herself. She states that she has not been feeling well since yesterday, had mild cough and fever upto 102F at home. Denies any shortness of breath, abdominal pain, nausea, vomiting, diarrhea, leg pain, swelling.     IMPRESSION;   # sepsis secondary to multilobar bacterial PNA RLL/LLL  - CXR increased opacity  RLL  - 5/20 CT bibasilar consolidation  - 5/20 Legionella NG  - 5/28 Nares ORSA NG  - CT Chest 6/2 - redemonstation of bibasilar opacities, decreased left lower lobe opacities, increased right lower lobe opactiesi     #Sickle cell vasoocclusive crisis  - 5/26 COVID-19 NG   - refused exchange transfusion   - Ferritin, Serum: 2493: Test Repeated ng/mL (06.03.22 @ 18:55)  - Lactate Dehydrogenase, Serum: 1244 (06.03.22 @ 06:22)  - Lower extremity dopplers negative 6/3    RECOMMENDATIONS;  - low suspicion for worsening bacterial pneumonia - potentially worsening sickle cell crisis with elevated LDH/Ferritin --  but will escalate antibiotics in light of fevers and worsening WBC   - if plans for bronch, appreciate cultures   - trend LDH, Ferritin, T bili   - follow-up blood cx  - repeat MRSA nares   - continue linezolid 600 mg q 12 hours   - continue meropenem 1g q 8 hours   - continue levofloxacin 750 mg q 24 hours   - pain control   - supplemental o2     Please call or message on Microsoft Teams if with any questions.  Spectra [id number]

## 2022-06-04 NOTE — PROGRESS NOTE ADULT - ASSESSMENT
IMPRESSION    Sickle cell crisis  Possible superimposed bacterial infection   Acute chest syndrome refused exchange transfusion  Possible HLH ?     Plan     CNS: pain control as needed     HEENT: oral care    PULMONARY: On RA. Encourage Incentive spirometry.  Possible bronchoscopy.  Repeat CXR     CARDIOVASCULAR: C/w D5 1/2 NS at 100 cc/hr    GI: GI prophylaxis.  Feeding post bronchoscopy.      RENAL: f/u lytes and replete    INFECTIOUS DISEASE: IV ABX. Cultures negative to date, Repeat procal noted. ID follow up. DW with Dr. Chavis.  Jessica, Zyvox, and Levaquin    HEMATOLOGICAL:  hem f/u. Pain control. Transfuse to target >8. Ferritin level.  HARSHA Godinez     ENDOCRINE:  Follow up FS.     MUSCULOSKELETAL: OOB     IMPRESSION    Sickle cell crisis  Possible superimposed bacterial infection   Acute chest syndrome refused exchange transfusion  Possible HLH ?     Plan     CNS: pain control as needed     HEENT: oral care    PULMONARY: On RA. Encourage Incentive spirometry.  Possible bronchoscopy.  Repeat CXR     CARDIOVASCULAR: C/w D5 1/2 NS at 100 cc/hr    GI: GI prophylaxis.  Feeding post bronchoscopy.      RENAL: f/u lytes and replete    INFECTIOUS DISEASE: IV ABX. Cultures negative to date, Repeat procal noted. ID follow up. DW with Dr. Silverman.  Jessica, Zyvox, and Levaquin    HEMATOLOGICAL:  hem f/u. Pain control. Transfuse to target >8. Ferritin level.  DW Dr. Thomas.  Favoring Exchange.  DW patient and family at the bed side.  She agrees     ENDOCRINE:  Follow up FS.     MUSCULOSKELETAL: OOB

## 2022-06-05 LAB
ALBUMIN SERPL ELPH-MCNC: 2.8 G/DL — LOW (ref 3.5–5.2)
ALP SERPL-CCNC: 240 U/L — HIGH (ref 30–115)
ALT FLD-CCNC: 31 U/L — SIGNIFICANT CHANGE UP (ref 0–41)
ANION GAP SERPL CALC-SCNC: 13 MMOL/L — SIGNIFICANT CHANGE UP (ref 7–14)
AST SERPL-CCNC: 35 U/L — SIGNIFICANT CHANGE UP (ref 0–41)
BASOPHILS # BLD AUTO: 0.06 K/UL — SIGNIFICANT CHANGE UP (ref 0–0.2)
BASOPHILS NFR BLD AUTO: 0.2 % — SIGNIFICANT CHANGE UP (ref 0–1)
BILIRUB SERPL-MCNC: 2.6 MG/DL — HIGH (ref 0.2–1.2)
BUN SERPL-MCNC: 7 MG/DL — LOW (ref 10–20)
CALCIUM SERPL-MCNC: 9.1 MG/DL — SIGNIFICANT CHANGE UP (ref 8.5–10.1)
CHLORIDE SERPL-SCNC: 101 MMOL/L — SIGNIFICANT CHANGE UP (ref 98–110)
CO2 SERPL-SCNC: 25 MMOL/L — SIGNIFICANT CHANGE UP (ref 17–32)
CREAT SERPL-MCNC: <0.5 MG/DL — LOW (ref 0.7–1.5)
EGFR: 142 ML/MIN/1.73M2 — SIGNIFICANT CHANGE UP
EOSINOPHIL # BLD AUTO: 0.01 K/UL — SIGNIFICANT CHANGE UP (ref 0–0.7)
EOSINOPHIL NFR BLD AUTO: 0 % — SIGNIFICANT CHANGE UP (ref 0–8)
GLUCOSE SERPL-MCNC: 107 MG/DL — HIGH (ref 70–99)
HCT VFR BLD CALC: 20.2 % — LOW (ref 37–47)
HGB BLD-MCNC: 6.9 G/DL — CRITICAL LOW (ref 12–16)
IMM GRANULOCYTES NFR BLD AUTO: 3.1 % — HIGH (ref 0.1–0.3)
LDH SERPL L TO P-CCNC: 1005 — HIGH (ref 50–242)
LYMPHOCYTES # BLD AUTO: 14.4 % — LOW (ref 20.5–51.1)
LYMPHOCYTES # BLD AUTO: 3.99 K/UL — HIGH (ref 1.2–3.4)
MAGNESIUM SERPL-MCNC: 1.9 MG/DL — SIGNIFICANT CHANGE UP (ref 1.8–2.4)
MCHC RBC-ENTMCNC: 30.5 PG — SIGNIFICANT CHANGE UP (ref 27–31)
MCHC RBC-ENTMCNC: 34.2 G/DL — SIGNIFICANT CHANGE UP (ref 32–37)
MCV RBC AUTO: 89.4 FL — SIGNIFICANT CHANGE UP (ref 81–99)
MONOCYTES # BLD AUTO: 2.9 K/UL — HIGH (ref 0.1–0.6)
MONOCYTES NFR BLD AUTO: 10.5 % — HIGH (ref 1.7–9.3)
NEUTROPHILS # BLD AUTO: 19.85 K/UL — HIGH (ref 1.4–6.5)
NEUTROPHILS NFR BLD AUTO: 71.8 % — SIGNIFICANT CHANGE UP (ref 42.2–75.2)
NRBC # BLD: 14 /100 WBCS — HIGH (ref 0–0)
PLATELET # BLD AUTO: 370 K/UL — SIGNIFICANT CHANGE UP (ref 130–400)
POTASSIUM SERPL-MCNC: 3.7 MMOL/L — SIGNIFICANT CHANGE UP (ref 3.5–5)
POTASSIUM SERPL-SCNC: 3.7 MMOL/L — SIGNIFICANT CHANGE UP (ref 3.5–5)
PROT SERPL-MCNC: 6.7 G/DL — SIGNIFICANT CHANGE UP (ref 6–8)
RBC # BLD: 2.26 M/UL — LOW (ref 4.2–5.4)
RBC # FLD: 22.4 % — HIGH (ref 11.5–14.5)
SODIUM SERPL-SCNC: 139 MMOL/L — SIGNIFICANT CHANGE UP (ref 135–146)
WBC # BLD: 27.66 K/UL — HIGH (ref 4.8–10.8)
WBC # FLD AUTO: 27.66 K/UL — HIGH (ref 4.8–10.8)

## 2022-06-05 PROCEDURE — 99233 SBSQ HOSP IP/OBS HIGH 50: CPT

## 2022-06-05 PROCEDURE — 71045 X-RAY EXAM CHEST 1 VIEW: CPT | Mod: 26

## 2022-06-05 RX ADMIN — LIDOCAINE 1 PATCH: 4 CREAM TOPICAL at 13:19

## 2022-06-05 RX ADMIN — HYDROMORPHONE HYDROCHLORIDE 2 MILLIGRAM(S): 2 INJECTION INTRAMUSCULAR; INTRAVENOUS; SUBCUTANEOUS at 20:08

## 2022-06-05 RX ADMIN — HYDROMORPHONE HYDROCHLORIDE 2 MILLIGRAM(S): 2 INJECTION INTRAMUSCULAR; INTRAVENOUS; SUBCUTANEOUS at 08:01

## 2022-06-05 RX ADMIN — Medication 50 MILLIGRAM(S): at 05:37

## 2022-06-05 RX ADMIN — HYDROMORPHONE HYDROCHLORIDE 2 MILLIGRAM(S): 2 INJECTION INTRAMUSCULAR; INTRAVENOUS; SUBCUTANEOUS at 16:18

## 2022-06-05 RX ADMIN — MEROPENEM 100 MILLIGRAM(S): 1 INJECTION INTRAVENOUS at 13:18

## 2022-06-05 RX ADMIN — LIDOCAINE 1 PATCH: 4 CREAM TOPICAL at 19:15

## 2022-06-05 RX ADMIN — LINEZOLID 300 MILLIGRAM(S): 600 INJECTION, SOLUTION INTRAVENOUS at 05:37

## 2022-06-05 RX ADMIN — Medication 650 MILLIGRAM(S): at 06:30

## 2022-06-05 RX ADMIN — CHLORHEXIDINE GLUCONATE 1 APPLICATION(S): 213 SOLUTION TOPICAL at 13:24

## 2022-06-05 RX ADMIN — HYDROMORPHONE HYDROCHLORIDE 2 MILLIGRAM(S): 2 INJECTION INTRAMUSCULAR; INTRAVENOUS; SUBCUTANEOUS at 03:43

## 2022-06-05 RX ADMIN — LOSARTAN POTASSIUM 25 MILLIGRAM(S): 100 TABLET, FILM COATED ORAL at 05:38

## 2022-06-05 RX ADMIN — ENOXAPARIN SODIUM 40 MILLIGRAM(S): 100 INJECTION SUBCUTANEOUS at 00:41

## 2022-06-05 RX ADMIN — HYDROMORPHONE HYDROCHLORIDE 2 MILLIGRAM(S): 2 INJECTION INTRAMUSCULAR; INTRAVENOUS; SUBCUTANEOUS at 10:39

## 2022-06-05 RX ADMIN — MEROPENEM 100 MILLIGRAM(S): 1 INJECTION INTRAVENOUS at 21:47

## 2022-06-05 RX ADMIN — HYDROMORPHONE HYDROCHLORIDE 2 MILLIGRAM(S): 2 INJECTION INTRAMUSCULAR; INTRAVENOUS; SUBCUTANEOUS at 16:42

## 2022-06-05 RX ADMIN — SENNA PLUS 2 TABLET(S): 8.6 TABLET ORAL at 21:47

## 2022-06-05 RX ADMIN — MEROPENEM 100 MILLIGRAM(S): 1 INJECTION INTRAVENOUS at 05:37

## 2022-06-05 RX ADMIN — HYDROMORPHONE HYDROCHLORIDE 2 MILLIGRAM(S): 2 INJECTION INTRAMUSCULAR; INTRAVENOUS; SUBCUTANEOUS at 23:19

## 2022-06-05 RX ADMIN — LINEZOLID 300 MILLIGRAM(S): 600 INJECTION, SOLUTION INTRAVENOUS at 17:05

## 2022-06-05 RX ADMIN — HYDROMORPHONE HYDROCHLORIDE 2 MILLIGRAM(S): 2 INJECTION INTRAMUSCULAR; INTRAVENOUS; SUBCUTANEOUS at 20:23

## 2022-06-05 RX ADMIN — PANTOPRAZOLE SODIUM 40 MILLIGRAM(S): 20 TABLET, DELAYED RELEASE ORAL at 05:37

## 2022-06-05 RX ADMIN — HYDROMORPHONE HYDROCHLORIDE 2 MILLIGRAM(S): 2 INJECTION INTRAMUSCULAR; INTRAVENOUS; SUBCUTANEOUS at 23:04

## 2022-06-05 RX ADMIN — Medication 650 MILLIGRAM(S): at 07:07

## 2022-06-05 RX ADMIN — Medication 1 MILLIGRAM(S): at 13:20

## 2022-06-05 RX ADMIN — LIDOCAINE 1 PATCH: 4 CREAM TOPICAL at 00:51

## 2022-06-05 RX ADMIN — HYDROMORPHONE HYDROCHLORIDE 2 MILLIGRAM(S): 2 INJECTION INTRAMUSCULAR; INTRAVENOUS; SUBCUTANEOUS at 06:45

## 2022-06-05 RX ADMIN — HYDROMORPHONE HYDROCHLORIDE 2 MILLIGRAM(S): 2 INJECTION INTRAMUSCULAR; INTRAVENOUS; SUBCUTANEOUS at 00:00

## 2022-06-05 RX ADMIN — HYDROMORPHONE HYDROCHLORIDE 2 MILLIGRAM(S): 2 INJECTION INTRAMUSCULAR; INTRAVENOUS; SUBCUTANEOUS at 00:43

## 2022-06-05 RX ADMIN — HYDROMORPHONE HYDROCHLORIDE 2 MILLIGRAM(S): 2 INJECTION INTRAMUSCULAR; INTRAVENOUS; SUBCUTANEOUS at 03:24

## 2022-06-05 RX ADMIN — HYDROMORPHONE HYDROCHLORIDE 2 MILLIGRAM(S): 2 INJECTION INTRAMUSCULAR; INTRAVENOUS; SUBCUTANEOUS at 12:30

## 2022-06-05 RX ADMIN — CHLORHEXIDINE GLUCONATE 1 APPLICATION(S): 213 SOLUTION TOPICAL at 05:38

## 2022-06-05 RX ADMIN — HYDROMORPHONE HYDROCHLORIDE 2 MILLIGRAM(S): 2 INJECTION INTRAMUSCULAR; INTRAVENOUS; SUBCUTANEOUS at 17:15

## 2022-06-05 RX ADMIN — HYDROMORPHONE HYDROCHLORIDE 2 MILLIGRAM(S): 2 INJECTION INTRAMUSCULAR; INTRAVENOUS; SUBCUTANEOUS at 05:37

## 2022-06-05 NOTE — PROGRESS NOTE ADULT - ASSESSMENT
Please call or message on Microsoft Teams if with any questions.  Spectra [id number]    · Assessment	  25yo  F with PMH of Sickle cell anemia (on oxbryta - followed by Hematologist - Dr. Jimenez in [place]), recently discharged after treatment of pneumonia and sickle cell crisis, presented to the ED with c/o right sided chest, shoulder and upper back pain. States that she overexerted herself. She states that she has not been feeling well since yesterday, had mild cough and fever upto 102F at home. Denies any shortness of breath, abdominal pain, nausea, vomiting, diarrhea, leg pain, swelling.     IMPRESSION;   # sepsis secondary to multilobar bacterial PNA RLL/LLL  - CXR increased opacity  RLL  - 5/20 CT bibasilar consolidation  - 5/20 Legionella NG  - 5/28 Nares ORSA NG  - CT Chest 6/2 - redemonstation of bibasilar opacities, decreased left lower lobe opacities, increased right lower lobe opactiesi     #Sickle cell vasoocclusive crisis  - 5/26 COVID-19 NG   - refused exchange transfusion   - Ferritin, Serum: 2493: Test Repeated ng/mL (06.03.22 @ 18:55)  - Lactate Dehydrogenase, Serum: 1244 (06.03.22 @ 06:22)  - Lower extremity dopplers negative 6/3    RECOMMENDATIONS;  - s/p bronch 6/4 - no significant purulent secretions noted   - follow-up [facility] Cx   - stop linezolid and levofloxacin   - continue meropenem 1g q 8 hours -- will narrow pending Cx results   - pain control   - supplemental o2   - noted discussions regarding exchange transfusion     Please call or message on Microsoft Teams if with any questions.  Spectra [id number]

## 2022-06-05 NOTE — DIETITIAN INITIAL EVALUATION ADULT - OTHER CALCULATIONS
Estimated Calorie Needs: [id number] x AF 1.2-1.3=1580-1712kcal/day   Estimated Protein Needs: 57-68grams/day (1-1.2grams/kg of admit weight)  Estimated Fluid Needs: Fluids per ICU team

## 2022-06-05 NOTE — PROGRESS NOTE ADULT - ASSESSMENT
27yo  F with PMH of Sickle cell anemia (on oxbryta - followed by Hematologist - Dr. Jimenez in [place]), recently discharged after treatment of pneumonia and sickle cell crisis, presented to the ED with c/o right sided chest, shoulder and upper back pain.  She also reports fever at home 102F.  She was recently treated for sickle cell crisis and pneumonia and discharged last week on Levaquin PO.    Hematology consulted for r/o ACS with hx of sickle cell disease.     IMPRESSION:    #ACS/Vaso-occlusive pain crisis/PNA  #Hx of Hb SS disease   -Was on Oxbryta, just started in April 2022 but hasn't been routinely taking it due to her recent hospitalizations/infections; never was on hydrea or any other HbSS medications  -Initially offered both exchange as patient was in high 80s on RA and simple transfusion however she declined, eventually she agreed to a simple transfusion.    -Baseline Hb 7-8  -She has had 2 crises far this year.   -Hb electrophoresis from 5/21 showed 90% HbS    #Possible autoimmune hemolytic anemia   Direct Lori Profile (05.31.22 @ 08:00)    Dir Antiglob Polyspecific Interpretation: POS  - Spoke with Dr Burroughs from transfusion med, it is hard to call if pt has alloAb or autoab given chronic hemolysis    #Chronic leukocytosis and thrombocytosis: Resolving  - likely reactive     #Acute right knee pain -improved  - likely 2/2 sickle cell crisis. No evidence of effusion or AVN on Xray    #Right shin protuberance with tenderness to palpation    RECOMMENDATIONS:  - Pt still refusing exchange transfusion, we will reassess tomorrow if indicated  - F/u results from bronchoscopy: cultures negative so far  - C/w Prednisone 50mg daily with GI ppx; started on 5/31: will likely do 2 weeks of 1mg/kg dose followed by quick taper.   - Viral panel noted: past EBV infection, hep panel non reactive  - C/w IV hydration: 0.45% NS @100cc /Hr  - C/w pain meds: IV Dilaudid 2mg q2hr prn. pain mx follow up  - IV Abx per ID  - C/w folic acid supplementation  - She can continue Oxbryta as outpatient and follow up with her primary hematologist post discharge.  - Monitor hemolysis labs and CBC. Check daily LDH    DVT ppx: Lovenox        25yo  F with PMH of Sickle cell anemia (on oxbryta - followed by Hematologist - Dr. Jimenez in [place]), recently discharged after treatment of pneumonia and sickle cell crisis, presented to the ED with c/o right sided chest, shoulder and upper back pain.  She also reports fever at home 102F.  She was recently treated for sickle cell crisis and pneumonia and discharged last week on Levaquin PO.    Hematology consulted for r/o ACS with hx of sickle cell disease.     IMPRESSION:    #ACS/Vaso-occlusive pain crisis/PNA  #Hx of Hb SS disease   -Was on Oxbryta, just started in April 2022 but hasn't been routinely taking it due to her recent hospitalizations/infections; never was on hydrea or any other HbSS medications  -Initially offered both exchange as patient was in high 80s on RA and simple transfusion however she declined, eventually she agreed to a simple transfusion.    -Baseline Hb 7-8  -She has had 2 crises far this year.   -Hb electrophoresis from 5/21 showed 90% HbS  -Viral panel noted: past EBV infection, hep panel non reactive    #Possible autoimmune hemolytic anemia   Direct Lori Profile (05.31.22 @ 08:00)    Dir Antiglob Polyspecific Interpretation: POS  - Spoke with Dr Burroughs from transfusion med, it is hard to call if pt has alloAb or autoab given chronic hemolysis    #Chronic leukocytosis and thrombocytosis: Resolving  - likely reactive     #Acute right knee pain -improved  - likely 2/2 sickle cell crisis. No evidence of effusion or AVN on Xray    #Right shin protuberance with tenderness to palpation    RECOMMENDATIONS:  - Pt still refusing exchange transfusion, we will reassess tomorrow if indicated; Can hold off on simple transfusion today as it is around pt's baseline. Will reassess tomorrow with CBC  - F/u results from bronchoscopy: cultures negative so far  - C/w Prednisone 50mg daily with GI ppx; started on 5/31: will likely do 2 weeks of 1mg/kg dose followed by quick taper.   - C/w IV hydration: 0.45% NS @100cc /Hr  - C/w pain meds: IV Dilaudid 2mg q2hr prn. pain mx follow up  - Can get Xray of the right leg. Warm compresses prn.   - IV Abx per ID  - C/w folic acid supplementation  - She can continue Oxbryta as outpatient and follow up with her primary hematologist post discharge.  - Monitor hemolysis labs and CBC. Check daily LDH    DVT ppx: Lovenox        25yo  F with PMH of Sickle cell anemia (on oxbryta - followed by Hematologist - Dr. Jimenez in [place]), recently discharged after treatment of pneumonia and sickle cell crisis, presented to the ED with c/o right sided chest, shoulder and upper back pain.  She also reports fever at home 102F.  She was recently treated for sickle cell crisis and pneumonia and discharged last week on Levaquin PO.    Hematology consulted for r/o ACS with hx of sickle cell disease.     IMPRESSION:    #ACS/Vaso-occlusive pain crisis/PNA  #Hx of Hb SS disease   -Was on Oxbryta, just started in April 2022 but hasn't been routinely taking it due to her recent hospitalizations/infections; never was on hydrea or any other HbSS medications  -Initially offered both exchange as patient was in high 80s on RA and simple transfusion however she declined, eventually she agreed to a simple transfusion.    -Baseline Hb 7-8  -She has had 2 crises far this year.   -Hb electrophoresis from 5/21 showed 90% HbS  -Viral panel noted: past EBV infection, hep panel non reactive    #Possible autoimmune hemolytic anemia   Direct Lori Profile (05.31.22 @ 08:00)    Dir Antiglob Polyspecific Interpretation: POS  - Spoke with Dr Burroughs from transfusion med, it is hard to call if pt has alloAb or autoab given chronic hemolysis    #Chronic leukocytosis and thrombocytosis: Resolving  - likely reactive     #Acute right knee pain -improved  - likely 2/2 sickle cell crisis. No evidence of effusion or AVN on Xray    #Right shin protuberance with tenderness to palpation    RECOMMENDATIONS:  - Pt still refusing exchange transfusion, we will reassess tomorrow if indicated; Can hold off on simple transfusion today as it is around pt's baseline. Will reassess tomorrow with CBC  - F/u results from bronchoscopy: cultures negative so far  - C/w Prednisone 50mg daily with GI ppx; started on 5/31: will likely do 2 weeks of 1mg/kg dose followed by quick taper.   - C/w IV hydration: 0.45% NS @100cc /Hr  - C/w pain meds: IV Dilaudid 2mg q2hr prn. pain mx follow up  - Can get Xray of the right leg ( likely bone infarct )  Warm compresses prn.   - IV Abx per ID  - C/w folic acid supplementation  - She can continue Oxbryta as outpatient and follow up with her primary hematologist post discharge.  - Monitor hemolysis labs and CBC. Check daily LDH    DVT ppx: Lovenox

## 2022-06-05 NOTE — PROGRESS NOTE ADULT - SUBJECTIVE AND OBJECTIVE BOX
JAMES VÁSQUEZ  26y, Female  Allergy: No Known Allergies      LOS  10d    CHIEF COMPLAINT: Acute chest syndrome (04 Jun 2022 10:15)      INTERVAL EVENTS/HPI  - No acute events overnight  - T(F): , Max: 100.5 (06-05-22 @ 05:00)  - s/p bronch   - on 3L NC  - WBC Count: 27.66 (06-05-22 @ 04:49)  WBC Count: 33.27 (06-04-22 @ 11:02)     - Creatinine, Serum: <0.5 (06-05-22 @ 04:49)  Creatinine, Serum: <0.5 (06-04-22 @ 11:02)       ROS  General: Denies rigors, nightsweats  HEENT: Denies headache, rhinorrhea, sore throat, eye pain  CV: Denies CP, palpitations  PULM: Denies wheezing, hemoptysis  GI: Denies hematemesis, hematochezia, melena  : Denies discharge, hematuria  MSK: Denies arthralgias, myalgias  SKIN: Denies rash, lesions  NEURO: Denies paresthesias, weakness  PSYCH: Denies depression, anxiety    VITALS:  T(F): 100.1, Max: 100.5 (06-05-22 @ 05:00)  HR: 106  BP: 135/65  RR: 14Vital Signs Last 24 Hrs  T(C): 37.8 (05 Jun 2022 07:00), Max: 38.1 (05 Jun 2022 05:00)  T(F): 100.1 (05 Jun 2022 07:00), Max: 100.5 (05 Jun 2022 05:00)  HR: 106 (05 Jun 2022 07:00) (80 - 108)  BP: 135/65 (05 Jun 2022 07:00) (114/70 - 136/73)  BP(mean): 84 (05 Jun 2022 07:00) (79 - 94)  RR: 14 (05 Jun 2022 07:00) (14 - 27)  SpO2: 99% (05 Jun 2022 07:00) (98% - 100%)    PHYSICAL EXAM:  Gen: NAD, resting in bed  HEENT: Normocephalic, atraumatic  Neck: supple, no lymphadenopathy  CV: Regular rate & regular rhythm  Lungs: decreased BS at bases, no fremitus  Abdomen: Soft, BS present  Ext: Warm, well perfused  Neuro: non focal, awake  Skin: no rash, no erythema  Lines: no phlebitis    FH: Non-contributory  Social Hx: Non-contributory    TESTS & MEASUREMENTS:                        6.9    27.66 )-----------( 370      ( 05 Jun 2022 04:49 )             20.2     06-05    139  |  101  |  7<L>  ----------------------------<  107<H>  3.7   |  25  |  <0.5<L>    Ca    9.1      05 Jun 2022 04:49  Mg     1.9     06-05    TPro  6.7  /  Alb  2.8<L>  /  TBili  2.6<H>  /  DBili  x   /  AST  35  /  ALT  31  /  AlkPhos  240<H>  06-05      LIVER FUNCTIONS - ( 05 Jun 2022 04:49 )  Alb: 2.8 g/dL / Pro: 6.7 g/dL / ALK PHOS: 240 U/L / ALT: 31 U/L / AST: 35 U/L / GGT: x               Culture - Acid Fast - Bronchial w/Smear (collected 06-04-22 @ 09:50)  Source: .Bronchial None    Culture - Bronchial (collected 06-04-22 @ 09:50)  Source: .Bronchial None  Gram Stain (06-04-22 @ 19:11):    Rare polymorphonuclear leukocytes per low power field    No organisms seen    Culture - Blood (collected 06-03-22 @ 06:22)  Source: .Blood None  Preliminary Report (06-04-22 @ 19:01):    No growth to date.    Culture - Blood (collected 06-02-22 @ 20:33)  Source: .Blood None  Preliminary Report (06-04-22 @ 03:01):    No growth to date.    Culture - Blood (collected 05-27-22 @ 11:46)  Source: .Blood None  Final Report (06-01-22 @ 23:00):    No Growth Final    Culture - Blood (collected 05-20-22 @ 21:41)  Source: .Blood Blood-Peripheral  Final Report (05-26-22 @ 09:01):    No Growth Final    Culture - Blood (collected 05-14-22 @ 16:45)  Source: .Blood Blood  Final Report (05-20-22 @ 01:01):    No Growth Final    Culture - Blood (collected 05-14-22 @ 16:45)  Source: .Blood Blood  Final Report (05-20-22 @ 02:01):    No Growth Final            INFECTIOUS DISEASES TESTING  HIV-1/2 Combo Result: Nonreact (06-03-22 @ 18:55)  Procalcitonin, Serum: 0.10 (06-03-22 @ 06:22)  COVID-19 PCR: NotDetec (06-02-22 @ 06:30)  MRSA PCR Result.: Negative (05-28-22 @ 11:50)  Procalcitonin, Serum: 0.09 (05-27-22 @ 11:46)  Rapid RVP Result: NotDetec (05-26-22 @ 21:55)  Legionella Antigen, Urine: Negative (05-22-22 @ 12:50)  Procalcitonin, Serum: 0.09 (05-21-22 @ 12:12)  Rapid RVP Result: NotDetec (05-20-22 @ 13:28)  Rapid RVP Result: NotDetec (05-14-22 @ 14:33)  Procalcitonin, Serum: <0.02 (03-15-22 @ 04:30)  COVID-19 PCR: NotDetec (03-14-22 @ 09:27)      INFLAMMATORY MARKERS  Sedimentation Rate, Erythrocyte: >140 mm/Hr (06-03-22 @ 06:22)  C-Reactive Protein, Serum: 8 mg/L (03-15-22 @ 04:30)      RADIOLOGY & ADDITIONAL TESTS:  I have personally reviewed the last available Chest xray  CXR      CT  CT Chest No Cont:   ACC: [id number] EXAM:  CT CHEST                          PROCEDURE DATE:  06/02/2022          INTERPRETATION:  HISTORY: Acute chest syndrome. Pain.    TECHNIQUE: A noncontrast CT of the thorax was performed. Sagittal and   coronal reformats as well as MIP images were performed.    COMPARISON: CT chest 5/20/2022.    FINDINGS:    LUNGS, PLEURA, AND AIRWAYS: Redemonstration of patchy bibasilar   opacities, noting decreasing left lower lobe opacities. Increasing small   right pleural effusion and increasing right lower opacities, with likely   a component of atelectasis. No pneumothorax.    HEART/GREAT VESSELS: Cardiomegaly. Small pericardial effusion. Relative   hypodensity of the intracardiac blood pool suggestive of anemia.    MEDIASTINUM/LYMPH NODES: No definite enlarged mediastinal or axillary   lymph nodes. Previously described prominent hilar lymph nodes on   3/14/2022 not well evaluated without contrast.    UPPER ABDOMEN: Hyperdense focus adjacent to or arising from the right   kidney, difficult to determine, may represent proteinaceous cyst    BONES AND SOFT TISSUES: Unremarkable        IMPRESSION:    Redemonstration of patchy bibasilar opacities (suspected to be infectious   in the appropriate clinical setting), noting decreasing left lower lobe   opacities. Increasing small right pleural effusion and increasing right   lower opacities, with likely a component of atelectasis.    Cardiomegaly. Small pericardial effusion.    Previously described prominent hilar lymph nodes on 3/14/2022 not well   evaluated without contrast.    --- End of Report ---          AYLA OLIVEROS MD; Resident Radiologist  This document has been electronically signed.  AURY SOTO MD; Attending Radiologist  This document has been electronically signed. [place]  3 2022 10:08AM (06-02-22 @ 18:35)      CARDIOLOGY TESTING  12 Lead ECG:   Ventricular Rate 53 BPM    Atrial Rate 53 BPM    P-R Interval 272 ms    QRS Duration 86 ms    Q-T Interval 426 ms    QTC Calculation(Bazett) 399 ms    P Axis 118 degrees    R Axis 37 degrees    T Axis -1 degrees    Diagnosis Line Sinus bradycardia with marked sinus arrhythmia with 1st degree A-V block  T wave abnormality, consider anterior ischemia  Abnormal ECG    Confirmed by Deepak Bridges ([id number]) on 6/2/2022 11:14:08 AM (06-02-22 @ 01:26)  12 Lead ECG:   Ventricular Rate 119 BPM    Atrial Rate 119 BPM    P-R Interval 148 ms    QRS Duration 76 ms    Q-T Interval 290 ms    QTC Calculation(Bazett) 407 ms    P Axis 42 degrees    R Axis 47 degrees    T Axis -20 degrees    Diagnosis Line *** Poor data quality, interpretation may be adversely affected  Sinus tachycardia  T wave abnormality, consider inferior ischemia  Baseline artifact  Abnormal ECG    Confirmed by Karlee rFank MD ([id number]) on 5/27/2022 8:13:30 AM (05-26-22 @ 19:21)      MEDICATIONS  chlorhexidine 4% Liquid 1 Topical daily  dextrose 5% + sodium chloride 0.45%. 1000 IV Continuous <Continuous>  enoxaparin Injectable 40 SubCutaneous every 24 hours  folic acid 1 Oral daily  influenza   Vaccine 0.5 IntraMuscular once  levoFLOXacin IVPB 750 IV Intermittent every 24 hours  lidocaine   4% Patch 1 Transdermal daily  linezolid  IVPB 600 IV Intermittent every 12 hours  losartan 25 Oral daily  meropenem  IVPB     meropenem  IVPB 1000 IV Intermittent every 8 hours  pantoprazole    Tablet 40 Oral before breakfast  predniSONE   Tablet 50 Oral daily  senna 2 Oral at bedtime      WEIGHT  Weight (kg): 56.5 (06-04-22 @ 08:57)  Creatinine, Serum: <0.5 mg/dL (06-05-22 @ 04:49)  Creatinine, Serum: <0.5 mg/dL (06-04-22 @ 11:02)      ANTIBIOTICS:  levoFLOXacin IVPB 750 milliGRAM(s) IV Intermittent every 24 hours  linezolid  IVPB 600 milliGRAM(s) IV Intermittent every 12 hours  meropenem  IVPB      meropenem  IVPB 1000 milliGRAM(s) IV Intermittent every 8 hours      All available historical records have been reviewed

## 2022-06-05 NOTE — PROGRESS NOTE ADULT - SUBJECTIVE AND OBJECTIVE BOX
Patient is a 26y old  Female who presents with a chief complaint of Acute chest syndrome (05 Jun 2022 07:47)        Over Night Events:  Off pressors.  On 2 liters.  SP bronchoscopy.          ROS:     All ROS are negative except HPI         PHYSICAL EXAM    ICU Vital Signs Last 24 Hrs  T(C): 37.8 (05 Jun 2022 07:00), Max: 38.1 (05 Jun 2022 05:00)  T(F): 100.1 (05 Jun 2022 07:00), Max: 100.5 (05 Jun 2022 05:00)  HR: 106 (05 Jun 2022 07:00) (80 - 108)  BP: 135/65 (05 Jun 2022 07:00) (114/70 - 136/73)  BP(mean): 84 (05 Jun 2022 07:00) (79 - 94)  ABP: --  ABP(mean): --  RR: 14 (05 Jun 2022 07:00) (14 - 27)  SpO2: 99% (05 Jun 2022 07:00) (98% - 100%)      CONSTITUTIONAL:  In NAD    ENT:   Airway patent,   Mouth with normal mucosa.       EYES:   Pupils equal,   Round and reactive to light.    CARDIAC:   Normal rate,   Regular rhythm.        RESPIRATORY:   No wheezing  Bilateral BS  Normal chest expansion  Not tachypneic,  No use of accessory muscles    GASTROINTESTINAL:  Abdomen soft,   Non-tender,   No guarding,   + BS    MUSCULOSKELETAL:   Range of motion is not limited,  No clubbing, cyanosis    NEUROLOGICAL:   Alert and oriented   No motor  deficits.    SKIN:   Skin normal color for race,   No evidence of rash.    PSYCHIATRIC:   Normal mood and affect.   No apparent risk to self or others.          06-04-22 @ 07:01  -  06-05-22 @ 07:00  --------------------------------------------------------  IN:    dextrose 5% + sodium chloride 0.45%: 1200 mL    IV PiggyBack: 600 mL    Oral Fluid: 220 mL  Total IN: 2020 mL    OUT:    Voided (mL): 2100 mL  Total OUT: 2100 mL    Total NET: -80 mL          LABS:                            6.9    27.66 )-----------( 370      ( 05 Jun 2022 04:49 )             20.2                                               06-05    139  |  101  |  7<L>  ----------------------------<  107<H>  3.7   |  25  |  <0.5<L>    Ca    9.1      05 Jun 2022 04:49  Mg     1.9     06-05    TPro  6.7  /  Alb  2.8<L>  /  TBili  2.6<H>  /  DBili  x   /  AST  35  /  ALT  31  /  AlkPhos  240<H>  06-05      PT/INR - ( 04 Jun 2022 04:36 )   PT: 18.10 sec;   INR: 1.58 ratio         PTT - ( 04 Jun 2022 04:36 )  PTT:34.8 sec                                           CARDIAC MARKERS ( 03 Jun 2022 11:04 )  x     / <0.01 ng/mL / x     / x     / <1.0 ng/mL                                            LIVER FUNCTIONS - ( 05 Jun 2022 04:49 )  Alb: 2.8 g/dL / Pro: 6.7 g/dL / ALK PHOS: 240 U/L / ALT: 31 U/L / AST: 35 U/L / GGT: x                                                  Culture - Acid Fast - Bronchial w/Smear (collected 04 Jun 2022 09:50)  Source: .Bronchial None    Culture - Bronchial (collected 04 Jun 2022 09:50)  Source: .Bronchial None  Gram Stain (04 Jun 2022 19:11):    Rare polymorphonuclear leukocytes per low power field    No organisms seen    Culture - Blood (collected 03 Jun 2022 06:22)  Source: .Blood None  Preliminary Report (04 Jun 2022 19:01):    No growth to date.    Culture - Blood (collected 02 Jun 2022 20:33)  Source: .Blood None  Preliminary Report (04 Jun 2022 03:01):    No growth to date.                                                                                           MEDICATIONS  (STANDING):  chlorhexidine 4% Liquid 1 Application(s) Topical daily  dextrose 5% + sodium chloride 0.45%. 1000 milliLiter(s) (100 mL/Hr) IV Continuous <Continuous>  enoxaparin Injectable 40 milliGRAM(s) SubCutaneous every 24 hours  folic acid 1 milliGRAM(s) Oral daily  influenza   Vaccine 0.5 milliLiter(s) IntraMuscular once  levoFLOXacin IVPB 750 milliGRAM(s) IV Intermittent every 24 hours  lidocaine   4% Patch 1 Patch Transdermal daily  linezolid  IVPB 600 milliGRAM(s) IV Intermittent every 12 hours  losartan 25 milliGRAM(s) Oral daily  meropenem  IVPB 1000 milliGRAM(s) IV Intermittent every 8 hours  meropenem  IVPB      pantoprazole    Tablet 40 milliGRAM(s) Oral before breakfast  predniSONE   Tablet 50 milliGRAM(s) Oral daily  senna 2 Tablet(s) Oral at bedtime    MEDICATIONS  (PRN):  acetaminophen     Tablet .. 650 milliGRAM(s) Oral every 6 hours PRN Temp greater or equal to 38C (100.4F), Mild Pain (1 - 3)  HYDROmorphone  Injectable 2 milliGRAM(s) IV Push every 2 hours PRN Severe Pain (7 - 10)  melatonin 3 milliGRAM(s) Oral at bedtime PRN Insomnia  ondansetron Injectable 4 milliGRAM(s) IV Push every 8 hours PRN Nausea and/or Vomiting      New X-rays reviewed:                                                                                  ECHO

## 2022-06-05 NOTE — DIETITIAN INITIAL EVALUATION ADULT - PERTINENT LABORATORY DATA
06-05    139  |  101  |  7<L>  ----------------------------<  107<H>  3.7   |  25  |  <0.5<L>    Ca    9.1      05 Jun 2022 04:49  Mg     1.9     06-05    TPro  6.7  /  Alb  2.8<L>  /  TBili  2.6<H>  /  DBili  x   /  AST  35  /  ALT  31  /  AlkPhos  240<H>  06-05

## 2022-06-05 NOTE — DIETITIAN INITIAL EVALUATION ADULT - NSFNSGIIOFT_GEN_A_CORE
Dx: 25y/o female with h/o sickle cell anemia presented to the ED with c/o right sided chest, shoulder and upper back pain. Admitted with acute chest syndrome. Hospital course is complicated by vaso-occlusive pain crisis, pneumonia, possible autoimmune hemolytic anemia, chronic leukocytosis and thrombocytosis (resolving), acute right knee pain (improved) and right shin protuberance with tenderness to palpation. MAP-90.

## 2022-06-05 NOTE — PROGRESS NOTE ADULT - SUBJECTIVE AND OBJECTIVE BOX
Patient is a 26y old  Female who presents with a chief complaint of Acute chest syndrome (05 Jun 2022 08:14)      Subjective:      Vital Signs Last 24 Hrs  T(C): 37.8 (05 Jun 2022 07:00), Max: 38.1 (05 Jun 2022 05:00)  T(F): 100.1 (05 Jun 2022 07:00), Max: 100.5 (05 Jun 2022 05:00)  HR: 90 (05 Jun 2022 08:00) (80 - 108)  BP: 128/64 (05 Jun 2022 08:00) (114/70 - 136/73)  BP(mean): 87 (05 Jun 2022 08:00) (79 - 94)  RR: 18 (05 Jun 2022 08:00) (14 - 27)  SpO2: 100% (05 Jun 2022 08:00) (98% - 100%)    PHYSICAL EXAM  General: adult in NAD  HEENT: clear oropharynx, anicteric sclera, pink conjunctiva  Neck: supple  CV: normal S1/S2 with no murmur rubs or gallops  Lungs: positive air movement b/l ant lungs,clear to auscultation, no wheezes, no rales  Abdomen: soft non-tender non-distended, no hepatosplenomegaly  Ext: no clubbing cyanosis or edema  Skin: no rashes and no petechiae  Neuro: alert and oriented X 4, no focal deficits    MEDICATIONS  (STANDING):  chlorhexidine 4% Liquid 1 Application(s) Topical daily  dextrose 5% + sodium chloride 0.45%. 1000 milliLiter(s) (100 mL/Hr) IV Continuous <Continuous>  enoxaparin Injectable 40 milliGRAM(s) SubCutaneous every 24 hours  folic acid 1 milliGRAM(s) Oral daily  influenza   Vaccine 0.5 milliLiter(s) IntraMuscular once  levoFLOXacin IVPB 750 milliGRAM(s) IV Intermittent every 24 hours  lidocaine   4% Patch 1 Patch Transdermal daily  linezolid  IVPB 600 milliGRAM(s) IV Intermittent every 12 hours  losartan 25 milliGRAM(s) Oral daily  meropenem  IVPB 1000 milliGRAM(s) IV Intermittent every 8 hours  meropenem  IVPB      pantoprazole    Tablet 40 milliGRAM(s) Oral before breakfast  predniSONE   Tablet 50 milliGRAM(s) Oral daily  senna 2 Tablet(s) Oral at bedtime    MEDICATIONS  (PRN):  acetaminophen     Tablet .. 650 milliGRAM(s) Oral every 6 hours PRN Temp greater or equal to 38C (100.4F), Mild Pain (1 - 3)  HYDROmorphone  Injectable 2 milliGRAM(s) IV Push every 2 hours PRN Severe Pain (7 - 10)  melatonin 3 milliGRAM(s) Oral at bedtime PRN Insomnia  ondansetron Injectable 4 milliGRAM(s) IV Push every 8 hours PRN Nausea and/or Vomiting      LABS:                          6.9    27.66 )-----------( 370      ( 05 Jun 2022 04:49 )             20.2         Mean Cell Volume : 89.4 fL  Mean Cell Hemoglobin : 30.5 pg  Mean Cell Hemoglobin Concentration : 34.2 g/dL  Auto Neutrophil # : 19.85 K/uL  Auto Lymphocyte # : 3.99 K/uL  Auto Monocyte # : 2.90 K/uL  Auto Eosinophil # : 0.01 K/uL  Auto Basophil # : 0.06 K/uL  Auto Neutrophil % : 71.8 %  Auto Lymphocyte % : 14.4 %  Auto Monocyte % : 10.5 %  Auto Eosinophil % : 0.0 %  Auto Basophil % : 0.2 %      Serial CBC's  06-05 @ 04:49  Hct-20.2 / Hgb-6.9 / Plat-370 / RBC-2.26 / WBC-27.66  Serial CBC's  06-04 @ 11:02  Hct-22.3 / Hgb-7.5 / Plat-391 / RBC-2.42 / WBC-33.27  Serial CBC's  06-03 @ 06:22  Hct-20.6 / Hgb-7.3 / Plat-447 / RBC-2.26 / WBC-37.34  Serial CBC's  06-02 @ 12:12  Hct-20.5 / Hgb-7.0 / Plat-531 / RBC-2.27 / WBC-38.46      06-05    139  |  101  |  7<L>  ----------------------------<  107<H>  3.7   |  25  |  <0.5<L>    Ca    9.1      05 Jun 2022 04:49  Mg     1.9     06-05    TPro  6.7  /  Alb  2.8<L>  /  TBili  2.6<H>  /  DBili  x   /  AST  35  /  ALT  31  /  AlkPhos  240<H>  06-05      PT/INR - ( 04 Jun 2022 04:36 )   PT: 18.10 sec;   INR: 1.58 ratio         PTT - ( 04 Jun 2022 04:36 )  PTT:34.8 sec    Ferritin, Serum: 2669 ng/mL (06-04 @ 04:36)  Ferritin, Serum: 2493 ng/mL (06-03 @ 18:55)  Reticulocyte Percent: 15.3 % (06-03 @ 06:22)  Reticulocyte Percent: 13.6 % (06-01 @ 06:11)  Reticulocyte Percent: 13.4 % (05-31 @ 08:00)    Culture - Acid Fast - Bronchial w/Smear (collected 04 Jun 2022 09:50)  Source: .Bronchial None    Culture - Bronchial (collected 04 Jun 2022 09:50)  Source: .Bronchial None  Gram Stain (04 Jun 2022 19:11):    Rare polymorphonuclear leukocytes per low power field    No organisms seen    Culture - Blood (collected 03 Jun 2022 06:22)  Source: .Blood None  Preliminary Report (04 Jun 2022 19:01):    No growth to date.    Culture - Blood (collected 02 Jun 2022 20:33)  Source: .Blood None  Preliminary Report (04 Jun 2022 03:01):    No growth to date.         Patient is a 26y old  Female who presents with a chief complaint of Acute chest syndrome (05 Jun 2022 08:14)      Subjective: Breathing is better, has very tender right shin today      Vital Signs Last 24 Hrs  T(C): 37.8 (05 Jun 2022 07:00), Max: 38.1 (05 Jun 2022 05:00)  T(F): 100.1 (05 Jun 2022 07:00), Max: 100.5 (05 Jun 2022 05:00)  HR: 90 (05 Jun 2022 08:00) (80 - 108)  BP: 128/64 (05 Jun 2022 08:00) (114/70 - 136/73)  BP(mean): 87 (05 Jun 2022 08:00) (79 - 94)  RR: 18 (05 Jun 2022 08:00) (14 - 27)  SpO2: 100% (05 Jun 2022 08:00) (98% - 100%)    PHYSICAL EXAM  General: adult in NAD  HEENT: icteric sclera, pink conjunctiva  Neck: supple  CV: normal S1/S2  Lungs: positive air movement b/l ant lungs  Abdomen: soft non-tender non-distended  Ext: RLE shin protuberance with tenderness to light palpation  Skin: no rashes and no petechiae  Neuro: alert and oriented X 4, no focal deficits    MEDICATIONS  (STANDING):  chlorhexidine 4% Liquid 1 Application(s) Topical daily  dextrose 5% + sodium chloride 0.45%. 1000 milliLiter(s) (100 mL/Hr) IV Continuous <Continuous>  enoxaparin Injectable 40 milliGRAM(s) SubCutaneous every 24 hours  folic acid 1 milliGRAM(s) Oral daily  influenza   Vaccine 0.5 milliLiter(s) IntraMuscular once  levoFLOXacin IVPB 750 milliGRAM(s) IV Intermittent every 24 hours  lidocaine   4% Patch 1 Patch Transdermal daily  linezolid  IVPB 600 milliGRAM(s) IV Intermittent every 12 hours  losartan 25 milliGRAM(s) Oral daily  meropenem  IVPB 1000 milliGRAM(s) IV Intermittent every 8 hours  meropenem  IVPB      pantoprazole    Tablet 40 milliGRAM(s) Oral before breakfast  predniSONE   Tablet 50 milliGRAM(s) Oral daily  senna 2 Tablet(s) Oral at bedtime    MEDICATIONS  (PRN):  acetaminophen     Tablet .. 650 milliGRAM(s) Oral every 6 hours PRN Temp greater or equal to 38C (100.4F), Mild Pain (1 - 3)  HYDROmorphone  Injectable 2 milliGRAM(s) IV Push every 2 hours PRN Severe Pain (7 - 10)  melatonin 3 milliGRAM(s) Oral at bedtime PRN Insomnia  ondansetron Injectable 4 milliGRAM(s) IV Push every 8 hours PRN Nausea and/or Vomiting      LABS:                          6.9    27.66 )-----------( 370      ( 05 Jun 2022 04:49 )             20.2         Mean Cell Volume : 89.4 fL  Mean Cell Hemoglobin : 30.5 pg  Mean Cell Hemoglobin Concentration : 34.2 g/dL  Auto Neutrophil # : 19.85 K/uL  Auto Lymphocyte # : 3.99 K/uL  Auto Monocyte # : 2.90 K/uL  Auto Eosinophil # : 0.01 K/uL  Auto Basophil # : 0.06 K/uL  Auto Neutrophil % : 71.8 %  Auto Lymphocyte % : 14.4 %  Auto Monocyte % : 10.5 %  Auto Eosinophil % : 0.0 %  Auto Basophil % : 0.2 %      Serial CBC's  06-05 @ 04:49  Hct-20.2 / Hgb-6.9 / Plat-370 / RBC-2.26 / WBC-27.66  Serial CBC's  06-04 @ 11:02  Hct-22.3 / Hgb-7.5 / Plat-391 / RBC-2.42 / WBC-33.27  Serial CBC's  06-03 @ 06:22  Hct-20.6 / Hgb-7.3 / Plat-447 / RBC-2.26 / WBC-37.34  Serial CBC's  06-02 @ 12:12  Hct-20.5 / Hgb-7.0 / Plat-531 / RBC-2.27 / WBC-38.46      06-05    139  |  101  |  7<L>  ----------------------------<  107<H>  3.7   |  25  |  <0.5<L>    Ca    9.1      05 Jun 2022 04:49  Mg     1.9     06-05    TPro  6.7  /  Alb  2.8<L>  /  TBili  2.6<H>  /  DBili  x   /  AST  35  /  ALT  31  /  AlkPhos  240<H>  06-05      PT/INR - ( 04 Jun 2022 04:36 )   PT: 18.10 sec;   INR: 1.58 ratio         PTT - ( 04 Jun 2022 04:36 )  PTT:34.8 sec    Ferritin, Serum: 2669 ng/mL (06-04 @ 04:36)  Ferritin, Serum: 2493 ng/mL (06-03 @ 18:55)  Reticulocyte Percent: 15.3 % (06-03 @ 06:22)  Reticulocyte Percent: 13.6 % (06-01 @ 06:11)  Reticulocyte Percent: 13.4 % (05-31 @ 08:00)    Culture - Acid Fast - Bronchial w/Smear (collected 04 Jun 2022 09:50)  Source: .Bronchial None    Culture - Bronchial (collected 04 Jun 2022 09:50)  Source: .Bronchial None  Gram Stain (04 Jun 2022 19:11):    Rare polymorphonuclear leukocytes per low power field    No organisms seen    Culture - Blood (collected 03 Jun 2022 06:22)  Source: .Blood None  Preliminary Report (04 Jun 2022 19:01):    No growth to date.    Culture - Blood (collected 02 Jun 2022 20:33)  Source: .Blood None  Preliminary Report (04 Jun 2022 03:01):    No growth to date.

## 2022-06-05 NOTE — DIETITIAN INITIAL EVALUATION ADULT - PERTINENT MEDS FT
MEDICATIONS  (STANDING):  chlorhexidine 4% Liquid 1 Application(s) Topical daily  dextrose 5% + sodium chloride 0.45%. 1000 milliLiter(s) (100 mL/Hr) IV Continuous <Continuous>  enoxaparin Injectable 40 milliGRAM(s) SubCutaneous every 24 hours  folic acid 1 milliGRAM(s) Oral daily  influenza   Vaccine 0.5 milliLiter(s) IntraMuscular once  levoFLOXacin IVPB 750 milliGRAM(s) IV Intermittent every 24 hours  lidocaine   4% Patch 1 Patch Transdermal daily  linezolid  IVPB 600 milliGRAM(s) IV Intermittent every 12 hours  losartan 25 milliGRAM(s) Oral daily  meropenem  IVPB      meropenem  IVPB 1000 milliGRAM(s) IV Intermittent every 8 hours  pantoprazole    Tablet 40 milliGRAM(s) Oral before breakfast  predniSONE   Tablet 50 milliGRAM(s) Oral daily  senna 2 Tablet(s) Oral at bedtime    MEDICATIONS  (PRN):  acetaminophen     Tablet .. 650 milliGRAM(s) Oral every 6 hours PRN Temp greater or equal to 38C (100.4F), Mild Pain (1 - 3)  HYDROmorphone  Injectable 2 milliGRAM(s) IV Push every 2 hours PRN Severe Pain (7 - 10)  melatonin 3 milliGRAM(s) Oral at bedtime PRN Insomnia  ondansetron Injectable 4 milliGRAM(s) IV Push every 8 hours PRN Nausea and/or Vomiting

## 2022-06-05 NOTE — PROGRESS NOTE ADULT - ASSESSMENT
IMPRESSION    Sickle cell crisis  Possible superimposed bacterial infection   Improving WBC and fever trend   Acute chest syndrome refused exchange transfusion now accepting it for tomorrow.        Plan     CNS: pain control as needed     HEENT: oral care    PULMONARY: Encourage Incentive spirometry.  Repeat CXR noted.      CARDIOVASCULAR: C/w D5 1/2 NS at 100 cc/hr    GI: GI prophylaxis.  Feeding.      RENAL: f/u lytes and replete    INFECTIOUS DISEASE: IV ABX. Cultures negative to date,    HEMATOLOGICAL:  hem f/u. Pain control. One Unit PRBC      ENDOCRINE:  Follow up FS.     MUSCULOSKELETAL: OOB    MICU

## 2022-06-06 LAB
ALBUMIN SERPL ELPH-MCNC: 2.8 G/DL — LOW (ref 3.5–5.2)
ALP SERPL-CCNC: 243 U/L — HIGH (ref 30–115)
ALT FLD-CCNC: 73 U/L — HIGH (ref 0–41)
ANION GAP SERPL CALC-SCNC: 12 MMOL/L — SIGNIFICANT CHANGE UP (ref 7–14)
APTT BLD: 28 SEC — SIGNIFICANT CHANGE UP (ref 27–39.2)
AST SERPL-CCNC: 87 U/L — HIGH (ref 0–41)
BASOPHILS # BLD AUTO: 0.04 K/UL — SIGNIFICANT CHANGE UP (ref 0–0.2)
BASOPHILS NFR BLD AUTO: 0.2 % — SIGNIFICANT CHANGE UP (ref 0–1)
BILIRUB SERPL-MCNC: 1.8 MG/DL — HIGH (ref 0.2–1.2)
BUN SERPL-MCNC: 5 MG/DL — LOW (ref 10–20)
CALCIUM SERPL-MCNC: 8.7 MG/DL — SIGNIFICANT CHANGE UP (ref 8.5–10.1)
CHLORIDE SERPL-SCNC: 99 MMOL/L — SIGNIFICANT CHANGE UP (ref 98–110)
CO2 SERPL-SCNC: 26 MMOL/L — SIGNIFICANT CHANGE UP (ref 17–32)
CREAT SERPL-MCNC: <0.5 MG/DL — LOW (ref 0.7–1.5)
EGFR: 152 ML/MIN/1.73M2 — SIGNIFICANT CHANGE UP
EOSINOPHIL # BLD AUTO: 0.07 K/UL — SIGNIFICANT CHANGE UP (ref 0–0.7)
EOSINOPHIL NFR BLD AUTO: 0.3 % — SIGNIFICANT CHANGE UP (ref 0–8)
GLUCOSE SERPL-MCNC: 106 MG/DL — HIGH (ref 70–99)
HCT VFR BLD CALC: 18.8 % — LOW (ref 37–47)
HCT VFR BLD CALC: 21.6 % — LOW (ref 37–47)
HGB BLD-MCNC: 6.2 G/DL — CRITICAL LOW (ref 12–16)
HGB BLD-MCNC: 7.3 G/DL — LOW (ref 12–16)
IMM GRANULOCYTES NFR BLD AUTO: 1.8 % — HIGH (ref 0.1–0.3)
INR BLD: 1.25 RATIO — SIGNIFICANT CHANGE UP (ref 0.65–1.3)
LDH SERPL L TO P-CCNC: 900 — HIGH (ref 50–242)
LYMPHOCYTES # BLD AUTO: 23.4 % — SIGNIFICANT CHANGE UP (ref 20.5–51.1)
LYMPHOCYTES # BLD AUTO: 5.15 K/UL — HIGH (ref 1.2–3.4)
MCHC RBC-ENTMCNC: 29 PG — SIGNIFICANT CHANGE UP (ref 27–31)
MCHC RBC-ENTMCNC: 29.6 PG — SIGNIFICANT CHANGE UP (ref 27–31)
MCHC RBC-ENTMCNC: 33 G/DL — SIGNIFICANT CHANGE UP (ref 32–37)
MCHC RBC-ENTMCNC: 33.8 G/DL — SIGNIFICANT CHANGE UP (ref 32–37)
MCV RBC AUTO: 87.4 FL — SIGNIFICANT CHANGE UP (ref 81–99)
MCV RBC AUTO: 87.9 FL — SIGNIFICANT CHANGE UP (ref 81–99)
MONOCYTES # BLD AUTO: 2.45 K/UL — HIGH (ref 0.1–0.6)
MONOCYTES NFR BLD AUTO: 11.2 % — HIGH (ref 1.7–9.3)
NEUTROPHILS # BLD AUTO: 13.87 K/UL — HIGH (ref 1.4–6.5)
NEUTROPHILS NFR BLD AUTO: 63.1 % — SIGNIFICANT CHANGE UP (ref 42.2–75.2)
NRBC # BLD: 26 /100 WBCS — HIGH (ref 0–0)
NRBC # BLD: 40 /100 WBCS — HIGH (ref 0–0)
PLATELET # BLD AUTO: 434 K/UL — HIGH (ref 130–400)
PLATELET # BLD AUTO: 510 K/UL — HIGH (ref 130–400)
POTASSIUM SERPL-MCNC: 3.6 MMOL/L — SIGNIFICANT CHANGE UP (ref 3.5–5)
POTASSIUM SERPL-SCNC: 3.6 MMOL/L — SIGNIFICANT CHANGE UP (ref 3.5–5)
PROT SERPL-MCNC: 6.3 G/DL — SIGNIFICANT CHANGE UP (ref 6–8)
PROTHROM AB SERPL-ACNC: 14.4 SEC — HIGH (ref 9.95–12.87)
RBC # BLD: 2.14 M/UL — LOW (ref 4.2–5.4)
RBC # BLD: 2.47 M/UL — LOW (ref 4.2–5.4)
RBC # FLD: 22 % — HIGH (ref 11.5–14.5)
RBC # FLD: 22.3 % — HIGH (ref 11.5–14.5)
SODIUM SERPL-SCNC: 137 MMOL/L — SIGNIFICANT CHANGE UP (ref 135–146)
WBC # BLD: 17.13 K/UL — HIGH (ref 4.8–10.8)
WBC # BLD: 21.97 K/UL — HIGH (ref 4.8–10.8)
WBC # FLD AUTO: 17.13 K/UL — HIGH (ref 4.8–10.8)
WBC # FLD AUTO: 21.97 K/UL — HIGH (ref 4.8–10.8)

## 2022-06-06 PROCEDURE — 71045 X-RAY EXAM CHEST 1 VIEW: CPT | Mod: 26

## 2022-06-06 PROCEDURE — 73590 X-RAY EXAM OF LOWER LEG: CPT | Mod: 26,RT

## 2022-06-06 PROCEDURE — 73552 X-RAY EXAM OF FEMUR 2/>: CPT | Mod: 26,RT

## 2022-06-06 PROCEDURE — 99233 SBSQ HOSP IP/OBS HIGH 50: CPT

## 2022-06-06 PROCEDURE — 99232 SBSQ HOSP IP/OBS MODERATE 35: CPT

## 2022-06-06 PROCEDURE — 73560 X-RAY EXAM OF KNEE 1 OR 2: CPT | Mod: 26,RT

## 2022-06-06 RX ORDER — CALCIUM GLUCONATE 100 MG/ML
2 VIAL (ML) INTRAVENOUS ONCE
Refills: 0 | Status: COMPLETED | OUTPATIENT
Start: 2022-06-06 | End: 2022-06-07

## 2022-06-06 RX ORDER — LIDOCAINE HCL 20 MG/ML
1 VIAL (ML) INJECTION ONCE
Refills: 0 | Status: COMPLETED | OUTPATIENT
Start: 2022-06-06 | End: 2022-06-06

## 2022-06-06 RX ADMIN — LIDOCAINE 1 PATCH: 4 CREAM TOPICAL at 01:24

## 2022-06-06 RX ADMIN — Medication 300 UNIT(S): at 17:45

## 2022-06-06 RX ADMIN — HYDROMORPHONE HYDROCHLORIDE 2 MILLIGRAM(S): 2 INJECTION INTRAMUSCULAR; INTRAVENOUS; SUBCUTANEOUS at 13:32

## 2022-06-06 RX ADMIN — HYDROMORPHONE HYDROCHLORIDE 2 MILLIGRAM(S): 2 INJECTION INTRAMUSCULAR; INTRAVENOUS; SUBCUTANEOUS at 01:38

## 2022-06-06 RX ADMIN — HYDROMORPHONE HYDROCHLORIDE 2 MILLIGRAM(S): 2 INJECTION INTRAMUSCULAR; INTRAVENOUS; SUBCUTANEOUS at 06:53

## 2022-06-06 RX ADMIN — PANTOPRAZOLE SODIUM 40 MILLIGRAM(S): 20 TABLET, DELAYED RELEASE ORAL at 06:36

## 2022-06-06 RX ADMIN — HYDROMORPHONE HYDROCHLORIDE 2 MILLIGRAM(S): 2 INJECTION INTRAMUSCULAR; INTRAVENOUS; SUBCUTANEOUS at 20:09

## 2022-06-06 RX ADMIN — HYDROMORPHONE HYDROCHLORIDE 2 MILLIGRAM(S): 2 INJECTION INTRAMUSCULAR; INTRAVENOUS; SUBCUTANEOUS at 17:45

## 2022-06-06 RX ADMIN — Medication 1 MILLIGRAM(S): at 12:50

## 2022-06-06 RX ADMIN — MEROPENEM 100 MILLIGRAM(S): 1 INJECTION INTRAVENOUS at 15:35

## 2022-06-06 RX ADMIN — LINEZOLID 300 MILLIGRAM(S): 600 INJECTION, SOLUTION INTRAVENOUS at 17:45

## 2022-06-06 RX ADMIN — HYDROMORPHONE HYDROCHLORIDE 2 MILLIGRAM(S): 2 INJECTION INTRAMUSCULAR; INTRAVENOUS; SUBCUTANEOUS at 09:32

## 2022-06-06 RX ADMIN — HYDROMORPHONE HYDROCHLORIDE 2 MILLIGRAM(S): 2 INJECTION INTRAMUSCULAR; INTRAVENOUS; SUBCUTANEOUS at 01:23

## 2022-06-06 RX ADMIN — MEROPENEM 100 MILLIGRAM(S): 1 INJECTION INTRAVENOUS at 05:30

## 2022-06-06 RX ADMIN — HYDROMORPHONE HYDROCHLORIDE 2 MILLIGRAM(S): 2 INJECTION INTRAMUSCULAR; INTRAVENOUS; SUBCUTANEOUS at 18:28

## 2022-06-06 RX ADMIN — CHLORHEXIDINE GLUCONATE 1 APPLICATION(S): 213 SOLUTION TOPICAL at 12:54

## 2022-06-06 RX ADMIN — HYDROMORPHONE HYDROCHLORIDE 2 MILLIGRAM(S): 2 INJECTION INTRAMUSCULAR; INTRAVENOUS; SUBCUTANEOUS at 20:24

## 2022-06-06 RX ADMIN — HYDROMORPHONE HYDROCHLORIDE 2 MILLIGRAM(S): 2 INJECTION INTRAMUSCULAR; INTRAVENOUS; SUBCUTANEOUS at 23:26

## 2022-06-06 RX ADMIN — LIDOCAINE 1 PATCH: 4 CREAM TOPICAL at 18:27

## 2022-06-06 RX ADMIN — HYDROMORPHONE HYDROCHLORIDE 2 MILLIGRAM(S): 2 INJECTION INTRAMUSCULAR; INTRAVENOUS; SUBCUTANEOUS at 06:38

## 2022-06-06 RX ADMIN — LOSARTAN POTASSIUM 25 MILLIGRAM(S): 100 TABLET, FILM COATED ORAL at 05:31

## 2022-06-06 RX ADMIN — HYDROMORPHONE HYDROCHLORIDE 2 MILLIGRAM(S): 2 INJECTION INTRAMUSCULAR; INTRAVENOUS; SUBCUTANEOUS at 14:11

## 2022-06-06 RX ADMIN — HYDROMORPHONE HYDROCHLORIDE 2 MILLIGRAM(S): 2 INJECTION INTRAMUSCULAR; INTRAVENOUS; SUBCUTANEOUS at 15:36

## 2022-06-06 RX ADMIN — LIDOCAINE 1 PATCH: 4 CREAM TOPICAL at 12:49

## 2022-06-06 RX ADMIN — HYDROMORPHONE HYDROCHLORIDE 2 MILLIGRAM(S): 2 INJECTION INTRAMUSCULAR; INTRAVENOUS; SUBCUTANEOUS at 04:12

## 2022-06-06 RX ADMIN — HYDROMORPHONE HYDROCHLORIDE 2 MILLIGRAM(S): 2 INJECTION INTRAMUSCULAR; INTRAVENOUS; SUBCUTANEOUS at 17:08

## 2022-06-06 RX ADMIN — LINEZOLID 300 MILLIGRAM(S): 600 INJECTION, SOLUTION INTRAVENOUS at 05:30

## 2022-06-06 RX ADMIN — HYDROMORPHONE HYDROCHLORIDE 2 MILLIGRAM(S): 2 INJECTION INTRAMUSCULAR; INTRAVENOUS; SUBCUTANEOUS at 23:11

## 2022-06-06 RX ADMIN — HYDROMORPHONE HYDROCHLORIDE 2 MILLIGRAM(S): 2 INJECTION INTRAMUSCULAR; INTRAVENOUS; SUBCUTANEOUS at 03:57

## 2022-06-06 RX ADMIN — Medication 50 MILLIGRAM(S): at 05:31

## 2022-06-06 RX ADMIN — ENOXAPARIN SODIUM 40 MILLIGRAM(S): 100 INJECTION SUBCUTANEOUS at 00:45

## 2022-06-06 RX ADMIN — SENNA PLUS 2 TABLET(S): 8.6 TABLET ORAL at 22:59

## 2022-06-06 RX ADMIN — HYDROMORPHONE HYDROCHLORIDE 2 MILLIGRAM(S): 2 INJECTION INTRAMUSCULAR; INTRAVENOUS; SUBCUTANEOUS at 09:41

## 2022-06-06 NOTE — PROGRESS NOTE ADULT - ASSESSMENT
IMPRESSION    Sickle cell crisis  Possible superimposed bacterial infection   Improving WBC and fever trend   Acute chest syndrome refused exchange transfusion now accepting it for tomorrow.        Plan     CNS: pain control as needed     HEENT: oral care    PULMONARY: Encourage Incentive spirometry.  Wean o2 as tolerated     CARDIOVASCULAR: C/w D5 1/2 NS at 100 cc/hr    GI: GI prophylaxis.  Feeding.      RENAL: f/u lytes and replete    INFECTIOUS DISEASE: IV ABX. Cultures negative to date,      HEMATOLOGICAL:  hem f/u. Pain control. One Unit PRBC  FU with hem onc regarding exchange     ENDOCRINE:  Follow up FS.     MUSCULOSKELETAL: OOB    MICU

## 2022-06-06 NOTE — PROGRESS NOTE ADULT - SUBJECTIVE AND OBJECTIVE BOX
JAMES VÁSQUEZ 26y Female  MRN#: [id number]   CODE STATUS: Full  Hospital Day: 11d  Pt is currently admitted with the primary diagnosis of: Sickle Cell Pain Crisis w/ Acute Chest Syndrome    SUBJECTIVE  Overnight events: None reported.  Subjective complaints: Endorses moderate LE pain.                                          ----------------------------------------------------------  OBJECTIVE  PAST MEDICAL & SURGICAL HISTORY  Sickle cell anemia    S/P cholecystectomy                                          -----------------------------------------------------------  ALLERGIES:  No Known Allergies                                          ------------------------------------------------------------  HOME MEDICATIONS  Home Medications:  folic acid 1 mg oral tablet: 1 tab(s) orally once a day (20 May 2022 16:12)  losartan 25 mg oral tablet: 1 tab(s) orally once a day (20 May 2022 16:12)                         MEDICATIONS:  STANDING MEDICATIONS  chlorhexidine 4% Liquid 1 Application(s) Topical daily  dextrose 5% + sodium chloride 0.45%. 1000 milliLiter(s) IV Continuous <Continuous>  enoxaparin Injectable 40 milliGRAM(s) SubCutaneous every 24 hours  folic acid 1 milliGRAM(s) Oral daily  influenza   Vaccine 0.5 milliLiter(s) IntraMuscular once  levoFLOXacin IVPB 750 milliGRAM(s) IV Intermittent every 24 hours  lidocaine   4% Patch 1 Patch Transdermal daily  linezolid  IVPB 600 milliGRAM(s) IV Intermittent every 12 hours  losartan 25 milliGRAM(s) Oral daily  meropenem  IVPB      meropenem  IVPB 1000 milliGRAM(s) IV Intermittent every 8 hours  pantoprazole    Tablet 40 milliGRAM(s) Oral before breakfast  predniSONE   Tablet 50 milliGRAM(s) Oral daily  senna 2 Tablet(s) Oral at bedtime    PRN MEDICATIONS  acetaminophen     Tablet .. 650 milliGRAM(s) Oral every 6 hours PRN  HYDROmorphone  Injectable 2 milliGRAM(s) IV Push every 2 hours PRN  melatonin 3 milliGRAM(s) Oral at bedtime PRN  ondansetron Injectable 4 milliGRAM(s) IV Push every 8 hours PRN                                          ------------------------------------------------------------  VITAL SIGNS: Last 24 Hours  T(C): 36.9 (06 Jun 2022 08:00), Max: 36.9 (06 Jun 2022 08:00)  T(F): 98.5 (06 Jun 2022 08:00), Max: 98.5 (06 Jun 2022 08:00)  HR: 92 (06 Jun 2022 10:00) (68 - 100)  BP: 130/65 (06 Jun 2022 10:00) (110/58 - 134/68)  BP(mean): 87 (06 Jun 2022 10:00) (75 - 94)  RR: 19 (06 Jun 2022 10:00) (10 - 26)  SpO2: 100% (06 Jun 2022 10:00) (96% - 100%)    06-05-22 @ 07:01  -  06-06-22 @ 07:00  --------------------------------------------------------  IN: 2800 mL / OUT: 2225 mL / NET: 575 mL    06-06-22 @ 07:01  -  06-06-22 @ 10:54  --------------------------------------------------------  IN: 450 mL / OUT: 375 mL / NET: 75 mL                                             --------------------------------------------------------------  LABS:                        6.2    21.97 )-----------( 434      ( 06 Jun 2022 04:40 )             18.8     06-06    137  |  99  |  5<L>  ----------------------------<  106<H>  3.6   |  26  |  <0.5<L>    Ca    8.7      06 Jun 2022 04:40  Mg     1.9     06-05    TPro  6.3  /  Alb  2.8<L>  /  TBili  1.8<H>  /  DBili  x   /  AST  87<H>  /  ALT  73<H>  /  AlkPhos  243<H>  06-06              Culture - Fungal, Bronchial (collected 04 Jun 2022 09:50)  Source: .Bronchial None  Preliminary Report (06 Jun 2022 08:40):    Testing in progress    Culture - Acid Fast - Bronchial w/Smear (collected 04 Jun 2022 09:50)  Source: .Bronchial None    Culture - Bronchial (collected 04 Jun 2022 09:50)  Source: .Bronchial None  Gram Stain (04 Jun 2022 19:11):    Rare polymorphonuclear leukocytes per low power field    No organisms seen  Preliminary Report (05 Jun 2022 17:10):    No growth to date.                                          -------------------------------------------------------------  RADIOLOGY:                                          --------------------------------------------------------------  PHYSICAL EXAM:  General:  HEENT:  LUNGS:  HEART:  ABDOMEN:  EXT:  NEURO:  SKIN:                                         --------------------------------------------------------------  ASSESSMENT & PLAN  Past medical history and hospital course:                                                                              ----------------------------------------------------  # DVT prophylaxis:  # GI prophylaxis:  # Diet:  # Activity:  # Code status:  # Disposition:                                                                           --------------------------------------------------------  # Handoff: JAMES VÁSQUEZ 26y Female  MRN#: [id number]   CODE STATUS: Full  Hospital Day: 11d  Pt is currently admitted with the primary diagnosis of: Sickle Cell Pain Crisis w/ Acute Chest Syndrome    SUBJECTIVE  Overnight events: None reported.  Subjective complaints: Endorses moderate LE pain.                                          ----------------------------------------------------------  OBJECTIVE  PAST MEDICAL & SURGICAL HISTORY  Sickle cell anemia    S/P cholecystectomy                                          -----------------------------------------------------------  ALLERGIES:  No Known Allergies                                          ------------------------------------------------------------  HOME MEDICATIONS  Home Medications:  folic acid 1 mg oral tablet: 1 tab(s) orally once a day (20 May 2022 16:12)  losartan 25 mg oral tablet: 1 tab(s) orally once a day (20 May 2022 16:12)                         MEDICATIONS:  STANDING MEDICATIONS  chlorhexidine 4% Liquid 1 Application(s) Topical daily  dextrose 5% + sodium chloride 0.45%. 1000 milliLiter(s) IV Continuous <Continuous>  enoxaparin Injectable 40 milliGRAM(s) SubCutaneous every 24 hours  folic acid 1 milliGRAM(s) Oral daily  influenza   Vaccine 0.5 milliLiter(s) IntraMuscular once  levoFLOXacin IVPB 750 milliGRAM(s) IV Intermittent every 24 hours  lidocaine   4% Patch 1 Patch Transdermal daily  linezolid  IVPB 600 milliGRAM(s) IV Intermittent every 12 hours  losartan 25 milliGRAM(s) Oral daily  meropenem  IVPB      meropenem  IVPB 1000 milliGRAM(s) IV Intermittent every 8 hours  pantoprazole    Tablet 40 milliGRAM(s) Oral before breakfast  predniSONE   Tablet 50 milliGRAM(s) Oral daily  senna 2 Tablet(s) Oral at bedtime    PRN MEDICATIONS  acetaminophen     Tablet .. 650 milliGRAM(s) Oral every 6 hours PRN  HYDROmorphone  Injectable 2 milliGRAM(s) IV Push every 2 hours PRN  melatonin 3 milliGRAM(s) Oral at bedtime PRN  ondansetron Injectable 4 milliGRAM(s) IV Push every 8 hours PRN                                          ------------------------------------------------------------  VITAL SIGNS: Last 24 Hours  T(C): 36.9 (06 Jun 2022 08:00), Max: 36.9 (06 Jun 2022 08:00)  T(F): 98.5 (06 Jun 2022 08:00), Max: 98.5 (06 Jun 2022 08:00)  HR: 92 (06 Jun 2022 10:00) (68 - 100)  BP: 130/65 (06 Jun 2022 10:00) (110/58 - 134/68)  BP(mean): 87 (06 Jun 2022 10:00) (75 - 94)  RR: 19 (06 Jun 2022 10:00) (10 - 26)  SpO2: 100% (06 Jun 2022 10:00) (96% - 100%)    06-05-22 @ 07:01  -  06-06-22 @ 07:00  --------------------------------------------------------  IN: 2800 mL / OUT: 2225 mL / NET: 575 mL    06-06-22 @ 07:01  -  06-06-22 @ 10:54  --------------------------------------------------------  IN: 450 mL / OUT: 375 mL / NET: 75 mL                                         --------------------------------------------------------------  LABS:             6.2    21.97 )-----------( 434      ( 06 Jun 2022 04:40 )             18.8     06-06    137  |  99  |  5<L>  ----------------------------<  106<H>  3.6   |  26  |  <0.5<L>    Ca    8.7      06 Jun 2022 04:40  Mg     1.9     06-05    TPro  6.3  /  Alb  2.8<L>  /  TBili  1.8<H>  /  DBili  x   /  AST  87<H>  /  ALT  73<H>  /  AlkPhos  243<H>  06-06    Culture - Fungal, Bronchial (collected 04 Jun 2022 09:50)  Source: .Bronchial None  Preliminary Report (06 Jun 2022 08:40):    Testing in progress    Culture - Acid Fast - Bronchial w/Smear (collected 04 Jun 2022 09:50)  Source: .Bronchial None    Culture - Bronchial (collected 04 Jun 2022 09:50)  Source: .Bronchial None  Gram Stain (04 Jun 2022 19:11):    Rare polymorphonuclear leukocytes per low power field    No organisms seen  Preliminary Report (05 Jun 2022 17:10):    No growth to date.                                          -------------------------------------------------------------  RADIOLOGY:  < from: Xray Chest 1 View- PORTABLE-Routine (Xray Chest 1 View- PORTABLE-Routine in AM.) (06.05.22 @ 06:34) >  Impression:  Low lung volume.  Bilateral opacities, slightly worse.  < end of copied text >                                          --------------------------------------------------------------  PHYSICAL EXAM:  General: no acute distress  HEENT: atraumatic  LUNGS: clear to auscultation bilaterally, no wheezes noted  HEART: regular rate/rhythm, no murmurs/gallops  ABDOMEN: soft, nontender, nondistended, normoactive bowel sounds  EXT: 2+ radial pulses, no edema noted  NEURO: aaox4, proper affect  SKIN: intact, no new lesions noted                                         --------------------------------------------------------------  ASSESSMENT & PLAN  Past medical history and hospital course:  Ms Vásquez is a 26 RENEE w a PMH of Sickle Cell disease (on oxbryta) w a recent dc following treatment for PNA w sickle cell crisis presented to the ED May 26th for Rt sided chest, shoulder and back pain w mild cough and fever (has been febrile to 103F). Patient was upgraded to ICU on 5/28 for exchange transfusion now s/p 2 U PRBC. Patient was downgraded back to floors and now on Cefepime and levofloxacin w resolving pyrexia. Dr Hobbs performed bedside US to assess for fluid in her lungs on may 31 and stated there was nothing to tap, most likely atalectasis. Heme/Onc saw patient, assessing for warm hemolytic antibodies in blood, started patient on prednsione 50mg. Patient's pain medications were decreased last night. Patient reports crying from pain most of the night and is hyperalgesic today also reporting BL LE pain. Will obtain BL LE US to r/o dvt (unlikely given BL nature). Given WBC 38.46 have reconsulted Heme/Onc and Pulm. Heme/Onc felt leukocytosis was secondary to steroids and reactive to sickle cell crisis, she is not a candidate at this time for exchange transfusion due to her lack of Hypoxia; suggested increasing IV dilaudid to 2mg q2hr PRN . Patient is on adequate pseudomonal coverage, ID reconsulted. As of June 2 pain crisis isolated to BL knees. June 3rd patient has temp of 101.2 w/ increasing proinflamary markers.    #Sickle Cell Vaso-Occlusive Pain Crisis w/ Multilobar Pneumonia, #Acute Chest Syndrome - on 2L-NC ~100% (comfort?), wean off  #Hb-SS Disease - Hb electrophoresis from 5/21 showed 90% Hb-S  #LE Pain - likely secondary to Pain Crisis, LE Duplex negative for DVT 6/3  - s/p 2U PRBCs, baseline Hb ~upper 6/low 7s? Hb 6.2 today, f/u Heme/Onc regarding exchange transfusion, will transfuse 1U PRBC if not indicated today  - initially SaO2 ~80s on room air, met criteria for exchange transfusion but patient declined and improved s/p 2U PRBC  - f/u Heme/Onc, continue Prednisone 50mg PO daily, Dilaudid 2mg IV q2h PRN, Senna/Miralax ordered  - continue Folic Acid 1mg PO daily, continue Oxbryta as outpatient, f/u RLE-XR, continue 1/2-NS @100cc, daily hemolysis labs/LDH  - CXR 6/5 bilateral opacities, s/p Bronch/BAL 6/4, cultures negative, f/u    #Acute on Chronic Leukocytosis/Thrombocytosis - likely reactive, stable/improving, ICS @bedside  #Acute Intravascular Hemolysis - had +anti-C, anti-E, anti-S (Rh) + Warm-abs, monitor LDH daily with Retic-Count, monitor Bilirubin daily  - BCx negative 5/27-6/2, continuing Levaquin 750mg IV daily, Zyvox 600mg IV BID, Meropenem 1g IV q8h, f/u ID, recent hospitalization with Pseudomonas  - 6/1 IgM EBG + CMV negative, Hep-Panel negative, Mycoplasma negative, 6/3 HIV negative, f/u Heme/Onc                                                                            ----------------------------------------------------  # DVT prophylaxis: Lovenox 40mg subQ daily  # GI prophylaxis: Protonix 40mg PO qAM  # Diet: Regular  # Activity: Increase as Tolerated  # Code status: Full  # Disposition: Remain in ICU                                                                           --------------------------------------------------------  # Handoff: f/u Heme/Onc

## 2022-06-06 NOTE — PROGRESS NOTE ADULT - SUBJECTIVE AND OBJECTIVE BOX
Patient is a 26y old  Female who presents with a chief complaint of Acute chest syndrome (06 Jun 2022 08:24)      Subjective:      Vital Signs Last 24 Hrs  T(C): 36.7 (06 Jun 2022 04:00), Max: 36.7 (06 Jun 2022 04:00)  T(F): 98 (06 Jun 2022 04:00), Max: 98 (06 Jun 2022 04:00)  HR: 80 (06 Jun 2022 07:00) (68 - 100)  BP: 124/63 (06 Jun 2022 07:00) (110/58 - 134/68)  BP(mean): 85 (06 Jun 2022 07:00) (75 - 94)  RR: 10 (06 Jun 2022 07:00) (10 - 26)  SpO2: 99% (06 Jun 2022 08:23) (99% - 100%)    PHYSICAL EXAM  General: adult in NAD  HEENT: clear oropharynx, anicteric sclera, pink conjunctiva  Neck: supple  CV: normal S1/S2 with no murmur rubs or gallops  Lungs: positive air movement b/l ant lungs,clear to auscultation, no wheezes, no rales  Abdomen: soft non-tender non-distended, no hepatosplenomegaly  Ext: no clubbing cyanosis or edema  Skin: no rashes and no petechiae  Neuro: alert and oriented X 4, no focal deficits    MEDICATIONS  (STANDING):  chlorhexidine 4% Liquid 1 Application(s) Topical daily  dextrose 5% + sodium chloride 0.45%. 1000 milliLiter(s) (100 mL/Hr) IV Continuous <Continuous>  enoxaparin Injectable 40 milliGRAM(s) SubCutaneous every 24 hours  folic acid 1 milliGRAM(s) Oral daily  influenza   Vaccine 0.5 milliLiter(s) IntraMuscular once  levoFLOXacin IVPB 750 milliGRAM(s) IV Intermittent every 24 hours  lidocaine   4% Patch 1 Patch Transdermal daily  linezolid  IVPB 600 milliGRAM(s) IV Intermittent every 12 hours  losartan 25 milliGRAM(s) Oral daily  meropenem  IVPB      meropenem  IVPB 1000 milliGRAM(s) IV Intermittent every 8 hours  pantoprazole    Tablet 40 milliGRAM(s) Oral before breakfast  predniSONE   Tablet 50 milliGRAM(s) Oral daily  senna 2 Tablet(s) Oral at bedtime    MEDICATIONS  (PRN):  acetaminophen     Tablet .. 650 milliGRAM(s) Oral every 6 hours PRN Temp greater or equal to 38C (100.4F), Mild Pain (1 - 3)  HYDROmorphone  Injectable 2 milliGRAM(s) IV Push every 2 hours PRN Severe Pain (7 - 10)  melatonin 3 milliGRAM(s) Oral at bedtime PRN Insomnia  ondansetron Injectable 4 milliGRAM(s) IV Push every 8 hours PRN Nausea and/or Vomiting      LABS:                          6.2    21.97 )-----------( 434      ( 06 Jun 2022 04:40 )             18.8         Mean Cell Volume : 87.9 fL  Mean Cell Hemoglobin : 29.0 pg  Mean Cell Hemoglobin Concentration : 33.0 g/dL  Auto Neutrophil # : 13.87 K/uL  Auto Lymphocyte # : 5.15 K/uL  Auto Monocyte # : 2.45 K/uL  Auto Eosinophil # : 0.07 K/uL  Auto Basophil # : 0.04 K/uL  Auto Neutrophil % : 63.1 %  Auto Lymphocyte % : 23.4 %  Auto Monocyte % : 11.2 %  Auto Eosinophil % : 0.3 %  Auto Basophil % : 0.2 %      Serial CBC's  06-06 @ 04:40  Hct-18.8 / Hgb-6.2 / Plat-434 / RBC-2.14 / WBC-21.97  Serial CBC's  06-05 @ 04:49  Hct-20.2 / Hgb-6.9 / Plat-370 / RBC-2.26 / WBC-27.66  Serial CBC's  06-04 @ 11:02  Hct-22.3 / Hgb-7.5 / Plat-391 / RBC-2.42 / WBC-33.27  Serial CBC's  06-03 @ 06:22  Hct-20.6 / Hgb-7.3 / Plat-447 / RBC-2.26 / WBC-37.34  Serial CBC's  06-02 @ 12:12  Hct-20.5 / Hgb-7.0 / Plat-531 / RBC-2.27 / WBC-38.46      06-06    137  |  99  |  5<L>  ----------------------------<  106<H>  3.6   |  26  |  <0.5<L>    Ca    8.7      06 Jun 2022 04:40  Mg     1.9     06-05    TPro  6.3  /  Alb  2.8<L>  /  TBili  1.8<H>  /  DBili  x   /  AST  87<H>  /  ALT  73<H>  /  AlkPhos  243<H>  06-06    Ferritin, Serum: 2669 ng/mL (06-04 @ 04:36)  Ferritin, Serum: 2493 ng/mL (06-03 @ 18:55)  Reticulocyte Percent: 15.3 % (06-03 @ 06:22)  Reticulocyte Percent: 13.6 % (06-01 @ 06:11)  Reticulocyte Percent: 13.4 % (05-31 @ 08:00)      Culture - Fungal, Bronchial (collected 04 Jun 2022 09:50)  Source: .Bronchial None  Preliminary Report (06 Jun 2022 08:40):    Testing in progress    Culture - Acid Fast - Bronchial w/Smear (collected 04 Jun 2022 09:50)  Source: .Bronchial None    Culture - Bronchial (collected 04 Jun 2022 09:50)  Source: .Bronchial None  Gram Stain (04 Jun 2022 19:11):    Rare polymorphonuclear leukocytes per low power field    No organisms seen  Preliminary Report (05 Jun 2022 17:10):    No growth to date.            BLOOD SMEAR INTERPRETATION:       RADIOLOGY & ADDITIONAL STUDIES:     Patient is a 26y old  Female who presents with a chief complaint of Acute chest syndrome (06 Jun 2022 08:24)      Subjective: Pt feels about the same as yesterday.       Vital Signs Last 24 Hrs  T(C): 36.7 (06 Jun 2022 04:00), Max: 36.7 (06 Jun 2022 04:00)  T(F): 98 (06 Jun 2022 04:00), Max: 98 (06 Jun 2022 04:00)  HR: 80 (06 Jun 2022 07:00) (68 - 100)  BP: 124/63 (06 Jun 2022 07:00) (110/58 - 134/68)  BP(mean): 85 (06 Jun 2022 07:00) (75 - 94)  RR: 10 (06 Jun 2022 07:00) (10 - 26)  SpO2: 99% (06 Jun 2022 08:23) (99% - 100%)    PHYSICAL EXAM  General: adult in NAD  HEENT: clear oropharynx, anicteric sclera, pink conjunctiva  Neck: supple  CV: normal S1/S2 with no murmur rubs or gallops  Lungs: positive air movement b/l ant lungs,clear to auscultation, no wheezes, no rales  Abdomen: soft non-tender non-distended, no hepatosplenomegaly  Ext: no clubbing cyanosis or edema  Skin: no rashes and no petechiae  Neuro: alert and oriented X 4, no focal deficits    MEDICATIONS  (STANDING):  chlorhexidine 4% Liquid 1 Application(s) Topical daily  dextrose 5% + sodium chloride 0.45%. 1000 milliLiter(s) (100 mL/Hr) IV Continuous <Continuous>  enoxaparin Injectable 40 milliGRAM(s) SubCutaneous every 24 hours  folic acid 1 milliGRAM(s) Oral daily  influenza   Vaccine 0.5 milliLiter(s) IntraMuscular once  levoFLOXacin IVPB 750 milliGRAM(s) IV Intermittent every 24 hours  lidocaine   4% Patch 1 Patch Transdermal daily  linezolid  IVPB 600 milliGRAM(s) IV Intermittent every 12 hours  losartan 25 milliGRAM(s) Oral daily  meropenem  IVPB      meropenem  IVPB 1000 milliGRAM(s) IV Intermittent every 8 hours  pantoprazole    Tablet 40 milliGRAM(s) Oral before breakfast  predniSONE   Tablet 50 milliGRAM(s) Oral daily  senna 2 Tablet(s) Oral at bedtime    MEDICATIONS  (PRN):  acetaminophen     Tablet .. 650 milliGRAM(s) Oral every 6 hours PRN Temp greater or equal to 38C (100.4F), Mild Pain (1 - 3)  HYDROmorphone  Injectable 2 milliGRAM(s) IV Push every 2 hours PRN Severe Pain (7 - 10)  melatonin 3 milliGRAM(s) Oral at bedtime PRN Insomnia  ondansetron Injectable 4 milliGRAM(s) IV Push every 8 hours PRN Nausea and/or Vomiting      LABS:                          6.2    21.97 )-----------( 434      ( 06 Jun 2022 04:40 )             18.8         Mean Cell Volume : 87.9 fL  Mean Cell Hemoglobin : 29.0 pg  Mean Cell Hemoglobin Concentration : 33.0 g/dL  Auto Neutrophil # : 13.87 K/uL  Auto Lymphocyte # : 5.15 K/uL  Auto Monocyte # : 2.45 K/uL  Auto Eosinophil # : 0.07 K/uL  Auto Basophil # : 0.04 K/uL  Auto Neutrophil % : 63.1 %  Auto Lymphocyte % : 23.4 %  Auto Monocyte % : 11.2 %  Auto Eosinophil % : 0.3 %  Auto Basophil % : 0.2 %      Serial CBC's  06-06 @ 04:40  Hct-18.8 / Hgb-6.2 / Plat-434 / RBC-2.14 / WBC-21.97  Serial CBC's  06-05 @ 04:49  Hct-20.2 / Hgb-6.9 / Plat-370 / RBC-2.26 / WBC-27.66  Serial CBC's  06-04 @ 11:02  Hct-22.3 / Hgb-7.5 / Plat-391 / RBC-2.42 / WBC-33.27  Serial CBC's  06-03 @ 06:22  Hct-20.6 / Hgb-7.3 / Plat-447 / RBC-2.26 / WBC-37.34  Serial CBC's  06-02 @ 12:12  Hct-20.5 / Hgb-7.0 / Plat-531 / RBC-2.27 / WBC-38.46      06-06    137  |  99  |  5<L>  ----------------------------<  106<H>  3.6   |  26  |  <0.5<L>    Ca    8.7      06 Jun 2022 04:40  Mg     1.9     06-05    TPro  6.3  /  Alb  2.8<L>  /  TBili  1.8<H>  /  DBili  x   /  AST  87<H>  /  ALT  73<H>  /  AlkPhos  243<H>  06-06    Ferritin, Serum: 2669 ng/mL (06-04 @ 04:36)  Ferritin, Serum: 2493 ng/mL (06-03 @ 18:55)  Reticulocyte Percent: 15.3 % (06-03 @ 06:22)  Reticulocyte Percent: 13.6 % (06-01 @ 06:11)  Reticulocyte Percent: 13.4 % (05-31 @ 08:00)      Culture - Fungal, Bronchial (collected 04 Jun 2022 09:50)  Source: .Bronchial None  Preliminary Report (06 Jun 2022 08:40):    Testing in progress    Culture - Acid Fast - Bronchial w/Smear (collected 04 Jun 2022 09:50)  Source: .Bronchial None    Culture - Bronchial (collected 04 Jun 2022 09:50)  Source: .Bronchial None  Gram Stain (04 Jun 2022 19:11):    Rare polymorphonuclear leukocytes per low power field    No organisms seen  Preliminary Report (05 Jun 2022 17:10):    No growth to date.            BLOOD SMEAR INTERPRETATION:       RADIOLOGY & ADDITIONAL STUDIES:

## 2022-06-06 NOTE — CONSULT NOTE ADULT - ASSESSMENT
ASSESSMENT: pt is 27y/o F with PMH of Sickle cell anemia (on oxbryta - followed by Hematologist - Dr. Jimenez in [place]), recently discharged after treatment of pneumonia and sickle cell crisis, presented to the ED with c/o right sided chest, shoulder and upper back associated with fever at home 102F. Pt admitted for Acute chest pain.    Vascular surgery consulted for need for temporary dialysis catheter for exchange transfusion    PLAN:   - Will place uldall  - need coags within 24 hours  -   -   - Patient seen/examined or Plan Discussed with Fellow, Dr. Mike johnston  - Plan to be discussed with Attending, Dr. Barnes ASSESSMENT: pt is 25y/o F with PMH of Sickle cell anemia (on oxbryta - followed by Hematologist - Dr. Jimenez in [place]), recently discharged after treatment of pneumonia and sickle cell crisis, presented to the ED with c/o right sided chest, shoulder and upper back associated with fever at home 102F. Pt admitted for Acute chest pain.    Vascular surgery consulted for need for temporary dialysis catheter for exchange transfusion    PLAN:   - Will place uldall  - need coags within 24 hours  - Post procedure x-ray - if line is in the correct location okay to use    - Patient seen/examined or Plan Discussed with Fellow, Dr. Mike johnston  - Plan to be discussed with Attending, Dr. Barnes

## 2022-06-06 NOTE — CONSULT NOTE ADULT - SUBJECTIVE AND OBJECTIVE BOX
VASCULAR SURGERY CONSULT NOTE      HPI:  27yo  F with PMH of Sickle cell anemia (on oxbryta - followed by Hematologist - Dr. Jimenez in [place]), recently discharged after treatment of pneumonia and sickle cell crisis, presented to the ED with c/o right sided chest, shoulder and upper back pain. States that she overexerted herself. She states that she has not been feeling well since yesterday, had mild cough and fever upto 102F at home. Denies any shortness of breath, abdominal pain, nausea, vomiting, diarrhea, leg pain, swelling.  Pt has had a long tumultuous hospital course currently pt is in the ICU in need of exchange transfusion for sickle cell crisis pt has been reusing for multiple days, finally is agreeable today.       ED vitals:   T(F): 102.6 (05-26 @ 23:54), Max: 102.6 (05-26 @ 23:54)  HR: 110 (05-26 @ 23:54) (109 - 117)  BP: 117/55 (05-26 @ 23:54) (110/62 - 117/55)  RR: 18 (05-26 @ 23:54) (18 - 19)  SpO2: 95% (05-26 @ 23:54) (95% - 97%)    ED workup: Hb 7.8, WBC 23.58, Plt 650, Retics 14.9%. Mg 1.5.  She was given LR bolus, Magnesium and morphine in ED. Patient being admitted for management of sickle cell crisis, r/o pneumonia/Acute chest syndrome.  (26 May 2022 23:34)        PAST MEDICAL & SURGICAL HISTORY:  Sickle cell anemia      S/P cholecystectomy        No Known Allergies    Home Medications:  folic acid 1 mg oral tablet: 1 tab(s) orally once a day (20 May 2022 16:12)  losartan 25 mg oral tablet: 1 tab(s) orally once a day (20 May 2022 16:12)    No permtinent family history of PVD    REVIEW OF SYSTEMS:  GENERAL:                                         negative  SKIN:                                                 negative  OPTHALMOLOGIC:                          negative  ENMT:                                               negative  RESPIRATORY AND THORAX:        negative  CARDIOVASCULAR:                         negative  GASTROINTESTINAL:                       negative  NEPHROLOGY:                                  negative  MUSCULOSKELETAL:                       negative  NEUROLOGIC:                                   negative  PSYCHIATRIC:                                    negative  HEMATOLOGY/LYMPHATICS:         negative  ENDOCRINE:                                     negative  ALLERGIC/IMMUNOLOGIC:            negative    12 point ROS otherwise normal except as stated in HPI    PHYSICAL EXAM  Vital Signs Last 24 Hrs  T(C): 36.9 (06 Jun 2022 08:00), Max: 36.9 (06 Jun 2022 08:00)  T(F): 98.5 (06 Jun 2022 08:00), Max: 98.5 (06 Jun 2022 08:00)  HR: 92 (06 Jun 2022 10:00) (68 - 100)  BP: 130/65 (06 Jun 2022 10:00) (110/58 - 134/68)  BP(mean): 87 (06 Jun 2022 10:00) (75 - 94)  RR: 19 (06 Jun 2022 10:00) (10 - 26)  SpO2: 100% (06 Jun 2022 10:00) (96% - 100%)    Appearance: Normal	  HEENT:   Normal oral mucosa, PERRL, EOMI	  Neck: Supple, - JVD; Carotid Bruit   Cardiovascular: Normal S1 S2, No JVD, No murmurs,   Respiratory: Lungs clear to auscultation, No Rales, Rhonchi, Wheezing	  Gastrointestinal:  Soft, Non-tender, positive BS	  Skin: No rashes, No ecchymoses, No cyanosis  Extremities: Normal range of motion, No clubbing, cyanosis or edema  Vascular: Peripheral pulses palpable 2+ bilaterally  Neurologic: Non-focal  Psychiatry: A & O x 3, Mood & affect appropriate      PULSES:  Femoral:  Popliteal:  Dorsal Pedal:  Posterior Tibial:  Capillary:    MEDICATIONS:   MEDICATIONS  (STANDING):  chlorhexidine 4% Liquid 1 Application(s) Topical daily  dextrose 5% + sodium chloride 0.45%. 1000 milliLiter(s) (100 mL/Hr) IV Continuous <Continuous>  enoxaparin Injectable 40 milliGRAM(s) SubCutaneous every 24 hours  folic acid 1 milliGRAM(s) Oral daily  influenza   Vaccine 0.5 milliLiter(s) IntraMuscular once  levoFLOXacin IVPB 750 milliGRAM(s) IV Intermittent every 24 hours  lidocaine   4% Patch 1 Patch Transdermal daily  linezolid  IVPB 600 milliGRAM(s) IV Intermittent every 12 hours  losartan 25 milliGRAM(s) Oral daily  meropenem  IVPB      meropenem  IVPB 1000 milliGRAM(s) IV Intermittent every 8 hours  pantoprazole    Tablet 40 milliGRAM(s) Oral before breakfast  predniSONE   Tablet 50 milliGRAM(s) Oral daily  senna 2 Tablet(s) Oral at bedtime    MEDICATIONS  (PRN):  acetaminophen     Tablet .. 650 milliGRAM(s) Oral every 6 hours PRN Temp greater or equal to 38C (100.4F), Mild Pain (1 - 3)  HYDROmorphone  Injectable 2 milliGRAM(s) IV Push every 2 hours PRN Severe Pain (7 - 10)  melatonin 3 milliGRAM(s) Oral at bedtime PRN Insomnia  ondansetron Injectable 4 milliGRAM(s) IV Push every 8 hours PRN Nausea and/or Vomiting      LAB/STUDIES:                        6.2    21.97 )-----------( 434      ( 06 Jun 2022 04:40 )             18.8     06-06    137  |  99  |  5<L>  ----------------------------<  106<H>  3.6   |  26  |  <0.5<L>    Ca    8.7      06 Jun 2022 04:40  Mg     1.9     06-05    TPro  6.3  /  Alb  2.8<L>  /  TBili  1.8<H>  /  DBili  x   /  AST  87<H>  /  ALT  73<H>  /  AlkPhos  243<H>  06-06      LIVER FUNCTIONS - ( 06 Jun 2022 04:40 )  Alb: 2.8 g/dL / Pro: 6.3 g/dL / ALK PHOS: 243 U/L / ALT: 73 U/L / AST: 87 U/L / GGT: x                           Culture - Fungal, Bronchial (collected 04 Jun 2022 09:50)  Source: .Bronchial None  Preliminary Report (06 Jun 2022 08:40):    Testing in progress    Culture - Acid Fast - Bronchial w/Smear (collected 04 Jun 2022 09:50)  Source: .Bronchial None    Culture - Bronchial (collected 04 Jun 2022 09:50)  Source: .Bronchial None  Gram Stain (04 Jun 2022 19:11):    Rare polymorphonuclear leukocytes per low power field    No organisms seen  Preliminary Report (05 Jun 2022 17:10):    No growth to date.        IMAGING:       VASCULAR SURGERY CONSULT NOTE      HPI:  27yo  F with PMH of Sickle cell anemia (on oxbryta - followed by Hematologist - Dr. Jimenez in [place]), recently discharged after treatment of pneumonia and sickle cell crisis, presented to the ED with c/o right sided chest, shoulder and upper back pain. States that she overexerted herself. She states that she has not been feeling well since yesterday, had mild cough and fever upto 102F at home. Denies any shortness of breath, abdominal pain, nausea, vomiting, diarrhea, leg pain, swelling.  Pt has had a long tumultuous hospital course currently pt is in the ICU in need of exchange transfusion for sickle cell crisis pt has been reusing for multiple days, finally is agreeable today.       ED vitals:   T(F): 102.6 (05-26 @ 23:54), Max: 102.6 (05-26 @ 23:54)  HR: 110 (05-26 @ 23:54) (109 - 117)  BP: 117/55 (05-26 @ 23:54) (110/62 - 117/55)  RR: 18 (05-26 @ 23:54) (18 - 19)  SpO2: 95% (05-26 @ 23:54) (95% - 97%)    ED workup: Hb 7.8, WBC 23.58, Plt 650, Retics 14.9%. Mg 1.5.  She was given LR bolus, Magnesium and morphine in ED. Patient being admitted for management of sickle cell crisis, r/o pneumonia/Acute chest syndrome.  (26 May 2022 23:34)        PAST MEDICAL & SURGICAL HISTORY:  Sickle cell anemia      S/P cholecystectomy        No Known Allergies    Home Medications:  folic acid 1 mg oral tablet: 1 tab(s) orally once a day (20 May 2022 16:12)  losartan 25 mg oral tablet: 1 tab(s) orally once a day (20 May 2022 16:12)    No permtinent family history of PVD    REVIEW OF SYSTEMS:  GENERAL:                                         negative  SKIN:                                                 negative  OPTHALMOLOGIC:                          negative  ENMT:                                               negative  RESPIRATORY AND THORAX:        negative  CARDIOVASCULAR:                         negative  GASTROINTESTINAL:                       negative  NEPHROLOGY:                                  negative  MUSCULOSKELETAL:                       negative  NEUROLOGIC:                                   negative  PSYCHIATRIC:                                    negative  HEMATOLOGY/LYMPHATICS:         negative  ENDOCRINE:                                     negative  ALLERGIC/IMMUNOLOGIC:            negative    12 point ROS otherwise normal except as stated in HPI    PHYSICAL EXAM  Vital Signs Last 24 Hrs  T(C): 36.9 (06 Jun 2022 08:00), Max: 36.9 (06 Jun 2022 08:00)  T(F): 98.5 (06 Jun 2022 08:00), Max: 98.5 (06 Jun 2022 08:00)  HR: 92 (06 Jun 2022 10:00) (68 - 100)  BP: 130/65 (06 Jun 2022 10:00) (110/58 - 134/68)  BP(mean): 87 (06 Jun 2022 10:00) (75 - 94)  RR: 19 (06 Jun 2022 10:00) (10 - 26)  SpO2: 100% (06 Jun 2022 10:00) (96% - 100%)    Appearance: Normal	  HEENT:   Normal oral mucosa, PERRL, EOMI	  Neck: Supple, - JVD; Carotid Bruit   Cardiovascular: Normal   Respiratory: Lungs clear to auscultation, No Rales, Rhonchi, Wheezing	  Gastrointestinal:  Soft, Non-tender, positive BS	  Skin: No rashes, No ecchymoses, No cyanosis  Extremities: tender on range of motion, No clubbing, cyanosis or edema,   Vascular: Peripheral pulses palpable 2+ bilaterally  Neurologic: Non-focal  Psychiatry: A & O x 3, Mood & affect appropriate    MEDICATIONS:   MEDICATIONS  (STANDING):  chlorhexidine 4% Liquid 1 Application(s) Topical daily  dextrose 5% + sodium chloride 0.45%. 1000 milliLiter(s) (100 mL/Hr) IV Continuous <Continuous>  enoxaparin Injectable 40 milliGRAM(s) SubCutaneous every 24 hours  folic acid 1 milliGRAM(s) Oral daily  influenza   Vaccine 0.5 milliLiter(s) IntraMuscular once  levoFLOXacin IVPB 750 milliGRAM(s) IV Intermittent every 24 hours  lidocaine   4% Patch 1 Patch Transdermal daily  linezolid  IVPB 600 milliGRAM(s) IV Intermittent every 12 hours  losartan 25 milliGRAM(s) Oral daily  meropenem  IVPB      meropenem  IVPB 1000 milliGRAM(s) IV Intermittent every 8 hours  pantoprazole    Tablet 40 milliGRAM(s) Oral before breakfast  predniSONE   Tablet 50 milliGRAM(s) Oral daily  senna 2 Tablet(s) Oral at bedtime    MEDICATIONS  (PRN):  acetaminophen     Tablet .. 650 milliGRAM(s) Oral every 6 hours PRN Temp greater or equal to 38C (100.4F), Mild Pain (1 - 3)  HYDROmorphone  Injectable 2 milliGRAM(s) IV Push every 2 hours PRN Severe Pain (7 - 10)  melatonin 3 milliGRAM(s) Oral at bedtime PRN Insomnia  ondansetron Injectable 4 milliGRAM(s) IV Push every 8 hours PRN Nausea and/or Vomiting      LAB/STUDIES:                        6.2    21.97 )-----------( 434      ( 06 Jun 2022 04:40 )             18.8     06-06    137  |  99  |  5<L>  ----------------------------<  106<H>  3.6   |  26  |  <0.5<L>    Ca    8.7      06 Jun 2022 04:40  Mg     1.9     06-05    TPro  6.3  /  Alb  2.8<L>  /  TBili  1.8<H>  /  DBili  x   /  AST  87<H>  /  ALT  73<H>  /  AlkPhos  243<H>  06-06      LIVER FUNCTIONS - ( 06 Jun 2022 04:40 )  Alb: 2.8 g/dL / Pro: 6.3 g/dL / ALK PHOS: 243 U/L / ALT: 73 U/L / AST: 87 U/L / GGT: x                           Culture - Fungal, Bronchial (collected 04 Jun 2022 09:50)  Source: .Bronchial None  Preliminary Report (06 Jun 2022 08:40):    Testing in progress    Culture - Acid Fast - Bronchial w/Smear (collected 04 Jun 2022 09:50)  Source: .Bronchial None    Culture - Bronchial (collected 04 Jun 2022 09:50)  Source: .Bronchial None  Gram Stain (04 Jun 2022 19:11):    Rare polymorphonuclear leukocytes per low power field    No organisms seen  Preliminary Report (05 Jun 2022 17:10):    No growth to date.        IMAGING:  Awaiting post procedural x-ray

## 2022-06-06 NOTE — PROGRESS NOTE ADULT - ASSESSMENT
25yo  F with PMH of Sickle cell anemia (on oxbryta - followed by Hematologist - Dr. Jimenez in [place]), recently discharged after treatment of pneumonia and sickle cell crisis, presented to the ED with c/o right sided chest, shoulder and upper back pain.  She also reports fever at home 102F.  She was recently treated for sickle cell crisis and pneumonia and discharged last week on Levaquin PO.    Hematology consulted for r/o ACS with hx of sickle cell disease.     IMPRESSION:    #ACS/Vaso-occlusive pain crisis/PNA  #Hx of Hb SS disease   -Was on Oxbryta, just started in April 2022 but hasn't been routinely taking it due to her recent hospitalizations/infections; never was on hydrea or any other HbSS medications  -Initially offered both exchange as patient was in high 80s on RA and simple transfusion however she declined, eventually she agreed to a simple transfusion.    -Baseline Hb 7-8  -She has had 2 crises far this year.   -Hb electrophoresis from 5/21 showed 90% HbS  -Viral panel noted: past EBV infection, hep panel non reactive    #Possible autoimmune hemolytic anemia   Direct Lori Profile (05.31.22 @ 08:00)    Dir Antiglob Polyspecific Interpretation: POS  - Spoke with Dr Burroughs from transfusion med, it is hard to call if pt has alloAb or autoab given chronic hemolysis    #Chronic leukocytosis and thrombocytosis: Resolving  - likely reactive     #Acute right knee pain -improved  - likely 2/2 sickle cell crisis. No evidence of effusion or AVN on Xray    #Right shin protuberance with tenderness to palpation    RECOMMENDATIONS:  -  - F/u results from bronchoscopy: cultures negative so far  - C/w Prednisone 50mg daily with GI ppx; started on 5/31: will likely do 2 weeks of 1mg/kg dose followed by quick taper.   - C/w IV hydration: 0.45% NS @100cc /Hr  - C/w pain meds: IV Dilaudid 2mg q2hr prn. pain mx follow up  - Can get Xray of the right leg ( likely bone infarct )  Warm compresses prn.   - IV Abx per ID  - C/w folic acid supplementation  - She can continue Oxbryta as outpatient and follow up with her primary hematologist post discharge.  - Monitor hemolysis labs and CBC. Check daily LDH    DVT ppx: Lovenox        25yo  F with PMH of Sickle cell anemia (on oxbryta - followed by Hematologist - Dr. Jimenez in [place]), recently discharged after treatment of pneumonia and sickle cell crisis, presented to the ED with c/o right sided chest, shoulder and upper back pain.  She also reports fever at home 102F.  She was recently treated for sickle cell crisis and pneumonia and discharged last week on Levaquin PO.    Hematology consulted for r/o ACS with hx of sickle cell disease.     IMPRESSION:    #ACS/Vaso-occlusive pain crisis/PNA  #Hx of Hb SS disease   -Was on Oxbryta, just started in April 2022 but hasn't been routinely taking it due to her recent hospitalizations/infections; never was on hydrea or any other HbSS medications  -Initially offered both exchange as patient was in high 80s on RA and simple transfusion however she declined, eventually she agreed to a simple transfusion.    -Baseline Hb 7-8  -She has had 2 crises far this year.   -Hb electrophoresis from 5/21 showed 90% HbS  -Viral panel noted: past EBV infection, hep panel non reactive    #Possible autoimmune hemolytic anemia   Direct Lori Profile (05.31.22 @ 08:00)    Dir Antiglob Polyspecific Interpretation: POS  - Spoke with Dr Burroughs from transfusion med, it is hard to call if pt has alloAb or autoab given chronic hemolysis    #Chronic leukocytosis and thrombocytosis: Resolving  - likely reactive     #Acute right knee pain -improved  - likely 2/2 sickle cell crisis. No evidence of effusion or AVN on Xray    #Right shin protuberance with tenderness to palpation    RECOMMENDATIONS:    - Pt agreeable to exchange transfusion. Please hold off on 1U PRBC. Once udall is placed, will call transfusion medicine for exchange transfusion.   - F/u results from bronchoscopy: cultures negative so far  - C/w Prednisone 50mg daily with GI ppx; started on 5/31: will likely do 2 weeks of 1mg/kg dose followed by quick taper.   - C/w IV hydration: 0.45% NS @100cc /Hr  - C/w pain meds: IV Dilaudid 2mg q2hr prn. pain mx follow up  - Can get Xray of the right leg ( likely bone infarct )  Warm compresses prn.   - IV Abx per ID  - C/w folic acid supplementation  - She can continue Oxbryta as outpatient and follow up with her primary hematologist post discharge.  - Monitor hemolysis labs and CBC. Check daily LDH    DVT ppx: Lovenox        27yo  F with PMH of Sickle cell anemia (on oxbryta - followed by Hematologist - Dr. Jimenez in [place]), recently discharged after treatment of pneumonia and sickle cell crisis, presented to the ED with c/o right sided chest, shoulder and upper back pain.  She also reports fever at home 102F.  She was recently treated for sickle cell crisis and pneumonia and discharged last week on Levaquin PO.    Hematology consulted for r/o ACS with hx of sickle cell disease.     IMPRESSION:    #ACS/Vaso-occlusive pain crisis/PNA  #Hx of Hb SS disease   -Was on Oxbryta, just started in April 2022 but hasn't been routinely taking it due to her recent hospitalizations/infections; never was on hydrea or any other HbSS medications  -Initially offered both exchange as patient was in high 80s on RA and simple transfusion however she declined, eventually she agreed to a simple transfusion.    -Baseline Hb 7-8  -She has had 2 crises far this year.   -Hb electrophoresis from 5/21 showed 90% HbS  -Viral panel noted: past EBV infection, hep panel non reactive    #Possible autoimmune hemolytic anemia   Direct Lori Profile (05.31.22 @ 08:00)    Dir Antiglob Polyspecific Interpretation: POS  - Spoke with Dr Burroughs from transfusion med, it is hard to call if pt has alloAb or autoab given chronic hemolysis    #Chronic leukocytosis and thrombocytosis: Resolving  - likely reactive     #Acute right knee pain -improved  - likely 2/2 sickle cell crisis. No evidence of effusion or AVN on Xray    #Right shin protuberance with tenderness to palpation    RECOMMENDATIONS:    - Pt agreeable to exchange transfusion. Please hold off on 1U PRBC. Once udall is placed, will call transfusion medicine for exchange transfusion.   - F/u results from bronchoscopy: cultures negative so far  - C/w Prednisone 50mg daily with GI ppx; started on 5/31: will likely do 2 weeks of 1mg/kg dose followed by quick taper.   - C/w IV hydration: 0.45% NS @100cc /Hr  - C/w pain meds: IV Dilaudid 2mg q2hr prn. pain mx follow up  - Can get Xray of the right leg ( likely bone infarct )  Warm compresses prn.   - IV Abx per ID  - C/w folic acid supplementation  - She can continue Oxbryta as outpatient and follow up with her primary hematologist post discharge.  - Monitor hemolysis labs and CBC. Check daily LDH    DVT ppx: Lovenox

## 2022-06-06 NOTE — PROGRESS NOTE ADULT - SUBJECTIVE AND OBJECTIVE BOX
Patient is a 26y old  Female who presents with a chief complaint of SICKLE CELL DISEASE,WITH ACUTE CHEST SYNDROME;PNEUMONIA;HYPOMAGNESEMIA     (05 Jun 2022 18:25)        Over Night Events:  On 2 liters O2.          ROS:     All ROS are negative except HPI         PHYSICAL EXAM    ICU Vital Signs Last 24 Hrs  T(C): 36.7 (06 Jun 2022 04:00), Max: 36.7 (06 Jun 2022 04:00)  T(F): 98 (06 Jun 2022 04:00), Max: 98 (06 Jun 2022 04:00)  HR: 80 (06 Jun 2022 07:00) (68 - 100)  BP: 124/63 (06 Jun 2022 07:00) (110/58 - 134/68)  BP(mean): 85 (06 Jun 2022 07:00) (75 - 94)  ABP: --  ABP(mean): --  RR: 10 (06 Jun 2022 07:00) (10 - 26)  SpO2: 99% (06 Jun 2022 08:23) (99% - 100%)      CONSTITUTIONAL:  In NAD    ENT:   Airway patent,   Mouth with normal mucosa.       EYES:   Pupils equal,   Round and reactive to light.    CARDIAC:   Normal rate,   Regular rhythm.        RESPIRATORY:   No wheezing  Bilateral BS  Normal chest expansion  Not tachypneic,  No use of accessory muscles    GASTROINTESTINAL:  Abdomen soft,   Non-tender,   No guarding,   + BS    MUSCULOSKELETAL:   Range of motion is not limited,  No clubbing, cyanosis    NEUROLOGICAL:   Alert and oriented   No motor  deficits.    SKIN:   Skin normal color for race,   No evidence of rash.    PSYCHIATRIC:   No apparent risk to self or others.          06-05-22 @ 07:01  -  06-06-22 @ 07:00  --------------------------------------------------------  IN:    dextrose 5% + sodium chloride 0.45%: 2400 mL    IV PiggyBack: 400 mL  Total IN: 2800 mL    OUT:    Voided (mL): 2225 mL  Total OUT: 2225 mL    Total NET: 575 mL          LABS:                            6.2    21.97 )-----------( 434      ( 06 Jun 2022 04:40 )             18.8                                               06-06    137  |  99  |  5<L>  ----------------------------<  106<H>  3.6   |  26  |  <0.5<L>    Ca    8.7      06 Jun 2022 04:40  Mg     1.9     06-05    TPro  6.3  /  Alb  2.8<L>  /  TBili  1.8<H>  /  DBili  x   /  AST  87<H>  /  ALT  73<H>  /  AlkPhos  243<H>  06-06                                                                                           LIVER FUNCTIONS - ( 06 Jun 2022 04:40 )  Alb: 2.8 g/dL / Pro: 6.3 g/dL / ALK PHOS: 243 U/L / ALT: 73 U/L / AST: 87 U/L / GGT: x                                                  Culture - Acid Fast - Bronchial w/Smear (collected 04 Jun 2022 09:50)  Source: .Bronchial None    Culture - Bronchial (collected 04 Jun 2022 09:50)  Source: .Bronchial None  Gram Stain (04 Jun 2022 19:11):    Rare polymorphonuclear leukocytes per low power field    No organisms seen  Preliminary Report (05 Jun 2022 17:10):    No growth to date.                                                                                           MEDICATIONS  (STANDING):  chlorhexidine 4% Liquid 1 Application(s) Topical daily  dextrose 5% + sodium chloride 0.45%. 1000 milliLiter(s) (100 mL/Hr) IV Continuous <Continuous>  enoxaparin Injectable 40 milliGRAM(s) SubCutaneous every 24 hours  folic acid 1 milliGRAM(s) Oral daily  influenza   Vaccine 0.5 milliLiter(s) IntraMuscular once  levoFLOXacin IVPB 750 milliGRAM(s) IV Intermittent every 24 hours  lidocaine   4% Patch 1 Patch Transdermal daily  linezolid  IVPB 600 milliGRAM(s) IV Intermittent every 12 hours  losartan 25 milliGRAM(s) Oral daily  meropenem  IVPB      meropenem  IVPB 1000 milliGRAM(s) IV Intermittent every 8 hours  pantoprazole    Tablet 40 milliGRAM(s) Oral before breakfast  predniSONE   Tablet 50 milliGRAM(s) Oral daily  senna 2 Tablet(s) Oral at bedtime    MEDICATIONS  (PRN):  acetaminophen     Tablet .. 650 milliGRAM(s) Oral every 6 hours PRN Temp greater or equal to 38C (100.4F), Mild Pain (1 - 3)  HYDROmorphone  Injectable 2 milliGRAM(s) IV Push every 2 hours PRN Severe Pain (7 - 10)  melatonin 3 milliGRAM(s) Oral at bedtime PRN Insomnia  ondansetron Injectable 4 milliGRAM(s) IV Push every 8 hours PRN Nausea and/or Vomiting      New X-rays reviewed:                                                                                  ECHO    CXR interpreted by me:

## 2022-06-07 LAB
ALBUMIN SERPL ELPH-MCNC: 2.7 G/DL — LOW (ref 3.5–5.2)
ALP SERPL-CCNC: 237 U/L — HIGH (ref 30–115)
ALT FLD-CCNC: 92 U/L — HIGH (ref 0–41)
ANION GAP SERPL CALC-SCNC: 13 MMOL/L — SIGNIFICANT CHANGE UP (ref 7–14)
AST SERPL-CCNC: 92 U/L — HIGH (ref 0–41)
BASOPHILS # BLD AUTO: 0.05 K/UL — SIGNIFICANT CHANGE UP (ref 0–0.2)
BASOPHILS NFR BLD AUTO: 0.2 % — SIGNIFICANT CHANGE UP (ref 0–1)
BILIRUB SERPL-MCNC: 2.8 MG/DL — HIGH (ref 0.2–1.2)
BUN SERPL-MCNC: 5 MG/DL — LOW (ref 10–20)
CALCIUM SERPL-MCNC: 9.2 MG/DL — SIGNIFICANT CHANGE UP (ref 8.5–10.1)
CHLORIDE SERPL-SCNC: 101 MMOL/L — SIGNIFICANT CHANGE UP (ref 98–110)
CO2 SERPL-SCNC: 27 MMOL/L — SIGNIFICANT CHANGE UP (ref 17–32)
CREAT SERPL-MCNC: <0.5 MG/DL — LOW (ref 0.7–1.5)
EGFR: 152 ML/MIN/1.73M2 — SIGNIFICANT CHANGE UP
EOSINOPHIL # BLD AUTO: 0.06 K/UL — SIGNIFICANT CHANGE UP (ref 0–0.7)
EOSINOPHIL NFR BLD AUTO: 0.3 % — SIGNIFICANT CHANGE UP (ref 0–8)
GLUCOSE SERPL-MCNC: 118 MG/DL — HIGH (ref 70–99)
HCT VFR BLD CALC: 27.1 % — LOW (ref 37–47)
HGB BLD-MCNC: 9.7 G/DL — LOW (ref 12–16)
IMM GRANULOCYTES NFR BLD AUTO: 1.5 % — HIGH (ref 0.1–0.3)
LDH SERPL L TO P-CCNC: 754 — HIGH (ref 50–242)
LYMPHOCYTES # BLD AUTO: 30.4 % — SIGNIFICANT CHANGE UP (ref 20.5–51.1)
LYMPHOCYTES # BLD AUTO: 6.2 K/UL — HIGH (ref 1.2–3.4)
MAGNESIUM SERPL-MCNC: 1.7 MG/DL — LOW (ref 1.8–2.4)
MCHC RBC-ENTMCNC: 30.5 PG — SIGNIFICANT CHANGE UP (ref 27–31)
MCHC RBC-ENTMCNC: 35.8 G/DL — SIGNIFICANT CHANGE UP (ref 32–37)
MCV RBC AUTO: 85.2 FL — SIGNIFICANT CHANGE UP (ref 81–99)
MONOCYTES # BLD AUTO: 2.29 K/UL — HIGH (ref 0.1–0.6)
MONOCYTES NFR BLD AUTO: 11.2 % — HIGH (ref 1.7–9.3)
NEUTROPHILS # BLD AUTO: 11.49 K/UL — HIGH (ref 1.4–6.5)
NEUTROPHILS NFR BLD AUTO: 56.4 % — SIGNIFICANT CHANGE UP (ref 42.2–75.2)
NRBC # BLD: 20 /100 WBCS — HIGH (ref 0–0)
PLATELET # BLD AUTO: 297 K/UL — SIGNIFICANT CHANGE UP (ref 130–400)
POTASSIUM SERPL-MCNC: 3.5 MMOL/L — SIGNIFICANT CHANGE UP (ref 3.5–5)
POTASSIUM SERPL-SCNC: 3.5 MMOL/L — SIGNIFICANT CHANGE UP (ref 3.5–5)
PROT SERPL-MCNC: 5.8 G/DL — LOW (ref 6–8)
RBC # BLD: 3.18 M/UL — LOW (ref 4.2–5.4)
RBC # BLD: 3.18 M/UL — LOW (ref 4.2–5.4)
RBC # FLD: 15.7 % — HIGH (ref 11.5–14.5)
RETICS #: 267.8 K/UL — HIGH (ref 25–125)
RETICS/RBC NFR: 8.4 % — HIGH (ref 0.5–1.5)
SODIUM SERPL-SCNC: 141 MMOL/L — SIGNIFICANT CHANGE UP (ref 135–146)
WBC # BLD: 20.4 K/UL — HIGH (ref 4.8–10.8)
WBC # FLD AUTO: 20.4 K/UL — HIGH (ref 4.8–10.8)

## 2022-06-07 PROCEDURE — 71045 X-RAY EXAM CHEST 1 VIEW: CPT | Mod: 26

## 2022-06-07 PROCEDURE — 99233 SBSQ HOSP IP/OBS HIGH 50: CPT

## 2022-06-07 PROCEDURE — 99231 SBSQ HOSP IP/OBS SF/LOW 25: CPT

## 2022-06-07 RX ORDER — POLYETHYLENE GLYCOL 3350 17 G/17G
17 POWDER, FOR SOLUTION ORAL DAILY
Refills: 0 | Status: DISCONTINUED | OUTPATIENT
Start: 2022-06-07 | End: 2022-06-10

## 2022-06-07 RX ORDER — HYDROMORPHONE HYDROCHLORIDE 2 MG/ML
2 INJECTION INTRAMUSCULAR; INTRAVENOUS; SUBCUTANEOUS ONCE
Refills: 0 | Status: DISCONTINUED | OUTPATIENT
Start: 2022-06-07 | End: 2022-06-07

## 2022-06-07 RX ADMIN — HYDROMORPHONE HYDROCHLORIDE 2 MILLIGRAM(S): 2 INJECTION INTRAMUSCULAR; INTRAVENOUS; SUBCUTANEOUS at 00:52

## 2022-06-07 RX ADMIN — LIDOCAINE 1 PATCH: 4 CREAM TOPICAL at 23:46

## 2022-06-07 RX ADMIN — HYDROMORPHONE HYDROCHLORIDE 2 MILLIGRAM(S): 2 INJECTION INTRAMUSCULAR; INTRAVENOUS; SUBCUTANEOUS at 20:00

## 2022-06-07 RX ADMIN — MEROPENEM 100 MILLIGRAM(S): 1 INJECTION INTRAVENOUS at 21:58

## 2022-06-07 RX ADMIN — ENOXAPARIN SODIUM 40 MILLIGRAM(S): 100 INJECTION SUBCUTANEOUS at 23:44

## 2022-06-07 RX ADMIN — HYDROMORPHONE HYDROCHLORIDE 2 MILLIGRAM(S): 2 INJECTION INTRAMUSCULAR; INTRAVENOUS; SUBCUTANEOUS at 14:00

## 2022-06-07 RX ADMIN — PANTOPRAZOLE SODIUM 40 MILLIGRAM(S): 20 TABLET, DELAYED RELEASE ORAL at 06:19

## 2022-06-07 RX ADMIN — MEROPENEM 100 MILLIGRAM(S): 1 INJECTION INTRAVENOUS at 06:19

## 2022-06-07 RX ADMIN — LINEZOLID 300 MILLIGRAM(S): 600 INJECTION, SOLUTION INTRAVENOUS at 05:24

## 2022-06-07 RX ADMIN — HYDROMORPHONE HYDROCHLORIDE 2 MILLIGRAM(S): 2 INJECTION INTRAMUSCULAR; INTRAVENOUS; SUBCUTANEOUS at 04:00

## 2022-06-07 RX ADMIN — HYDROMORPHONE HYDROCHLORIDE 2 MILLIGRAM(S): 2 INJECTION INTRAMUSCULAR; INTRAVENOUS; SUBCUTANEOUS at 03:46

## 2022-06-07 RX ADMIN — MEROPENEM 100 MILLIGRAM(S): 1 INJECTION INTRAVENOUS at 15:38

## 2022-06-07 RX ADMIN — CHLORHEXIDINE GLUCONATE 1 APPLICATION(S): 213 SOLUTION TOPICAL at 11:31

## 2022-06-07 RX ADMIN — POLYETHYLENE GLYCOL 3350 17 GRAM(S): 17 POWDER, FOR SOLUTION ORAL at 11:30

## 2022-06-07 RX ADMIN — HYDROMORPHONE HYDROCHLORIDE 2 MILLIGRAM(S): 2 INJECTION INTRAMUSCULAR; INTRAVENOUS; SUBCUTANEOUS at 13:33

## 2022-06-07 RX ADMIN — HYDROMORPHONE HYDROCHLORIDE 2 MILLIGRAM(S): 2 INJECTION INTRAMUSCULAR; INTRAVENOUS; SUBCUTANEOUS at 19:42

## 2022-06-07 RX ADMIN — LIDOCAINE 1 PATCH: 4 CREAM TOPICAL at 00:00

## 2022-06-07 RX ADMIN — SODIUM CHLORIDE 100 MILLILITER(S): 9 INJECTION, SOLUTION INTRAVENOUS at 18:29

## 2022-06-07 RX ADMIN — HYDROMORPHONE HYDROCHLORIDE 2 MILLIGRAM(S): 2 INJECTION INTRAMUSCULAR; INTRAVENOUS; SUBCUTANEOUS at 01:07

## 2022-06-07 RX ADMIN — HYDROMORPHONE HYDROCHLORIDE 2 MILLIGRAM(S): 2 INJECTION INTRAMUSCULAR; INTRAVENOUS; SUBCUTANEOUS at 09:54

## 2022-06-07 RX ADMIN — Medication 50 MILLIGRAM(S): at 05:25

## 2022-06-07 RX ADMIN — HYDROMORPHONE HYDROCHLORIDE 2 MILLIGRAM(S): 2 INJECTION INTRAMUSCULAR; INTRAVENOUS; SUBCUTANEOUS at 10:15

## 2022-06-07 RX ADMIN — Medication 1 MILLIGRAM(S): at 11:29

## 2022-06-07 RX ADMIN — LOSARTAN POTASSIUM 25 MILLIGRAM(S): 100 TABLET, FILM COATED ORAL at 05:25

## 2022-06-07 RX ADMIN — ENOXAPARIN SODIUM 40 MILLIGRAM(S): 100 INJECTION SUBCUTANEOUS at 00:35

## 2022-06-07 RX ADMIN — LIDOCAINE 1 PATCH: 4 CREAM TOPICAL at 11:29

## 2022-06-07 RX ADMIN — MEROPENEM 100 MILLIGRAM(S): 1 INJECTION INTRAVENOUS at 00:06

## 2022-06-07 RX ADMIN — HYDROMORPHONE HYDROCHLORIDE 2 MILLIGRAM(S): 2 INJECTION INTRAMUSCULAR; INTRAVENOUS; SUBCUTANEOUS at 16:25

## 2022-06-07 RX ADMIN — HYDROMORPHONE HYDROCHLORIDE 2 MILLIGRAM(S): 2 INJECTION INTRAMUSCULAR; INTRAVENOUS; SUBCUTANEOUS at 22:10

## 2022-06-07 RX ADMIN — HYDROMORPHONE HYDROCHLORIDE 2 MILLIGRAM(S): 2 INJECTION INTRAMUSCULAR; INTRAVENOUS; SUBCUTANEOUS at 23:46

## 2022-06-07 RX ADMIN — HYDROMORPHONE HYDROCHLORIDE 2 MILLIGRAM(S): 2 INJECTION INTRAMUSCULAR; INTRAVENOUS; SUBCUTANEOUS at 06:43

## 2022-06-07 RX ADMIN — HYDROMORPHONE HYDROCHLORIDE 2 MILLIGRAM(S): 2 INJECTION INTRAMUSCULAR; INTRAVENOUS; SUBCUTANEOUS at 07:00

## 2022-06-07 RX ADMIN — SENNA PLUS 2 TABLET(S): 8.6 TABLET ORAL at 21:57

## 2022-06-07 RX ADMIN — Medication 200 GRAM(S): at 00:33

## 2022-06-07 RX ADMIN — LIDOCAINE 1 PATCH: 4 CREAM TOPICAL at 21:53

## 2022-06-07 NOTE — PROGRESS NOTE ADULT - SUBJECTIVE AND OBJECTIVE BOX
JAMES VÁSQUEZ 26y Female  MRN#: [id number]      Pt is currently admitted with the primary diagnosis of Acute Chest Syndrome       Hospital Day: 12d  CC: Chest Pain   HPI:  25yo  F with PMH of Sickle cell anemia (on oxbryta - followed by Hematologist - Dr. Jimenez in [place]), recently discharged after treatment of pneumonia and sickle cell crisis, presented to the ED with c/o right sided chest, shoulder and upper back pain. States that she overexerted herself. She states that she has not been feeling well since yesterday, had mild cough and fever upto 102F at home. Denies any shortness of breath, abdominal pain, nausea, vomiting, diarrhea, leg pain, swelling.     ED vitals:   T(F): 102.6 (05-26 @ 23:54), Max: 102.6 (05-26 @ 23:54)  HR: 110 (05-26 @ 23:54) (109 - 117)  BP: 117/55 (05-26 @ 23:54) (110/62 - 117/55)  RR: 18 (05-26 @ 23:54) (18 - 19)  SpO2: 95% (05-26 @ 23:54) (95% - 97%)    ED workup: Hb 7.8, WBC 23.58, Plt 650, Retics 14.9%. Mg 1.5.  She was given LR bolus, Magnesium and morphine in ED. Patient being admitted for management of sickle cell crisis, r/o pneumonia/Acute chest syndrome.       Overnight events:  Received exchange transfusion and calcium gluconate   HD stable  Afebrile     Subjective complaints:  Still reporting pain. ROS otherwise negative.     Present Today:   - Oc:  No [ x ], Yes [   ] : Indication:                                             ----------------------------------------------------------    PAST MEDICAL & SURGICAL HISTORY  Sickle cell anemia    S/P cholecystectomy                                              -----------------------------------------------------------  ALLERGIES:  No Known Allergies                                            ------------------------------------------------------------    HOME MEDICATIONS  Home Medications:  folic acid 1 mg oral tablet: 1 tab(s) orally once a day (20 May 2022 16:12)  losartan 25 mg oral tablet: 1 tab(s) orally once a day (20 May 2022 16:12)                           MEDICATIONS:  STANDING MEDICATIONS  chlorhexidine 4% Liquid 1 Application(s) Topical daily  dextrose 5% + sodium chloride 0.45%. 1000 milliLiter(s) IV Continuous <Continuous>  enoxaparin Injectable 40 milliGRAM(s) SubCutaneous every 24 hours  folic acid 1 milliGRAM(s) Oral daily  heparin  Lock Flush 100 Units/mL Injectable 300 Unit(s) IV Push daily  influenza   Vaccine 0.5 milliLiter(s) IntraMuscular once  levoFLOXacin IVPB 750 milliGRAM(s) IV Intermittent every 24 hours  lidocaine   4% Patch 1 Patch Transdermal daily  losartan 25 milliGRAM(s) Oral daily  meropenem  IVPB      meropenem  IVPB 1000 milliGRAM(s) IV Intermittent every 8 hours  pantoprazole    Tablet 40 milliGRAM(s) Oral before breakfast  predniSONE   Tablet 50 milliGRAM(s) Oral daily  senna 2 Tablet(s) Oral at bedtime    PRN MEDICATIONS  acetaminophen     Tablet .. 650 milliGRAM(s) Oral every 6 hours PRN  HYDROmorphone  Injectable 2 milliGRAM(s) IV Push every 2 hours PRN  melatonin 3 milliGRAM(s) Oral at bedtime PRN  ondansetron Injectable 4 milliGRAM(s) IV Push every 8 hours PRN                                            ------------------------------------------------------------  VITAL SIGNS: Last 24 Hours  T(C): 37.3 (07 Jun 2022 08:00), Max: 37.3 (07 Jun 2022 08:00)  T(F): 99.2 (07 Jun 2022 08:00), Max: 99.2 (07 Jun 2022 08:00)  HR: 92 (07 Jun 2022 09:00) (72 - 108)  BP: 133/72 (07 Jun 2022 09:00) (114/65 - 135/64)  BP(mean): 90 (07 Jun 2022 09:00) (75 - 92)  RR: 22 (07 Jun 2022 09:00) (14 - 30)  SpO2: 100% (07 Jun 2022 09:00) (97% - 100%)      06-06-22 @ 07:01  -  06-07-22 @ 07:00  --------------------------------------------------------  IN: 2250 mL / OUT: 1450 mL / NET: 800 mL                                             --------------------------------------------------------------  LABS:                        9.7    20.40 )-----------( 297      ( 07 Jun 2022 04:53 )             27.1     06-07    141  |  101  |  5<L>  ----------------------------<  118<H>  3.5   |  27  |  <0.5<L>    Ca    9.2      07 Jun 2022 04:53  Mg     1.7     06-07    TPro  5.8<L>  /  Alb  2.7<L>  /  TBili  2.8<H>  /  DBili  x   /  AST  92<H>  /  ALT  92<H>  /  AlkPhos  237<H>  06-07    PT/INR - ( 06 Jun 2022 12:20 )   PT: 14.40 sec;   INR: 1.25 ratio         PTT - ( 06 Jun 2022 12:20 )  PTT:28.0 sec                                                          -------------------------------------------------------------  RADIOLOGY:                                            --------------------------------------------------------------    PHYSICAL EXAM:  General: NAD  LUNGS: Clear to auscultation b/l. No wheezes, rales, or rhonchi.  HEART: RRR. Systolic murmur noted throughout.   ABDOMEN: Nontender, nondistended. + bowel sounds.  EXT: Nonedematous, tender to palpation at shins

## 2022-06-07 NOTE — PROGRESS NOTE ADULT - SUBJECTIVE AND OBJECTIVE BOX
26 year old female presenting with acute chest syndrome successfully received RBC exchange transfusion yesterday (6 RBC units).  Patient tolerated procedure well without incident.  No future procedures anticipated.    Lisa Burroughs MD, D[facility]  [phone]

## 2022-06-07 NOTE — PROGRESS NOTE ADULT - SUBJECTIVE AND OBJECTIVE BOX
JAMES VÁSQUEZ  26y, Female  Allergy: No Known Allergies      LOS  12d    CHIEF COMPLAINT: Acute chest syndrome (07 Jun 2022 11:49)      INTERVAL EVENTS/HPI  - No acute events overnight  - T(F): , Max: 99.2 (06-07-22 @ 08:00)  - s/p exfusion exchange yesterday -- lessening pain   - WBC Count: 20.40 (06-07-22 @ 04:53)  WBC Count: 17.13 (06-06-22 @ 16:52)     - Creatinine, Serum: <0.5 (06-07-22 @ 04:53)  Creatinine, Serum: <0.5 (06-06-22 @ 04:40)       ROS  General: Denies rigors, nightsweats  HEENT: Denies headache, rhinorrhea, sore throat, eye pain  CV: Denies CP, palpitations  PULM: Denies wheezing, hemoptysis  GI: Denies hematemesis, hematochezia, melena  : Denies discharge, hematuria  MSK: Denies arthralgias, myalgias  SKIN: Denies rash, lesions  NEURO: Denies paresthesias, weakness  PSYCH: Denies depression, anxiety    VITALS:  T(F): 96.8, Max: 99.2 (06-07-22 @ 08:00)  HR: 90  BP: 118/62  RR: 16Vital Signs Last 24 Hrs  T(C): 36 (07 Jun 2022 12:00), Max: 37.3 (07 Jun 2022 08:00)  T(F): 96.8 (07 Jun 2022 12:00), Max: 99.2 (07 Jun 2022 08:00)  HR: 90 (07 Jun 2022 14:00) (76 - 108)  BP: 118/62 (07 Jun 2022 14:00) (114/65 - 135/64)  BP(mean): 80 (07 Jun 2022 14:00) (78 - 92)  RR: 16 (07 Jun 2022 14:00) (14 - 30)  SpO2: 100% (07 Jun 2022 14:00) (97% - 100%)    PHYSICAL EXAM:  Gen: NAD, resting in bed  HEENT: Normocephalic, atraumatic  Neck: supple, no lymphadenopathy  CV: Regular rate & regular rhythm  Lungs: decreased BS at bases, no fremitus  Abdomen: Soft, BS present  Ext: Warm, well perfused  Neuro: non focal, awake  Skin: no rash, no erythema  Lines: no phlebitis    FH: Non-contributory  Social Hx: Non-contributory    TESTS & MEASUREMENTS:                        9.7    20.40 )-----------( 297      ( 07 Jun 2022 04:53 )             27.1     06-07    141  |  101  |  5<L>  ----------------------------<  118<H>  3.5   |  27  |  <0.5<L>    Ca    9.2      07 Jun 2022 04:53  Mg     1.7     06-07    TPro  5.8<L>  /  Alb  2.7<L>  /  TBili  2.8<H>  /  DBili  x   /  AST  92<H>  /  ALT  92<H>  /  AlkPhos  237<H>  06-07      LIVER FUNCTIONS - ( 07 Jun 2022 04:53 )  Alb: 2.7 g/dL / Pro: 5.8 g/dL / ALK PHOS: 237 U/L / ALT: 92 U/L / AST: 92 U/L / GGT: x               Culture - Fungal, Bronchial (collected 06-04-22 @ 09:50)  Source: .Bronchial None  Preliminary Report (06-06-22 @ 08:40):    Testing in progress    Culture - Acid Fast - Bronchial w/Smear (collected 06-04-22 @ 09:50)  Source: .Bronchial None    Culture - Bronchial (collected 06-04-22 @ 09:50)  Source: .Bronchial None  Gram Stain (06-04-22 @ 19:11):    Rare polymorphonuclear leukocytes per low power field    No organisms seen  Final Report (06-06-22 @ 18:31):    Normal Respiratory [place] present    Culture - Blood (collected 06-03-22 @ 06:22)  Source: .Blood None  Preliminary Report (06-04-22 @ 19:01):    No growth to date.    Culture - Blood (collected 06-02-22 @ 20:33)  Source: .Blood None  Preliminary Report (06-04-22 @ 03:01):    No growth to date.    Culture - Blood (collected 05-27-22 @ 11:46)  Source: .Blood None  Final Report (06-01-22 @ 23:00):    No Growth Final    Culture - Blood (collected 05-20-22 @ 21:41)  Source: .Blood Blood-Peripheral  Final Report (05-26-22 @ 09:01):    No Growth Final    Culture - Blood (collected 05-14-22 @ 16:45)  Source: .Blood Blood  Final Report (05-20-22 @ 01:01):    No Growth Final    Culture - Blood (collected 05-14-22 @ 16:45)  Source: .Blood Blood  Final Report (05-20-22 @ 02:01):    No Growth Final            INFECTIOUS DISEASES TESTING  HIV-1/2 Combo Result: Nonreact (06-03-22 @ 18:55)  Procalcitonin, Serum: 0.10 (06-03-22 @ 06:22)  COVID-19 PCR: NotDetec (06-02-22 @ 06:30)  MRSA PCR Result.: Negative (05-28-22 @ 11:50)  Procalcitonin, Serum: 0.09 (05-27-22 @ 11:46)  Rapid RVP Result: NotDetec (05-26-22 @ 21:55)  Legionella Antigen, Urine: Negative (05-22-22 @ 12:50)  Procalcitonin, Serum: 0.09 (05-21-22 @ 12:12)  Rapid RVP Result: NotDetec (05-20-22 @ 13:28)  Rapid RVP Result: NotDetec (05-14-22 @ 14:33)  Procalcitonin, Serum: <0.02 (03-15-22 @ 04:30)  COVID-19 PCR: NotDetec (03-14-22 @ 09:27)      INFLAMMATORY MARKERS  Sedimentation Rate, Erythrocyte: >140 mm/Hr (06-03-22 @ 06:22)  C-Reactive Protein, Serum: 8 mg/L (03-15-22 @ 04:30)      RADIOLOGY & ADDITIONAL TESTS:  I have personally reviewed the last available Chest xray  CXR      CT      CARDIOLOGY TESTING  12 Lead ECG:   Ventricular Rate 53 BPM    Atrial Rate 53 BPM    P-R Interval 272 ms    QRS Duration 86 ms    Q-T Interval 426 ms    QTC Calculation(Bazett) 399 ms    P Axis 118 degrees    R Axis 37 degrees    T Axis -1 degrees    Diagnosis Line Sinus bradycardia with marked sinus arrhythmia with 1st degree A-V block  T wave abnormality, consider anterior ischemia  Abnormal ECG    Confirmed by Deepak Bridges ([id number]) on 6/2/2022 11:14:08 AM (06-02-22 @ 01:26)  12 Lead ECG:   Ventricular Rate 119 BPM    Atrial Rate 119 BPM    P-R Interval 148 ms    QRS Duration 76 ms    Q-T Interval 290 ms    QTC Calculation(Bazett) 407 ms    P Axis 42 degrees    R Axis 47 degrees    T Axis -20 degrees    Diagnosis Line *** Poor data quality, interpretation may be adversely affected  Sinus tachycardia  T wave abnormality, consider inferior ischemia  Baseline artifact  Abnormal ECG    Confirmed by Karlee Frank MD ([id number]) on 5/27/2022 8:13:30 AM (05-26-22 @ 19:21)      MEDICATIONS  chlorhexidine 4% Liquid 1 Topical daily  dextrose 5% + sodium chloride 0.45%. 1000 IV Continuous <Continuous>  enoxaparin Injectable 40 SubCutaneous every 24 hours  folic acid 1 Oral daily  heparin  Lock Flush 100 Units/mL Injectable 300 IV Push daily  influenza   Vaccine 0.5 IntraMuscular once  levoFLOXacin IVPB 750 IV Intermittent every 24 hours  lidocaine   4% Patch 1 Transdermal daily  losartan 25 Oral daily  meropenem  IVPB     meropenem  IVPB 1000 IV Intermittent every 8 hours  pantoprazole    Tablet 40 Oral before breakfast  polyethylene glycol 3350 17 Oral daily  senna 2 Oral at bedtime      WEIGHT  Weight (kg): 56.5 (06-04-22 @ 08:57)  Creatinine, Serum: <0.5 mg/dL (06-07-22 @ 04:53)      ANTIBIOTICS:  levoFLOXacin IVPB 750 milliGRAM(s) IV Intermittent every 24 hours  meropenem  IVPB      meropenem  IVPB 1000 milliGRAM(s) IV Intermittent every 8 hours      All available historical records have been reviewed

## 2022-06-07 NOTE — PROGRESS NOTE ADULT - SUBJECTIVE AND OBJECTIVE BOX
Patient is a 26y old  Female who presents with a chief complaint of Acute chest syndrome (06 Jun 2022 12:32)        Over Night Events:  Feels and looks better.  SP Exchange transfusion.  On RA         ROS:     All ROS are negative except HPI         PHYSICAL EXAM    ICU Vital Signs Last 24 Hrs  T(C): 36.9 (07 Jun 2022 04:00), Max: 36.9 (06 Jun 2022 16:00)  T(F): 98.5 (07 Jun 2022 04:00), Max: 98.5 (07 Jun 2022 00:00)  HR: 82 (07 Jun 2022 08:00) (72 - 108)  BP: 122/63 (07 Jun 2022 08:00) (114/65 - 135/64)  BP(mean): 83 (07 Jun 2022 08:00) (75 - 92)  ABP: --  ABP(mean): --  RR: 15 (07 Jun 2022 08:00) (14 - 30)  SpO2: 97% (07 Jun 2022 08:14) (96% - 100%)      CONSTITUTIONAL:  In  NAD    ENT:   Airway patent,   Mouth with normal mucosa.   No thrush    EYES:   Pupils equal,   Round and reactive to light.    CARDIAC:   Normal rate,   Regular rhythm.        RESPIRATORY:   No wheezing  Bilateral BS  Normal chest expansion  Not tachypneic,  No use of accessory muscles    GASTROINTESTINAL:  Abdomen soft,   Non-tender,   No guarding,   + BS    MUSCULOSKELETAL:   Range of motion is not limited,  No clubbing, cyanosis    NEUROLOGICAL:   Alert and oriented   No motor  deficits.    SKIN:   Skin normal color for race,   No evidence of rash.    PSYCHIATRIC:   No apparent risk to self or others.      06-06-22 @ 07:01  -  06-07-22 @ 07:00  --------------------------------------------------------  IN:    dextrose 5% + sodium chloride 0.45%: 2100 mL    IV PiggyBack: 150 mL  Total IN: 2250 mL    OUT:    Voided (mL): 1450 mL  Total OUT: 1450 mL    Total NET: 800 mL          LABS:                            9.7    20.40 )-----------( 297      ( 07 Jun 2022 04:53 )             27.1                                               06-07    141  |  101  |  5<L>  ----------------------------<  118<H>  3.5   |  27  |  <0.5<L>    Ca    9.2      07 Jun 2022 04:53  Mg     1.7     06-07    TPro  5.8<L>  /  Alb  2.7<L>  /  TBili  2.8<H>  /  DBili  x   /  AST  92<H>  /  ALT  92<H>  /  AlkPhos  237<H>  06-07      PT/INR - ( 06 Jun 2022 12:20 )   PT: 14.40 sec;   INR: 1.25 ratio         PTT - ( 06 Jun 2022 12:20 )  PTT:28.0 sec                                                                                     LIVER FUNCTIONS - ( 07 Jun 2022 04:53 )  Alb: 2.7 g/dL / Pro: 5.8 g/dL / ALK PHOS: 237 U/L / ALT: 92 U/L / AST: 92 U/L / GGT: x                                                  Culture - Fungal, Bronchial (collected 04 Jun 2022 09:50)  Source: .Bronchial None  Preliminary Report (06 Jun 2022 08:40):    Testing in progress    Culture - Acid Fast - Bronchial w/Smear (collected 04 Jun 2022 09:50)  Source: .Bronchial None    Culture - Bronchial (collected 04 Jun 2022 09:50)  Source: .Bronchial None  Gram Stain (04 Jun 2022 19:11):    Rare polymorphonuclear leukocytes per low power field    No organisms seen  Final Report (06 Jun 2022 18:31):    Normal Respiratory [place] present                                                                                           MEDICATIONS  (STANDING):  chlorhexidine 4% Liquid 1 Application(s) Topical daily  dextrose 5% + sodium chloride 0.45%. 1000 milliLiter(s) (100 mL/Hr) IV Continuous <Continuous>  enoxaparin Injectable 40 milliGRAM(s) SubCutaneous every 24 hours  folic acid 1 milliGRAM(s) Oral daily  heparin  Lock Flush 100 Units/mL Injectable 300 Unit(s) IV Push daily  influenza   Vaccine 0.5 milliLiter(s) IntraMuscular once  levoFLOXacin IVPB 750 milliGRAM(s) IV Intermittent every 24 hours  lidocaine   4% Patch 1 Patch Transdermal daily  linezolid  IVPB 600 milliGRAM(s) IV Intermittent every 12 hours  losartan 25 milliGRAM(s) Oral daily  meropenem  IVPB      meropenem  IVPB 1000 milliGRAM(s) IV Intermittent every 8 hours  pantoprazole    Tablet 40 milliGRAM(s) Oral before breakfast  predniSONE   Tablet 50 milliGRAM(s) Oral daily  senna 2 Tablet(s) Oral at bedtime    MEDICATIONS  (PRN):  acetaminophen     Tablet .. 650 milliGRAM(s) Oral every 6 hours PRN Temp greater or equal to 38C (100.4F), Mild Pain (1 - 3)  HYDROmorphone  Injectable 2 milliGRAM(s) IV Push every 2 hours PRN Severe Pain (7 - 10)  melatonin 3 milliGRAM(s) Oral at bedtime PRN Insomnia  ondansetron Injectable 4 milliGRAM(s) IV Push every 8 hours PRN Nausea and/or Vomiting      New X-rays reviewed:                                                                                  ECHO

## 2022-06-07 NOTE — PROGRESS NOTE ADULT - ASSESSMENT
Ms Gonzalez is a 26 RENEE w a PMH of Sickle Cell disease (on oxbryta) w a recent dc following treatment for PNA w sickle cell crisis presented to the ED May 26th for Rt sided chest, shoulder and back pain w mild cough and fever (has been febrile to 103F). Patient was upgraded to ICU on 5/28 for exchange transfusion now s/p 2 U PRBC. Patient was downgraded back to floors and now on Cefepime and levofloxacin w resolving pyrexia. Dr Hobbs performed bedside US to assess for fluid in her lungs on may 31 and stated there was nothing to tap, most likely atalectasis. Heme/Onc saw patient, assessing for warm hemolytic antibodies in blood, started patient on prednsione 50mg. Patient's pain medications were decreased last night. Patient reports crying from pain most of the night and is hyperalgesic today also reporting BL LE pain. Will obtain BL LE US to r/o dvt (unlikely given BL nature). Given WBC 38.46 have reconsulted Heme/Onc and Pulm. Heme/Onc felt leukocytosis was secondary to steroids and reactive to sickle cell crisis, she is not a candidate at this time for exchange transfusion due to her lack of Hypoxia; suggested increasing IV dilaudid to 2mg q2hr PRN . Patient is on adequate pseudomonal coverage, ID reconsulted. As of June 2 pain crisis isolated to BL knees. June 3rd patient has temp of 101.2 w/ increasing proinflamary markers.    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   - 6/7: patient received exchange transfusion overnight, with improvement in Hgb (6.2 --> 9.7)   - No need for further exchange transfusions as per Hem/Onc, will d/c [place]ie   - Appreciate Hem/Onc recs   - C/w Levaquin 750 mg IV daily and Meropenem 1g IV daily, d/c Zyvox   - C/w Prednisone 50mg PO daily (as per Hem/Onc will be for two weeks since 5/31 w/ quick taper)   - C/w Folic Acid 1mg PO daily  - C/w Oxbryta as outpatient  - Pain control: Dilaudid 2mg IV q2h PRN (on senna/miralax)   - C/w 1/2-NS @100cc  - Daily hemolysis labs/LDH  - F/u BCx and Bronchial cultures     #LE Pain - likely secondary to Pain Crisis  - LE Duplex negative for DVT 6/3  - RLE XR showing subtle sclerosis in the proximal tibial metaphysis, possibly developing osteonecrosis  - Pain should improve w/ exchange transfusion  - C/w IVF, dilaudid and warm compresses     #Acute on Chronic Leukocytosis/Thrombocytosis - likely reactive, stable/improving, ICS @bedside    #Acute Intravascular Hemolysis  - had +anti-C, anti-E, anti-S (Rh) + Warm-abs  - 6/1 IgM EBG + CMV negative, Hep-Panel negative, Mycoplasma negative, 6/3 HIV negative  - monitor LDH daily with Retic-Count  - monitor Bilirubin daily  - recent hospitalization with Pseudomonas  - BCx negative 5/27-6/2  - D/c Zyvox  - C/w Levaquin 750mg IV daily  - C/w Meropenem 1g IV q8h  - f/u ID   - f/u Heme/Onc                                                                            ----------------------------------------------------  # DVT prophylaxis: Lovenox 40mg subQ daily  # GI prophylaxis: Protonix 40mg PO qAM  # Diet: Regular  # Activity: Increase as Tolerated  # Code status: Full  # Disposition: Remain in ICU                                                                           --------------------------------------------------------  # Handoff: f/u Heme/Onc Ms Gonzalez is a 26 RENEE w a PMH of Sickle Cell disease (on oxbryta) w a recent dc following treatment for PNA w sickle cell crisis presented to the ED May 26th for Rt sided chest, shoulder and back pain w mild cough and fever (has been febrile to 103F). Patient was upgraded to ICU on 5/28 for exchange transfusion now s/p 2 U PRBC. Patient was downgraded back to floors and now on Cefepime and levofloxacin w resolving pyrexia. Dr Hobbs performed bedside US to assess for fluid in her lungs on may 31 and stated there was nothing to tap, most likely atalectasis. Heme/Onc saw patient, assessing for warm hemolytic antibodies in blood, started patient on prednsione 50mg. Patient's pain medications were decreased last night. Patient reports crying from pain most of the night and is hyperalgesic today also reporting BL LE pain. Will obtain BL LE US to r/o dvt (unlikely given BL nature). Given WBC 38.46 have reconsulted Heme/Onc and Pulm. Heme/Onc felt leukocytosis was secondary to steroids and reactive to sickle cell crisis, she is not a candidate at this time for exchange transfusion due to her lack of Hypoxia; suggested increasing IV dilaudid to 2mg q2hr PRN . Patient is on adequate pseudomonal coverage, ID reconsulted. As of June 2 pain crisis isolated to BL knees. June 3rd patient has temp of 101.2 w/ increasing proinflamary markers.    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   - 6/7: patient received exchange transfusion overnight, with improvement in Hgb (6.2 --> 9.7)   - No need for further exchange transfusions as per Hem/Onc, will d/c Portia   - Appreciate Hem/Onc recs   - C/w Levaquin 750 mg IV daily and Meropenem 1g IV daily, d/c Zyvox   - C/w Prednisone 50mg PO daily (as per Hem/Onc will be for two weeks since 5/31 w/ quick taper)   - C/w Folic Acid 1mg PO daily  - C/w Oxbryta as outpatient  - Pain control: Dilaudid 2mg IV q2h PRN (on senna/miralax)   - C/w D5 1/2-NS @100cc  - Daily hemolysis labs/LDH  - F/u BCx and Bronchial cultures     #LE Pain - likely secondary to Pain Crisis  - LE Duplex negative for DVT 6/3  - RLE XR showing subtle sclerosis in the proximal tibial metaphysis, possibly developing osteonecrosis  - Pain should improve w/ exchange transfusion  - C/w IVF, dilaudid and warm compresses     #Acute on Chronic Leukocytosis/Thrombocytosis 2/2 possible superimposed bacterial infection vs reactive leukocytosis  - WBC stable/improving, ICS @bedside  - recent hospitalization with Pseudomonas  - BCx negative 5/27-6/2  - D/c Zyvox  - C/w Levaquin 750mg IV daily  - C/w Meropenem 1g IV q8h  - f/u ID     #Acute Intravascular Hemolysis  - had +anti-C, anti-E, anti-S (Rh) + Warm-abs  - 6/1 IgM EBG + CMV negative, Hep-Panel negative, Mycoplasma negative, 6/3 HIV negative  - monitor LDH daily with Retic-Count  - monitor Bilirubin daily- f/u Heme/Onc                                                                            ----------------------------------------------------  # DVT prophylaxis: Lovenox 40mg subQ daily  # GI prophylaxis: Protonix 40mg PO qAM  # Diet: Regular  # Activity: Increase as Tolerated  # Code status: Full  # Disposition: DG to floor                                                                            --------------------------------------------------------  # Handoff: F/u Hem/Onc recs, pain control, completion of antibiotics

## 2022-06-07 NOTE — CHART NOTE - NSCHARTNOTESELECT_GEN_ALL_CORE
Off Service Note
Downgrade/Transfer Note
Event Note
PACU ANESTHESIA PACU ADMISSION NOTE
Transfer Note

## 2022-06-07 NOTE — PROGRESS NOTE ADULT - ASSESSMENT
25yo  F with PMH of Sickle cell anemia (on oxbryta - followed by Hematologist - Dr. Jimenez in [place]), recently discharged after treatment of pneumonia and sickle cell crisis, presented to the ED with c/o right sided chest, shoulder and upper back pain.  She also reports fever at home 102F.  She was recently treated for sickle cell crisis and pneumonia and discharged last week on Levaquin PO.    Hematology consulted for r/o ACS with hx of sickle cell disease.     IMPRESSION:    #ACS/Vaso-occlusive pain crisis/PNA  #Hx of Hb SS disease   -Was on Oxbryta, just started in April 2022 but hasn't been routinely taking it due to her recent hospitalizations/infections; never was on hydrea or any other HbSS medications  -Initially offered both exchange as patient was in high 80s on RA and simple transfusion however she declined, eventually she agreed to a simple transfusion.    -Baseline Hb 7-8  -She has had 2 crises far this year.   -Hb electrophoresis from 5/21 showed 90% HbS  -Viral panel noted: past EBV infection, hep panel non reactive    #Possible autoimmune hemolytic anemia   Direct Lori Profile (05.31.22 @ 08:00)    Dir Antiglob Polyspecific Interpretation: POS  - Spoke with Dr Burroughs from transfusion med, it is hard to call if pt has alloAb or autoab given chronic hemolysis    #Chronic leukocytosis and thrombocytosis: Resolving  - likely reactive     #Acute right knee pain -improved  - likely 2/2 sickle cell crisis. No evidence of effusion or AVN on Xray    #Right shin protuberance with tenderness to palpation  - Xray: Subtle sclerosis in the proximal tibial metaphysis, possibly developing osteonecrosis.    RECOMMENDATIONS:    - S/P exchange transfusion on 6/6/22 without any complications. Repeat Hb 9.7 with dec in retic count and improvement in symptoms.   - F/u results from bronchoscopy: cultures negative so far  - C/w Prednisone 50mg daily with GI ppx; started on 5/31: will likely do 2 weeks of 1mg/kg dose followed by quick taper. Start taper on 6/15.  - C/w IV hydration: 0.45% NS @100cc /Hr  - C/w pain meds: IV Dilaudid 2mg q2hr prn. Can de-escalate once pain is controlled   - Warm compresses prn.   - IV Abx per ID  - C/w folic acid supplementation  - She can continue Oxbryta as outpatient and follow up with her primary hematologist post discharge.  - Monitor hemolysis labs and CBC. Check daily LDH    DVT ppx: Lovenox        25yo  F with PMH of Sickle cell anemia (on oxbryta - followed by Hematologist - Dr. Jimenez in [place]), recently discharged after treatment of pneumonia and sickle cell crisis, presented to the ED with c/o right sided chest, shoulder and upper back pain.  She also reports fever at home 102F.  She was recently treated for sickle cell crisis and pneumonia and discharged last week on Levaquin PO.    Hematology consulted for r/o ACS with hx of sickle cell disease.     IMPRESSION:    #ACS/Vaso-occlusive pain crisis/PNA  #Hx of Hb SS disease   -Was on Oxbryta, just started in April 2022 but hasn't been routinely taking it due to her recent hospitalizations/infections; never was on hydrea or any other HbSS medications  -Initially offered both exchange as patient was in high 80s on RA and simple transfusion however she declined, eventually she agreed to a simple transfusion.    -Baseline Hb 7-8  -She has had 2 crises far this year.   -Hb electrophoresis from 5/21 showed 90% HbS  -Viral panel noted: past EBV infection, hep panel non reactive    #Possible autoimmune hemolytic anemia   Direct Lori Profile (05.31.22 @ 08:00)    Dir Antiglob Polyspecific Interpretation: POS  - Spoke with Dr Burroughs from transfusion med, it is hard to call if pt has alloAb or autoab given chronic hemolysis    #Chronic leukocytosis and thrombocytosis: Resolving  - likely reactive     #Acute right knee pain -improved  - likely 2/2 sickle cell crisis. No evidence of effusion or AVN on Xray    #Right shin protuberance with tenderness to palpation  - Xray: Subtle sclerosis in the proximal tibial metaphysis, possibly developing osteonecrosis.    RECOMMENDATIONS:    - S/P exchange transfusion on 6/6/22 without any complications. Repeat Hb 9.7 with dec in retic count and improvement in symptoms.   - F/u results from bronchoscopy: cultures negative so far  - Pt was started on Prednisone 50mg daily on 5/31. Decrease to 30mg qdaily and then taper by 10mg every week and then stop.   - C/w IV hydration: 0.45% NS @100cc /Hr  - C/w pain meds: IV Dilaudid 2mg q2hr prn. Can de-escalate once pain is controlled   - Warm compresses prn.   - IV Abx per ID  - C/w folic acid supplementation  - She can continue Oxbryta as outpatient and follow up with her primary hematologist post discharge.  - Monitor hemolysis labs and CBC. Check daily LDH    Needs close follow up as O/P for CBC check given she is on taper of Prednisone.     DVT ppx: Lovenox

## 2022-06-07 NOTE — PROGRESS NOTE ADULT - SUBJECTIVE AND OBJECTIVE BOX
Patient is a 26y old  Female who presents with a chief complaint of Acute chest syndrome (07 Jun 2022 10:00)      Subjective: Pt feels well today after exchange.       Vital Signs Last 24 Hrs  T(C): 37.3 (07 Jun 2022 08:00), Max: 37.3 (07 Jun 2022 08:00)  T(F): 99.2 (07 Jun 2022 08:00), Max: 99.2 (07 Jun 2022 08:00)  HR: 92 (07 Jun 2022 09:00) (72 - 108)  BP: 133/72 (07 Jun 2022 09:00) (114/65 - 135/64)  BP(mean): 90 (07 Jun 2022 09:00) (75 - 92)  RR: 22 (07 Jun 2022 09:00) (14 - 30)  SpO2: 100% (07 Jun 2022 09:00) (97% - 100%)    PHYSICAL EXAM  General: adult in NAD  HEENT:  anicteric sclera, pink conjunctiva  Neck: supple  CV: normal S1/S2  Lungs: positive air movement b/l ant lungs   Abdomen: soft non-tender non-distended  Ext: no clubbing cyanosis or edema; TTP right shin  Skin: no rashes and no petechiae  Neuro: alert and oriented X 4, no focal deficits    MEDICATIONS  (STANDING):  chlorhexidine 4% Liquid 1 Application(s) Topical daily  dextrose 5% + sodium chloride 0.45%. 1000 milliLiter(s) (100 mL/Hr) IV Continuous <Continuous>  enoxaparin Injectable 40 milliGRAM(s) SubCutaneous every 24 hours  folic acid 1 milliGRAM(s) Oral daily  heparin  Lock Flush 100 Units/mL Injectable 300 Unit(s) IV Push daily  influenza   Vaccine 0.5 milliLiter(s) IntraMuscular once  levoFLOXacin IVPB 750 milliGRAM(s) IV Intermittent every 24 hours  lidocaine   4% Patch 1 Patch Transdermal daily  losartan 25 milliGRAM(s) Oral daily  meropenem  IVPB      meropenem  IVPB 1000 milliGRAM(s) IV Intermittent every 8 hours  pantoprazole    Tablet 40 milliGRAM(s) Oral before breakfast  polyethylene glycol 3350 17 Gram(s) Oral daily  predniSONE   Tablet 50 milliGRAM(s) Oral daily  senna 2 Tablet(s) Oral at bedtime    MEDICATIONS  (PRN):  acetaminophen     Tablet .. 650 milliGRAM(s) Oral every 6 hours PRN Temp greater or equal to 38C (100.4F), Mild Pain (1 - 3)  HYDROmorphone  Injectable 2 milliGRAM(s) IV Push every 2 hours PRN Severe Pain (7 - 10)  melatonin 3 milliGRAM(s) Oral at bedtime PRN Insomnia  ondansetron Injectable 4 milliGRAM(s) IV Push every 8 hours PRN Nausea and/or Vomiting      LABS:                          9.7    20.40 )-----------( 297      ( 07 Jun 2022 04:53 )             27.1         Mean Cell Volume : 85.2 fL  Mean Cell Hemoglobin : 30.5 pg  Mean Cell Hemoglobin Concentration : 35.8 g/dL  Auto Neutrophil # : 11.49 K/uL  Auto Lymphocyte # : 6.20 K/uL  Auto Monocyte # : 2.29 K/uL  Auto Eosinophil # : 0.06 K/uL  Auto Basophil # : 0.05 K/uL  Auto Neutrophil % : 56.4 %  Auto Lymphocyte % : 30.4 %  Auto Monocyte % : 11.2 %  Auto Eosinophil % : 0.3 %  Auto Basophil % : 0.2 %      Serial CBC's  06-07 @ 04:53  Hct-27.1 / Hgb-9.7 / Plat-297 / RBC-3.18 / WBC-20.40  Serial CBC's  06-06 @ 16:52  Hct-21.6 / Hgb-7.3 / Plat-510 / RBC-2.47 / WBC-17.13  Serial CBC's  06-06 @ 04:40  Hct-18.8 / Hgb-6.2 / Plat-434 / RBC-2.14 / WBC-21.97  Serial CBC's  06-05 @ 04:49  Hct-20.2 / Hgb-6.9 / Plat-370 / RBC-2.26 / WBC-27.66  Serial CBC's  06-04 @ 11:02  Hct-22.3 / Hgb-7.5 / Plat-391 / RBC-2.42 / WBC-33.27      06-07    141  |  101  |  5<L>  ----------------------------<  118<H>  3.5   |  27  |  <0.5<L>    Ca    9.2      07 Jun 2022 04:53  Mg     1.7     06-07    TPro  5.8<L>  /  Alb  2.7<L>  /  TBili  2.8<H>  /  DBili  x   /  AST  92<H>  /  ALT  92<H>  /  AlkPhos  237<H>  06-07      PT/INR - ( 06 Jun 2022 12:20 )   PT: 14.40 sec;   INR: 1.25 ratio         PTT - ( 06 Jun 2022 12:20 )  PTT:28.0 sec    Reticulocyte Percent: 8.4 % (06-07 @ 04:53)  Ferritin, Serum: 2669 ng/mL (06-04 @ 04:36)  Ferritin, Serum: 2493 ng/mL (06-03 @ 18:55)  Reticulocyte Percent: 15.3 % (06-03 @ 06:22)  Reticulocyte Percent: 13.6 % (06-01 @ 06:11)      RADIOLOGY & ADDITIONAL STUDIES:    < from: Xray Knee, tibia, fibula 1 or 2 Views, Right (06.06.22 @ 14:45) >    IMPRESSION:    Subtle sclerosis in the proximal tibial metaphysis, possibly developing   osteonecrosis.    --- End of Report ---    < end of copied text >

## 2022-06-07 NOTE — PROGRESS NOTE ADULT - ASSESSMENT
IMPRESSION    Sickle cell crisis SP Exchange transfusion   Possible superimposed bacterial infection treated   Improving WBC and fever trend       Plan     CNS: pain control as needed     HEENT: oral care    PULMONARY: Encourage Incentive spirometry.     CARDIOVASCULAR: C/w D5 1/2 NS at 100 cc/hr    GI: GI prophylaxis.  Feeding.      RENAL: f/u lytes and replete    INFECTIOUS DISEASE: DC Zyvox.  Finish ABX course.        HEMATOLOGICAL:  hem f/u. Pain control. FU Hem onc.      ENDOCRINE:  Follow up FS.     MUSCULOSKELETAL: OOB.  PT OT     Downgrade to floor     DC [place] if no more exchange

## 2022-06-07 NOTE — CHART NOTE - NSCHARTNOTEFT_GEN_A_CORE
MICU Transfer Note    Transfer from: MICU  Transfer to:  ( x ) Medicine    (  ) Telemetry    (  ) RCU    (  ) Palliative    (  ) Stroke Unit    (  ) _______________  Accepting physican:      MICU COURSE:  25yo  F with PMH of Sickle cell anemia (on oxbryta - followed by Hematologist - Dr. Jimenez in [place]), recently discharged after treatment of pneumonia and sickle cell crisis, presented to the ED  on 5/26 with c/o right sided chest, shoulder and upper back pain. States that she overexerted herself. She states that she has not been feeling well since yesterday, had mild cough and fever up to 102F at home. Denies any shortness of breath, abdominal pain, nausea, vomiting, diarrhea, leg pain, swelling.     ED vitals:   T(F): 102.6 (05-26 @ 23:54), Max: 102.6 (05-26 @ 23:54)  HR: 110 (05-26 @ 23:54) (109 - 117)  BP: 117/55 (05-26 @ 23:54) (110/62 - 117/55)  RR: 18 (05-26 @ 23:54) (18 - 19)  SpO2: 95% (05-26 @ 23:54) (95% - 97%)    ED workup: Hb 7.8, WBC 23.58, Plt 650, Retics 14.9%. Mg 1.5.  She was given LR bolus, Magnesium and morphine in ED. Patient being admitted for management of sickle cell crisis, r/o pneumonia/Acute chest syndrome.     Patient was upgraded to ICU on 5/28 for exchange transfusion now s/p 2 U PRBC. Patient was downgraded back to floors and now on Cefepime and levofloxacin w resolving pyrexia. Dr Hobbs performed bedside US to assess for fluid in her lungs on may 31 and stated there was nothing to tap, most likely atalectasis. Heme/Onc saw patient, assessing for warm hemolytic antibodies in blood, started patient on prednsione 50mg. Patient's pain medications were decreased last night. Patient reports crying from pain most of the night and is hyperalgesic today also reporting BL LE pain. Will obtain BL LE US to r/o dvt (unlikely given BL nature). Given WBC 38.46 have reconsulted Heme/Onc and Pulm. Heme/Onc felt leukocytosis was secondary to steroids and reactive to sickle cell crisis, she is not a candidate at this time for exchange transfusion due to her lack of Hypoxia; suggested increasing IV dilaudid to 2mg q2hr PRN . Patient is on adequate pseudomonal coverage, ID reconsulted. As of June 2 pain crisis isolated to BL knees. June 3rd patient has temp of 101.2 w/ increasing proinflamary markers.        ASSESSMENT & PLAN:       #Sickle Cell Vaso-Occlusive Pain Crisis w/ Multilobar Pneumonia  #Acute Chest Syndrome  #Hb-SS Disease - Hb electrophoresis from 5/21 showed 90% Hb-S  - Initially SaO2 80s on RA, now requiring 1L-NC ~100%, wean off  - S/p Bronch/BAL 6/4, cultures negative to date  - CXR on 6/5 showing b/l opacities, improving   - s/p 2U PRBCs, baseline Hb ~upper 6/low 7s?   - 6/7: patient received exchange transfusion overnight, with improvement in Hgb (6.2 --> 9.7)   - No need for further exchange transfusions as per Hem/Onc, will d/c [place]hman   - Appreciate Hem/Onc recs   - C/w Levaquin 750 mg IV daily and Meropenem 1g IV daily, d/c Zyvox   - C/w Prednisone 50mg PO daily (as per Hem/Onc will be for two weeks since 5/31 w/ quick taper)   - C/w Folic Acid 1mg PO daily  - C/w Oxbryta as outpatient  - Pain control: Dilaudid 2mg IV q2h PRN (on senna/miralax)   - C/w D5 1/2-NS @100cc  - Daily hemolysis labs/LDH  - F/u BCx and Bronchial cultures     #LE Pain - likely secondary to Pain Crisis  - LE Duplex negative for DVT 6/3  - RLE XR showing subtle sclerosis in the proximal tibial metaphysis, possibly developing osteonecrosis  - Pain should improve w/ exchange transfusion  - C/w IVF, dilaudid and warm compresses     #Acute on Chronic Leukocytosis/Thrombocytosis 2/2 possible superimposed bacterial infection vs reactive leukocytosis  - WBC stable/improving, ICS @bedside  - recent hospitalization with Pseudomonas  - BCx negative 5/27-6/2  - D/c Zyvox  - C/w Levaquin 750mg IV daily  - C/w Meropenem 1g IV q8h  - f/u ID     #Acute Intravascular Hemolysis  - had +anti-C, anti-E, anti-S (Rh) + Warm-abs  - 6/1 IgM EBG + CMV negative, Hep-Panel negative, Mycoplasma negative, 6/3 HIV negative  - monitor LDH daily with Retic-Count  - monitor Bilirubin daily- f/u Heme/Onc                                                                            ----------------------------------------------------  # DVT prophylaxis: Lovenox 40mg subQ daily  # GI prophylaxis: Protonix 40mg PO qAM  # Diet: Regular  # Activity: Increase as Tolerated  # Code status: Full  # Disposition: DG to floor                                                                            --------------------------------------------------------  # Handoff: F/u Hem/Onc recs, pain control, completion of antibiotics           For Follow-Up:          Vital Signs Last 24 Hrs  T(C): 37.3 (07 Jun 2022 08:00), Max: 37.3 (07 Jun 2022 08:00)  T(F): 99.2 (07 Jun 2022 08:00), Max: 99.2 (07 Jun 2022 08:00)  HR: 92 (07 Jun 2022 09:00) (76 - 108)  BP: 133/72 (07 Jun 2022 09:00) (114/65 - 135/64)  BP(mean): 90 (07 Jun 2022 09:00) (78 - 92)  RR: 22 (07 Jun 2022 09:00) (14 - 30)  SpO2: 100% (07 Jun 2022 09:00) (97% - 100%)  I&O's Summary    06 Jun 2022 07:01  -  07 Jun 2022 07:00  --------------------------------------------------------  IN: 2250 mL / OUT: 1450 mL / NET: 800 mL    07 Jun 2022 07:01  -  07 Jun 2022 13:44  --------------------------------------------------------  IN: 1330 mL / OUT: 1100 mL / NET: 230 mL          MEDICATIONS  (STANDING):  chlorhexidine 4% Liquid 1 Application(s) Topical daily  dextrose 5% + sodium chloride 0.45%. 1000 milliLiter(s) (100 mL/Hr) IV Continuous <Continuous>  enoxaparin Injectable 40 milliGRAM(s) SubCutaneous every 24 hours  folic acid 1 milliGRAM(s) Oral daily  heparin  Lock Flush 100 Units/mL Injectable 300 Unit(s) IV Push daily  influenza   Vaccine 0.5 milliLiter(s) IntraMuscular once  levoFLOXacin IVPB 750 milliGRAM(s) IV Intermittent every 24 hours  lidocaine   4% Patch 1 Patch Transdermal daily  losartan 25 milliGRAM(s) Oral daily  meropenem  IVPB      meropenem  IVPB 1000 milliGRAM(s) IV Intermittent every 8 hours  pantoprazole    Tablet 40 milliGRAM(s) Oral before breakfast  polyethylene glycol 3350 17 Gram(s) Oral daily  predniSONE   Tablet 50 milliGRAM(s) Oral daily  senna 2 Tablet(s) Oral at bedtime    MEDICATIONS  (PRN):  acetaminophen     Tablet .. 650 milliGRAM(s) Oral every 6 hours PRN Temp greater or equal to 38C (100.4F), Mild Pain (1 - 3)  HYDROmorphone  Injectable 2 milliGRAM(s) IV Push every 2 hours PRN Severe Pain (7 - 10)  melatonin 3 milliGRAM(s) Oral at bedtime PRN Insomnia  ondansetron Injectable 4 milliGRAM(s) IV Push every 8 hours PRN Nausea and/or Vomiting        LABS                                            9.7                   Neurophils% (auto):   56.4   (06-07 @ 04:53):    20.40)-----------(297          Lymphocytes% (auto):  30.4                                          27.1                   Eosinphils% (auto):   0.3      Manual%: Neutrophils x    ; Lymphocytes x    ; Eosinophils x    ; Bands%: x    ; Blasts x                                    141    |  101    |  5                   Calcium: 9.2   / iCa: x      (06-07 @ 04:53)    ----------------------------<  118       Magnesium: 1.7                              3.5     |  27     |  <0.5             Phosphorous: x        TPro  5.8    /  Alb  2.7    /  TBili  2.8    /  DBili  x      /  AST  92     /  ALT  92     /  AlkPhos  237    07 Jun 2022 04:53 MICU Transfer Note    Transfer from: MICU  Transfer to:  ( x ) Medicine    (  ) Telemetry    (  ) RCU    (  ) Palliative    (  ) Stroke Unit    (  ) _______________  Accepting physican:      MICU COURSE:  25yo  F with PMH of Sickle cell anemia (on oxbryta - followed by Hematologist - Dr. Jimenez in [place]), recently discharged after treatment of pneumonia and sickle cell crisis, presented to the ED  on 5/26 with c/o right sided chest, shoulder and upper back pain. States that she overexerted herself. She states that she has not been feeling well since yesterday, had mild cough and fever up to 102F at home. Denies any shortness of breath, abdominal pain, nausea, vomiting, diarrhea, leg pain, swelling.     ED vitals:   T(F): 102.6 (05-26 @ 23:54), Max: 102.6 (05-26 @ 23:54)  HR: 110 (05-26 @ 23:54) (109 - 117)  BP: 117/55 (05-26 @ 23:54) (110/62 - 117/55)  RR: 18 (05-26 @ 23:54) (18 - 19)  SpO2: 95% (05-26 @ 23:54) (95% - 97%)    ED workup: Hb 7.8, WBC 23.58, Plt 650, Retics 14.9%. Mg 1.5.  She was given LR bolus, Magnesium and morphine in ED. Patient being admitted for management of sickle cell crisis, r/o pneumonia/Acute chest syndrome.     Patient was upgraded to ICU on 5/28 for exchange transfusion, received 2 U PRBC. Patient was downgraded back to floors on Cefepime and levofloxacin w resolving pyrexia. Dr Hobbs performed bedside US to assess for fluid in her lungs on may 31 and stated there was nothing to tap, most likely atalectasis. Heme/Onc saw patient, assessing for warm hemolytic antibodies in blood, started patient on prednsione 50mg. Patient noted b/l LE pain; Duplex was negative but RLE X-ray showed osteonecrosis. With increasing WBC up to 38.46, and T up to 101.2 (with increasing pro-inflammatory markers), increasing pain, patient was upgraded back to MICU on 6/3 and started on meropenem and Zyvox. Patient received Bronchoscopy/BAL on 6/4 (cultures negative to date). Patient was agreeable to exchange transfusion, which patient received on 6/7, with improvement of Hgb (6.2 --> 9.7) and some improvement in pain. WBC downtrended, meropenem and zyvox were discontinued. Patient stable for downgrade back to floor.     ASSESSMENT & PLAN:   25yo  F with PMH of Sickle cell anemia (on oxbryta - followed by Hematologist - Dr. Jimenez in [place]), recently discharged after treatment of pneumonia and sickle cell crisis, presented to the ED  on 5/26 with c/o right sided chest, shoulder and upper back pain, admitted with Acute Chest Syndrome    #Sickle Cell Vaso-Occlusive Pain Crisis w/ Multilobar Pneumonia  #Acute Chest Syndrome  #Hb-SS Disease - Hb electrophoresis from 5/21 showed 90% Hb-S  - Initially SaO2 80s on RA, now requiring 1L-NC ~100%, wean off  - S/p Bronch/BAL 6/4, cultures negative to date  - CXR on 6/5 showing b/l opacities, improving   - s/p 2U PRBCs, baseline Hb ~upper 6/low 7s?   - 6/7: patient received exchange transfusion overnight, with improvement in Hgb (6.2 --> 9.7)   - No need for further exchange transfusions as per Hem/Onc, will d/c Tony   - Appreciate Hem/Onc recs   - C/w Levaquin 750 mg IV daily and Meropenem 1g IV daily, d/c Zyvox   - C/w Prednisone 50mg PO daily (as per Hem/Onc will be for two weeks since 5/31 w/ quick taper)   - C/w Folic Acid 1mg PO daily  - C/w Oxbryta as outpatient  - Pain control: Dilaudid 2mg IV q2h PRN (on senna/miralax)   - C/w D5 1/2-NS @100cc  - Daily hemolysis labs/LDH  - F/u BCx and Bronchial cultures     #LE Pain - likely secondary to Pain Crisis  - LE Duplex negative for DVT 6/3  - RLE XR showing subtle sclerosis in the proximal tibial metaphysis, possibly developing osteonecrosis  - Pain should improve w/ exchange transfusion  - C/w IVF, dilaudid and warm compresses     #Acute on Chronic Leukocytosis/Thrombocytosis 2/2 possible superimposed bacterial infection vs reactive leukocytosis  - WBC stable/improving, ICS @bedside  - recent hospitalization with Pseudomonas  - BCx negative 5/27-6/2  - D/c Zyvox  - C/w Levaquin 750mg IV daily  - C/w Meropenem 1g IV q8h  - f/u ID     #Acute Intravascular Hemolysis  - had +anti-C, anti-E, anti-S (Rh) + Warm-abs  - 6/1 IgM EBG + CMV negative, Hep-Panel negative, Mycoplasma negative, 6/3 HIV negative  - monitor LDH daily with Retic-Count  - monitor Bilirubin daily- f/u Heme/Onc                                                                            ----------------------------------------------------  # DVT prophylaxis: Lovenox 40mg subQ daily  # GI prophylaxis: Protonix 40mg PO qAM  # Diet: Regular  # Activity: Increase as Tolerated  # Code status: Full  # Disposition: DG to floor                                                                            --------------------------------------------------------  # Handoff: F/u Hem/Onc recs, pain control, completion of antibiotics             Vital Signs Last 24 Hrs  T(C): 37.3 (07 Jun 2022 08:00), Max: 37.3 (07 Jun 2022 08:00)  T(F): 99.2 (07 Jun 2022 08:00), Max: 99.2 (07 Jun 2022 08:00)  HR: 92 (07 Jun 2022 09:00) (76 - 108)  BP: 133/72 (07 Jun 2022 09:00) (114/65 - 135/64)  BP(mean): 90 (07 Jun 2022 09:00) (78 - 92)  RR: 22 (07 Jun 2022 09:00) (14 - 30)  SpO2: 100% (07 Jun 2022 09:00) (97% - 100%)  I&O's Summary    06 Jun 2022 07:01  -  07 Jun 2022 07:00  --------------------------------------------------------  IN: 2250 mL / OUT: 1450 mL / NET: 800 mL    07 Jun 2022 07:01  -  07 Jun 2022 13:44  --------------------------------------------------------  IN: 1330 mL / OUT: 1100 mL / NET: 230 mL          MEDICATIONS  (STANDING):  chlorhexidine 4% Liquid 1 Application(s) Topical daily  dextrose 5% + sodium chloride 0.45%. 1000 milliLiter(s) (100 mL/Hr) IV Continuous <Continuous>  enoxaparin Injectable 40 milliGRAM(s) SubCutaneous every 24 hours  folic acid 1 milliGRAM(s) Oral daily  heparin  Lock Flush 100 Units/mL Injectable 300 Unit(s) IV Push daily  influenza   Vaccine 0.5 milliLiter(s) IntraMuscular once  levoFLOXacin IVPB 750 milliGRAM(s) IV Intermittent every 24 hours  lidocaine   4% Patch 1 Patch Transdermal daily  losartan 25 milliGRAM(s) Oral daily  meropenem  IVPB      meropenem  IVPB 1000 milliGRAM(s) IV Intermittent every 8 hours  pantoprazole    Tablet 40 milliGRAM(s) Oral before breakfast  polyethylene glycol 3350 17 Gram(s) Oral daily  predniSONE   Tablet 50 milliGRAM(s) Oral daily  senna 2 Tablet(s) Oral at bedtime    MEDICATIONS  (PRN):  acetaminophen     Tablet .. 650 milliGRAM(s) Oral every 6 hours PRN Temp greater or equal to 38C (100.4F), Mild Pain (1 - 3)  HYDROmorphone  Injectable 2 milliGRAM(s) IV Push every 2 hours PRN Severe Pain (7 - 10)  melatonin 3 milliGRAM(s) Oral at bedtime PRN Insomnia  ondansetron Injectable 4 milliGRAM(s) IV Push every 8 hours PRN Nausea and/or Vomiting        LABS                                            9.7                   Neurophils% (auto):   56.4   (06-07 @ 04:53):    20.40)-----------(297          Lymphocytes% (auto):  30.4                                          27.1                   Eosinphils% (auto):   0.3      Manual%: Neutrophils x    ; Lymphocytes x    ; Eosinophils x    ; Bands%: x    ; Blasts x                                    141    |  101    |  5                   Calcium: 9.2   / iCa: x      (06-07 @ 04:53)    ----------------------------<  118       Magnesium: 1.7                              3.5     |  27     |  <0.5             Phosphorous: x        TPro  5.8    /  Alb  2.7    /  TBili  2.8    /  DBili  x      /  AST  92     /  ALT  92     /  AlkPhos  237    07 Jun 2022 04:53 MICU Transfer Note    Transfer from: MICU  Transfer to:  ( x ) Medicine    (  ) Telemetry    (  ) RCU    (  ) Palliative    (  ) Stroke Unit    (  ) _______________  Signed out to receiving 3B resident Dr. Mcconnell      MICU COURSE:  25yo  F with PMH of Sickle cell anemia (on oxbryta - followed by Hematologist - Dr. Jimenez in [place]), recently discharged after treatment of pneumonia and sickle cell crisis, presented to the ED  on 5/26 with c/o right sided chest, shoulder and upper back pain. States that she overexerted herself. She states that she has not been feeling well since yesterday, had mild cough and fever up to 102F at home. Denies any shortness of breath, abdominal pain, nausea, vomiting, diarrhea, leg pain, swelling.     ED vitals:   T(F): 102.6 (05-26 @ 23:54), Max: 102.6 (05-26 @ 23:54)  HR: 110 (05-26 @ 23:54) (109 - 117)  BP: 117/55 (05-26 @ 23:54) (110/62 - 117/55)  RR: 18 (05-26 @ 23:54) (18 - 19)  SpO2: 95% (05-26 @ 23:54) (95% - 97%)    ED workup: Hb 7.8, WBC 23.58, Plt 650, Retics 14.9%. Mg 1.5.  She was given LR bolus, Magnesium and morphine in ED. Patient being admitted for management of sickle cell crisis, r/o pneumonia/Acute chest syndrome.     Patient was upgraded to ICU on 5/28 for exchange transfusion, received 2 U PRBC. Patient was downgraded back to floors on Cefepime and levofloxacin w resolving pyrexia. Dr Hobbs performed bedside US to assess for fluid in her lungs on may 31 and stated there was nothing to tap, most likely atalectasis. Heme/Onc saw patient, assessing for warm hemolytic antibodies in blood, started patient on prednsione 50mg. Patient noted b/l LE pain; Duplex was negative but RLE X-ray showed osteonecrosis. With increasing WBC up to 38.46, and T up to 101.2 (with increasing pro-inflammatory markers), increasing pain, patient was upgraded back to MICU on 6/3 and started on meropenem and Zyvox. Patient received Bronchoscopy/BAL on 6/4 (cultures negative to date). Patient was agreeable to exchange transfusion, which patient received on 6/7, with improvement of Hgb (6.2 --> 9.7) and some improvement in pain. WBC downtrended, meropenem and zyvox were discontinued. Patient stable for downgrade back to floor.     ASSESSMENT & PLAN:   25yo  F with PMH of Sickle cell anemia (on oxbryta - followed by Hematologist - Dr. Jimenez in [place]), recently discharged after treatment of pneumonia and sickle cell crisis, presented to the ED  on 5/26 with c/o right sided chest, shoulder and upper back pain, admitted with Acute Chest Syndrome    #Sickle Cell Vaso-Occlusive Pain Crisis w/ Multilobar Pneumonia  #Acute Chest Syndrome  #Hb-SS Disease - Hb electrophoresis from 5/21 showed 90% Hb-S  - Initially SaO2 80s on RA, now requiring 1L-NC ~100%, wean off  - S/p Bronch/BAL 6/4, cultures negative to date  - CXR on 6/5 showing b/l opacities, improving   - s/p 2U PRBCs, baseline Hb ~upper 6/low 7s?   - 6/7: patient received exchange transfusion overnight, with improvement in Hgb (6.2 --> 9.7)   - No need for further exchange transfusions as per Hem/Onc, will d/c Tony   - Appreciate Hem/Onc recs   - C/w Levaquin 750 mg IV daily and Meropenem 1g IV daily, d/c Zyvox   - C/w Prednisone 50mg PO daily (as per Hem/Onc will be for two weeks since 5/31 w/ quick taper)   - C/w Folic Acid 1mg PO daily  - C/w Oxbryta as outpatient  - Pain control: Dilaudid 2mg IV q2h PRN (on senna/miralax)   - C/w D5 1/2-NS @100cc  - Daily hemolysis labs/LDH  - F/u BCx and Bronchial cultures     #LE Pain - likely secondary to Pain Crisis  - LE Duplex negative for DVT 6/3  - RLE XR showing subtle sclerosis in the proximal tibial metaphysis, possibly developing osteonecrosis  - Pain should improve w/ exchange transfusion  - C/w IVF, dilaudid and warm compresses     #Acute on Chronic Leukocytosis/Thrombocytosis 2/2 possible superimposed bacterial infection vs reactive leukocytosis  - WBC stable/improving, ICS @bedside  - recent hospitalization with Pseudomonas  - BCx negative 5/27-6/2  - D/c Zyvox  - C/w Levaquin 750mg IV daily  - C/w Meropenem 1g IV q8h  - f/u ID     #Acute Intravascular Hemolysis  - had +anti-C, anti-E, anti-S (Rh) + Warm-abs  - 6/1 IgM EBG + CMV negative, Hep-Panel negative, Mycoplasma negative, 6/3 HIV negative  - monitor LDH daily with Retic-Count  - monitor Bilirubin daily- f/u Heme/Onc                                                                            ----------------------------------------------------  # DVT prophylaxis: Lovenox 40mg subQ daily  # GI prophylaxis: Protonix 40mg PO qAM  # Diet: Regular  # Activity: Increase as Tolerated  # Code status: Full  # Disposition: DG to floor                                                                            --------------------------------------------------------  # Handoff: F/u Hem/Onc recs, pain control, completion of antibiotics             Vital Signs Last 24 Hrs  T(C): 37.3 (07 Jun 2022 08:00), Max: 37.3 (07 Jun 2022 08:00)  T(F): 99.2 (07 Jun 2022 08:00), Max: 99.2 (07 Jun 2022 08:00)  HR: 92 (07 Jun 2022 09:00) (76 - 108)  BP: 133/72 (07 Jun 2022 09:00) (114/65 - 135/64)  BP(mean): 90 (07 Jun 2022 09:00) (78 - 92)  RR: 22 (07 Jun 2022 09:00) (14 - 30)  SpO2: 100% (07 Jun 2022 09:00) (97% - 100%)  I&O's Summary    06 Jun 2022 07:01  -  07 Jun 2022 07:00  --------------------------------------------------------  IN: 2250 mL / OUT: 1450 mL / NET: 800 mL    07 Jun 2022 07:01  -  07 Jun 2022 13:44  --------------------------------------------------------  IN: 1330 mL / OUT: 1100 mL / NET: 230 mL          MEDICATIONS  (STANDING):  chlorhexidine 4% Liquid 1 Application(s) Topical daily  dextrose 5% + sodium chloride 0.45%. 1000 milliLiter(s) (100 mL/Hr) IV Continuous <Continuous>  enoxaparin Injectable 40 milliGRAM(s) SubCutaneous every 24 hours  folic acid 1 milliGRAM(s) Oral daily  heparin  Lock Flush 100 Units/mL Injectable 300 Unit(s) IV Push daily  influenza   Vaccine 0.5 milliLiter(s) IntraMuscular once  levoFLOXacin IVPB 750 milliGRAM(s) IV Intermittent every 24 hours  lidocaine   4% Patch 1 Patch Transdermal daily  losartan 25 milliGRAM(s) Oral daily  meropenem  IVPB      meropenem  IVPB 1000 milliGRAM(s) IV Intermittent every 8 hours  pantoprazole    Tablet 40 milliGRAM(s) Oral before breakfast  polyethylene glycol 3350 17 Gram(s) Oral daily  predniSONE   Tablet 50 milliGRAM(s) Oral daily  senna 2 Tablet(s) Oral at bedtime    MEDICATIONS  (PRN):  acetaminophen     Tablet .. 650 milliGRAM(s) Oral every 6 hours PRN Temp greater or equal to 38C (100.4F), Mild Pain (1 - 3)  HYDROmorphone  Injectable 2 milliGRAM(s) IV Push every 2 hours PRN Severe Pain (7 - 10)  melatonin 3 milliGRAM(s) Oral at bedtime PRN Insomnia  ondansetron Injectable 4 milliGRAM(s) IV Push every 8 hours PRN Nausea and/or Vomiting        LABS                                            9.7                   Neurophils% (auto):   56.4   (06-07 @ 04:53):    20.40)-----------(297          Lymphocytes% (auto):  30.4                                          27.1                   Eosinphils% (auto):   0.3      Manual%: Neutrophils x    ; Lymphocytes x    ; Eosinophils x    ; Bands%: x    ; Blasts x                                    141    |  101    |  5                   Calcium: 9.2   / iCa: x      (06-07 @ 04:53)    ----------------------------<  118       Magnesium: 1.7                              3.5     |  27     |  <0.5             Phosphorous: x        TPro  5.8    /  Alb  2.7    /  TBili  2.8    /  DBili  x      /  AST  92     /  ALT  92     /  AlkPhos  237    07 Jun 2022 04:53

## 2022-06-07 NOTE — PROGRESS NOTE ADULT - ASSESSMENT
Please call or message on [employer] Teams if with any questions.  Spectra [phone]    · Assessment	  27yo  F with PMH of Sickle cell anemia (on oxbryta - followed by Hematologist - Dr. Jimenez in [place]), recently discharged after treatment of pneumonia and sickle cell crisis, presented to the ED with c/o right sided chest, shoulder and upper back pain. States that she overexerted herself. She states that she has not been feeling well since yesterday, had mild cough and fever upto 102F at home. Denies any shortness of breath, abdominal pain, nausea, vomiting, diarrhea, leg pain, swelling.     IMPRESSION;   # sepsis secondary to multilobar bacterial PNA RLL/LLL  - CXR increased opacity  RLL  - 5/20 CT bibasilar consolidation  - 5/20 Legionella NG  - 5/28 Nares ORSA NG  - CT Chest 6/2 - redemonstation of bibasilar opacities, decreased left lower lobe opacities, increased right lower lobe opactiesi     #Sickle cell vasoocclusive crisis  - 5/26 COVID-19 NG   - refused exchange transfusion   - Ferritin, Serum: 2493: Test Repeated ng/mL (06.03.22 @ 18:55)  - Lactate Dehydrogenase, Serum: 1244 (06.03.22 @ 06:22)  - Lower extremity dopplers negative 6/3    RECOMMENDATIONS;  - s/p bronch 6/4 - no significant purulent secretions noted   - stop levofloxacin   - continue meropenem 1g q 8 hours until 6/10   - supplemental o2   - trend WBC    Please call or message on [employer] Teams if with any questions.  Spectra [phone]

## 2022-06-08 LAB
ALBUMIN SERPL ELPH-MCNC: 2.8 G/DL — LOW (ref 3.5–5.2)
ALP SERPL-CCNC: 208 U/L — HIGH (ref 30–115)
ALT FLD-CCNC: 83 U/L — HIGH (ref 0–41)
ANION GAP SERPL CALC-SCNC: 13 MMOL/L — SIGNIFICANT CHANGE UP (ref 7–14)
AST SERPL-CCNC: 51 U/L — HIGH (ref 0–41)
BASOPHILS # BLD AUTO: 0.07 K/UL — SIGNIFICANT CHANGE UP (ref 0–0.2)
BASOPHILS NFR BLD AUTO: 0.4 % — SIGNIFICANT CHANGE UP (ref 0–1)
BILIRUB SERPL-MCNC: 2 MG/DL — HIGH (ref 0.2–1.2)
BUN SERPL-MCNC: <3 MG/DL — LOW (ref 10–20)
CALCIUM SERPL-MCNC: 9.1 MG/DL — SIGNIFICANT CHANGE UP (ref 8.5–10.1)
CHLORIDE SERPL-SCNC: 102 MMOL/L — SIGNIFICANT CHANGE UP (ref 98–110)
CO2 SERPL-SCNC: 26 MMOL/L — SIGNIFICANT CHANGE UP (ref 17–32)
CREAT SERPL-MCNC: <0.5 MG/DL — LOW (ref 0.7–1.5)
CULTURE RESULTS: SIGNIFICANT CHANGE UP
CULTURE RESULTS: SIGNIFICANT CHANGE UP
EGFR: 152 ML/MIN/1.73M2 — SIGNIFICANT CHANGE UP
EOSINOPHIL # BLD AUTO: 0.15 K/UL — SIGNIFICANT CHANGE UP (ref 0–0.7)
EOSINOPHIL NFR BLD AUTO: 0.8 % — SIGNIFICANT CHANGE UP (ref 0–8)
GLUCOSE SERPL-MCNC: 113 MG/DL — HIGH (ref 70–99)
HCT VFR BLD CALC: 26.1 % — LOW (ref 37–47)
HGB BLD-MCNC: 9 G/DL — LOW (ref 12–16)
IMM GRANULOCYTES NFR BLD AUTO: 2.2 % — HIGH (ref 0.1–0.3)
LDH SERPL L TO P-CCNC: 652 — HIGH (ref 50–242)
LYMPHOCYTES # BLD AUTO: 21.5 % — SIGNIFICANT CHANGE UP (ref 20.5–51.1)
LYMPHOCYTES # BLD AUTO: 4.09 K/UL — HIGH (ref 1.2–3.4)
MAGNESIUM SERPL-MCNC: 1.5 MG/DL — LOW (ref 1.8–2.4)
MCHC RBC-ENTMCNC: 29.9 PG — SIGNIFICANT CHANGE UP (ref 27–31)
MCHC RBC-ENTMCNC: 34.5 G/DL — SIGNIFICANT CHANGE UP (ref 32–37)
MCV RBC AUTO: 86.7 FL — SIGNIFICANT CHANGE UP (ref 81–99)
MONOCYTES # BLD AUTO: 2.17 K/UL — HIGH (ref 0.1–0.6)
MONOCYTES NFR BLD AUTO: 11.4 % — HIGH (ref 1.7–9.3)
NEUTROPHILS # BLD AUTO: 12.09 K/UL — HIGH (ref 1.4–6.5)
NEUTROPHILS NFR BLD AUTO: 63.7 % — SIGNIFICANT CHANGE UP (ref 42.2–75.2)
NRBC # BLD: 14 /100 WBCS — HIGH (ref 0–0)
PLATELET # BLD AUTO: 387 K/UL — SIGNIFICANT CHANGE UP (ref 130–400)
POTASSIUM SERPL-MCNC: 3.4 MMOL/L — LOW (ref 3.5–5)
POTASSIUM SERPL-SCNC: 3.4 MMOL/L — LOW (ref 3.5–5)
PROT SERPL-MCNC: 6.1 G/DL — SIGNIFICANT CHANGE UP (ref 6–8)
RBC # BLD: 3.01 M/UL — LOW (ref 4.2–5.4)
RBC # FLD: 18.9 % — HIGH (ref 11.5–14.5)
SODIUM SERPL-SCNC: 141 MMOL/L — SIGNIFICANT CHANGE UP (ref 135–146)
SPECIMEN SOURCE: SIGNIFICANT CHANGE UP
SPECIMEN SOURCE: SIGNIFICANT CHANGE UP
WBC # BLD: 18.98 K/UL — HIGH (ref 4.8–10.8)
WBC # FLD AUTO: 18.98 K/UL — HIGH (ref 4.8–10.8)

## 2022-06-08 PROCEDURE — 99231 SBSQ HOSP IP/OBS SF/LOW 25: CPT

## 2022-06-08 PROCEDURE — 99233 SBSQ HOSP IP/OBS HIGH 50: CPT

## 2022-06-08 RX ORDER — POTASSIUM CHLORIDE 20 MEQ
40 PACKET (EA) ORAL ONCE
Refills: 0 | Status: COMPLETED | OUTPATIENT
Start: 2022-06-08 | End: 2022-06-08

## 2022-06-08 RX ORDER — MAGNESIUM SULFATE 500 MG/ML
2 VIAL (ML) INJECTION ONCE
Refills: 0 | Status: DISCONTINUED | OUTPATIENT
Start: 2022-06-08 | End: 2022-06-08

## 2022-06-08 RX ADMIN — HYDROMORPHONE HYDROCHLORIDE 2 MILLIGRAM(S): 2 INJECTION INTRAMUSCULAR; INTRAVENOUS; SUBCUTANEOUS at 17:35

## 2022-06-08 RX ADMIN — HYDROMORPHONE HYDROCHLORIDE 2 MILLIGRAM(S): 2 INJECTION INTRAMUSCULAR; INTRAVENOUS; SUBCUTANEOUS at 02:58

## 2022-06-08 RX ADMIN — Medication 30 MILLIGRAM(S): at 05:52

## 2022-06-08 RX ADMIN — SENNA PLUS 2 TABLET(S): 8.6 TABLET ORAL at 21:08

## 2022-06-08 RX ADMIN — LIDOCAINE 1 PATCH: 4 CREAM TOPICAL at 12:34

## 2022-06-08 RX ADMIN — MEROPENEM 100 MILLIGRAM(S): 1 INJECTION INTRAVENOUS at 05:51

## 2022-06-08 RX ADMIN — HYDROMORPHONE HYDROCHLORIDE 2 MILLIGRAM(S): 2 INJECTION INTRAMUSCULAR; INTRAVENOUS; SUBCUTANEOUS at 21:30

## 2022-06-08 RX ADMIN — Medication 1 MILLIGRAM(S): at 12:31

## 2022-06-08 RX ADMIN — LIDOCAINE 1 PATCH: 4 CREAM TOPICAL at 20:14

## 2022-06-08 RX ADMIN — HYDROMORPHONE HYDROCHLORIDE 2 MILLIGRAM(S): 2 INJECTION INTRAMUSCULAR; INTRAVENOUS; SUBCUTANEOUS at 01:56

## 2022-06-08 RX ADMIN — HYDROMORPHONE HYDROCHLORIDE 2 MILLIGRAM(S): 2 INJECTION INTRAMUSCULAR; INTRAVENOUS; SUBCUTANEOUS at 14:30

## 2022-06-08 RX ADMIN — HYDROMORPHONE HYDROCHLORIDE 2 MILLIGRAM(S): 2 INJECTION INTRAMUSCULAR; INTRAVENOUS; SUBCUTANEOUS at 21:08

## 2022-06-08 RX ADMIN — HYDROMORPHONE HYDROCHLORIDE 2 MILLIGRAM(S): 2 INJECTION INTRAMUSCULAR; INTRAVENOUS; SUBCUTANEOUS at 06:02

## 2022-06-08 RX ADMIN — PANTOPRAZOLE SODIUM 40 MILLIGRAM(S): 20 TABLET, DELAYED RELEASE ORAL at 06:47

## 2022-06-08 RX ADMIN — HYDROMORPHONE HYDROCHLORIDE 2 MILLIGRAM(S): 2 INJECTION INTRAMUSCULAR; INTRAVENOUS; SUBCUTANEOUS at 06:48

## 2022-06-08 RX ADMIN — POLYETHYLENE GLYCOL 3350 17 GRAM(S): 17 POWDER, FOR SOLUTION ORAL at 12:32

## 2022-06-08 RX ADMIN — CHLORHEXIDINE GLUCONATE 1 APPLICATION(S): 213 SOLUTION TOPICAL at 12:49

## 2022-06-08 RX ADMIN — MEROPENEM 100 MILLIGRAM(S): 1 INJECTION INTRAVENOUS at 21:09

## 2022-06-08 RX ADMIN — MEROPENEM 100 MILLIGRAM(S): 1 INJECTION INTRAVENOUS at 14:26

## 2022-06-08 RX ADMIN — Medication 40 MILLIEQUIVALENT(S): at 15:04

## 2022-06-08 RX ADMIN — HYDROMORPHONE HYDROCHLORIDE 2 MILLIGRAM(S): 2 INJECTION INTRAMUSCULAR; INTRAVENOUS; SUBCUTANEOUS at 10:27

## 2022-06-08 NOTE — PROGRESS NOTE ADULT - SUBJECTIVE AND OBJECTIVE BOX
27yo  F with PMH of Sickle cell anemia (on oxbryta - followed by Hematologist - Dr. Jimenez in [place]), recently discharged after treatment of pneumonia and sickle cell crisis, presented to the ED  on 5/26 with c/o right sided chest, shoulder and upper back pain. States that she overexerted herself. She states that she has not been feeling well since yesterday, had mild cough and fever up to 102F at home. Denies any shortness of breath, abdominal pain, nausea, vomiting, diarrhea, leg pain, swelling.   Patient was upgraded to ICU on 5/28 for exchange transfusion, received 2 U PRBC. Patient was downgraded  to floors.     Hemoglobin level is stable.   Today pt is c/o right LE pain, asking for pain medication.       OBJECTIVE  PAST MEDICAL & SURGICAL HISTORY  Sickle cell anemia    S/P cholecystectomy    ALLERGIES:  No Known Allergies      HOME MEDICATIONS  Home Medications:  folic acid 1 mg oral tablet: 1 tab(s) orally once a day (20 May 2022 16:12)  losartan 25 mg oral tablet: 1 tab(s) orally once a day (20 May 2022 16:12)    VITAL SIGNS: Last 24 Hours  T(C): 36.4 (08 Jun 2022 12:30), Max: 36.5 (07 Jun 2022 22:52)  T(F): 97.6 (08 Jun 2022 12:30), Max: 97.7 (07 Jun 2022 22:52)  HR: 76 (08 Jun 2022 12:30) (76 - 86)  BP: 108/63 (08 Jun 2022 12:30) (108/57 - 115/58)  BP(mean): 78 (07 Jun 2022 22:52) (78 - 78)  RR: 18 (08 Jun 2022 12:30) (18 - 18)  SpO2: 97% (07 Jun 2022 22:52) (97% - 97%)    PHYSICAL EXAM:  GENERAL: NAD, pleasant   HEAD:  Atraumatic, Normocephalic  EYES: EOMI, PERRLA, conjunctiva and sclera clear  ENT: Moist mucous membranes  CHEST/LUNG: Clear to auscultation bilaterally  HEART: Regular rate and rhythm; No murmur  ABDOMEN:  Soft, Nontender, Nondistended.  EXTREMITIES: No clubbing, cyanosis, very tender and indurated area noted on RLE below the knee   NERVOUS SYSTEM:  Alert . No deficits   SKIN: No rashes or lesions    LABS:                        9.0    18.98 )-----------( 387      ( 08 Jun 2022 07:33 )             26.1     06-08    141  |  102  |  <3<L>  ----------------------------<  113<H>  3.4<L>   |  26  |  <0.5<L>    Ca    9.1      08 Jun 2022 07:33  Mg     1.5     06-08    TPro  6.1  /  Alb  2.8<L>  /  TBili  2.0<H>  /  DBili  x   /  AST  51<H>  /  ALT  83<H>  /  AlkPhos  208<H>  06-08    PT/INR - ( 06 Jun 2022 12:20 )   PT: 14.40 sec;   INR: 1.25 ratio         PTT - ( 06 Jun 2022 12:20 )  PTT:28.0 sec        RADIOLOGY:  < from: Xray Chest 1 View- PORTABLE-Routine (Xray Chest 1 View- PORTABLE-Routine in AM.) (06.07.22 @ 05:35) >  Impression:    Low lung volume.    Bibasilar opacities, unchanged.    Right IJ line.    < from: Xray Knee 1 or 2 Views, Right (06.06.22 @ 14:45) >    IMPRESSION:    Subtle sclerosis in the proximal tibial metaphysis, possibly developing   osteonecrosis.    < end of copied text >  < from: Xray Knee 1 or 2 Views, Right (06.06.22 @ 14:45) >    IMPRESSION:    Subtle sclerosis in the proximal tibial metaphysis, possibly developing   osteonecrosis.    < end of copied text >  < from: TTE Echo Complete w/o Contrast w/ Doppler (06.01.22 @ 16:34) >  Summary:   1. Normal global left ventricular systolic function.   2. LV Ejection Fraction by Carr's Method with a biplane EF of 63 %.   3. Normal left ventricular internal cavity size.   4. The mean global longitudinal peak strain by speckle tracking is   -20.8% which is normal.   5. Normal left atrial size.   6. Myxomatous mitral valve.   7. Trace mitral valve regurgitation.   8. LA volume Index is 22.0 ml/m² ml/m2.      MEDICATIONS  (STANDING):  chlorhexidine 4% Liquid 1 Application(s) Topical daily  dextrose 5% + sodium chloride 0.45%. 1000 milliLiter(s) (100 mL/Hr) IV Continuous <Continuous>  enoxaparin Injectable 40 milliGRAM(s) SubCutaneous every 24 hours  folic acid 1 milliGRAM(s) Oral daily  influenza   Vaccine 0.5 milliLiter(s) IntraMuscular once  lidocaine   4% Patch 1 Patch Transdermal daily  losartan 25 milliGRAM(s) Oral daily  meropenem  IVPB      meropenem  IVPB 1000 milliGRAM(s) IV Intermittent every 8 hours  pantoprazole    Tablet 40 milliGRAM(s) Oral before breakfast  polyethylene glycol 3350 17 Gram(s) Oral daily  predniSONE   Tablet 30 milliGRAM(s) Oral daily  senna 2 Tablet(s) Oral at bedtime    MEDICATIONS  (PRN):  acetaminophen     Tablet .. 650 milliGRAM(s) Oral every 6 hours PRN Temp greater or equal to 38C (100.4F), Mild Pain (1 - 3)  HYDROmorphone  Injectable 2 milliGRAM(s) IV Push every 2 hours PRN Severe Pain (7 - 10)  melatonin 3 milliGRAM(s) Oral at bedtime PRN Insomnia  ondansetron Injectable 4 milliGRAM(s) IV Push every 8 hours PRN Nausea and/or Vomiting

## 2022-06-08 NOTE — PROGRESS NOTE ADULT - ASSESSMENT
ASSESSMENT & PLAN  25yo  F with PMH of Sickle cell anemia (on oxbryta - followed by Hematologist - Dr. Jimenez in [place]), recently discharged after treatment of pneumonia and sickle cell crisis, presented to the ED  on 5/26 with c/o right sided chest, shoulder and upper back pain. States that she overexerted herself. She , had mild cough and fever up to 102F at home. Initially pt was admitted for sickle cell crisis, developed acute chest syndrome.    Patient was upgraded to ICU on 5/28 for exchange transfusion, received 2 U PRBC. Patient was downgraded back to floors on Cefepime and levofloxacin w resolving pyrexia. Dr Hobbs performed bedside US to assess for fluid in her lungs on may 31 and stated there was nothing to tap, most likely atalectasis. Heme/Onc saw patient, assessing for warm hemolytic antibodies in blood, started patient on prednsione 50mg. Patient noted b/l LE pain; Duplex was negative but RLE X-ray showed osteonecrosis. With increasing WBC up to 38.46, and T up to 101.2 (with increasing pro-inflammatory markers), increasing pain, patient was upgraded back to MICU on 6/3 and started on meropenem and Zyvox. Patient underwent  Bronchoscopy/BAL on 6/4 (cultures negative to date). Patient was agreeable to exchange transfusion, which patient received on 6/7, with improvement of Hgb (6.2 --> 9.7) and some improvement in pain. WBC downtrended, meropenem and zyvox were discontinued. Patient stable for downgrade back to floor.       #Sickle Cell Vaso-Occlusive  Crisis / Multilobar Pneumonia/ Acute Chest Syndrome/ Hb-SS Disease   - clinically improved, H/H stable   - Hb electrophoresis from 5/21 showed 90% Hb-S  - Initially SaO2 80s on RA, now requiring 1L-NC ~100%, wean off  - s/p 2U PRBCs, baseline Hb ~upper 6/low 7s?   - 6/6: patient received exchange transfusion  - monitor H/H   - continue meropenem 1g q 8 hours until 6/10   - C/w Prednisone 50mg PO daily (as per Hem/Onc will be for two weeks since 5/31 w/ quick taper)   - C/w Folic Acid 1mg PO daily  - C/w Oxbryta as outpatient  - Pain control: Dilaudid 2mg IV q2h PRN   - c/w IV fluids while in the hospital   - monitor LDH, reticulocytes     #LE Pain / osteonecrosis    - LE Duplex negative for DVT 6/3  - RLE XR showing subtle sclerosis in the proximal tibial metaphysis, possibly developing osteonecrosis  - c/w pain meds   - warm compress   - partial weight bearing on RLE          # DVT prophylaxis: Lovenox 40mg subQ daily  # GI prophylaxis: Protonix 40mg PO qAM  # Diet: Regular  # Activity: Increase as Tolerated  # Code status: Full  # Disposition:  discharge planning on Friday ( home)

## 2022-06-08 NOTE — PROGRESS NOTE ADULT - SUBJECTIVE AND OBJECTIVE BOX
Patient is a 26y old  Female who presents with a chief complaint of Acute chest syndrome (07 Jun 2022 20:54)      Subjective: Pt feels well with regards to breathing and chest pain. Still has RLE pain.      Vital Signs Last 24 Hrs  T(C): 36.4 (08 Jun 2022 05:00), Max: 36.5 (07 Jun 2022 22:52)  T(F): 97.5 (08 Jun 2022 05:00), Max: 97.7 (07 Jun 2022 22:52)  HR: 80 (08 Jun 2022 05:00) (80 - 98)  BP: 115/58 (08 Jun 2022 05:00) (108/57 - 127/70)  BP(mean): 78 (07 Jun 2022 22:52) (78 - 87)  RR: 18 (08 Jun 2022 05:00) (16 - 21)  SpO2: 97% (07 Jun 2022 22:52) (97% - 100%)    PHYSICAL EXAM  General: adult in NAD  HEENT: anicteric sclera, pink conjunctiva  Neck: supple  CV: normal S1/S2  Lungs: positive air movement b/l ant lungs  Abdomen: soft non-tender non-distended  Ext: RLE shin ttp  Skin: no rashes and no petechiae  Neuro: alert and oriented X 4, no focal deficits    MEDICATIONS  (STANDING):  chlorhexidine 4% Liquid 1 Application(s) Topical daily  dextrose 5% + sodium chloride 0.45%. 1000 milliLiter(s) (100 mL/Hr) IV Continuous <Continuous>  enoxaparin Injectable 40 milliGRAM(s) SubCutaneous every 24 hours  folic acid 1 milliGRAM(s) Oral daily  influenza   Vaccine 0.5 milliLiter(s) IntraMuscular once  lidocaine   4% Patch 1 Patch Transdermal daily  losartan 25 milliGRAM(s) Oral daily  meropenem  IVPB      meropenem  IVPB 1000 milliGRAM(s) IV Intermittent every 8 hours  pantoprazole    Tablet 40 milliGRAM(s) Oral before breakfast  polyethylene glycol 3350 17 Gram(s) Oral daily  predniSONE   Tablet 30 milliGRAM(s) Oral daily  senna 2 Tablet(s) Oral at bedtime    MEDICATIONS  (PRN):  acetaminophen     Tablet .. 650 milliGRAM(s) Oral every 6 hours PRN Temp greater or equal to 38C (100.4F), Mild Pain (1 - 3)  HYDROmorphone  Injectable 2 milliGRAM(s) IV Push every 2 hours PRN Severe Pain (7 - 10)  melatonin 3 milliGRAM(s) Oral at bedtime PRN Insomnia  ondansetron Injectable 4 milliGRAM(s) IV Push every 8 hours PRN Nausea and/or Vomiting      LABS:                          9.0    18.98 )-----------( 387      ( 08 Jun 2022 07:33 )             26.1         Mean Cell Volume : 86.7 fL  Mean Cell Hemoglobin : 29.9 pg  Mean Cell Hemoglobin Concentration : 34.5 g/dL  Auto Neutrophil # : 12.09 K/uL  Auto Lymphocyte # : 4.09 K/uL  Auto Monocyte # : 2.17 K/uL  Auto Eosinophil # : 0.15 K/uL  Auto Basophil # : 0.07 K/uL  Auto Neutrophil % : 63.7 %  Auto Lymphocyte % : 21.5 %  Auto Monocyte % : 11.4 %  Auto Eosinophil % : 0.8 %  Auto Basophil % : 0.4 %      Serial CBC's  06-08 @ 07:33  Hct-26.1 / Hgb-9.0 / Plat-387 / RBC-3.01 / WBC-18.98  Serial CBC's  06-07 @ 04:53  Hct-27.1 / Hgb-9.7 / Plat-297 / RBC-3.18 / WBC-20.40  Serial CBC's  06-06 @ 16:52  Hct-21.6 / Hgb-7.3 / Plat-510 / RBC-2.47 / WBC-17.13  Serial CBC's  06-06 @ 04:40  Hct-18.8 / Hgb-6.2 / Plat-434 / RBC-2.14 / WBC-21.97  Serial CBC's  06-05 @ 04:49  Hct-20.2 / Hgb-6.9 / Plat-370 / RBC-2.26 / WBC-27.66      06-08    141  |  102  |  <3<L>  ----------------------------<  113<H>  3.4<L>   |  26  |  <0.5<L>    Ca    9.1      08 Jun 2022 07:33  Mg     1.5     06-08    TPro  6.1  /  Alb  2.8<L>  /  TBili  2.0<H>  /  DBili  x   /  AST  51<H>  /  ALT  83<H>  /  AlkPhos  208<H>  06-08      PT/INR - ( 06 Jun 2022 12:20 )   PT: 14.40 sec;   INR: 1.25 ratio         PTT - ( 06 Jun 2022 12:20 )  PTT:28.0 sec    Reticulocyte Percent: 8.4 % (06-07 @ 04:53)  Ferritin, Serum: 2669 ng/mL (06-04 @ 04:36)  Ferritin, Serum: 2493 ng/mL (06-03 @ 18:55)  Reticulocyte Percent: 15.3 % (06-03 @ 06:22)

## 2022-06-08 NOTE — PROGRESS NOTE ADULT - SUBJECTIVE AND OBJECTIVE BOX
JAMES VÁSQUEZ 26y Female  MRN#: [id number]   CODE STATUS:________    Hospital Day: 13d    Pt is currently admitted with the primary diagnosis of Acute Chest Syndrome    SUBJECTIVE  Hospital Course      27yo  F with PMH of Sickle cell anemia (on oxbryta - followed by Hematologist - Dr. Jimenez in [place]), recently discharged after treatment of pneumonia and sickle cell crisis, presented to the ED  on 5/26 with c/o right sided chest, shoulder and upper back pain. States that she overexerted herself. She states that she has not been feeling well since yesterday, had mild cough and fever up to 102F at home. Denies any shortness of breath, abdominal pain, nausea, vomiting, diarrhea, leg pain, swelling.   Patient was upgraded to ICU on 5/28 for exchange transfusion, received 2 U PRBC. Patient was downgraded back to floors.       The patient was seen and examined ,alert oriented ,comfortable in bed. pain controlled.  No new or overnight events.                                         ----------------------------------------------------------  OBJECTIVE  PAST MEDICAL & SURGICAL HISTORY  Sickle cell anemia    S/P cholecystectomy                                              -----------------------------------------------------------  ALLERGIES:  No Known Allergies                                            ------------------------------------------------------------    HOME MEDICATIONS  Home Medications:  folic acid 1 mg oral tablet: 1 tab(s) orally once a day (20 May 2022 16:12)  losartan 25 mg oral tablet: 1 tab(s) orally once a day (20 May 2022 16:12)                           MEDICATIONS:  STANDING MEDICATIONS  chlorhexidine 4% Liquid 1 Application(s) Topical daily  dextrose 5% + sodium chloride 0.45%. 1000 milliLiter(s) IV Continuous <Continuous>  enoxaparin Injectable 40 milliGRAM(s) SubCutaneous every 24 hours  folic acid 1 milliGRAM(s) Oral daily  influenza   Vaccine 0.5 milliLiter(s) IntraMuscular once  lidocaine   4% Patch 1 Patch Transdermal daily  losartan 25 milliGRAM(s) Oral daily  meropenem  IVPB      meropenem  IVPB 1000 milliGRAM(s) IV Intermittent every 8 hours  pantoprazole    Tablet 40 milliGRAM(s) Oral before breakfast  polyethylene glycol 3350 17 Gram(s) Oral daily  predniSONE   Tablet 30 milliGRAM(s) Oral daily  senna 2 Tablet(s) Oral at bedtime    PRN MEDICATIONS  acetaminophen     Tablet .. 650 milliGRAM(s) Oral every 6 hours PRN  HYDROmorphone  Injectable 2 milliGRAM(s) IV Push every 2 hours PRN  melatonin 3 milliGRAM(s) Oral at bedtime PRN  ondansetron Injectable 4 milliGRAM(s) IV Push every 8 hours PRN                                            ------------------------------------------------------------  VITAL SIGNS: Last 24 Hours  T(C): 36.4 (08 Jun 2022 05:00), Max: 36.5 (07 Jun 2022 22:52)  T(F): 97.5 (08 Jun 2022 05:00), Max: 97.7 (07 Jun 2022 22:52)  HR: 80 (08 Jun 2022 05:00) (80 - 98)  BP: 115/58 (08 Jun 2022 05:00) (108/57 - 127/70)  BP(mean): 78 (07 Jun 2022 22:52) (78 - 87)  RR: 18 (08 Jun 2022 05:00) (16 - 21)  SpO2: 97% (07 Jun 2022 22:52) (97% - 100%)      06-07-22 @ 07:01  -  06-08-22 @ 07:00  --------------------------------------------------------  IN: 1530 mL / OUT: 1100 mL / NET: 430 mL                                             --------------------------------------------------------------  LABS:                        9.0    18.98 )-----------( 387      ( 08 Jun 2022 07:33 )             26.1     06-08    141  |  102  |  <3<L>  ----------------------------<  113<H>  3.4<L>   |  26  |  <0.5<L>    Ca    9.1      08 Jun 2022 07:33  Mg     1.5     06-08    TPro  6.1  /  Alb  2.8<L>  /  TBili  2.0<H>  /  DBili  x   /  AST  51<H>  /  ALT  83<H>  /  AlkPhos  208<H>  06-08    PT/INR - ( 06 Jun 2022 12:20 )   PT: 14.40 sec;   INR: 1.25 ratio         PTT - ( 06 Jun 2022 12:20 )  PTT:28.0 sec                                                  -------------------------------------------------------------  RADIOLOGY:  < from: Xray Chest 1 View- PORTABLE-Routine (Xray Chest 1 View- PORTABLE-Routine in AM.) (06.07.22 @ 05:35) >  Impression:    Low lung volume.    Bibasilar opacities, unchanged.    Right IJ line.    < end of copied text >                                            --------------------------------------------------------------    PHYSICAL EXAM:  GENERAL: NAD, lying in bed comfortably  HEAD:  Atraumatic, Normocephalic  EYES: EOMI, PERRLA, conjunctiva and sclera clear  ENT: Moist mucous membranes  CHEST/LUNG: Clear to auscultation bilaterally  HEART: Regular rate and rhythm; No murmur  ABDOMEN:  Soft, Nontender, Nondistended.  EXTREMITIES: No clubbing, cyanosis, or edema  NERVOUS SYSTEM:  Alert . No deficits   SKIN: No rashes or lesions                                           --------------------------------------------------------------    ASSESSMENT & PLAN    Past medical history and hospital course     27yo  F with PMH of Sickle cell anemia (on oxbryta - followed by Hematologist - Dr. Jimenez in [place]), recently discharged after treatment of pneumonia and sickle cell crisis, presented to the ED  on 5/26 with c/o right sided chest, shoulder and upper back pain. States that she overexerted herself. She states that she has not been feeling well since yesterday, had mild cough and fever up to 102F at home. Denies any shortness of breath, abdominal pain, nausea, vomiting, diarrhea, leg pain, swelling.     Patient was upgraded to ICU on 5/28 for exchange transfusion, received 2 U PRBC. Patient was downgraded back to floors on Cefepime and levofloxacin w resolving pyrexia. Dr Hobbs performed bedside US to assess for fluid in her lungs on may 31 and stated there was nothing to tap, most likely atalectasis. Heme/Onc saw patient, assessing for warm hemolytic antibodies in blood, started patient on prednsione 50mg. Patient noted b/l LE pain; Duplex was negative but RLE X-ray showed osteonecrosis. With increasing WBC up to 38.46, and T up to 101.2 (with increasing pro-inflammatory markers), increasing pain, patient was upgraded back to MICU on 6/3 and started on meropenem and Zyvox. Patient received Bronchoscopy/BAL on 6/4 (cultures negative to date). Patient was agreeable to exchange transfusion, which patient received on 6/7, with improvement of Hgb (6.2 --> 9.7) and some improvement in pain. WBC downtrended, meropenem and zyvox were discontinued. Patient stable for downgrade back to floor.       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   - s/p bronch 6/4 - no significant purulent secretions noted   - 6/6: patient received exchange transfusion, with improvement in Hgb (6.2 --> 9.7>9)   - continue meropenem 1g q 8 hours until 6/10   - C/w Prednisone 50mg PO daily (as per Hem/Onc will be for two weeks since 5/31 w/ quick taper)   - C/w Folic Acid 1mg PO daily  - C/w Oxbryta as outpatient  - Pain control: Dilaudid 2mg IV q2h PRN (on senna/miralax) ,pain management   - C/w D5 1/2-NS @100cc  - Daily hemolysis labs/LDH  - F/u BCx and Bronchial cultures     #LE Pain - likely secondary to Pain Crisis  - LE Duplex negative for DVT 6/3  - RLE XR showing subtle sclerosis in the proximal tibial metaphysis, possibly developing osteonecrosis  - Pain should improve w/ exchange transfusion  - C/w IVF, dilaudid and warm compresses     #Acute on Chronic Leukocytosis/Thrombocytosis 2/2 possible superimposed bacterial infection vs reactive leukocytosis  - WBC stable/improving, ICS @bedside  - recent hospitalization with Pseudomonas  - BCx negative 5/27-6/2  - D/c Zyvox and Levaquin   - C/w Meropenem 1g IV q8h  -  ID following     #Acute Intravascular Hemolysis  - had +anti-C, anti-E, anti-S (Rh) + Warm-abs  - 6/1 IgM EBG + CMV negative, Hep-Panel negative, Mycoplasma negative, 6/3 HIV negative  - monitor LDH daily with Retic-Count  - monitor Bilirubin daily- f/u Heme/Onc      # DVT prophylaxis: Lovenox 40mg subQ daily  # GI prophylaxis: Protonix 40mg PO qAM  # Diet: Regular  # Activity: Increase as Tolerated  # Code status: Full  # Disposition:  floor     # Handoff: F/u Hem/Onc recs, pain control, completion of antibiotics                                                                                                 ----------------------------------------------------  # DVT prophylaxis     # GI prophylaxis     # Diet     # Activity Score (AM-PAC)    # Code status     # Disposition                                                                              --------------------------------------------------------    # Handoff      JAMES VÁSQUEZ 26y Female  MRN#: [id number]   CODE STATUS:________    Hospital Day: 13d    Pt is currently admitted with the primary diagnosis of Acute Chest Syndrome    SUBJECTIVE  Hospital Course      25yo  F with PMH of Sickle cell anemia (on oxbryta - followed by Hematologist - Dr. Jimenez in [place]), recently discharged after treatment of pneumonia and sickle cell crisis, presented to the ED  on 5/26 with c/o right sided chest, shoulder and upper back pain. States that she overexerted herself. She states that she has not been feeling well since yesterday, had mild cough and fever up to 102F at home. Denies any shortness of breath, abdominal pain, nausea, vomiting, diarrhea, leg pain, swelling.   Patient was upgraded to ICU on 5/28 for exchange transfusion, received 2 U PRBC. Patient was downgraded back to floors.       The patient was seen and examined ,alert oriented ,comfortable in bed. pain controlled.  No new or overnight events.                                         ----------------------------------------------------------  OBJECTIVE  PAST MEDICAL & SURGICAL HISTORY  Sickle cell anemia    S/P cholecystectomy                                              -----------------------------------------------------------  ALLERGIES:  No Known Allergies                                            ------------------------------------------------------------    HOME MEDICATIONS  Home Medications:  folic acid 1 mg oral tablet: 1 tab(s) orally once a day (20 May 2022 16:12)  losartan 25 mg oral tablet: 1 tab(s) orally once a day (20 May 2022 16:12)                           MEDICATIONS:  STANDING MEDICATIONS  chlorhexidine 4% Liquid 1 Application(s) Topical daily  dextrose 5% + sodium chloride 0.45%. 1000 milliLiter(s) IV Continuous <Continuous>  enoxaparin Injectable 40 milliGRAM(s) SubCutaneous every 24 hours  folic acid 1 milliGRAM(s) Oral daily  influenza   Vaccine 0.5 milliLiter(s) IntraMuscular once  lidocaine   4% Patch 1 Patch Transdermal daily  losartan 25 milliGRAM(s) Oral daily  meropenem  IVPB      meropenem  IVPB 1000 milliGRAM(s) IV Intermittent every 8 hours  pantoprazole    Tablet 40 milliGRAM(s) Oral before breakfast  polyethylene glycol 3350 17 Gram(s) Oral daily  predniSONE   Tablet 30 milliGRAM(s) Oral daily  senna 2 Tablet(s) Oral at bedtime    PRN MEDICATIONS  acetaminophen     Tablet .. 650 milliGRAM(s) Oral every 6 hours PRN  HYDROmorphone  Injectable 2 milliGRAM(s) IV Push every 2 hours PRN  melatonin 3 milliGRAM(s) Oral at bedtime PRN  ondansetron Injectable 4 milliGRAM(s) IV Push every 8 hours PRN                                            ------------------------------------------------------------  VITAL SIGNS: Last 24 Hours  T(C): 36.4 (08 Jun 2022 05:00), Max: 36.5 (07 Jun 2022 22:52)  T(F): 97.5 (08 Jun 2022 05:00), Max: 97.7 (07 Jun 2022 22:52)  HR: 80 (08 Jun 2022 05:00) (80 - 98)  BP: 115/58 (08 Jun 2022 05:00) (108/57 - 127/70)  BP(mean): 78 (07 Jun 2022 22:52) (78 - 87)  RR: 18 (08 Jun 2022 05:00) (16 - 21)  SpO2: 97% (07 Jun 2022 22:52) (97% - 100%)      06-07-22 @ 07:01  -  06-08-22 @ 07:00  --------------------------------------------------------  IN: 1530 mL / OUT: 1100 mL / NET: 430 mL                                             --------------------------------------------------------------  LABS:                        9.0    18.98 )-----------( 387      ( 08 Jun 2022 07:33 )             26.1     06-08    141  |  102  |  <3<L>  ----------------------------<  113<H>  3.4<L>   |  26  |  <0.5<L>    Ca    9.1      08 Jun 2022 07:33  Mg     1.5     06-08    TPro  6.1  /  Alb  2.8<L>  /  TBili  2.0<H>  /  DBili  x   /  AST  51<H>  /  ALT  83<H>  /  AlkPhos  208<H>  06-08    PT/INR - ( 06 Jun 2022 12:20 )   PT: 14.40 sec;   INR: 1.25 ratio         PTT - ( 06 Jun 2022 12:20 )  PTT:28.0 sec                                                  -------------------------------------------------------------  RADIOLOGY:  < from: Xray Chest 1 View- PORTABLE-Routine (Xray Chest 1 View- PORTABLE-Routine in AM.) (06.07.22 @ 05:35) >  Impression:    Low lung volume.    Bibasilar opacities, unchanged.    Right IJ line.    < end of copied text >                                            --------------------------------------------------------------    PHYSICAL EXAM:  GENERAL: NAD, lying in bed comfortably  HEAD:  Atraumatic, Normocephalic  EYES: EOMI, PERRLA, conjunctiva and sclera clear  ENT: Moist mucous membranes  CHEST/LUNG: Clear to auscultation bilaterally  HEART: Regular rate and rhythm; No murmur  ABDOMEN:  Soft, Nontender, Nondistended.  EXTREMITIES: No clubbing, cyanosis, or edema  NERVOUS SYSTEM:  Alert . No deficits   SKIN: No rashes or lesions                                           --------------------------------------------------------------    ASSESSMENT & PLAN    Past medical history and hospital course     25yo  F with PMH of Sickle cell anemia (on oxbryta - followed by Hematologist - Dr. Jimeenz in [place]), recently discharged after treatment of pneumonia and sickle cell crisis, presented to the ED  on 5/26 with c/o right sided chest, shoulder and upper back pain. States that she overexerted herself. She states that she has not been feeling well since yesterday, had mild cough and fever up to 102F at home. Denies any shortness of breath, abdominal pain, nausea, vomiting, diarrhea, leg pain, swelling.     Patient was upgraded to ICU on 5/28 for exchange transfusion, received 2 U PRBC. Patient was downgraded back to floors on Cefepime and levofloxacin w resolving pyrexia. Dr Hobbs performed bedside US to assess for fluid in her lungs on may 31 and stated there was nothing to tap, most likely atalectasis. Heme/Onc saw patient, assessing for warm hemolytic antibodies in blood, started patient on prednsione 50mg. Patient noted b/l LE pain; Duplex was negative but RLE X-ray showed osteonecrosis. With increasing WBC up to 38.46, and T up to 101.2 (with increasing pro-inflammatory markers), increasing pain, patient was upgraded back to MICU on 6/3 and started on meropenem and Zyvox. Patient received Bronchoscopy/BAL on 6/4 (cultures negative to date). Patient was agreeable to exchange transfusion, which patient received on 6/7, with improvement of Hgb (6.2 --> 9.7) and some improvement in pain. WBC downtrended, meropenem and zyvox were discontinued. Patient stable for downgrade back to floor.       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   - s/p bronch 6/4 - no significant purulent secretions noted   - 6/6: patient received exchange transfusion, with improvement in Hgb (6.2 --> 9.7>9)   - continue meropenem 1g q 8 hours until 6/10   - C/w Prednisone 50mg PO daily (as per Hem/Onc will be for two weeks since 5/31 w/ quick taper)   - C/w Folic Acid 1mg PO daily  - C/w Oxbryta as outpatient  - Pain control: Dilaudid 2mg IV q2h PRN (on senna/miralax) ,pain management   - C/w D5 1/2-NS @100cc  - Daily hemolysis labs/LDH  - F/u BCx and Bronchial cultures     #LE Pain - likely secondary to Pain Crisis  - LE Duplex negative for DVT 6/3  - RLE XR showing subtle sclerosis in the proximal tibial metaphysis, possibly developing osteonecrosis  - Pain should improve w/ exchange transfusion  - C/w IVF, dilaudid and warm compresses     #Acute on Chronic Leukocytosis/Thrombocytosis 2/2 possible superimposed bacterial infection vs reactive leukocytosis  - WBC stable/improving, ICS @bedside  - recent hospitalization with Pseudomonas  - BCx negative 5/27-6/2  - D/c Zyvox and Levaquin   - C/w Meropenem 1g IV q8h  -  ID following     #Acute Intravascular Hemolysis  - had +anti-C, anti-E, anti-S (Rh) + Warm-abs  - 6/1 IgM EBG + CMV negative, Hep-Panel negative, Mycoplasma negative, 6/3 HIV negative  - monitor LDH daily with Retic-Count  - monitor Bilirubin daily- f/u Heme/Onc      # DVT prophylaxis: Lovenox 40mg subQ daily  # GI prophylaxis: Protonix 40mg PO qAM  # Diet: Regular  # Activity: Increase as Tolerated  # Code status: Full  # Disposition:  floor     # Handoff: F/u Hem/Onc recs, pain control, completion of antibiotics

## 2022-06-08 NOTE — PROGRESS NOTE ADULT - ASSESSMENT
27yo  F with PMH of Sickle cell anemia (on oxbryta - followed by Hematologist - Dr. Jimenez in [place]), recently discharged after treatment of pneumonia and sickle cell crisis, presented to the ED with c/o right sided chest, shoulder and upper back pain.  She also reports fever at home 102F.  She was recently treated for sickle cell crisis and pneumonia and discharged last week on Levaquin PO.    Hematology consulted for r/o ACS with hx of sickle cell disease.     IMPRESSION:    #ACS/Vaso-occlusive pain crisis/PNA  #Hx of Hb SS disease   -Was on Oxbryta, just started in April 2022 but hasn't been routinely taking it due to her recent hospitalizations/infections; never was on hydrea or any other HbSS medications  -Initially offered both exchange as patient was in high 80s on RA and simple transfusion however she declined, eventually she agreed to a simple transfusion.    -Baseline Hb 7-8  -She has had 2 crises far this year.   -Hb electrophoresis from 5/21 showed 90% HbS  -Viral panel noted: past EBV infection, hep panel non reactive    #Possible autoimmune hemolytic anemia   Direct Lori Profile (05.31.22 @ 08:00)    Dir Antiglob Polyspecific Interpretation: POS  - Spoke with Dr Burroughs from transfusion med, it is hard to call if pt has alloAb or autoab given chronic hemolysis    #Chronic leukocytosis and thrombocytosis: Resolving  - likely reactive     #Acute right knee pain -improved  - likely 2/2 sickle cell crisis. No evidence of effusion or AVN on Xray    #Right shin protuberance with tenderness to palpation  - Xray: Subtle sclerosis in the proximal tibial metaphysis, possibly developing osteonecrosis.    RECOMMENDATIONS:    - S/P exchange transfusion on 6/6/22 without any complications.   - F/u results from bronchoscopy: cultures negative so far  - Pt was started on Prednisone 50mg daily on 5/31. Decreased to 30mg qdaily on 6/8/22 and then taper by 10mg every week and then stop.   - C/w IV hydration: 0.45% NS @100cc /Hr  - C/w pain meds: IV Dilaudid 2mg q2hr prn. Can de-escalate once pain is controlled   - Warm compresses prn.   - IV Abx per ID  - C/w folic acid supplementation  - She can continue Oxbryta as outpatient and follow up with her primary hematologist post discharge.  - Monitor hemolysis labs and CBC. Check daily LDH    Needs close follow up as O/P for CBC check given she is on taper of Prednisone.     DVT ppx: Lovenox        27yo  F with PMH of Sickle cell anemia (on oxbryta - followed by Hematologist - Dr. Jimenez in [place]), recently discharged after treatment of pneumonia and sickle cell crisis, presented to the ED with c/o right sided chest, shoulder and upper back pain.  She also reports fever at home 102F.  She was recently treated for sickle cell crisis and pneumonia and discharged last week on Levaquin PO.    Hematology consulted for r/o ACS with hx of sickle cell disease.     IMPRESSION:    #ACS/Vaso-occlusive pain crisis/PNA  #Hx of Hb SS disease   -Was on Oxbryta, just started in April 2022 but hasn't been routinely taking it due to her recent hospitalizations/infections; never was on hydrea or any other HbSS medications  -Initially offered both exchange as patient was in high 80s on RA and simple transfusion however she declined, eventually she agreed to a simple transfusion.    -Baseline Hb 7-8  -She has had 2 crises far this year.   -Hb electrophoresis from 5/21 showed 90% HbS  -Viral panel noted: past EBV infection, hep panel non reactive    #Possible autoimmune hemolytic anemia   Direct Lori Profile (05.31.22 @ 08:00)    Dir Antiglob Polyspecific Interpretation: POS  - Spoke with Dr Burroughs from transfusion med, it is hard to call if pt has alloAb or autoab given chronic hemolysis    #Chronic leukocytosis and thrombocytosis: Resolving  - likely reactive     #Acute right knee pain -improved  - likely 2/2 sickle cell crisis. No evidence of effusion or AVN on Xray    #Right shin protuberance with tenderness to palpation  - Xray: Subtle sclerosis in the proximal tibial metaphysis, possibly developing osteonecrosis.    RECOMMENDATIONS:    - S/P exchange transfusion on 6/6/22 without any complications.   - F/u results from bronchoscopy: cultures negative so far  - Pt was started on Prednisone 50mg daily on 5/31. Decreased to 30mg qdaily on 6/8/22 and then taper by 10mg every week and then stop.   - C/w IV hydration: 0.45% NS @100cc /Hr  - C/w pain meds: IV Dilaudid 2mg q2hr prn: Can de-escalate to either every 3-4 hours or PO Dilaudid regimen  - Warm compresses prn.   - IV Abx per ID  - C/w folic acid supplementation  - She can continue Oxbryta as outpatient and follow up with her primary hematologist post discharge.  - Monitor hemolysis labs and CBC. Check daily LDH    Needs close follow up as O/P for CBC check given she is on taper of Prednisone.     DVT ppx: Lovenox

## 2022-06-09 LAB
ALBUMIN SERPL ELPH-MCNC: 2.9 G/DL — LOW (ref 3.5–5.2)
ALP SERPL-CCNC: 183 U/L — HIGH (ref 30–115)
ALT FLD-CCNC: 68 U/L — HIGH (ref 0–41)
ANION GAP SERPL CALC-SCNC: 12 MMOL/L — SIGNIFICANT CHANGE UP (ref 7–14)
AST SERPL-CCNC: 35 U/L — SIGNIFICANT CHANGE UP (ref 0–41)
BASOPHILS # BLD AUTO: 0.05 K/UL — SIGNIFICANT CHANGE UP (ref 0–0.2)
BASOPHILS NFR BLD AUTO: 0.3 % — SIGNIFICANT CHANGE UP (ref 0–1)
BILIRUB SERPL-MCNC: 1.6 MG/DL — HIGH (ref 0.2–1.2)
BUN SERPL-MCNC: <3 MG/DL — LOW (ref 10–20)
CALCIUM SERPL-MCNC: 9.5 MG/DL — SIGNIFICANT CHANGE UP (ref 8.5–10.1)
CHLORIDE SERPL-SCNC: 101 MMOL/L — SIGNIFICANT CHANGE UP (ref 98–110)
CO2 SERPL-SCNC: 25 MMOL/L — SIGNIFICANT CHANGE UP (ref 17–32)
CREAT SERPL-MCNC: <0.5 MG/DL — LOW (ref 0.7–1.5)
EGFR: 152 ML/MIN/1.73M2 — SIGNIFICANT CHANGE UP
EOSINOPHIL # BLD AUTO: 0.14 K/UL — SIGNIFICANT CHANGE UP (ref 0–0.7)
EOSINOPHIL NFR BLD AUTO: 0.8 % — SIGNIFICANT CHANGE UP (ref 0–8)
GLUCOSE SERPL-MCNC: 113 MG/DL — HIGH (ref 70–99)
HCT VFR BLD CALC: 26.7 % — LOW (ref 37–47)
HGB BLD-MCNC: 9.3 G/DL — LOW (ref 12–16)
IMM GRANULOCYTES NFR BLD AUTO: 2.3 % — HIGH (ref 0.1–0.3)
LDH SERPL L TO P-CCNC: 589 — HIGH (ref 50–242)
LYMPHOCYTES # BLD AUTO: 13.2 % — LOW (ref 20.5–51.1)
LYMPHOCYTES # BLD AUTO: 2.3 K/UL — SIGNIFICANT CHANGE UP (ref 1.2–3.4)
MAGNESIUM SERPL-MCNC: 1.4 MG/DL — LOW (ref 1.8–2.4)
MCHC RBC-ENTMCNC: 31.1 PG — HIGH (ref 27–31)
MCHC RBC-ENTMCNC: 34.8 G/DL — SIGNIFICANT CHANGE UP (ref 32–37)
MCV RBC AUTO: 89.3 FL — SIGNIFICANT CHANGE UP (ref 81–99)
MONOCYTES # BLD AUTO: 1.48 K/UL — HIGH (ref 0.1–0.6)
MONOCYTES NFR BLD AUTO: 8.5 % — SIGNIFICANT CHANGE UP (ref 1.7–9.3)
NEUTROPHILS # BLD AUTO: 13.09 K/UL — HIGH (ref 1.4–6.5)
NEUTROPHILS NFR BLD AUTO: 74.9 % — SIGNIFICANT CHANGE UP (ref 42.2–75.2)
NON-GYNECOLOGICAL CYTOLOGY STUDY: SIGNIFICANT CHANGE UP
NRBC # BLD: 7 /100 WBCS — HIGH (ref 0–0)
PLATELET # BLD AUTO: 497 K/UL — HIGH (ref 130–400)
POTASSIUM SERPL-MCNC: 4 MMOL/L — SIGNIFICANT CHANGE UP (ref 3.5–5)
POTASSIUM SERPL-SCNC: 4 MMOL/L — SIGNIFICANT CHANGE UP (ref 3.5–5)
PROT SERPL-MCNC: 6.3 G/DL — SIGNIFICANT CHANGE UP (ref 6–8)
RBC # BLD: 2.99 M/UL — LOW (ref 4.2–5.4)
RBC # BLD: 2.99 M/UL — LOW (ref 4.2–5.4)
RBC # FLD: 19.7 % — HIGH (ref 11.5–14.5)
RETICS #: 358.2 K/UL — HIGH (ref 25–125)
RETICS/RBC NFR: 12 % — HIGH (ref 0.5–1.5)
SODIUM SERPL-SCNC: 138 MMOL/L — SIGNIFICANT CHANGE UP (ref 135–146)
WBC # BLD: 17.46 K/UL — HIGH (ref 4.8–10.8)
WBC # FLD AUTO: 17.46 K/UL — HIGH (ref 4.8–10.8)

## 2022-06-09 PROCEDURE — 99232 SBSQ HOSP IP/OBS MODERATE 35: CPT

## 2022-06-09 PROCEDURE — 99233 SBSQ HOSP IP/OBS HIGH 50: CPT

## 2022-06-09 RX ORDER — HYDROMORPHONE HYDROCHLORIDE 2 MG/ML
6 INJECTION INTRAMUSCULAR; INTRAVENOUS; SUBCUTANEOUS ONCE
Refills: 0 | Status: COMPLETED | OUTPATIENT
Start: 2022-06-09 | End: 2022-06-09

## 2022-06-09 RX ORDER — HYDROMORPHONE HYDROCHLORIDE 2 MG/ML
1 INJECTION INTRAMUSCULAR; INTRAVENOUS; SUBCUTANEOUS ONCE
Refills: 0 | Status: DISCONTINUED | OUTPATIENT
Start: 2022-06-09 | End: 2022-06-09

## 2022-06-09 RX ORDER — MAGNESIUM SULFATE 500 MG/ML
2 VIAL (ML) INJECTION ONCE
Refills: 0 | Status: COMPLETED | OUTPATIENT
Start: 2022-06-09 | End: 2022-06-09

## 2022-06-09 RX ORDER — HYDROMORPHONE HYDROCHLORIDE 2 MG/ML
6 INJECTION INTRAMUSCULAR; INTRAVENOUS; SUBCUTANEOUS EVERY 4 HOURS
Refills: 0 | Status: DISCONTINUED | OUTPATIENT
Start: 2022-06-09 | End: 2022-06-10

## 2022-06-09 RX ORDER — ERTAPENEM SODIUM 1 G/1
1000 INJECTION, POWDER, LYOPHILIZED, FOR SOLUTION INTRAMUSCULAR; INTRAVENOUS ONCE
Refills: 0 | Status: COMPLETED | OUTPATIENT
Start: 2022-06-10 | End: 2022-06-10

## 2022-06-09 RX ORDER — HYDROMORPHONE HYDROCHLORIDE 2 MG/ML
2 INJECTION INTRAMUSCULAR; INTRAVENOUS; SUBCUTANEOUS EVERY 12 HOURS
Refills: 0 | Status: DISCONTINUED | OUTPATIENT
Start: 2022-06-09 | End: 2022-06-10

## 2022-06-09 RX ADMIN — SODIUM CHLORIDE 100 MILLILITER(S): 9 INJECTION, SOLUTION INTRAVENOUS at 00:23

## 2022-06-09 RX ADMIN — CHLORHEXIDINE GLUCONATE 1 APPLICATION(S): 213 SOLUTION TOPICAL at 11:21

## 2022-06-09 RX ADMIN — Medication 1 MILLIGRAM(S): at 11:21

## 2022-06-09 RX ADMIN — PANTOPRAZOLE SODIUM 40 MILLIGRAM(S): 20 TABLET, DELAYED RELEASE ORAL at 05:45

## 2022-06-09 RX ADMIN — MEROPENEM 100 MILLIGRAM(S): 1 INJECTION INTRAVENOUS at 21:55

## 2022-06-09 RX ADMIN — ENOXAPARIN SODIUM 40 MILLIGRAM(S): 100 INJECTION SUBCUTANEOUS at 00:05

## 2022-06-09 RX ADMIN — HYDROMORPHONE HYDROCHLORIDE 2 MILLIGRAM(S): 2 INJECTION INTRAMUSCULAR; INTRAVENOUS; SUBCUTANEOUS at 06:38

## 2022-06-09 RX ADMIN — HYDROMORPHONE HYDROCHLORIDE 2 MILLIGRAM(S): 2 INJECTION INTRAMUSCULAR; INTRAVENOUS; SUBCUTANEOUS at 04:30

## 2022-06-09 RX ADMIN — LIDOCAINE 1 PATCH: 4 CREAM TOPICAL at 00:07

## 2022-06-09 RX ADMIN — HYDROMORPHONE HYDROCHLORIDE 2 MILLIGRAM(S): 2 INJECTION INTRAMUSCULAR; INTRAVENOUS; SUBCUTANEOUS at 01:00

## 2022-06-09 RX ADMIN — Medication 25 GRAM(S): at 15:20

## 2022-06-09 RX ADMIN — HYDROMORPHONE HYDROCHLORIDE 6 MILLIGRAM(S): 2 INJECTION INTRAMUSCULAR; INTRAVENOUS; SUBCUTANEOUS at 18:53

## 2022-06-09 RX ADMIN — LOSARTAN POTASSIUM 25 MILLIGRAM(S): 100 TABLET, FILM COATED ORAL at 05:45

## 2022-06-09 RX ADMIN — MEROPENEM 100 MILLIGRAM(S): 1 INJECTION INTRAVENOUS at 05:45

## 2022-06-09 RX ADMIN — HYDROMORPHONE HYDROCHLORIDE 2 MILLIGRAM(S): 2 INJECTION INTRAMUSCULAR; INTRAVENOUS; SUBCUTANEOUS at 15:10

## 2022-06-09 RX ADMIN — SENNA PLUS 2 TABLET(S): 8.6 TABLET ORAL at 22:18

## 2022-06-09 RX ADMIN — HYDROMORPHONE HYDROCHLORIDE 2 MILLIGRAM(S): 2 INJECTION INTRAMUSCULAR; INTRAVENOUS; SUBCUTANEOUS at 04:01

## 2022-06-09 RX ADMIN — HYDROMORPHONE HYDROCHLORIDE 2 MILLIGRAM(S): 2 INJECTION INTRAMUSCULAR; INTRAVENOUS; SUBCUTANEOUS at 10:43

## 2022-06-09 RX ADMIN — MEROPENEM 100 MILLIGRAM(S): 1 INJECTION INTRAVENOUS at 15:20

## 2022-06-09 RX ADMIN — Medication 30 MILLIGRAM(S): at 05:44

## 2022-06-09 RX ADMIN — HYDROMORPHONE HYDROCHLORIDE 2 MILLIGRAM(S): 2 INJECTION INTRAMUSCULAR; INTRAVENOUS; SUBCUTANEOUS at 00:18

## 2022-06-09 RX ADMIN — Medication 25 GRAM(S): at 17:45

## 2022-06-09 NOTE — PROGRESS NOTE ADULT - ASSESSMENT
25yo  F with PMH of Sickle cell anemia (on oxbryta - followed by Hematologist - Dr. Jimenez in [place]), recently discharged after treatment of pneumonia and sickle cell crisis, presented to the ED with c/o right sided chest, shoulder and upper back pain.  She also reports fever at home 102F.  She was recently treated for sickle cell crisis and pneumonia and discharged last week on Levaquin PO.    Hematology consulted for r/o ACS with hx of sickle cell disease.     IMPRESSION:    #ACS/Vaso-occlusive pain crisis/PNA  #Hx of Hb SS disease   -Was on Oxbryta, just started in April 2022 but hasn't been routinely taking it due to her recent hospitalizations/infections; never was on hydrea or any other HbSS medications  -Initially offered both exchange as patient was in high 80s on RA and simple transfusion however she declined, eventually she agreed to a simple transfusion.    -Baseline Hb 7-8  -She has had 2 crises far this year.   -Hb electrophoresis from 5/21 showed 90% HbS  -Viral panel noted: past EBV infection, hep panel non reactive    #Possible autoimmune hemolytic anemia   Direct Lori Profile (05.31.22 @ 08:00)    Dir Antiglob Polyspecific Interpretation: POS  - Spoke with Dr Burroughs from transfusion med, it is hard to call if pt has alloAb or autoab given chronic hemolysis    #Chronic leukocytosis and thrombocytosis: Resolving  - likely reactive     #Acute right knee pain -improved  - likely 2/2 sickle cell crisis. No evidence of effusion or AVN on Xray    #Right shin protuberance with tenderness to palpation  - Xray: Subtle sclerosis in the proximal tibial metaphysis, possibly developing osteonecrosis.    RECOMMENDATIONS:    - S/P exchange transfusion on 6/6/22 without any complications.   - F/u results from bronchoscopy: cultures negative so far  - Pt was started on Prednisone 50mg daily on 5/31. Decreased to 30mg qdaily on 6/8/22 and then taper by 10mg every week and then stop.   - C/w IV hydration: 0.45% NS @100cc /Hr  - C/w pain meds: De-escalate to PO regimen in anticipation of d/c tomorrow  - Warm compresses prn.   - IV Abx per ID  - C/w folic acid supplementation  - She can continue Oxbryta as outpatient and follow up with her primary hematologist post discharge.  - Monitor hemolysis labs and CBC.     Needs close follow up as O/P for CBC check given she is on taper of Prednisone: Will schedule for next week.     DVT ppx: Lovenox

## 2022-06-09 NOTE — PROGRESS NOTE ADULT - SUBJECTIVE AND OBJECTIVE BOX
25yo  F with PMH of Sickle cell anemia (on oxbryta - followed by Hematologist - Dr. Jimenez in [place]), recently discharged after treatment of pneumonia and sickle cell crisis, presented to the ED  on 5/26 with c/o right sided chest, shoulder and upper back pain. States that she overexerted herself. She states that she has not been feeling well since yesterday, had mild cough and fever up to 102F at home. Denies any shortness of breath, abdominal pain, nausea, vomiting, diarrhea, leg pain, swelling.   Patient was upgraded to ICU on 5/28 for exchange transfusion, received 2 U PRBC. Patient was downgraded  to floors.     Hemoglobin level is stable.   Today pt is c/o RLE pain, feels slightly better.       OBJECTIVE  PAST MEDICAL & SURGICAL HISTORY  Sickle cell anemia    S/P cholecystectomy    ALLERGIES:  No Known Allergies      HOME MEDICATIONS  Home Medications:  folic acid 1 mg oral tablet: 1 tab(s) orally once a day (20 May 2022 16:12)  losartan 25 mg oral tablet: 1 tab(s) orally once a day (20 May 2022 16:12)    VITAL SIGNS: Last 24 Hours  T(C): 36.4 (09 Jun 2022 13:24), Max: 36.8 (08 Jun 2022 20:21)  T(F): 97.6 (09 Jun 2022 13:24), Max: 98.2 (08 Jun 2022 20:21)  HR: 80 (09 Jun 2022 13:24) (80 - 97)  BP: 116/54 (09 Jun 2022 13:24) (112/62 - 116/69)  BP(mean): --  RR: 18 (09 Jun 2022 13:24) (18 - 18)  SpO2: 99% (09 Jun 2022 08:00) (99% - 99%)    PHYSICAL EXAM:  GENERAL: NAD, pleasant   HEAD:  Atraumatic, Normocephalic  EYES: EOMI, PERRLA, conjunctiva and sclera clear  ENT: Moist mucous membranes  CHEST/LUNG: Clear to auscultation bilaterally  HEART: Regular rate and rhythm; No murmur  ABDOMEN:  Soft, Nontender, Nondistended.  EXTREMITIES: No clubbing, cyanosis, very tender and indurated area noted on RLE below the knee   NERVOUS SYSTEM:  Alert . No deficits   SKIN: No rashes or lesions    LABS:                        9.3    17.46 )-----------( 497      ( 09 Jun 2022 07:35 )             26.7   06-09    138  |  101  |  <3<L>  ----------------------------<  113<H>  4.0   |  25  |  <0.5<L>    Ca    9.5      09 Jun 2022 07:35  Mg     1.4     06-09    TPro  6.3  /  Alb  2.9<L>  /  TBili  1.6<H>  /  DBili  x   /  AST  35  /  ALT  68<H>  /  AlkPhos  183<H>  06-09      RADIOLOGY:  < from: Xray Chest 1 View- PORTABLE-Routine (Xray Chest 1 View- PORTABLE-Routine in AM.) (06.07.22 @ 05:35) >  Impression:    Low lung volume.    Bibasilar opacities, unchanged.    Right IJ line.    < from: Xray Knee 1 or 2 Views, Right (06.06.22 @ 14:45) >    IMPRESSION:    Subtle sclerosis in the proximal tibial metaphysis, possibly developing   osteonecrosis.    < end of copied text >  < from: Xray Knee 1 or 2 Views, Right (06.06.22 @ 14:45) >    IMPRESSION:    Subtle sclerosis in the proximal tibial metaphysis, possibly developing   osteonecrosis.    < end of copied text >  < from: TTE Echo Complete w/o Contrast w/ Doppler (06.01.22 @ 16:34) >  Summary:   1. Normal global left ventricular systolic function.   2. LV Ejection Fraction by Carr's Method with a biplane EF of 63 %.   3. Normal left ventricular internal cavity size.   4. The mean global longitudinal peak strain by speckle tracking is   -20.8% which is normal.   5. Normal left atrial size.   6. Myxomatous mitral valve.   7. Trace mitral valve regurgitation.   8. LA volume Index is 22.0 ml/m² ml/m2.      MEDICATIONS  (STANDING):  chlorhexidine 4% Liquid 1 Application(s) Topical daily  dextrose 5% + sodium chloride 0.45%. 1000 milliLiter(s) (100 mL/Hr) IV Continuous <Continuous>  enoxaparin Injectable 40 milliGRAM(s) SubCutaneous every 24 hours  folic acid 1 milliGRAM(s) Oral daily  influenza   Vaccine 0.5 milliLiter(s) IntraMuscular once  lidocaine   4% Patch 1 Patch Transdermal daily  losartan 25 milliGRAM(s) Oral daily  magnesium sulfate  IVPB 2 Gram(s) IV Intermittent once  magnesium sulfate  IVPB 2 Gram(s) IV Intermittent once  meropenem  IVPB      meropenem  IVPB 1000 milliGRAM(s) IV Intermittent every 8 hours  pantoprazole    Tablet 40 milliGRAM(s) Oral before breakfast  polyethylene glycol 3350 17 Gram(s) Oral daily  predniSONE   Tablet 30 milliGRAM(s) Oral daily  senna 2 Tablet(s) Oral at bedtime    MEDICATIONS  (PRN):  acetaminophen     Tablet .. 650 milliGRAM(s) Oral every 6 hours PRN Temp greater or equal to 38C (100.4F), Mild Pain (1 - 3)  HYDROmorphone  Injectable 2 milliGRAM(s) IV Push every 2 hours PRN Severe Pain (7 - 10)  melatonin 3 milliGRAM(s) Oral at bedtime PRN Insomnia  ondansetron Injectable 4 milliGRAM(s) IV Push every 8 hours PRN Nausea and/or Vomiting

## 2022-06-09 NOTE — PROGRESS NOTE ADULT - ATTENDING COMMENTS
She was seen earlier today with fellow.  She continues to be febrile and she is more hypoxic.  She did not look comfortable but reported that it is due to pain and agreed to take intravenous Dilaudid.  Chest x-ray from today showing worsening right opacity/pleural effusion.    I recommended an exchange transfusion or at least simple transfusion but she was hesitant initially.  She was concerned about iron overload with a simple transfusion I explained that her ferritin is in the 80s and a few units of PRBC is unlikely to cause this after some time and convincing she agreed for a blood transfusion , one unit, but was considering exchange transfusion or another simple transfusion if her condition does not improve.  She is also being moved back to the intensive care unit.    Plan  #Vaso-occlusive pain crisis likely due to pneumonia and  acute chest syndrome.  -Continue with antibiotics as per infectious disease  -Follow-up cultures  -She agreed to a unit of PRBC only, exchange transfusion was recommended and she will consider if does not improve  -Continue with hydration at one half NS at 100 cc  -Continue with current  pain medications  -Continue with folic acid 1 mg daily  -Monitor CBC  -She also has IV Dilaudid ordered  -If there is any further  clinical evidence of deterioration we will arrange for a RBC exchange but patient need to agree, I explained the benefits in detail and she inforemed me that she did some reading online about acute chest syndrome.   -On Lovenox for DVT prophylaxis .
IMPRESSION    Sickle cell crisis  Acute chest syndrome refusing exchange transfusion   The present CXR findings are more likely to be related to ACS than a bacterial infection.    SP ABX therapy   Hypokalemia]    Plan as outlined above
Patient examined , above note read , feels better s/p exchange . less pain and SOB .  Suggest : taper prednisone . monitor CBC .                continue antibiotics as per ID ( until 6/10 ? )
Patient examined , above note read . still with mild leg pain due to evolving bone infarct . hgb stable , LDH slightly decreased.
She was seen earlier today with fellow.  She ended up receiving 2 units of blood yesterday.  Today in MICU she was in the chair she still has pain but feels better.  She was saturating 99- 100% on ambient air    Plan  #Vaso-occlusive pain crisis likely due to pneumonia and  acute chest syndrome.  -Continue with antibiotics as per infectious disease  -Cultures so far have been negative  -Received 2 units of blood on 5/28/2022 and clinically she is doing much better  -Continue with hydration at one half NS at 100 cc  -Continue with current  pain medications  -Continue with folic acid 1 mg daily  -Monitor CBC  -She also has IV Dilaudid ordered  -Given improvement will hold off further transfusions or exchange transfusion today and will reassess daily  -On Lovenox for DVT prophylaxis .
She was seen earlier today.  She was on room  air and saturating well.  Overall her pain is  better and her fever curve has been improving when we saw her in the morning.  She mainly feels tired but overall states she feels better as well.  Hemoglobin has been stable at 7.9 this was the last blood work from yesterday also leukocytosis and thrombocytosis is improving suggesting improvement in her status.    #Vaso-occlusive pain crisis likely due to pneumonia and  acute chest syndrome.  -Continue with antibiotics as per infectious disease to complete course   -Cultures so far have been negative  -Received 2 units of blood on 5/28/2022 and clinically she is doing much better   -Continue with hydration at one half NS at 100 cc  -Continue with current  pain medications  -Continue with folic acid 1 mg daily  -Monitor CBC  -Given improvement will hold off further transfusions or exchange transfusion today and will reassess daily  -On Lovenox for DVT prophylaxis .
patient examined , agree with above note , Hgb is stable , still with pain due to bone infarct on po meds. No SOB .   can discharge in AM , continue prednisone taper , doubt autoimmune hemolysis .
Agree with above A/P
Patient examined at the bedside , breathing and chest xray slightly improved .  localized area of tenderness and swelling in right leg likely representing bony infarct .  Impression : vasoocclusive crisis with acute chest syndrome slowly improving .   continue supportive care, pain meds, hydration , antibiotics as per ID .
Patient examined by myself , above reviewed , agree with content and recommendations, Hgb stable , mild tachypnea on O2 , lower extremity pain .  Awaiting Bronch results . Continues to decline rbc exchange .
Agree with above A/P
Agree with above A/P.  Pt having worsening bone pain in her knees. Denies SOB, chest pain. Advised pain meds, incentive spirometry.  If resp symptoms worsen may consider exchange transfusion
Agree with above A/p
Patient undergoing [place] placement . CBC noted . xray shows developing bone infarct in right tibia .
IMPRESSION    Sickle cell crisis  Possible superimposed bacterial infection   Acute chest syndrome refused exchange transfusion    Plan as outlined above
IMPRESSION    Sickle cell crisis improving clinically    Acute chest syndrome refused exchange transfusion.  SP one Unit PRBC    The present CXR findings are more likely to be related to ACS than a bacterial infection.    SP ABX therapy       Plan as outlined above

## 2022-06-09 NOTE — CONSULT NOTE ADULT - SUBJECTIVE AND OBJECTIVE BOX
TIBIAL NECROSIS CONSULT NOTE    T(C): 36.4 (06-09-22 @ 13:24), Max: 36.8 (06-08-22 @ 20:21)  HR: 80 (06-09-22 @ 13:24) (80 - 97)  BP: 116/54 (06-09-22 @ 13:24) (112/62 - 116/69)  RR: 18 (06-09-22 @ 13:24) (18 - 18)  SpO2: 99% (06-09-22 @ 08:00) (99% - 99%)    SICKLE CELL DISEASE,WITH ACUTE CHEST SYNDROME;PNEUMONIA;HYPOMAGNESEMIA    ^SICKLE CELL DISEASE,WITH ACUTE CHEST SYNDROME;PNEUMONIA;HYPOMAGNESEMIA    Handoff    MEWS Score    Sickle cell anemia    Sickle cell crisis    Sickle cell disease, with acute chest syndrome    Insertion, catheter, temporary, dialysis    S/P cholecystectomy    SOB / CHEST PAIN    3    Pneumonia    Hypomagnesemia    SysAdmin_VisitLink                            9.3    17.46 )-----------( 497      ( 09 Jun 2022 07:35 )             26.7     06-09    138  |  101  |  <3<L>  ----------------------------<  113<H>  4.0   |  25  |  <0.5<L>    Ca    9.5      09 Jun 2022 07:35  Mg     1.4     06-09    TPro  6.3  /  Alb  2.9<L>  /  TBili  1.6<H>  /  DBili  x   /  AST  35  /  ALT  68<H>  /  AlkPhos  183<H>  06-09      acetaminophen     Tablet .. 650 milliGRAM(s) Oral every 6 hours PRN  chlorhexidine 4% Liquid 1 Application(s) Topical daily  dextrose 5% + sodium chloride 0.45%. 1000 milliLiter(s) IV Continuous <Continuous>  enoxaparin Injectable 40 milliGRAM(s) SubCutaneous every 24 hours  folic acid 1 milliGRAM(s) Oral daily  HYDROmorphone   Tablet 6 milliGRAM(s) Oral every 4 hours PRN  HYDROmorphone  Injectable 2 milliGRAM(s) IV Push every 12 hours PRN  influenza   Vaccine 0.5 milliLiter(s) IntraMuscular once  lidocaine   4% Patch 1 Patch Transdermal daily  losartan 25 milliGRAM(s) Oral daily  magnesium sulfate  IVPB 2 Gram(s) IV Intermittent once  melatonin 3 milliGRAM(s) Oral at bedtime PRN  meropenem  IVPB      meropenem  IVPB 1000 milliGRAM(s) IV Intermittent every 8 hours  ondansetron Injectable 4 milliGRAM(s) IV Push every 8 hours PRN  pantoprazole    Tablet 40 milliGRAM(s) Oral before breakfast  polyethylene glycol 3350 17 Gram(s) Oral daily  predniSONE   Tablet 30 milliGRAM(s) Oral daily  senna 2 Tablet(s) Oral at bedtime    No Known Allergies    Pt is a 26 year old female with a PMHx of sickle cell anemia admitted for acute chest syndrome 5/26/22, received exchange transfusion 5/28/22. She complains of RLE pretibial pain that began around the time of admission and has only improved slightly since. She states the pain makes it difficult to walk. She states that she has had some LLE pretibial pain as well however it is not as severe as the right side. Pt states that her last sickle cell crisis was "too long ago to remember", but she was discharged from [facility] last week on levaquin following a diagnosis of pneumonia.     Physical Exam  Pt is AOx3, pleasant and cooperative.    LLE  No erythema, effusion, edema or cyanosis. Mild tenderness to palpation of the pretibial region. Full range of motion of hip, knee and ankle. Neurovascularly intact.     RLE  No erythema, effusion, edema or cyanosis. Moderate tenderness to palpation of the pretibial region. Full range of motion of the hip, knee and ankle. Neurovascularly intact.     Imaging  < from: Xray Knee 1 or 2 Views, Right (06.06.22 @ 14:45) >    Subtle sclerosis in the proximal tibial metaphysis, possibly developing   osteonecrosis.    A/P  Pt is a 26 year old female with bilateral pretibial pain secondary to sclerosis of the tibial metaphysis.   Blood cultures negative. Pt is afebrile.  ESR was elevated on 6/3/22; repeat ESR for downtrend  WBAT with walker  Reconsult ortho PRN         TIBIAL NECROSIS CONSULT NOTE    T(C): 36.4 (06-09-22 @ 13:24), Max: 36.8 (06-08-22 @ 20:21)  HR: 80 (06-09-22 @ 13:24) (80 - 97)  BP: 116/54 (06-09-22 @ 13:24) (112/62 - 116/69)  RR: 18 (06-09-22 @ 13:24) (18 - 18)  SpO2: 99% (06-09-22 @ 08:00) (99% - 99%)    SICKLE CELL DISEASE,WITH ACUTE CHEST SYNDROME;PNEUMONIA;HYPOMAGNESEMIA    ^SICKLE CELL DISEASE,WITH ACUTE CHEST SYNDROME;PNEUMONIA;HYPOMAGNESEMIA    Handoff    MEWS Score    Sickle cell anemia    Sickle cell crisis    Sickle cell disease, with acute chest syndrome    Insertion, catheter, temporary, dialysis    S/P cholecystectomy    SOB / CHEST PAIN    3    Pneumonia    Hypomagnesemia    SysAdmin_VisitLink                            9.3    17.46 )-----------( 497      ( 09 Jun 2022 07:35 )             26.7     06-09    138  |  101  |  <3<L>  ----------------------------<  113<H>  4.0   |  25  |  <0.5<L>    Ca    9.5      09 Jun 2022 07:35  Mg     1.4     06-09    TPro  6.3  /  Alb  2.9<L>  /  TBili  1.6<H>  /  DBili  x   /  AST  35  /  ALT  68<H>  /  AlkPhos  183<H>  06-09      acetaminophen     Tablet .. 650 milliGRAM(s) Oral every 6 hours PRN  chlorhexidine 4% Liquid 1 Application(s) Topical daily  dextrose 5% + sodium chloride 0.45%. 1000 milliLiter(s) IV Continuous <Continuous>  enoxaparin Injectable 40 milliGRAM(s) SubCutaneous every 24 hours  folic acid 1 milliGRAM(s) Oral daily  HYDROmorphone   Tablet 6 milliGRAM(s) Oral every 4 hours PRN  HYDROmorphone  Injectable 2 milliGRAM(s) IV Push every 12 hours PRN  influenza   Vaccine 0.5 milliLiter(s) IntraMuscular once  lidocaine   4% Patch 1 Patch Transdermal daily  losartan 25 milliGRAM(s) Oral daily  magnesium sulfate  IVPB 2 Gram(s) IV Intermittent once  melatonin 3 milliGRAM(s) Oral at bedtime PRN  meropenem  IVPB      meropenem  IVPB 1000 milliGRAM(s) IV Intermittent every 8 hours  ondansetron Injectable 4 milliGRAM(s) IV Push every 8 hours PRN  pantoprazole    Tablet 40 milliGRAM(s) Oral before breakfast  polyethylene glycol 3350 17 Gram(s) Oral daily  predniSONE   Tablet 30 milliGRAM(s) Oral daily  senna 2 Tablet(s) Oral at bedtime    No Known Allergies    Pt is a 26 year old female with a PMHx of sickle cell anemia admitted for acute chest syndrome 5/26/22, received exchange transfusion 5/28/22. She complains of RLE pretibial pain that began around the time of admission and has only improved slightly since. She states the pain makes it difficult to walk. She states that she has had some LLE pretibial pain as well however it is not as severe as the right side. Pt states that her last sickle cell crisis was "too long ago to remember", and does not have a usual region of pain during crises.   She was discharged from [facility] last week on levaquin following a diagnosis of pneumonia.     Physical Exam  Pt is AOx3, pleasant and cooperative.    LLE  No erythema, effusion, edema or cyanosis. Mild tenderness to palpation of the pretibial region. Full range of motion of hip, knee and ankle. Neurovascularly intact.     RLE  No erythema, effusion, edema or cyanosis. Moderate tenderness to palpation of the pretibial region. Full range of motion of the hip, knee and ankle. Neurovascularly intact.     Imaging  < from: Xray Knee 1 or 2 Views, Right (06.06.22 @ 14:45) >    Subtle sclerosis in the proximal tibial metaphysis, possibly developing   osteonecrosis.  no obvious osteomyelitis noted on plain radiographs .     A/P  Pt is a 26 year old female with bilateral pretibial pain secondary acute sickle cell crises .   Blood cultures negative, wbc downtrending Pt is afebrile. Infection is unlikely at this time.  WBAT with walker progress to crutches and a cane as tolerated   Reconsult ortho PRN

## 2022-06-09 NOTE — PROGRESS NOTE ADULT - ATTENDING SUPERVISION STATEMENT
Fellow

## 2022-06-09 NOTE — PROGRESS NOTE ADULT - ASSESSMENT
ASSESSMENT & PLAN  25yo  F with PMH of Sickle cell anemia (on oxbryta - followed by Hematologist - Dr. Jimenez in [place]), recently discharged after treatment of pneumonia and sickle cell crisis, presented to the ED  on 5/26 with c/o right sided chest, shoulder and upper back pain. States that she overexerted herself. She , had mild cough and fever up to 102F at home. Initially pt was admitted for sickle cell crisis, developed acute chest syndrome.    Patient was upgraded to ICU on 5/28 for exchange transfusion, received 2 U PRBC. Patient was downgraded back to floors on Cefepime and levofloxacin w resolving pyrexia. Dr Hobbs performed bedside US to assess for fluid in her lungs on may 31 and stated there was nothing to tap, most likely atalectasis. Heme/Onc saw patient, assessing for warm hemolytic antibodies in blood, started patient on prednsione 50mg. Patient noted b/l LE pain; Duplex was negative but RLE X-ray showed osteonecrosis. With increasing WBC up to 38.46, and T up to 101.2 (with increasing pro-inflammatory markers), increasing pain, patient was upgraded back to MICU on 6/3 and started on meropenem and Zyvox. Patient underwent  Bronchoscopy/BAL on 6/4 (cultures negative to date). Patient was agreeable to exchange transfusion, which patient received on 6/7, with improvement of Hgb (6.2 --> 9.7) and some improvement in pain. WBC downtrended, meropenem and zyvox were discontinued. Patient stable for downgrade back to floor.       #Sickle Cell Vaso-Occlusive  Crisis / Multilobar Pneumonia/ Acute Chest Syndrome/ Hb-SS Disease   - clinically improved, H/H stable   - Hb electrophoresis from 5/21 showed 90% Hb-S  - Initially SaO2 80s on RA, now requiring 1L-NC ~100%, wean off  - s/p 2U PRBCs, baseline Hb ~upper 6/low 7s?   - 6/6: patient received exchange transfusion  - monitor H/H, stable for last few days    - continue meropenem 1g q 8 hours until 6/10   - C/w Prednisone 50mg PO daily (as per Hem/Onc will be for two weeks since 5/31 w/ quick taper)   - C/w Folic Acid 1mg PO daily  - C/w Oxbryta as outpatient  - Pain control: Dilaudid 2mg IV q2h PRN   - call pain management for po meds on discharge   - c/w IV fluids while in the hospital   - monitor LDH, reticulocytes     #LE Pain / osteonecrosis    - LE Duplex negative for DVT 6/3  - RLE XR showing subtle sclerosis in the proximal tibial metaphysis, possibly developing osteonecrosis  - c/w pain meds   - warm compress   - ortho consult for weight bearing status         # DVT prophylaxis: Lovenox 40mg subQ daily  # GI prophylaxis: Protonix 40mg PO qAM  # Diet: Regular  # Activity: Increase as Tolerated  # Code status: Full  # Disposition:  discharge planning on Friday ( home)     # Pending: ortho consult for weight bearing status, PT alyssa    ASSESSMENT & PLAN  25yo  F with PMH of Sickle cell anemia (on oxbryta - followed by Hematologist - Dr. Jimenez in [place]), recently discharged after treatment of pneumonia and sickle cell crisis, presented to the ED  on 5/26 with c/o right sided chest, shoulder and upper back pain. States that she overexerted herself. She , had mild cough and fever up to 102F at home. Initially pt was admitted for sickle cell crisis, developed acute chest syndrome.    Patient was upgraded to ICU on 5/28 for exchange transfusion, received 2 U PRBC. Patient was downgraded back to floors on Cefepime and levofloxacin w resolving pyrexia. Dr Hobbs performed bedside US to assess for fluid in her lungs on may 31 and stated there was nothing to tap, most likely atalectasis. Heme/Onc saw patient, assessing for warm hemolytic antibodies in blood, started patient on prednsione 50mg. Patient noted b/l LE pain; Duplex was negative but RLE X-ray showed osteonecrosis. With increasing WBC up to 38.46, and T up to 101.2 (with increasing pro-inflammatory markers), increasing pain, patient was upgraded back to MICU on 6/3 and started on meropenem and Zyvox. Patient underwent  Bronchoscopy/BAL on 6/4 (cultures negative to date). Patient was agreeable to exchange transfusion, which patient received on 6/7, with improvement of Hgb (6.2 --> 9.7) and some improvement in pain. WBC downtrended, meropenem and zyvox were discontinued. Patient stable for downgrade back to floor.       #Sickle Cell Vaso-Occlusive  Crisis / Multilobar Pneumonia/ Acute Chest Syndrome/ Hb-SS Disease   - clinically improved, H/H stable   - Hb electrophoresis from 5/21 showed 90% Hb-S  - Initially SaO2 80s on RA, now requiring 1L-NC ~100%, wean off  - s/p 2U PRBCs, baseline Hb ~upper 6/low 7s?   - 6/6: patient received exchange transfusion  - monitor H/H, stable for last few days    - continue meropenem 1g q 8 hours until 6/10   - C/w Prednisone 50mg PO daily (as per Hem/Onc will be for two weeks since 5/31 w/ quick taper)   - C/w Folic Acid 1mg PO daily  - C/w Oxbryta as outpatient  - Pain control: Dilaudid 2mg IV q2h PRN   - call pain management for po meds on discharge   - c/w IV fluids while in the hospital   - monitor LDH, reticulocytes     #LE Pain / osteonecrosis    - LE Duplex negative for DVT 6/3  - RLE XR showing subtle sclerosis in the proximal tibial metaphysis, possibly developing osteonecrosis  - c/w pain meds   - warm compress   - ortho consult for weight bearing status         # DVT prophylaxis: Lovenox 40mg subQ daily  # GI prophylaxis: Protonix 40mg PO qAM  # Diet: Regular  # Activity: Increase as Tolerated  # Code status: Full    #Progress Note Handoff  Pending (specify):  pain management eval for po meds on discharge, give one dose of Ertapenem on Friday to cover to 24 hours, f/u ortho recommendations for weight bearing status on RLE ( pt has avascular necrosis with high risk of Fx), anticipate discharge in 24 hours   Family discussion:  Disposition: Home___/SNF___/Other________/Unknown at this time________

## 2022-06-09 NOTE — PROGRESS NOTE ADULT - SUBJECTIVE AND OBJECTIVE BOX
Patient is a 26y old  Female who presents with a chief complaint of Acute chest syndrome (09 Jun 2022 16:31)      Subjective: Pt still complains of right LE pain.       Vital Signs Last 24 Hrs  T(C): 36.4 (09 Jun 2022 13:24), Max: 36.8 (08 Jun 2022 20:21)  T(F): 97.6 (09 Jun 2022 13:24), Max: 98.2 (08 Jun 2022 20:21)  HR: 80 (09 Jun 2022 13:24) (80 - 97)  BP: 116/54 (09 Jun 2022 13:24) (112/62 - 116/69)  RR: 18 (09 Jun 2022 13:24) (18 - 18)  SpO2: 99% (09 Jun 2022 08:00) (99% - 99%)    PHYSICAL EXAM  General: adult in NAD  HEENT: clear oropharynx, anicteric sclera, pink conjunctiva  Neck: supple  CV: normal S1/S2 with no murmur rubs or gallops  Lungs: positive air movement b/l ant lungs  Abdomen: soft non-tender non-distended  Ext: no clubbing cyanosis or edema  Skin: no rashes and no petechiae  Neuro: alert and oriented X 4, no focal deficits    MEDICATIONS:  chlorhexidine 4% Liquid 1 Application(s) Topical daily  dextrose 5% + sodium chloride 0.45%. 1000 milliLiter(s) (100 mL/Hr) IV Continuous <Continuous>  enoxaparin Injectable 40 milliGRAM(s) SubCutaneous every 24 hours  folic acid 1 milliGRAM(s) Oral daily  HYDROmorphone   Tablet 6 milliGRAM(s) Oral once  influenza   Vaccine 0.5 milliLiter(s) IntraMuscular once  lidocaine   4% Patch 1 Patch Transdermal daily  losartan 25 milliGRAM(s) Oral daily  meropenem  IVPB      meropenem  IVPB 1000 milliGRAM(s) IV Intermittent every 8 hours  pantoprazole    Tablet 40 milliGRAM(s) Oral before breakfast  polyethylene glycol 3350 17 Gram(s) Oral daily  predniSONE   Tablet 30 milliGRAM(s) Oral daily  senna 2 Tablet(s) Oral at bedtime  acetaminophen     Tablet .. 650 milliGRAM(s) Oral every 6 hours PRN Temp greater or equal to 38C (100.4F), Mild Pain (1 - 3)  HYDROmorphone   Tablet 6 milliGRAM(s) Oral every 4 hours PRN Severe Pain (7 - 10)  HYDROmorphone  Injectable 2 milliGRAM(s) IV Push every 12 hours PRN Pain  melatonin 3 milliGRAM(s) Oral at bedtime PRN Insomnia  ondansetron Injectable 4 milliGRAM(s) IV Push every 8 hours PRN Nausea and/or Vomiting      LABS:                          9.3    17.46 )-----------( 497      ( 09 Jun 2022 07:35 )             26.7         Mean Cell Volume : 89.3 fL  Mean Cell Hemoglobin : 31.1 pg  Mean Cell Hemoglobin Concentration : 34.8 g/dL  Auto Neutrophil # : 13.09 K/uL  Auto Lymphocyte # : 2.30 K/uL  Auto Monocyte # : 1.48 K/uL  Auto Eosinophil # : 0.14 K/uL  Auto Basophil # : 0.05 K/uL  Auto Neutrophil % : 74.9 %  Auto Lymphocyte % : 13.2 %  Auto Monocyte % : 8.5 %  Auto Eosinophil % : 0.8 %  Auto Basophil % : 0.3 %      Serial CBC's  06-09 @ 07:35  Hct-26.7 / Hgb-9.3 / Plat-497 / RBC-2.99 / WBC-17.46  Serial CBC's  06-08 @ 07:33  Hct-26.1 / Hgb-9.0 / Plat-387 / RBC-3.01 / WBC-18.98  Serial CBC's  06-07 @ 04:53  Hct-27.1 / Hgb-9.7 / Plat-297 / RBC-3.18 / WBC-20.40  Serial CBC's  06-06 @ 16:52  Hct-21.6 / Hgb-7.3 / Plat-510 / RBC-2.47 / WBC-17.13  Serial CBC's  06-06 @ 04:40  Hct-18.8 / Hgb-6.2 / Plat-434 / RBC-2.14 / WBC-21.97      06-09    138  |  101  |  <3<L>  ----------------------------<  113<H>  4.0   |  25  |  <0.5<L>    Ca    9.5      09 Jun 2022 07:35  Mg     1.4     06-09    TPro  6.3  /  Alb  2.9<L>  /  TBili  1.6<H>  /  DBili  x   /  AST  35  /  ALT  68<H>  /  AlkPhos  183<H>  06-09    Reticulocyte Percent: 12.0 % (06-09 @ 07:35)  Reticulocyte Percent: 8.4 % (06-07 @ 04:53)  Ferritin, Serum: 2669 ng/mL (06-04 @ 04:36)  Ferritin, Serum: 2493 ng/mL (06-03 @ 18:55)  Reticulocyte Percent: 15.3 % (06-03 @ 06:22)

## 2022-06-09 NOTE — PROGRESS NOTE ADULT - TIME BILLING
I have personally seen and examined this patient.    I have reviewed all pertinent clinical information and reviewed all relevant imaging and diagnostic studies personally.   I counseled the patient about diagnostic testing and treatment plan. All questions were answered.   I discussed recommendations with the primary team.
direct pt's care, communication with medical team, chart review, pt's counseling
direct pt's care, communication with medical team, chart review, pt's counseling
Counseled patient about diagnostic testing and treatment plan. All questions answered.
direct patient care and chart review    -coordinated current plan of care with medical staff on MDR
Counseled patient about diagnostic testing and treatment plan. All questions answered.

## 2022-06-09 NOTE — PROGRESS NOTE ADULT - SUBJECTIVE AND OBJECTIVE BOX
JAMES VÁSQUEZ 26y Female  MRN#: [id number]   CODE STATUS:________    Hospital Day: 14d    Pt is currently admitted with the primary diagnosis of Acute Chest Syndrome    SUBJECTIVE  Hospital Course    27yo  F with PMH of Sickle cell anemia (on oxbryta - followed by Hematologist - Dr. Jimenez in [place]), recently discharged after treatment of pneumonia and sickle cell crisis, presented to the ED  on 5/26 with c/o right sided chest, shoulder and upper back pain. States that she overexerted herself. She states that she has not been feeling well since yesterday, had mild cough and fever up to 102F at home. Denies any shortness of breath, abdominal pain, nausea, vomiting, diarrhea, leg pain, swelling.   Patient was upgraded to ICU on 5/28 for exchange transfusion, received 2 U PRBC. Patient was downgraded back to floors.       The patient was seen and examined ,alert oriented ,comfortable in bed. pain controlled.  No new or overnight events.                                              ----------------------------------------------------------  OBJECTIVE  PAST MEDICAL & SURGICAL HISTORY  Sickle cell anemia    S/P cholecystectomy                                              -----------------------------------------------------------  ALLERGIES:  No Known Allergies                                            ------------------------------------------------------------    HOME MEDICATIONS  Home Medications:  folic acid 1 mg oral tablet: 1 tab(s) orally once a day (20 May 2022 16:12)  losartan 25 mg oral tablet: 1 tab(s) orally once a day (20 May 2022 16:12)                           MEDICATIONS:  STANDING MEDICATIONS  chlorhexidine 4% Liquid 1 Application(s) Topical daily  dextrose 5% + sodium chloride 0.45%. 1000 milliLiter(s) IV Continuous <Continuous>  enoxaparin Injectable 40 milliGRAM(s) SubCutaneous every 24 hours  folic acid 1 milliGRAM(s) Oral daily  influenza   Vaccine 0.5 milliLiter(s) IntraMuscular once  lidocaine   4% Patch 1 Patch Transdermal daily  losartan 25 milliGRAM(s) Oral daily  meropenem  IVPB      meropenem  IVPB 1000 milliGRAM(s) IV Intermittent every 8 hours  pantoprazole    Tablet 40 milliGRAM(s) Oral before breakfast  polyethylene glycol 3350 17 Gram(s) Oral daily  predniSONE   Tablet 30 milliGRAM(s) Oral daily  senna 2 Tablet(s) Oral at bedtime    PRN MEDICATIONS  acetaminophen     Tablet .. 650 milliGRAM(s) Oral every 6 hours PRN  HYDROmorphone  Injectable 2 milliGRAM(s) IV Push every 2 hours PRN  melatonin 3 milliGRAM(s) Oral at bedtime PRN  ondansetron Injectable 4 milliGRAM(s) IV Push every 8 hours PRN                                            ------------------------------------------------------------  VITAL SIGNS: Last 24 Hours  T(C): 36.3 (09 Jun 2022 05:35), Max: 36.8 (08 Jun 2022 20:21)  T(F): 97.3 (09 Jun 2022 05:35), Max: 98.2 (08 Jun 2022 20:21)  HR: 86 (09 Jun 2022 05:35) (86 - 97)  BP: 112/62 (09 Jun 2022 05:35) (112/62 - 116/69)  BP(mean): --  RR: 18 (09 Jun 2022 05:35) (18 - 18)  SpO2: 99% (09 Jun 2022 08:00) (99% - 99%)                                             --------------------------------------------------------------  LABS:                        9.3    17.46 )-----------( 497      ( 09 Jun 2022 07:35 )             26.7     06-09    138  |  101  |  <3<L>  ----------------------------<  113<H>  4.0   |  25  |  <0.5<L>    Ca    9.5      09 Jun 2022 07:35  Mg     1.4     06-09    TPro  6.3  /  Alb  2.9<L>  /  TBili  1.6<H>  /  DBili  x   /  AST  35  /  ALT  68<H>  /  AlkPhos  183<H>  06-09                                                      -------------------------------------------------------------  RADIOLOGY:    < from: Xray Chest 1 View- PORTABLE-Routine (Xray Chest 1 View- PORTABLE-Routine in AM.) (06.07.22 @ 05:35) >  Low lung volume.    Bibasilar opacities, unchanged.    < end of copied text >                                          --------------------------------------------------------------    PHYSICAL EXAM:  GENERAL: NAD, lying in bed comfortably  HEAD:  Atraumatic, Normocephalic  ENT: Moist mucous membranes  CHEST/LUNG: Clear to auscultation bilaterally  HEART: Regular rate and rhythm; No murmur  ABDOMEN:  Soft, Nontender, Nondistended.  EXTREMITIES: No clubbing, cyanosis, or edema  NERVOUS SYSTEM:  Alert . No deficits   SKIN: No rashes or lesions                                           --------------------------------------------------------------    ASSESSMENT & PLAN    Past medical history and hospital course     27yo  F with PMH of Sickle cell anemia (on oxbryta - followed by Hematologist - Dr. Jimenez in [place]), recently discharged after treatment of pneumonia and sickle cell crisis, presented to the ED  on 5/26 with c/o right sided chest, shoulder and upper back pain. States that she overexerted herself. She states that she has not been feeling well since yesterday, had mild cough and fever up to 102F at home. Denies any shortness of breath, abdominal pain, nausea, vomiting, diarrhea, leg pain, swelling.     Patient was upgraded to ICU on 5/28 for exchange transfusion, received 2 U PRBC. Patient was downgraded back to floors on Cefepime and levofloxacin w resolving pyrexia. Dr Hobbs performed bedside US to assess for fluid in her lungs on may 31 and stated there was nothing to tap, most likely atalectasis. Heme/Onc saw patient, assessing for warm hemolytic antibodies in blood, started patient on prednsione 50mg. Patient noted b/l LE pain; Duplex was negative but RLE X-ray showed osteonecrosis. With increasing WBC up to 38.46, and T up to 101.2 (with increasing pro-inflammatory markers), increasing pain, patient was upgraded back to MICU on 6/3 and started on meropenem and Zyvox. Patient received Bronchoscopy/BAL on 6/4 (cultures negative to date). Patient was agreeable to exchange transfusion, which patient received on 6/7, with improvement of Hgb (6.2 --> 9.7) and some improvement in pain. WBC downtrended, meropenem and zyvox were discontinued. Patient stable for downgrade back to floor.       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   - s/p bronch 6/4 - no significant purulent secretions noted   - 6/6: patient received exchange transfusion, with improvement in Hgb (6.2 --> 9.7>9>9.3)   - continue meropenem 1g q 8 hours until 6/10   - C/w Prednisone 50mg PO daily (as per Hem/Onc will be for two weeks since 5/31 w/ quick taper)   - C/w Folic Acid 1mg PO daily  - C/w Oxbryta as outpatient  - Pain control: Dilaudid 2mg IV q2h PRN (on senna/miralax) ,pain management   - C/w D5 1/2-NS @100cc  - Daily hemolysis labs/LDH  - F/u BCx and Bronchial cultures     #LE Pain - likely secondary to Pain Crisis  - LE Duplex negative for DVT 6/3  - RLE XR showing subtle sclerosis in the proximal tibial metaphysis, possibly developing osteonecrosis  - Pain should improve w/ exchange transfusion  - C/w IVF, dilaudid and warm compresses     #Acute on Chronic Leukocytosis/Thrombocytosis 2/2 possible superimposed bacterial infection vs reactive leukocytosis  - WBC stable/improving, ICS @bedside  - recent hospitalization with Pseudomonas  - BCx negative 5/27-6/2  - D/c Zyvox and Levaquin   - C/w Meropenem 1g IV q8h  -  ID following     #Acute Intravascular Hemolysis  - had +anti-C, anti-E, anti-S (Rh) + Warm-abs  - 6/1 IgM EBG + CMV negative, Hep-Panel negative, Mycoplasma negative, 6/3 HIV negative  - monitor LDH daily with Retic-Count  - monitor Bilirubin daily- f/u Heme/Onc      # DVT prophylaxis: Lovenox 40mg subQ daily  # GI prophylaxis: Protonix 40mg PO qAM  # Diet: Regular  # Activity: Increase as Tolerated  # Code status: Full  # Disposition:  floor     # Handoff: F/u Hem/Onc recs, pain control, completion of antibiotics ,Bronchial cultures

## 2022-06-09 NOTE — PHYSICAL THERAPY INITIAL EVALUATION ADULT - SPECIFY REASON(S)
Discussed with DR house Both agreed ortho consult would be appropriate first in order to determine safe weight bearing status 2/2 R lower leg crisis and risk of pathological fx. will f/u

## 2022-06-10 ENCOUNTER — TRANSCRIPTION ENCOUNTER (OUTPATIENT)
Age: 27
End: 2022-06-10

## 2022-06-10 VITALS
TEMPERATURE: 98 F | RESPIRATION RATE: 18 BRPM | HEART RATE: 80 BPM | DIASTOLIC BLOOD PRESSURE: 68 MMHG | SYSTOLIC BLOOD PRESSURE: 118 MMHG

## 2022-06-10 DIAGNOSIS — D57.00 HB-SS DISEASE WITH CRISIS, UNSPECIFIED: ICD-10-CM

## 2022-06-10 LAB
ALBUMIN SERPL ELPH-MCNC: 3.3 G/DL — LOW (ref 3.5–5.2)
ALP SERPL-CCNC: 171 U/L — HIGH (ref 30–115)
ALT FLD-CCNC: 56 U/L — HIGH (ref 0–41)
ANION GAP SERPL CALC-SCNC: 14 MMOL/L — SIGNIFICANT CHANGE UP (ref 7–14)
AST SERPL-CCNC: 32 U/L — SIGNIFICANT CHANGE UP (ref 0–41)
BASOPHILS # BLD AUTO: 0.06 K/UL — SIGNIFICANT CHANGE UP (ref 0–0.2)
BASOPHILS NFR BLD AUTO: 0.4 % — SIGNIFICANT CHANGE UP (ref 0–1)
BILIRUB SERPL-MCNC: 1.9 MG/DL — HIGH (ref 0.2–1.2)
BUN SERPL-MCNC: 4 MG/DL — LOW (ref 10–20)
CALCIUM SERPL-MCNC: 9.8 MG/DL — SIGNIFICANT CHANGE UP (ref 8.5–10.1)
CHLORIDE SERPL-SCNC: 99 MMOL/L — SIGNIFICANT CHANGE UP (ref 98–110)
CO2 SERPL-SCNC: 26 MMOL/L — SIGNIFICANT CHANGE UP (ref 17–32)
CREAT SERPL-MCNC: <0.5 MG/DL — LOW (ref 0.7–1.5)
EGFR: 142 ML/MIN/1.73M2 — SIGNIFICANT CHANGE UP
EOSINOPHIL # BLD AUTO: 0.17 K/UL — SIGNIFICANT CHANGE UP (ref 0–0.7)
EOSINOPHIL NFR BLD AUTO: 1 % — SIGNIFICANT CHANGE UP (ref 0–8)
GLUCOSE SERPL-MCNC: 94 MG/DL — SIGNIFICANT CHANGE UP (ref 70–99)
HCT VFR BLD CALC: 28.5 % — LOW (ref 37–47)
HGB BLD-MCNC: 9.6 G/DL — LOW (ref 12–16)
IMM GRANULOCYTES NFR BLD AUTO: 1.5 % — HIGH (ref 0.1–0.3)
LDH SERPL L TO P-CCNC: 573 — HIGH (ref 50–242)
LYMPHOCYTES # BLD AUTO: 17.4 % — LOW (ref 20.5–51.1)
LYMPHOCYTES # BLD AUTO: 2.95 K/UL — SIGNIFICANT CHANGE UP (ref 1.2–3.4)
MAGNESIUM SERPL-MCNC: 1.9 MG/DL — SIGNIFICANT CHANGE UP (ref 1.8–2.4)
MCHC RBC-ENTMCNC: 30.4 PG — SIGNIFICANT CHANGE UP (ref 27–31)
MCHC RBC-ENTMCNC: 33.7 G/DL — SIGNIFICANT CHANGE UP (ref 32–37)
MCV RBC AUTO: 90.2 FL — SIGNIFICANT CHANGE UP (ref 81–99)
MONOCYTES # BLD AUTO: 2.41 K/UL — HIGH (ref 0.1–0.6)
MONOCYTES NFR BLD AUTO: 14.2 % — HIGH (ref 1.7–9.3)
NEUTROPHILS # BLD AUTO: 11.14 K/UL — HIGH (ref 1.4–6.5)
NEUTROPHILS NFR BLD AUTO: 65.5 % — SIGNIFICANT CHANGE UP (ref 42.2–75.2)
NRBC # BLD: 2 /100 WBCS — HIGH (ref 0–0)
PLATELET # BLD AUTO: 588 K/UL — HIGH (ref 130–400)
POTASSIUM SERPL-MCNC: 4.3 MMOL/L — SIGNIFICANT CHANGE UP (ref 3.5–5)
POTASSIUM SERPL-SCNC: 4.3 MMOL/L — SIGNIFICANT CHANGE UP (ref 3.5–5)
PROT SERPL-MCNC: 6.7 G/DL — SIGNIFICANT CHANGE UP (ref 6–8)
RBC # BLD: 3.16 M/UL — LOW (ref 4.2–5.4)
RBC # BLD: 3.16 M/UL — LOW (ref 4.2–5.4)
RBC # FLD: 19.4 % — HIGH (ref 11.5–14.5)
RETICS #: 371.3 K/UL — HIGH (ref 25–125)
RETICS/RBC NFR: 11.8 % — HIGH (ref 0.5–1.5)
SODIUM SERPL-SCNC: 139 MMOL/L — SIGNIFICANT CHANGE UP (ref 135–146)
WBC # BLD: 16.98 K/UL — HIGH (ref 4.8–10.8)
WBC # FLD AUTO: 16.98 K/UL — HIGH (ref 4.8–10.8)

## 2022-06-10 PROCEDURE — 99239 HOSP IP/OBS DSCHRG MGMT >30: CPT

## 2022-06-10 RX ORDER — DICLOFENAC SODIUM 75 MG/1
1 TABLET, DELAYED RELEASE ORAL
Qty: 42 | Refills: 0
Start: 2022-06-10 | End: 2022-06-23

## 2022-06-10 RX ORDER — MAGNESIUM SULFATE 500 MG/ML
2 VIAL (ML) INJECTION ONCE
Refills: 0 | Status: DISCONTINUED | OUTPATIENT
Start: 2022-06-10 | End: 2022-06-10

## 2022-06-10 RX ORDER — POLYETHYLENE GLYCOL 3350 17 G/17G
17 POWDER, FOR SOLUTION ORAL
Qty: 510 | Refills: 1
Start: 2022-06-10 | End: 2022-08-08

## 2022-06-10 RX ORDER — PANTOPRAZOLE SODIUM 20 MG/1
1 TABLET, DELAYED RELEASE ORAL
Qty: 30 | Refills: 0
Start: 2022-06-10

## 2022-06-10 RX ORDER — OXYCODONE HYDROCHLORIDE 5 MG/1
15 TABLET ORAL EVERY 4 HOURS
Refills: 0 | Status: DISCONTINUED | OUTPATIENT
Start: 2022-06-10 | End: 2022-06-10

## 2022-06-10 RX ORDER — ACETAMINOPHEN 500 MG
2 TABLET ORAL
Qty: 240 | Refills: 1
Start: 2022-06-10 | End: 2022-08-08

## 2022-06-10 RX ADMIN — PANTOPRAZOLE SODIUM 40 MILLIGRAM(S): 20 TABLET, DELAYED RELEASE ORAL at 05:55

## 2022-06-10 RX ADMIN — Medication 1 MILLIGRAM(S): at 11:40

## 2022-06-10 RX ADMIN — Medication 30 MILLIGRAM(S): at 05:55

## 2022-06-10 RX ADMIN — HYDROMORPHONE HYDROCHLORIDE 1 MILLIGRAM(S): 2 INJECTION INTRAMUSCULAR; INTRAVENOUS; SUBCUTANEOUS at 00:56

## 2022-06-10 RX ADMIN — OXYCODONE HYDROCHLORIDE 15 MILLIGRAM(S): 5 TABLET ORAL at 11:39

## 2022-06-10 RX ADMIN — HYDROMORPHONE HYDROCHLORIDE 1 MILLIGRAM(S): 2 INJECTION INTRAMUSCULAR; INTRAVENOUS; SUBCUTANEOUS at 00:20

## 2022-06-10 RX ADMIN — LOSARTAN POTASSIUM 25 MILLIGRAM(S): 100 TABLET, FILM COATED ORAL at 05:55

## 2022-06-10 RX ADMIN — HYDROMORPHONE HYDROCHLORIDE 2 MILLIGRAM(S): 2 INJECTION INTRAMUSCULAR; INTRAVENOUS; SUBCUTANEOUS at 06:23

## 2022-06-10 RX ADMIN — ERTAPENEM SODIUM 120 MILLIGRAM(S): 1 INJECTION, POWDER, LYOPHILIZED, FOR SOLUTION INTRAMUSCULAR; INTRAVENOUS at 11:28

## 2022-06-10 RX ADMIN — ENOXAPARIN SODIUM 40 MILLIGRAM(S): 100 INJECTION SUBCUTANEOUS at 01:55

## 2022-06-10 NOTE — CONSULT NOTE ADULT - ASSESSMENT
Patient is a 25 y/o woman with sickle cell disease who was admitted on 5/26/2022 with concern for acute chest syndrome on 5/26/2022 now with ongoing pain from crisis.     Opioid Risk Assessment Tool                                                                         Female       Male  Family History  Alcohol                                                              1                3  Illegal drugs                                                       2                3  Rx drugs                                                            4                4    Personal History   Alcohol                                                              3                3  Illegal drugs                                                       4                4  Rx drugs                                                            5                5    Age between 16—45 years                                  1                1  History of preadolescent sexual abuse                 3                0    Psychological disease  ADD, OCD, bipolar, schizophrenia                       2                2  Depression                                                        1                1    Total Score                                                       1              __    0 - 3 = low risk for future opioid abuse  4 - 7 = moderate risk for future opioid abuse  8+ = high risk for future opioid abuse

## 2022-06-10 NOTE — CONSULT NOTE ADULT - CONSULT REQUESTED DATE/TIME
27-May-2022 09:19
06-Jun-2022 12:33
27-May-2022 05:37
09-Jun-2022 16:31
27-May-2022 08:54
10-Mil[place]-2022 08:29

## 2022-06-10 NOTE — PROGRESS NOTE ADULT - PROVIDER SPECIALTY LIST ADULT
Heme/Onc
Heme/Onc
Infectious Disease
Internal Medicine
MICU
Critical Care
Heme/Onc
Hospitalist
Internal Medicine
Critical Care
Heme/Onc
Hospitalist
Hospitalist
Infectious Disease
Internal Medicine
Internal Medicine
Transfusion Medicine
Critical Care
Critical Care
Heme/Onc
Heme/Onc
Hospitalist
Infectious Disease
Internal Medicine
Pulmonology
Pulmonology
Hospitalist
Infectious Disease
Infectious Disease

## 2022-06-10 NOTE — CONSULT NOTE ADULT - CONSULT REASON
tibial necrosis
ACS
Sickle cell crisis
PNA
Placement of temporary hemodialysis catheter
Sickle cell crisis

## 2022-06-10 NOTE — DISCHARGE NOTE PROVIDER - HOSPITAL COURSE
27yo  F with PMH of Sickle cell anemia (on oxbryta - followed by Hematologist - Dr. Jimenez in [place]), recently discharged after treatment of pneumonia and sickle cell crisis, presented to the ED  on 5/26 with c/o right sided chest, shoulder and upper back pain. States that she overexerted herself. She states that she has not been feeling well since yesterday, had mild cough and fever up to 102F at home. Denies any shortness of breath, abdominal pain, nausea, vomiting, diarrhea, leg pain, swelling.     Patient was upgraded to ICU on 5/28 for exchange transfusion, received 2 U PRBC. Patient was downgraded back to floors on Cefepime and levofloxacin w resolving pyrexia. Dr Hobbs performed bedside US to assess for fluid in her lungs on may 31 and stated there was nothing to tap, most likely atalectasis. Heme/Onc saw patient, assessing for warm hemolytic antibodies in blood, started patient on prednsione 50mg. Patient noted b/l LE pain; Duplex was negative but RLE X-ray showed osteonecrosis. With increasing WBC up to 38.46, and T up to 101.2 (with increasing pro-inflammatory markers), increasing pain, patient was upgraded back to MICU on 6/3 and started on meropenem and Zyvox. Patient received Bronchoscopy/BAL on 6/4 (cultures negative to date). Patient was agreeable to exchange transfusion, which patient received on 6/7, with improvement of Hgb (6.2 --> 9.7) and some improvement in pain. WBC downtrended, meropenem and zyvox were discontinued. Patient stable for downgrade back to floor.    On Floor> patient was weaned off oxygen , pain regimen adjusted and completed her course of antibiotics ,she worker with PT.       #Sickle Cell Vaso-Occlusive Pain Crisis w/ Multilobar Pneumonia  #Acute Chest Syndrome  #Hb-SS Disease - Hb electrophoresis from 5/21 showed 90% Hb-S  CXR increased opacity  RLL , 5/20 CT bibasilar consolidation >CXR on 6/5 showing b/l opacities, improving    Initially SaO2 80s on RA, now requiring 1L-NC ~100%, wean off  S/p Bronch/BAL 6/4, cultures negative to date  s/p 2U PRBCs, baseline Hb ~upper 6/low 7s?   s/p bronch 6/4 - no significant purulent secretions noted ,bronchial cultures in progress  6/6: patient received exchange transfusion, with improvement in Hgb (6.2 --> 9.7>9>9.3)   - continue meropenem 1g q 8 hours until 6/10   - C/w Prednisone 50mg PO daily (as per Hem/Onc will be for two weeks since 5/31 w/ quick taper)   - C/w Folic Acid 1mg PO daily  - C/w Oxbryta as outpatient  - Pain control: Dilaudid 2mg IV q2h PRN (on senna/miralax) ,pain management   - C/w D5 1/2-NS @100cc  - Daily hemolysis labs/LDH  - F/u BCx and Bronchial cultures     #LE Pain - likely secondary to Pain Crisis  - LE Duplex negative for DVT 6/3  - RLE XR showing subtle sclerosis in the proximal tibial metaphysis, possibly developing osteonecrosis  - Pain should improve w/ exchange transfusion  - C/w IVF, dilaudid and warm compresses     #Acute on Chronic Leukocytosis/Thrombocytosis 2/2 possible superimposed bacterial infection vs reactive leukocytosis  - WBC stable/improving, ICS @bedside  - recent hospitalization with Pseudomonas  - BCx negative 5/27-6/2  - D/c Zyvox and Levaquin   - C/w Meropenem 1g IV q8h  -  ID following     #Acute Intravascular Hemolysis  - had +anti-C, anti-E, anti-S (Rh) + Warm-abs  - 6/1 IgM EBG + CMV negative, Hep-Panel negative, Mycoplasma negative, 6/3 HIV negative  - monitor LDH daily with Retic-Count  - monitor Bilirubin daily- f/u Heme/Onc   25yo  F with PMH of Sickle cell anemia (on oxbryta - followed by Hematologist - Dr. Jimenez in [place]), recently discharged after treatment of pneumonia and sickle cell crisis, presented to the ED  on 5/26 with c/o right sided chest, shoulder and upper back pain. States that she overexerted herself. She states that she has not been feeling well since yesterday, had mild cough and fever up to 102F at home. Denies any shortness of breath, abdominal pain, nausea, vomiting, diarrhea, leg pain, swelling.     Patient was upgraded to ICU on 5/28 for exchange transfusion, received 2 U PRBC. Patient was downgraded back to floors on Cefepime and levofloxacin w resolving pyrexia. Dr Hobbs performed bedside US to assess for fluid in her lungs on may 31 and stated there was nothing to tap, most likely atalectasis. Heme/Onc saw patient, assessing for warm hemolytic antibodies in blood, started patient on prednsione 50mg. Patient noted b/l LE pain; Duplex was negative but RLE X-ray showed osteonecrosis. With increasing WBC up to 38.46, and T up to 101.2 (with increasing pro-inflammatory markers), increasing pain, patient was upgraded back to MICU on 6/3 and started on meropenem and Zyvox. Patient received Bronchoscopy/BAL on 6/4 (cultures negative to date). Patient was agreeable to exchange transfusion, which patient received on 6/7, with improvement of Hgb (6.2 --> 9.7) and some improvement in pain. WBC downtrended, meropenem and zyvox were discontinued. Patient stable for downgrade back to floor.  On Floor> patient was weaned off oxygen , pain regimen adjusted and completed her course of antibiotics ,she worker with PT.     Sickle Cell Vaso-Occlusive Pain Crisis w/ Multilobar Pneumonia  Acute Chest Syndrome  Hb-SS Disease - Hb electrophoresis from 5/21 showed 90% Hb-S  CXR increased opacity  RLL , 5/20 CT bibasilar consolidation >CXR on 6/5 showing b/l opacities, improving   s/p 2U PRBCs, baseline Hb ~upper 6/low 7s?   s/p bronch 6/4 - no significant purulent secretions noted ,bronchial cultures in progress  6/6: patient received exchange transfusion, with improvement in Hgb (6.2 --> 9.7>9>9.3)   Completed meropenem 1g q 8 hours  6/10   Pt was started on Prednisone 50mg daily on 5/31. Decreased to 30mg daily on 6/8/22 and then taper by 10mg every week and then stop.   Continue Folic Acid 1mg PO daily  Continue Oxbryta as outpatient and follow up with her primary hematologist post discharge.  pain management were consulted   Blood cultures were negative and Bronchial cultures are in progress    #LE Pain - likely secondary to Pain Crisis  LE Duplex negative for DVT 6/3  RLE XR showing subtle sclerosis in the proximal tibial metaphysis, possibly developing osteonecrosis  Blood cultures negative, wbc downtrending Pt is afebrile. Infection is unlikely at this time.  WBAT with walker progress to crutches and a cane as tolerated ,warm compress as needed     #Acute on Chronic Leukocytosis/Thrombocytosis 2/2 possible superimposed bacterial infection vs reactive leukocytosis  WBC stable/improving, ICS @bedside   recent hospitalization with Pseudomonas  Blood cultures negative ,completed meropenem course     #Acute Intravascular Hemolysis   had +anti-C, anti-E, anti-S (Rh) + Warm-abs   6/1 IgM EBG + CMV negative, Hep-Panel negative, Mycoplasma negative, 6/3 HIV negative  Pt was started on Prednisone 50mg daily on 5/31. Decreased to 30mg daily on 6/8/22 and then taper by 10mg every week and then stop.    Follow up outpatient with Hematology ,pulm and PCP     27yo  F with PMH of Sickle cell anemia (on oxbryta - followed by Hematologist - Dr. Jimenez in [place]), recently discharged after treatment of pneumonia and sickle cell crisis, presented to the ED  on 5/26 with c/o right sided chest, shoulder and upper back pain. States that she overexerted herself. She states that she has not been feeling well since yesterday, had mild cough and fever up to 102F at home. Denies any shortness of breath, abdominal pain, nausea, vomiting, diarrhea, leg pain, swelling.     Patient was upgraded to ICU on 5/28 for exchange transfusion, received 2 U PRBC. Patient was downgraded back to floors on Cefepime and levofloxacin w resolving pyrexia. Dr Hobbs performed bedside US to assess for fluid in her lungs on may 31 and stated there was nothing to tap, most likely atalectasis. Heme/Onc saw patient, assessing for warm hemolytic antibodies in blood, started patient on prednsione 50mg. Patient noted b/l LE pain; Duplex was negative but RLE X-ray showed osteonecrosis. With increasing WBC up to 38.46, and T up to 101.2 (with increasing pro-inflammatory markers), increasing pain, patient was upgraded back to MICU on 6/3 and started on meropenem and Zyvox. Patient received Bronchoscopy/BAL on 6/4 (cultures negative to date). Patient was agreeable to exchange transfusion, which patient received on 6/7, with improvement of Hgb (6.2 --> 9.7) and some improvement in pain. WBC downtrended, meropenem and zyvox were discontinued. Patient stable for downgrade back to floor.  On Floor> patient was weaned off oxygen , pain regimen adjusted and completed her course of antibiotics ,she worker with PT.     Sickle Cell Vaso-Occlusive Pain Crisis w/ Multilobar Pneumonia  Acute Chest Syndrome  Hb-SS Disease - Hb electrophoresis from 5/21 showed 90% Hb-S  CXR increased opacity  RLL , 5/20 CT bibasilar consolidation >CXR on 6/5 showing b/l opacities, improving   s/p 2U PRBCs, baseline Hb ~upper 6/low 7s?   s/p bronch 6/4 - no significant purulent secretions noted ,bronchial cultures in progress  6/6: patient received exchange transfusion, with improvement in Hgb (6.2 --> 9.7>9>9.3)   Completed meropenem 1g q 8 hours  6/10   Pt was started on Prednisone 50mg daily on 5/31. Decreased to 30mg daily on 6/8/22 and then taper by 10mg every week and then stop.   Continue Folic Acid 1mg PO daily  Continue Oxbryta as outpatient and follow up with her primary hematologist post discharge.  pain management were consulted   Blood cultures were negative and Bronchial cultures are in progress    #LE Pain - likely secondary to Pain Crisis  LE Duplex negative for DVT 6/3  RLE XR showing subtle sclerosis in the proximal tibial metaphysis, possibly developing osteonecrosis  Blood cultures negative, wbc downtrending Pt is afebrile. Infection is unlikely at this time.  WBAT with walker progress to crutches and a cane as tolerated ,warm compress as needed     #Acute on Chronic Leukocytosis/Thrombocytosis 2/2 possible superimposed bacterial infection vs reactive leukocytosis  WBC stable/improving, ICS @bedside   recent hospitalization with Pseudomonas  Blood cultures negative ,completed meropenem course     #Acute Intravascular Hemolysis   had +anti-C, anti-E, anti-S (Rh) + Warm-abs   6/1 IgM EBG + CMV negative, Hep-Panel negative, Mycoplasma negative, 6/3 HIV negative  Pt was started on Prednisone 50mg daily on 5/31. Decreased to 30mg daily on 6/8/22 and then taper by 10mg every week and then stop.    Follow up outpatient with Hematology ,pulm and PCP  She is aware that if she is discharged tonight that the current medical team cannot prescribe opioids should she need them for pain control at home. She was given to option to stay overnight and the day team can assess her need for an opioid prescription, she states she doesn't require them and would prefer discharge tonight.

## 2022-06-10 NOTE — DISCHARGE NOTE PROVIDER - NSDCMRMEDTOKEN_GEN_ALL_CORE_FT
folic acid 1 mg oral tablet: 1 tab(s) orally once a day  levoFLOXacin 500 mg oral tablet: 1 tab(s) orally once a day   losartan 25 mg oral tablet: 1 tab(s) orally once a day   acetaminophen 325 mg oral tablet: 2 tab(s) orally every 6 hours, As needed, Temp greater or equal to 38C (100.4F), Mild Pain (1 - 3)  diclofenac potassium 50 mg oral tablet: 1 tab(s) orally every 8 hours, As Needed  with meals for severe pain  folic acid 1 mg oral tablet: 1 tab(s) orally once a day  losartan 25 mg oral tablet: 1 tab(s) orally once a day  polyethylene glycol 3350 oral powder for reconstitution: 17 gram(s) orally once a day, As Needed for constipation  predniSONE 10 mg oral tablet: 3 tab(s) orally once a day from 6/11 to 6/14  predniSONE 10 mg oral tablet: 2 tab(s) orally once a day from 6/15 to 6/21  predniSONE 10 mg oral tablet: 1 tab(s) orally once a day from 6/22 to 6/28

## 2022-06-10 NOTE — DISCHARGE NOTE NURSING/CASE MANAGEMENT/SOCIAL WORK - NSDCPEFALRISK_GEN_ALL_CORE
For information on Fall & Injury Prevention, visit: [email or web address] OR  [email or web address] OR  https://www.cdc.gov/steadi/patient.html

## 2022-06-10 NOTE — PHYSICAL THERAPY INITIAL EVALUATION ADULT - PERTINENT HX OF CURRENT PROBLEM, REHAB EVAL
patient is a 27yo female admitted with  Sickle cell crisis, r/o Acute chest syndrome
25yo  F with PMH of Sickle cell anemia (on oxbryta - followed by Hematologist - Dr. Jimenez in [place]), recently discharged after treatment of pneumonia and sickle cell crisis, presented to the ED with c/o right sided chest, shoulder and upper back pain. States that she overexerted herself.

## 2022-06-10 NOTE — CONSULT NOTE ADULT - SUBJECTIVE AND OBJECTIVE BOX
Pain Medicine Consult Note    History of Present Illness  Patient is a 27 y/o woman with sickle cell disease who was admitted on 5/26/2022 with concern for acute chest syndrome on 5/26/2022 now with persistent pain 2/2 sickle cell vaso-occlusive crisis. The patient initially had pain in the right chest, shoulder, and upper back and is now primarily having pain in the legs bilaterally. She describes the pain as aching and nature and notes that the quality of the pain is similar to previous sickle cell crises. No focal numbness or weakness in the lower extremities. No edema or erythema. The patinet states that her last sickle cell crisis was approximately 8 years ago. She does not take opioid medications at home. The patient has been on hydromorphone IV 2mg Q2h prn and was transitioned yesterday to hydromorphone PO 6mg Q4h prn. She does not feel that this medication is providing her with significant relief of her bilateral leg pain. She denies having any side effects with her medications. Overall, the patient feels that her pain has improved and is now 4/10 in severity. She states that pain was 10/10 in severity on admission.      Current Inpatient Medication Regimen:  acetaminophen     Tablet .. 650 milliGRAM(s) Oral every 6 hours PRN  chlorhexidine 4% Liquid 1 Application(s) Topical daily  dextrose 5% + sodium chloride 0.45%. 1000 milliLiter(s) IV Continuous <Continuous>  enoxaparin Injectable 40 milliGRAM(s) SubCutaneous every 24 hours  ertapenem  IVPB 1000 milliGRAM(s) IV Intermittent once  folic acid 1 milliGRAM(s) Oral daily  HYDROmorphone   Tablet 6 milliGRAM(s) Oral every 4 hours PRN  influenza   Vaccine 0.5 milliLiter(s) IntraMuscular once  lidocaine   4% Patch 1 Patch Transdermal daily  losartan 25 milliGRAM(s) Oral daily  magnesium sulfate  IVPB 2 Gram(s) IV Intermittent once  melatonin 3 milliGRAM(s) Oral at bedtime PRN  ondansetron Injectable 4 milliGRAM(s) IV Push every 8 hours PRN  oxyCODONE    IR 15 milliGRAM(s) Oral every 4 hours PRN  pantoprazole    Tablet 40 milliGRAM(s) Oral before breakfast  polyethylene glycol 3350 17 Gram(s) Oral daily  predniSONE   Tablet 30 milliGRAM(s) Oral daily  senna 2 Tablet(s) Oral at bedtime      Home Analgesic Regimen:  None    Allergies:  No Known Allergies      Past Medical History:  Sickle cell disease    Past Surgical History:  Cholecystectomy    Family History:  Denies    Social History:  Tobacco - denies   EtOH - occasional   Drugs - denies      Review of Systems:  General: no fevers or chills  Eyes: no diplopia or blurred vision  ENT: no rhinorrhea  CV: no chest pain  Resp: no cough or dyspnea  GI: no abdominal pain, constipation  : no urinary incontinence  Neuro: no focal lower extremity weakness or numbness  Psych: no focal depression or anxiety    Physical Exam:  T(C): 37.2 (06-10-22 @ 04:34), Max: 37.2 (06-10-22 @ 04:34)  HR: 99 (06-10-22 @ 04:34) (80 - 107)  BP: 119/70 (06-10-22 @ 04:34) (116/54 - 120/73)  RR: 18 (06-09-22 @ 22:11) (18 - 18)  Gen: NAD  Eyes: no glasses or scleral icterus  Head: Normocephalic / Atraumatic  CV: no JVD  Lungs: nonlabored breathing  Abdomen: nondistended, soft  : no reilly catheter in place  Neuro: AOx3, Cranial nerves intact  Psych: normal affect      Labs:  CBC  16.98 K/uL<H> [4.80 - 10.80] > 9.6 g/dL<L> [12.0 - 16.0] / 28.5 %<L> [37.0 - 47.0] < 588 K/uL<H> [130 - 400]      BMP  139 mmol/L [135 - 146] | 99 mmol/L [98 - 110] | 4 mg/dL<L> [10 - 20]  4.3 mmol/L [3.5 - 5.0] | 26 mmol/L [17 - 32] | <0.5 mg/dL<L> [0.7 - 1.5]    94 mg/dL [70 - 99]        Imaging Studies:  CT Chest (6/2/2022)    FINDINGS:    LUNGS, PLEURA, AND AIRWAYS: Redemonstration of patchy bibasilar   opacities, noting decreasing left lower lobe opacities. Increasing small   right pleural effusion and increasing right lower opacities, with likely   a component of atelectasis. No pneumothorax.    HEART/GREAT VESSELS: Cardiomegaly. Small pericardial effusion. Relative   hypodensity of the intracardiac blood pool suggestive of anemia.    MEDIASTINUM/LYMPH NODES: No definite enlarged mediastinal or axillary   lymph nodes. Previously described prominent hilar lymph nodes on   3/14/2022 not well evaluated without contrast.    UPPER ABDOMEN: Hyperdense focus adjacent to or arising from the right   kidney, difficult to determine, may represent proteinaceous cyst    BONES AND SOFT TISSUES: Unremarkable        IMPRESSION:    Redemonstration of patchy bibasilar opacities (suspected to be infectious   in the appropriate clinical setting), noting decreasing left lower lobe   opacities. Increasing small right pleural effusion and increasing right   lower opacities, with likely a component of atelectasis.    Cardiomegaly. Small pericardial effusion.    Previously described prominent hilar lymph nodes on 3/14/2022 not well   evaluated without contrast.

## 2022-06-10 NOTE — PROGRESS NOTE ADULT - ASSESSMENT
ASSESSMENT & PLAN  25 y/o woman with PMH of Sickle cell anemia (on oxbryta - followed by Hematologist - Dr. Jimenez in [place]), recently discharged after treatment of pneumonia and sickle cell crisis, presented to the ED on 5/26 with c/o right sided chest, shoulder and upper back pain. States that she overexerted herself. She had mild cough and fever up to 102F at home. Initially pt was admitted for sickle cell crisis, developed acute chest syndrome.    Patient was upgraded to ICU on 5/28 for exchange transfusion, received 2 U PRBC. Patient was downgraded back to floors on Cefepime and levofloxacin with resolving pyrexia. Dr Hobbs performed bedside US to assess for fluid in her lungs on may 31 and stated there was nothing to tap, most likely atelectasis. Heme/Onc saw patient, assessing for warm hemolytic antibodies in blood, started patient on prednsione 50mg. Patient noted b/l LE pain; Duplex was negative but RLE X-ray showed osteonecrosis. With increasing WBC up to 38.46, and T up to 101.2 (with increasing pro-inflammatory markers) and increasing pain, patient was upgraded back to MICU on 6/3 and started on meropenem and Zyvox. Patient underwent  Bronchoscopy/BAL on 6/4 (cultures negative to date). Patient was agreeable to exchange transfusion, which patient received on 6/7, with improvement of Hgb (6.2 --> 9.7) and some improvement in pain. WBC downtrended, meropenem and zyvox were discontinued. Patient stable for downgrade back to floor.       Sickle Cell Vaso-Occlusive  Crisis / Multilobar Pneumonia/ Acute Chest Syndrome/ Hb-SS Disease   - clinically improved, H/H stable   - Hb electrophoresis from 5/21 showed 90% Hb-S  - Initially SaO2 80s on RA - was on O2   - check pulse ox on RA today  - s/p 2U PRBCs, baseline Hb ~upper 6/low 7s?   - 6/6: patient received exchange transfusion  - monitor H/H, stable for last few days    - continue meropenem 1g q 8 hours until 6/10 and then give dose of ertapenem today  - C/w Prednisone 50mg PO daily (as per Hem/Onc will be for two weeks since 5/31 w/ quick taper)   - C/w Folic Acid 1mg PO daily  - C/w Oxbryta as outpatient  - pain management consult appreciated: trial of oxycodone 15mg PO q4hr prn pain (if effective, short course on discharge)  - c/w IV fluids while in the hospital   - monitor , reticulocytes 11.8%    LE Pain / osteonecrosis    - LE Duplex negative for DVT 6/3  - RLE XR showing subtle sclerosis in the proximal tibial metaphysis, possibly developing osteonecrosis  - c/w pain meds   - warm compress   - ortho consulted: WBAT with RW and progress to cane and crutches    - pt ambulated 10 ft with RW today    DVT prophylaxis: Lovenox 40mg subQ daily  GI prophylaxis: Protonix 40mg PO qAM    Code status: Full    Progress Note Handoff  Pending (specify):  improved ambulation with RW, PT f/u on 6/11, monitor pain control on oxycodone prn    pt informed of the plan of care    Disposition: home in 24hrs if ambulation improves

## 2022-06-10 NOTE — DISCHARGE NOTE PROVIDER - CARE PROVIDER_API CALL
Power Thomas)  Internal Medicine; Medical Oncology  [address] [place] [address]  Phone: [phone]  Fax: [phone]  Follow Up Time: 2 weeks

## 2022-06-10 NOTE — DISCHARGE NOTE PROVIDER - NSDCCPCAREPLAN_GEN_ALL_CORE_FT
PRINCIPAL DISCHARGE DIAGNOSIS  Diagnosis: Sickle cell disease, with acute chest syndrome  Assessment and Plan of Treatment:       SECONDARY DISCHARGE DIAGNOSES  Diagnosis: Pneumonia  Assessment and Plan of Treatment:   Please take your medications as directed. Don’t skip doses. Follow up with your primary care physician within 3 days. Continue taking your antibiotics as directed until they are all gone—even if you start to feel better. This will prevent the pneumonia from  Coughing up mucus is normal. Don’t use medicines to suppress your cough unless your cough is dry, painful, or interferes with your sleep. Get plenty of rest until your fever, shortness of breath, and chest pain go away. Plan to get a flu shot every year. Ask your primary care doctor about pneumonia vaccines.  Seek immediate medical attention if you experience chest pain, trouble breathing, blue lips or fingernails, fever of 100.4°F  (38°C) or higher, yellow, green, bloody, or smelly sputum, more than normal mucus production, vomiting or diarrhea.      Diagnosis: Hypomagnesemia  Assessment and Plan of Treatment:      PRINCIPAL DISCHARGE DIAGNOSIS  Diagnosis: Sickle cell disease, with acute chest syndrome  Assessment and Plan of Treatment: You were admitted for chest pain ,you were found to have sickle cell crisis, acute chest syndrome and low hemoglobin.  Acute chest syndrome (ACS) is defined as a new radiodensity on chest imaging accompanied by fever and/or respiratory symptoms. It is an acute complication of sickle cell disease (SCD) that requires immediate intervention.  You were upgraded to the ICU and underwent exchange transfusion ,you hemoglobin improved afterwards.  you also had a bronchoscopy that did not show any signs of purulant infection ,cultures were collected.  You completed a course of antibiotic for pneumonia.  Please seek medical attention as needed.  Please take all medications as perscribed.  Please follow up outpatient with lung doctor and hematology and primart doctor.        SECONDARY DISCHARGE DIAGNOSES  Diagnosis: Pneumonia  Assessment and Plan of Treatment:   Please take your medications as directed. Don’t skip doses. Follow up with your primary care physician within 3 days. Continue taking your antibiotics as directed until they are all gone—even if you start to feel better. This will prevent the pneumonia from  Coughing up mucus is normal. Don’t use medicines to suppress your cough unless your cough is dry, painful, or interferes with your sleep. Get plenty of rest until your fever, shortness of breath, and chest pain go away. Plan to get a flu shot every year. Ask your primary care doctor about pneumonia vaccines.  Seek immediate medical attention if you experience chest pain, trouble breathing, blue lips or fingernails, fever of 100.4°F  (38°C) or higher, yellow, green, bloody, or smelly sputum, more than normal mucus production, vomiting or diarrhea.

## 2022-06-10 NOTE — DISCHARGE NOTE PROVIDER - NSDCFUSCHEDAPPT_GEN_ALL_CORE_FT
Deepak Bridges Physician [facility]  Cardio [address] [place] [address]  Scheduled Appointment: 07/18/2022

## 2022-06-10 NOTE — PROGRESS NOTE ADULT - SUBJECTIVE AND OBJECTIVE BOX
Discharge note    OMARBRODIEJAMES SCHWARTZ  26y Female    INTERVAL HPI/OVERNIGHT EVENTS:    pt feels better overall  has some shin pain   no fever, SOB, chest pain, N/V, abdominal pain    T(F): 99 (06-10-22 @ 04:34), Max: 99 (06-10-22 @ 04:34)  HR: 99 (06-10-22 @ 04:34) (80 - 107)  BP: 119/70 (06-10-22 @ 04:34) (116/54 - 120/73)  RR: 18 (06-09-22 @ 22:11) (18 - 18)    PHYSICAL EXAM:  GENERAL: NAD  HEAD:  Normocephalic  EYES:  conjunctiva and sclera clear  ENMT: Moist mucous membranes  NERVOUS SYSTEM:  Alert, awake, Good concentration  CHEST/LUNG: CTA b/l  HEART: Regular rate and rhythm  ABDOMEN: Soft, Nontender, Nondistended; Bowel sounds present  EXTREMITIES:   No edema  SKIN: warm, dry    Consultant(s) Notes Reviewed:  [x ] YES  [ ] NO  Care Discussed with Consultants/Other Providers [ x] YES  [ ] NO    MEDICATIONS  (STANDING):  chlorhexidine 4% Liquid 1 Application(s) Topical daily  dextrose 5% + sodium chloride 0.45%. 1000 milliLiter(s) (100 mL/Hr) IV Continuous <Continuous>  enoxaparin Injectable 40 milliGRAM(s) SubCutaneous every 24 hours  folic acid 1 milliGRAM(s) Oral daily  influenza   Vaccine 0.5 milliLiter(s) IntraMuscular once  lidocaine   4% Patch 1 Patch Transdermal daily  losartan 25 milliGRAM(s) Oral daily  magnesium sulfate  IVPB 2 Gram(s) IV Intermittent once  pantoprazole    Tablet 40 milliGRAM(s) Oral before breakfast  polyethylene glycol 3350 17 Gram(s) Oral daily  predniSONE   Tablet 30 milliGRAM(s) Oral daily  senna 2 Tablet(s) Oral at bedtime    MEDICATIONS  (PRN):  acetaminophen     Tablet .. 650 milliGRAM(s) Oral every 6 hours PRN Temp greater or equal to 38C (100.4F), Mild Pain (1 - 3)  melatonin 3 milliGRAM(s) Oral at bedtime PRN Insomnia  ondansetron Injectable 4 milliGRAM(s) IV Push every 8 hours PRN Nausea and/or Vomiting  oxyCODONE    IR 15 milliGRAM(s) Oral every 4 hours PRN Severe Pain (7 - 10)      LABS:                        9.6    16.98 )-----------( 588      ( 10 [place] 2022 07:56 )             28.5     06-10    139  |  99  |  4<L>  ----------------------------<  94  4.3   |  26  |  <0.5<L>    Ca    9.8      10 [place] 2022 07:56  Mg     1.9     06-10    TPro  6.7  /  Alb  3.3<L>  /  TBili  1.9<H>  /  DBili  x   /  AST  32  /  ALT  56<H>  /  AlkPhos  171<H>  06-10          RADIOLOGY & ADDITIONAL TESTS:    Imaging or report Personally Reviewed:  [x ] YES  [ ] NO    < from: Xray Knee 1 or 2 Views, Right (06.06.22 @ 14:45) >  IMPRESSION:    Subtle sclerosis in the proximal tibial metaphysis, possibly developing   osteonecrosis.    < end of copied text >      < from: CT Chest No Cont (06.02.22 @ 18:35) >  IMPRESSION:    Redemonstration of patchy bibasilar opacities (suspected to be infectious   in the appropriate clinical setting), noting decreasing left lower lobe   opacities. Increasing small right pleural effusion and increasing right   lower opacities, with likely a component of atelectasis.    Cardiomegaly. Small pericardial effusion.    Previously described prominent hilar lymph nodes on 3/14/2022 not well   evaluated without contrast.    < end of copied text >      Case discussed with residents and RN on rounds today    Care discussed with pt

## 2022-06-10 NOTE — PHYSICAL THERAPY INITIAL EVALUATION ADULT - LEVEL OF INDEPENDENCE: SIT/SUPINE, REHAB EVAL
Onset: Month     Location/description: Mother reports head cold with cough, nasal congestion and chest congestion for the past month.  Mother explains symptoms were improving and over the weekend the symptoms became worse.  Mother denies fever at this time but reports elevated temperature of 100.0 this past weekend.    Associated Symptoms: Mother reports patient has vomited with coughing a few times over the last few days but denies any input or output concerns.     What improves/worsens symptoms: Nothing     Symptom specific medications: Writer recommended using only 1 ounce apple juice warmed with 1 ounce water warmed before bed ONLY to help thin secretions.  Writer explained that all calories should come from formula therefore limit juice mixture to once daily.     Input and Output: No input concerns /  Last wet diaper @ 2000    Activity level: Awake, alert & interactive     Temperature (route and time): 100.0 this weekend but none now.      Weight:     Wt Readings from Last 1 Encounters:   03/03/17 5.052 kg (32 %, Z= -0.48)*     * Growth percentiles are based on WHO (Girls, 0-2 years) data.        Recent Care: Well child exam on 3-3-17    Disposition: See PCP within 3 days -- Patient scheduled with Dr. You on 3-24-17 @ 1020.    Reason for Disposition  • [1] Nasal discharge AND [2]  present > 14 days    Protocols used: COLDS-P-AH      
Regarding: Cant Kedp food Down/Cough  ----- Message from Sam Savage sent at 3/23/2017  7:52 PM CDT -----  Patient Name: Cande Giles  Specialist or PCP:Dr. Eric  Pregnant (If Yes, how long?):No  Symptoms:Cant Kedp food Down/Cough  Call Back #:[phone]  Is the patient’s permanent residence located in [place], [place], or a [facility]? Yes [facility] [id number]  Call Center Account #:[id number]      
independent

## 2022-06-10 NOTE — CONSULT NOTE ADULT - PROBLEM SELECTOR RECOMMENDATION 9
Patient not on chronic opioid therapy. Low risk for opioid abuse per ORT.  1) Start oxycodone 15mg Q4h prn; if this helps with pain, may discharge with a 3-5 day supply with plan on self taper  2) Discontinue hydromorphone PO and IV  3) Start diclofenac 50mg Q8h standing  4) Continue miralax, senna

## 2022-06-10 NOTE — PHYSICAL THERAPY INITIAL EVALUATION ADULT - GENERAL OBSERVATIONS, REHAB EVAL
[id number] Patient encountered semi coronel in bed + IV , NAD
Pt was seen by ortho and cleared pt for WBAT with walker progress to crutches and a cane as tolerated per ortho recommendation.  Pt encountered supine in bed, NAD, no c/o pain. Pt is indep in bed mobility, STS transfer CGA, ambulated 10 ft Min A using RW with decreased heather/step lenght. Pt demo limited amb alessio secondary c/o increased pressure pain to both shins as per pt. Pain described as "little bit" but pt refused further amb. RN Feli aware. Pt left in bed as found.

## 2022-06-10 NOTE — CONSULT NOTE ADULT - REASON FOR ADMISSION
Acute chest syndrome
Pain right shoulder, right chest and upper back
Acute chest syndrome
Acute chest syndrome
Sickle cell crisis
Acute chest syndrome

## 2022-06-10 NOTE — PROGRESS NOTE ADULT - ASSESSMENT
27yo  F with PMH of Sickle cell anemia (on oxbryta - followed by Hematologist - Dr. Jimenez in [place]), recently discharged after treatment of pneumonia and sickle cell crisis, presented to the ED with c/o right sided chest, shoulder and upper back pain.  She also reports fever at home 102F.  She was recently treated for sickle cell crisis and pneumonia and discharged last week on Levaquin PO.    Hematology consulted for r/o ACS with hx of sickle cell disease.     IMPRESSION:    #ACS/Vaso-occlusive pain crisis/PNA  #Hx of Hb SS disease   -Was on Oxbryta, just started in April 2022 but hasn't been routinely taking it due to her recent hospitalizations/infections; never was on hydrea or any other HbSS medications  -Initially offered both exchange as patient was in high 80s on RA and simple transfusion however she declined, eventually she agreed to a simple transfusion.    -Baseline Hb 7-8  -She has had 2 crises far this year.   -Hb electrophoresis from 5/21 showed 90% HbS  -Viral panel noted: past EBV infection, hep panel non reactive    #Possible autoimmune hemolytic anemia   Direct Lori Profile (05.31.22 @ 08:00)    Dir Antiglob Polyspecific Interpretation: POS  - Spoke with Dr Burroughs from transfusion med, it is hard to call if pt has alloAb or autoab given chronic hemolysis    #Chronic leukocytosis and thrombocytosis: Resolving  - likely reactive     #Acute right knee pain -improved  - likely 2/2 sickle cell crisis. No evidence of effusion or AVN on Xray    #Right shin protuberance with tenderness to palpation  - Xray: Subtle sclerosis in the proximal tibial metaphysis, possibly developing osteonecrosis.    RECOMMENDATIONS:    - S/P exchange transfusion on 6/6/22 without any complications.   - F/u results from bronchoscopy: cultures negative so far  - Pt was started on Prednisone 50mg daily on 5/31. Decreased to 30mg qdaily on 6/8/22 and then taper by 10mg every week and then stop.   - C/w IV hydration: 0.45% NS @100cc /Hr  - C/w pain meds: De-escalate to PO regimen in anticipation of d/c tomorrow  - Warm compresses prn.   - IV Abx per ID  - C/w folic acid supplementation  - She can continue Oxbryta as outpatient and follow up with her primary hematologist post discharge.  - Monitor hemolysis labs and CBC.     Needs close follow up as O/P for CBC check given she is on taper of Prednisone: Will schedule for next week.     DVT ppx: Lovenox        27yo  F with PMH of Sickle cell anemia (on oxbryta - followed by Hematologist - Dr. Jimenez in [place]), recently discharged after treatment of pneumonia and sickle cell crisis, presented to the ED with c/o right sided chest, shoulder and upper back pain.  She also reports fever at home 102F.  She was recently treated for sickle cell crisis and pneumonia and discharged last week on Levaquin PO.    Hematology consulted for r/o ACS with hx of sickle cell disease.     IMPRESSION:    #ACS/Vaso-occlusive pain crisis/PNA  #Hx of Hb SS disease   -Was on Oxbryta, just started in April 2022 but hasn't been routinely taking it due to her recent hospitalizations/infections; never was on hydrea or any other HbSS medications  -Initially offered both exchange as patient was in high 80s on RA and simple transfusion however she declined, eventually she agreed to a simple transfusion.    -Baseline Hb 7-8  -She has had 2 crises far this year.   -Hb electrophoresis from 5/21 showed 90% HbS  -Viral panel noted: past EBV infection, hep panel non reactive    #Possible autoimmune hemolytic anemia   Direct Lori Profile (05.31.22 @ 08:00)    Dir Antiglob Polyspecific Interpretation: POS  - Spoke with Dr Burroughs from transfusion med, it is hard to call if pt has alloAb or autoab given chronic hemolysis    #Chronic leukocytosis and thrombocytosis: Resolving  - likely reactive     #Acute right knee pain -improved  - likely 2/2 sickle cell crisis. No evidence of effusion or AVN on Xray    #Right shin protuberance with tenderness to palpation  - Xray: Subtle sclerosis in the proximal tibial metaphysis, possibly developing osteonecrosis.    RECOMMENDATIONS:    - S/P exchange transfusion on 6/6/22 without any complications.   - F/u results from bronchoscopy: cultures negative so far  - Pt was started on Prednisone 50mg daily on 5/31. Decreased to 30mg qdaily on 6/8/22 and then taper by 10mg every week and then stop.   - C/w IV hydration: 0.45% NS @100cc /Hr  - C/w pain meds: De-escalate to PO regimen in anticipation of d/c tomorrow  - Warm compresses prn.   - IV Abx per ID  - C/w folic acid supplementation  - She can continue Oxbryta as outpatient and follow up with her primary hematologist post discharge.  - Monitor hemolysis labs and CBC.     Needs close follow up as O/P for CBC check given she is on taper of Prednisone: Will schedule for next week.     DVT ppx: Lovenox ; Will set up f/u for next week.

## 2022-06-10 NOTE — PROGRESS NOTE ADULT - REASON FOR ADMISSION
Acute Chest Syndrome

## 2022-06-10 NOTE — PHYSICAL THERAPY INITIAL EVALUATION ADULT - DIAGNOSIS, PT EVAL
Gait dysfunction secondary Sickle cell disease, with acute chest syndrome; Pneumonia; Hypomagnesemia
Rehab of debility

## 2022-06-14 DIAGNOSIS — E83.42 HYPOMAGNESEMIA: ICD-10-CM

## 2022-06-14 DIAGNOSIS — Z86.19 PERSONAL HISTORY OF OTHER INFECTIOUS AND PARASITIC DISEASES: ICD-10-CM

## 2022-06-14 DIAGNOSIS — D59.10 AUTOIMMUNE HEMOLYTIC ANEMIA, UNSPECIFIED: ICD-10-CM

## 2022-06-14 DIAGNOSIS — J18.9 PNEUMONIA, UNSPECIFIED ORGANISM: ICD-10-CM

## 2022-06-14 DIAGNOSIS — Z79.899 OTHER LONG TERM (CURRENT) DRUG THERAPY: ICD-10-CM

## 2022-06-14 DIAGNOSIS — A41.9 SEPSIS, UNSPECIFIED ORGANISM: ICD-10-CM

## 2022-06-14 DIAGNOSIS — E87.6 HYPOKALEMIA: ICD-10-CM

## 2022-06-14 DIAGNOSIS — J98.11 ATELECTASIS: ICD-10-CM

## 2022-06-14 DIAGNOSIS — D57.01 HB-SS DISEASE WITH ACUTE CHEST SYNDROME: ICD-10-CM

## 2022-06-14 DIAGNOSIS — M90.561: ICD-10-CM

## 2022-06-24 ENCOUNTER — APPOINTMENT (OUTPATIENT)
Dept: HEMATOLOGY ONCOLOGY | Facility: CLINIC | Age: 27
End: 2022-06-24

## 2022-07-18 ENCOUNTER — RESULT CHARGE (OUTPATIENT)
Age: 27
End: 2022-07-18

## 2022-07-18 ENCOUNTER — APPOINTMENT (OUTPATIENT)
Dept: CARDIOLOGY | Facility: CLINIC | Age: 27
End: 2022-07-18

## 2022-07-27 ENCOUNTER — APPOINTMENT (OUTPATIENT)
Dept: CARDIOLOGY | Facility: CLINIC | Age: 27
End: 2022-07-27

## 2022-07-27 VITALS
DIASTOLIC BLOOD PRESSURE: 60 MMHG | HEART RATE: 94 BPM | HEIGHT: 64 IN | WEIGHT: 129 LBS | BODY MASS INDEX: 22.02 KG/M2 | SYSTOLIC BLOOD PRESSURE: 102 MMHG

## 2022-07-27 PROCEDURE — 93000 ELECTROCARDIOGRAM COMPLETE: CPT

## 2022-07-27 PROCEDURE — 99204 OFFICE O/P NEW MOD 45 MIN: CPT

## 2022-07-27 NOTE — ASSESSMENT
[FreeTextEntry1] : No active cardiovascular problems\par Pulmonary infiltrates\par Bilateral effusions

## 2022-07-27 NOTE — HISTORY OF PRESENT ILLNESS
[FreeTextEntry1] : Hospitalized for patchy density bilaterally in the lungs\par Possible COVID\par No cardiac problems\par No exertional chest pain\par EKG is normal sinus rhythm\par Echo is normal, no evidence of valvular heart disease\par Venous Doppler was normal\par CAT scan was performed with no evidence of pulmonary emboli\par EKG was normal sinus rhythm

## 2022-10-27 ENCOUNTER — EMERGENCY (EMERGENCY)
Facility: HOSPITAL | Age: 27
LOS: 0 days | Discharge: HOME | End: 2022-10-27
Attending: EMERGENCY MEDICINE | Admitting: EMERGENCY MEDICINE

## 2022-10-27 VITALS
HEART RATE: 112 BPM | TEMPERATURE: 97 F | RESPIRATION RATE: 53 BRPM | OXYGEN SATURATION: 97 % | SYSTOLIC BLOOD PRESSURE: 112 MMHG | DIASTOLIC BLOOD PRESSURE: 53 MMHG | HEIGHT: 65 IN | WEIGHT: 130.07 LBS

## 2022-10-27 DIAGNOSIS — Z90.49 ACQUIRED ABSENCE OF OTHER SPECIFIED PARTS OF DIGESTIVE TRACT: ICD-10-CM

## 2022-10-27 DIAGNOSIS — R51.9 HEADACHE, UNSPECIFIED: ICD-10-CM

## 2022-10-27 DIAGNOSIS — R70.1 ABNORMAL PLASMA VISCOSITY: ICD-10-CM

## 2022-10-27 DIAGNOSIS — Z20.822 CONTACT WITH AND (SUSPECTED) EXPOSURE TO COVID-19: ICD-10-CM

## 2022-10-27 DIAGNOSIS — E80.7 DISORDER OF BILIRUBIN METABOLISM, UNSPECIFIED: ICD-10-CM

## 2022-10-27 DIAGNOSIS — Z90.49 ACQUIRED ABSENCE OF OTHER SPECIFIED PARTS OF DIGESTIVE TRACT: Chronic | ICD-10-CM

## 2022-10-27 DIAGNOSIS — D57.1 SICKLE-CELL DISEASE WITHOUT CRISIS: ICD-10-CM

## 2022-10-27 LAB
ALBUMIN SERPL ELPH-MCNC: 4.5 G/DL — SIGNIFICANT CHANGE UP (ref 3.5–5.2)
ALLERGY+IMMUNOLOGY DIAG STUDY NOTE: SIGNIFICANT CHANGE UP
ALP SERPL-CCNC: 57 U/L — SIGNIFICANT CHANGE UP (ref 30–115)
ALT FLD-CCNC: 15 U/L — SIGNIFICANT CHANGE UP (ref 0–41)
ANION GAP SERPL CALC-SCNC: 9 MMOL/L — SIGNIFICANT CHANGE UP (ref 7–14)
ANISOCYTOSIS BLD QL: SIGNIFICANT CHANGE UP
AST SERPL-CCNC: 77 U/L — HIGH (ref 0–41)
BASOPHILS # BLD AUTO: 0 K/UL — SIGNIFICANT CHANGE UP (ref 0–0.2)
BASOPHILS NFR BLD AUTO: 0 % — SIGNIFICANT CHANGE UP (ref 0–1)
BILIRUB DIRECT SERPL-MCNC: 0.3 MG/DL — SIGNIFICANT CHANGE UP (ref 0–0.3)
BILIRUB SERPL-MCNC: 7.9 MG/DL — HIGH (ref 0.2–1.2)
BLD GP AB SCN SERPL QL: SIGNIFICANT CHANGE UP
BUN SERPL-MCNC: 7 MG/DL — LOW (ref 10–20)
CALCIUM SERPL-MCNC: 8.9 MG/DL — SIGNIFICANT CHANGE UP (ref 8.4–10.4)
CHLORIDE SERPL-SCNC: 103 MMOL/L — SIGNIFICANT CHANGE UP (ref 98–110)
CO2 SERPL-SCNC: 21 MMOL/L — SIGNIFICANT CHANGE UP (ref 17–32)
CREAT SERPL-MCNC: <0.5 MG/DL — LOW (ref 0.7–1.5)
DIR ANTIGLOB POLYSPECIFIC INTERPRETATION: SIGNIFICANT CHANGE UP
EGFR: 141 ML/MIN/1.73M2 — SIGNIFICANT CHANGE UP
EOSINOPHIL # BLD AUTO: 0.13 K/UL — SIGNIFICANT CHANGE UP (ref 0–0.7)
EOSINOPHIL NFR BLD AUTO: 0.9 % — SIGNIFICANT CHANGE UP (ref 0–8)
GIANT PLATELETS BLD QL SMEAR: PRESENT — SIGNIFICANT CHANGE UP
GLUCOSE SERPL-MCNC: 82 MG/DL — SIGNIFICANT CHANGE UP (ref 70–99)
HCG SERPL QL: NEGATIVE — SIGNIFICANT CHANGE UP
HCT VFR BLD CALC: 18.2 % — LOW (ref 37–47)
HGB BLD-MCNC: 6.9 G/DL — CRITICAL LOW (ref 12–16)
HYPOCHROMIA BLD QL: SLIGHT — SIGNIFICANT CHANGE UP
LDH SERPL L TO P-CCNC: 923 — HIGH (ref 50–242)
LYMPHOCYTES # BLD AUTO: 26.1 % — SIGNIFICANT CHANGE UP (ref 20.5–51.1)
LYMPHOCYTES # BLD AUTO: 3.81 K/UL — HIGH (ref 1.2–3.4)
MACROCYTES BLD QL: SIGNIFICANT CHANGE UP
MANUAL SMEAR VERIFICATION: SIGNIFICANT CHANGE UP
MCHC RBC-ENTMCNC: 36.1 PG — HIGH (ref 27–31)
MCHC RBC-ENTMCNC: 37.9 G/DL — HIGH (ref 32–37)
MCV RBC AUTO: 95.3 FL — SIGNIFICANT CHANGE UP (ref 81–99)
MONOCYTES # BLD AUTO: 1.52 K/UL — HIGH (ref 0.1–0.6)
MONOCYTES NFR BLD AUTO: 10.4 % — HIGH (ref 1.7–9.3)
NEUTROPHILS # BLD AUTO: 9.13 K/UL — HIGH (ref 1.4–6.5)
NEUTROPHILS NFR BLD AUTO: 62.6 % — SIGNIFICANT CHANGE UP (ref 42.2–75.2)
NRBC # BLD: 3 /100 — HIGH (ref 0–0)
NRBC # BLD: SIGNIFICANT CHANGE UP /100 WBCS (ref 0–0)
PLAT MORPH BLD: NORMAL — SIGNIFICANT CHANGE UP
PLATELET # BLD AUTO: 384 K/UL — SIGNIFICANT CHANGE UP (ref 130–400)
POIKILOCYTOSIS BLD QL AUTO: SIGNIFICANT CHANGE UP
POLYCHROMASIA BLD QL SMEAR: SIGNIFICANT CHANGE UP
POTASSIUM SERPL-MCNC: 5.9 MMOL/L — HIGH (ref 3.5–5)
POTASSIUM SERPL-SCNC: 5.9 MMOL/L — HIGH (ref 3.5–5)
PROT SERPL-MCNC: 7.5 G/DL — SIGNIFICANT CHANGE UP (ref 6–8)
RBC # BLD: 1.91 M/UL — LOW (ref 4.2–5.4)
RBC # BLD: 1.91 M/UL — LOW (ref 4.2–5.4)
RBC # FLD: 23.5 % — HIGH (ref 11.5–14.5)
RBC BLD AUTO: ABNORMAL
RETICS #: 491.4 K/UL — HIGH (ref 25–125)
RETICS/RBC NFR: 25.7 % — HIGH (ref 0.5–1.5)
SARS-COV-2 RNA SPEC QL NAA+PROBE: SIGNIFICANT CHANGE UP
SICKLE CELLS BLD QL SMEAR: SIGNIFICANT CHANGE UP
SODIUM SERPL-SCNC: 133 MMOL/L — LOW (ref 135–146)
TARGETS BLD QL SMEAR: SLIGHT — SIGNIFICANT CHANGE UP
WBC # BLD: 14.58 K/UL — HIGH (ref 4.8–10.8)
WBC # FLD AUTO: 14.58 K/UL — HIGH (ref 4.8–10.8)

## 2022-10-27 PROCEDURE — 86077 PHYS BLOOD BANK SERV XMATCH: CPT

## 2022-10-27 PROCEDURE — 99285 EMERGENCY DEPT VISIT HI MDM: CPT

## 2022-10-27 PROCEDURE — 70450 CT HEAD/BRAIN W/O DYE: CPT | Mod: 26,MA,59

## 2022-10-27 PROCEDURE — 70498 CT ANGIOGRAPHY NECK: CPT | Mod: 26,MA

## 2022-10-27 PROCEDURE — 70496 CT ANGIOGRAPHY HEAD: CPT | Mod: 26,MA

## 2022-10-27 RX ORDER — ACETAMINOPHEN 500 MG
650 TABLET ORAL ONCE
Refills: 0 | Status: COMPLETED | OUTPATIENT
Start: 2022-10-27 | End: 2022-10-27

## 2022-10-27 RX ORDER — METOCLOPRAMIDE HCL 10 MG
10 TABLET ORAL ONCE
Refills: 0 | Status: COMPLETED | OUTPATIENT
Start: 2022-10-27 | End: 2022-10-27

## 2022-10-27 RX ORDER — SODIUM CHLORIDE 9 MG/ML
1000 INJECTION, SOLUTION INTRAVENOUS ONCE
Refills: 0 | Status: COMPLETED | OUTPATIENT
Start: 2022-10-27 | End: 2022-10-27

## 2022-10-27 RX ADMIN — Medication 650 MILLIGRAM(S): at 17:19

## 2022-10-27 RX ADMIN — Medication 10 MILLIGRAM(S): at 17:19

## 2022-10-27 RX ADMIN — SODIUM CHLORIDE 1000 MILLILITER(S): 9 INJECTION, SOLUTION INTRAVENOUS at 17:19

## 2022-10-27 NOTE — ED ADULT NURSE NOTE - EXTENSIONS OF SELF_ADULT
Detail Level: Detailed None Quality 110: Preventive Care And Screening: Influenza Immunization: Influenza Immunization not Administered for Documented Reasons. Quality 111:Pneumonia Vaccination Status For Older Adults: Pneumococcal Vaccination not Administered or Previously Received, Reason not Otherwise Specified

## 2022-10-27 NOTE — ED PROVIDER NOTE - INTERNATIONAL TRAVEL
Office Visit   Nalini Sepulveda   85 y.o. female    Date of Visit: 12/15/2020    Chief Complaint   Patient presents with     Edema     Bilateral feet and ankles for a couple weeks, no pain        Assessment and Plan   1. Hypertension  - HM2(CBC w/o Differential)  - Basic Metabolic Panel    2. Edema, unspecified type  She has developed lower extremity edema over the last couple weeks since we started mirtazapine.  I will go ahead and recheck her labs today.  She is not having any symptoms or signs of heart failure.  I am going to have her stop the mirtazapine for the next couple of weeks and will reassess at that time.  She is in agreement with this plan.  - HM2(CBC w/o Differential)  - Basic Metabolic Panel         Return in about 2 weeks (around 12/29/2020) for Follow up.     History of Present Illness   This 85 y.o. old female comes in due to lower extremity edema.  I saw her a few weeks ago and she was having a lot of difficulty with sleeping.  We started mirtazapine.  Her sleep and appetite have improved significantly.  Unfortunately she has developed lower extremity edema.  It is unclear if it is related to mirtazapine.  She is also on amlodipine.  She has not had any problems with chest pain or palpitations or shortness of breath.  She has no other acute concerns today.    Review of Systems: As above, systems otherwise reviewed and negative.     Medications, Allergies and Problem List   Patient Active Problem List   Diagnosis     Type 2 diabetes mellitus (H)     Mixed hyperlipidemia     Hypertension     Benign Adenomatous Polyp Of The Large Intestine     Allergic Rhinitis     Adenocarcinoma Of The Breast     S/P mastectomy     Sleep apnea     Osteoporosis without current pathological fracture, unspecified osteoporosis type     Current Outpatient Medications   Medication Sig Dispense Refill     acetaminophen (TYLENOL EXTRA STRENGTH) 500 MG tablet Take 2 tablets (1,000 mg total) by mouth 3 (three) times a day.  100 tablet 2     alendronate (FOSAMAX) 70 MG tablet Take 1 tablet (70 mg total) by mouth every 7 days. Take in the morning on an empty stomach with a full glass of water 30 minutes before food 12 tablet 3     amLODIPine (NORVASC) 5 MG tablet Take 1 tablet (5 mg total) by mouth daily. 90 tablet 3     aspirin 81 MG EC tablet Take 81 mg by mouth daily.       atorvastatin (LIPITOR) 20 MG tablet Take 1 tablet (20 mg total) by mouth daily. 90 tablet 3     b complex vitamins tablet Take 1 tablet by mouth daily.       cholecalciferol, vitamin D3, 1,000 unit tablet Take 1,000 Units by mouth daily.       lisinopriL (PRINIVIL,ZESTRIL) 40 MG tablet Take 1 tablet (40 mg total) by mouth daily. 90 tablet 3     metFORMIN (GLUCOPHAGE) 500 MG tablet TAKE 2 TABLETS BY MOUTH 2 TIMES A DAY WITH MEALS 360 tablet 3     metoprolol succinate (TOPROL-XL) 100 MG 24 hr tablet Take 1 tablet (100 mg total) by mouth daily. 90 tablet 3     multivitamin therapeutic tablet Take 1 tablet by mouth daily.       loratadine (CLARITIN) 10 mg tablet Take 10 mg by mouth daily.       No current facility-administered medications for this visit.      Allergies   Allergen Reactions     Codeine Hives and Itching          Physical Exam     /60 (Patient Site: Left Arm, Patient Position: Sitting)   Pulse 80   Temp 97.8  F (36.6  C)   Ht 5' (1.524 m)   Wt 110 lb (49.9 kg)   SpO2 97%   BMI 21.48 kg/m      This is an alert female, sitting comfortably, no apparent distress.  Extremities: She has pitting edema bilaterally.  Also has some bruising on her right foot.  No redness or warmth.     Additional Information   Social History     Tobacco Use     Smoking status: Never Smoker     Smokeless tobacco: Never Used   Substance Use Topics     Alcohol use: No     Drug use: No            Didi Dunbar MD     No

## 2022-10-27 NOTE — ED PROVIDER NOTE - OBJECTIVE STATEMENT
28 yo female w/ PMH of sickle cell disease, acute chest syndrome presents for headache x 5 days. No inciting event or trauma, L sided headache, throbbing pain, radiates to L neck at times, no alleviating/provoking factors, denies having had similar headaches in the past.  No fevers, N/T, paralysis, dizziness, URI sxs. No photophobia contrary to triage note. 26 yo female w/ PMH of sickle cell disease, acute chest syndrome presents for headache x 5 days. No inciting event or trauma, L sided headache, throbbing pain, radiates to L neck at times, no alleviating/provoking factors, denies having had similar headaches in the past.  No fevers, N/T, paralysis, dizziness, URI sxs. No photophobia contrary to triage note. Baseline hemoglobin high 6s/low 7s.

## 2022-10-27 NOTE — ED PROVIDER NOTE - PATIENT PORTAL LINK FT
You can access the FollowMyHealth Patient Portal offered by [facility] by registering at the following website: [email or web address]. By joining [facility]’s FollowMyHealth portal, you will also be able to view your health information using other applications (apps) compatible with our system.

## 2022-10-27 NOTE — ED PROVIDER NOTE - CARE PLAN
Principal Discharge DX:	Headache   1 Principal Discharge DX:	Headache  Secondary Diagnosis:	Sickle cell hemolytic anemia  Secondary Diagnosis:	Hyperbilirubinemia   Principal Discharge DX:	Headache  Secondary Diagnosis:	Sickle cell hemolytic anemia  Secondary Diagnosis:	Hyperbilirubinemia  Secondary Diagnosis:	Reticulocytosis

## 2022-10-27 NOTE — ED ADULT NURSE NOTE - NS ED PATIENT SAFETY CONCERN
Airway  Urgency: elective    Airway not difficult    General Information and Staff    Patient location during procedure: OR  CRNA: RODRÍGUEZ QUIÑONES    Indications and Patient Condition  Indications for airway management: airway protection    Preoxygenated: yes  MILS maintained throughout  Mask difficulty assessment: 1 - vent by mask    Final Airway Details  Final airway type: endotracheal airway      Successful airway: ETT  Cuffed: yes   Successful intubation technique: awake intubation  Facilitating devices/methods: intubating stylet  Endotracheal tube insertion site: oral  Blade: [place]  Blade size: #3  ETT size: 7.0 mm  Placement verified by: chest auscultation and capnometry   Measured from: lips  ETT to lips (cm): 20  Number of attempts at approach: 1              
No

## 2022-10-27 NOTE — ED PROVIDER NOTE - NS ED ROS FT
Constitutional: No fevers, chills, or malaise.  HEENT: Reports headache. No vision change   Cardiac:  No chest pain, SOB, leg edema, or leg pain.  Respiratory:  No cough, respiratory distress, or hemoptysis.  GI:  No nausea, vomiting, diarrhea, or abdominal pain.  :  No dysuria, frequency, or urgency.  MS:  No myalgia, muscle weakness   Neuro:  No dizziness, LOC, paralysis, or N/T.  Skin:  No skin rash.   Endocrine: No polyuria, polyphagia, or polydipsia.

## 2022-10-27 NOTE — ED ADULT TRIAGE NOTE - NS ED TRIAGE AVPU SCALE
Rec'd pt awake and alert. No distress or discomfort assessed. Heparin infusing to (L) arm site @ 2000 units or 20ml/hr per ACS protocol. NPO status for CAth this am. Telemetry showing A-V paced rhythm. Bed in low locked position with call light and personal belongings in reach. Pt to cath lab @ 10:50am.  Pt returned s/p cath  with placement of 1 JEFFERSON to RCA via (R) wirst.  TR band in place to (R) wirst, good CMS  warm to touch. Pt denies any distress or discomfort. POC for recovery explained, understanding verbalized. .Will contnue to monitor TR band  and remove as appropriate. Alert-The patient is alert, awake and responds to voice. The patient is oriented to time, place, and person. The triage nurse is able to obtain subjective information.

## 2022-10-27 NOTE — ED PROVIDER NOTE - PHYSICAL EXAMINATION
GENERAL: NAD   SKIN: warm, dry  HEAD: Normocephalic; atraumatic.  EYES: PERRLA, EOMI, scleral icterus noted, baseline per pt   CARD: S1, S2 normal; no murmurs, gallops, or rubs. Tachycardic   RESP: LCTAB; No wheezes, rales, rhonchi, or stridor.  ABD: soft, nontender, and nondistended  NEURO: Alert, oriented. CN2-12 grossly intact. Strength 5/5 and sensation intact in bilateral UE and LEs. Normal gait.   PSYCH: Cooperative, appropriate. GENERAL: NAD   SKIN: warm, dry  HEAD: Normocephalic; atraumatic.  EYES: PERRLA, EOMI, scleral icterus noted  CARD: S1, S2 normal; no murmurs, gallops, or rubs. Tachycardic   RESP: LCTAB; No wheezes, rales, rhonchi, or stridor.  ABD: soft, nontender, and nondistended  NEURO: Alert, oriented. CN2-12 grossly intact. Strength 5/5 and sensation intact in bilateral UE and LEs. Normal gait.   PSYCH: Cooperative, appropriate.

## 2022-10-27 NOTE — ED PROVIDER NOTE - CLINICAL SUMMARY MEDICAL DECISION MAKING FREE TEXT BOX
27yF SCD (baseline Hgb ~7) p/w headache x 5d.  Pt nontoxic appearing and w/o focal neuro deficits.  Labs w/ severe anemia Hgb 6.9 (at baseline as per pt) but elevated bilirubin ~7, transaminitis, elevated LDH/reticulocytosis c/w sickle cell crisis (though pt does not have her typical sx).  CTH and CTA H+N obtained w/o acute pathology.  D/w pt's hematologist at [place] - despite lack of typical crisis sx, given characteristic lab abnormalities, recommend admission and [facility] hematology consult and treatment. 27yF SCD (baseline Hgb ~7) p/w headache x 5d.  Pt nontoxic appearing and w/o focal neuro deficits.  Labs w/ severe anemia Hgb 6.9 (at baseline as per pt) but elevated bilirubin ~7, transaminitis, elevated LDH/reticulocytosis c/w sickle cell crisis (though pt does not have her typical sx).  CTH and CTA H+N obtained w/o acute pathology.  D/w pt's hematologist at [place] - despite lack of typical crisis sx, given characteristic lab abnormalities, recommend admission and [facility] hematology consult and treatment.  D/w pt and family - pt is feeling better and does not want to be admitted at this time - she agrees to sign out AMA and f/u as o/p with her hematologist.    The patient wishes to leave against medical advice.  I have discussed the risks, benefits and alternatives (including the possibility of worsening of disease, pain, permanent disability, and/or death) with the patient and his/her family (if available).  The patient voices understanding of these risks, benefits, and alternatives and still wishes to sign out against medical advice.  The patient is awake, alert, oriented x 3 and has demonstrated capacity to refuse/direct care.  I have advised the patient that they can and should return immediately should they develop any worse/different/additional symptoms, or if they change their mind and want to continue their care. Patient has full capacity and has verbalized understanding.  Given detailed returned precautions.

## 2022-10-27 NOTE — ED PROVIDER NOTE - ATTENDING CONTRIBUTION TO CARE
27yF sickle cell disease p/w headache x 5d - reports headache w/ NO associated photophobia w/o hx migraines or similar headaches in the past.  No pain elsewhere.  No recollected trauma.  Pt sees hematologist Dr. Wesley Jimenez at [place] for management of her SCD but denies any typical pain or sx of crisis.

## 2022-10-27 NOTE — ED PROVIDER NOTE - PROGRESS NOTE DETAILS
RK: Consulted patient's hematologist's Dr. Wesley Jimenez. [phone], pending call back. RK: Discussed case with fellow on call, Dr. Ocampo. recommended pt to be admitted and have hematology see her in patient given lab values and clinical presentation. RK: Received signout from Dr. Mathur. Consulted patient's hematologist's Dr. Wesley Jimenez. [phone], pending call back. RK: discussed recommendation of admission with pt and her father. patient wants to be discharged and she will follow up with her hematologist. AMA form signed. Father to bring patient home and be with her and will have low threshold to bring her back to ED.

## 2023-01-17 ENCOUNTER — RESULT CHARGE (OUTPATIENT)
Age: 28
End: 2023-01-17

## 2023-01-17 ENCOUNTER — APPOINTMENT (OUTPATIENT)
Dept: CARDIOLOGY | Facility: CLINIC | Age: 28
End: 2023-01-17
Payer: COMMERCIAL

## 2023-01-17 VITALS
SYSTOLIC BLOOD PRESSURE: 100 MMHG | HEIGHT: 64 IN | DIASTOLIC BLOOD PRESSURE: 64 MMHG | WEIGHT: 146 LBS | BODY MASS INDEX: 24.92 KG/M2 | HEART RATE: 85 BPM

## 2023-01-17 VITALS — OXYGEN SATURATION: 83 %

## 2023-01-17 DIAGNOSIS — R79.81 ABNORMAL BLOOD-GAS LEVEL: ICD-10-CM

## 2023-01-17 DIAGNOSIS — Z78.9 OTHER SPECIFIED HEALTH STATUS: ICD-10-CM

## 2023-01-17 PROCEDURE — 99214 OFFICE O/P EST MOD 30 MIN: CPT

## 2023-01-17 PROCEDURE — 93000 ELECTROCARDIOGRAM COMPLETE: CPT | Mod: 1L

## 2023-01-17 RX ORDER — LOSARTAN POTASSIUM 25 MG/1
25 TABLET, FILM COATED ORAL
Qty: 90 | Refills: 0 | Status: ACTIVE | COMMUNITY
Start: 2023-01-17

## 2023-01-30 NOTE — PROGRESS NOTE ADULT - SUBJECTIVE AND OBJECTIVE BOX
Patient is a 26y old  Female who presents with a chief complaint of Acute chest syndrome (10 [place] 2022 12:43)      Subjective:      Vital Signs Last 24 Hrs  T(C): 36.4 (10 [place] 2022 13:41), Max: 37.2 (10 [place] 2022 04:34)  T(F): 97.6 (10 [place] 2022 13:41), Max: 99 (10 [place] 2022 04:34)  HR: 82 (10 [place] 2022 13:41) (82 - 107)  BP: 119/57 (10 [place] 2022 13:41) (119/57 - 120/73)  BP(mean): --  RR: 18 (10 [place] 2022 13:41) (18 - 18)  SpO2: --    PHYSICAL EXAM  General: adult in NAD  HEENT: clear oropharynx, anicteric sclera, pink conjunctiva  Neck: supple  CV: normal S1/S2 with no murmur rubs or gallops  Lungs: positive air movement b/l ant lungs,clear to auscultation, no wheezes, no rales  Abdomen: soft non-tender non-distended, no hepatosplenomegaly  Ext: no clubbing cyanosis or edema  Skin: no rashes and no petechiae  Neuro: alert and oriented X 4, no focal deficits    MEDICATIONS  (STANDING):  chlorhexidine 4% Liquid 1 Application(s) Topical daily  dextrose 5% + sodium chloride 0.45%. 1000 milliLiter(s) (100 mL/Hr) IV Continuous <Continuous>  enoxaparin Injectable 40 milliGRAM(s) SubCutaneous every 24 hours  folic acid 1 milliGRAM(s) Oral daily  influenza   Vaccine 0.5 milliLiter(s) IntraMuscular once  lidocaine   4% Patch 1 Patch Transdermal daily  losartan 25 milliGRAM(s) Oral daily  pantoprazole    Tablet 40 milliGRAM(s) Oral before breakfast  polyethylene glycol 3350 17 Gram(s) Oral daily  predniSONE   Tablet 30 milliGRAM(s) Oral daily  senna 2 Tablet(s) Oral at bedtime    MEDICATIONS  (PRN):  acetaminophen     Tablet .. 650 milliGRAM(s) Oral every 6 hours PRN Temp greater or equal to 38C (100.4F), Mild Pain (1 - 3)  melatonin 3 milliGRAM(s) Oral at bedtime PRN Insomnia  ondansetron Injectable 4 milliGRAM(s) IV Push every 8 hours PRN Nausea and/or Vomiting  oxyCODONE    IR 15 milliGRAM(s) Oral every 4 hours PRN Severe Pain (7 - 10)      LABS:                          9.6    16.98 )-----------( 588      ( 10 [place] 2022 07:56 )             28.5         Mean Cell Volume : 90.2 fL  Mean Cell Hemoglobin : 30.4 pg  Mean Cell Hemoglobin Concentration : 33.7 g/dL  Auto Neutrophil # : 11.14 K/uL  Auto Lymphocyte # : 2.95 K/uL  Auto Monocyte # : 2.41 K/uL  Auto Eosinophil # : 0.17 K/uL  Auto Basophil # : 0.06 K/uL  Auto Neutrophil % : 65.5 %  Auto Lymphocyte % : 17.4 %  Auto Monocyte % : 14.2 %  Auto Eosinophil % : 1.0 %  Auto Basophil % : 0.4 %      Serial CBC's  06-10 @ 07:56  Hct-28.5 / Hgb-9.6 / Plat-588 / RBC-3.16 / WBC-16.98  Serial CBC's  06-09 @ 07:35  Hct-26.7 / Hgb-9.3 / Plat-497 / RBC-2.99 / WBC-17.46  Serial CBC's  06-08 @ 07:33  Hct-26.1 / Hgb-9.0 / Plat-387 / RBC-3.01 / WBC-18.98  Serial CBC's  06-07 @ 04:53  Hct-27.1 / Hgb-9.7 / Plat-297 / RBC-3.18 / WBC-20.40  Serial CBC's  06-06 @ 16:52  Hct-21.6 / Hgb-7.3 / Plat-510 / RBC-2.47 / WBC-17.13      06-10    139  |  99  |  4<L>  ----------------------------<  94  4.3   |  26  |  <0.5<L>    Ca    9.8      10 [place] 2022 07:56  Mg     1.9     06-10    TPro  6.7  /  Alb  3.3<L>  /  TBili  1.9<H>  /  DBili  x   /  AST  32  /  ALT  56<H>  /  AlkPhos  171<H>  06-10          Reticulocyte Percent: 11.8 % (06-10 @ 07:56)  Reticulocyte Percent: 12.0 % (06-09 @ 07:35)  Reticulocyte Percent: 8.4 % (06-07 @ 04:53)  Ferritin, Serum: 2669 ng/mL (06-04 @ 04:36)  Ferritin, Serum: 2493 ng/mL (06-03 @ 18:55)       Patient is a 26y old  Female who presents with a chief complaint of Acute chest syndrome (10 [place] 2022 12:43)      Subjective: feels well      Vital Signs Last 24 Hrs  T(C): 36.4 (10 [place] 2022 13:41), Max: 37.2 (10 [place] 2022 04:34)  T(F): 97.6 (10 [place] 2022 13:41), Max: 99 (10 [place] 2022 04:34)  HR: 82 (10 [place] 2022 13:41) (82 - 107)  BP: 119/57 (10 [place] 2022 13:41) (119/57 - 120/73)  BP(mean): --  RR: 18 (10 [place] 2022 13:41) (18 - 18)  SpO2: --    PHYSICAL EXAM  General: adult in NAD  HEENT: clear oropharynx, anicteric sclera, pink conjunctiva  Neck: supple  CV: normal S1/S2 with no murmur rubs or gallops  Lungs: positive air movement b/l ant lungs,clear to auscultation, no wheezes, no rales  Abdomen: soft non-tender non-distended, no hepatosplenomegaly  Ext: no clubbing cyanosis or edema  Skin: no rashes and no petechiae  Neuro: alert and oriented X 4, no focal deficits    MEDICATIONS  (STANDING):  chlorhexidine 4% Liquid 1 Application(s) Topical daily  dextrose 5% + sodium chloride 0.45%. 1000 milliLiter(s) (100 mL/Hr) IV Continuous <Continuous>  enoxaparin Injectable 40 milliGRAM(s) SubCutaneous every 24 hours  folic acid 1 milliGRAM(s) Oral daily  influenza   Vaccine 0.5 milliLiter(s) IntraMuscular once  lidocaine   4% Patch 1 Patch Transdermal daily  losartan 25 milliGRAM(s) Oral daily  pantoprazole    Tablet 40 milliGRAM(s) Oral before breakfast  polyethylene glycol 3350 17 Gram(s) Oral daily  predniSONE   Tablet 30 milliGRAM(s) Oral daily  senna 2 Tablet(s) Oral at bedtime    MEDICATIONS  (PRN):  acetaminophen     Tablet .. 650 milliGRAM(s) Oral every 6 hours PRN Temp greater or equal to 38C (100.4F), Mild Pain (1 - 3)  melatonin 3 milliGRAM(s) Oral at bedtime PRN Insomnia  ondansetron Injectable 4 milliGRAM(s) IV Push every 8 hours PRN Nausea and/or Vomiting  oxyCODONE    IR 15 milliGRAM(s) Oral every 4 hours PRN Severe Pain (7 - 10)      LABS:                          9.6    16.98 )-----------( 588      ( 10 [place] 2022 07:56 )             28.5         Mean Cell Volume : 90.2 fL  Mean Cell Hemoglobin : 30.4 pg  Mean Cell Hemoglobin Concentration : 33.7 g/dL  Auto Neutrophil # : 11.14 K/uL  Auto Lymphocyte # : 2.95 K/uL  Auto Monocyte # : 2.41 K/uL  Auto Eosinophil # : 0.17 K/uL  Auto Basophil # : 0.06 K/uL  Auto Neutrophil % : 65.5 %  Auto Lymphocyte % : 17.4 %  Auto Monocyte % : 14.2 %  Auto Eosinophil % : 1.0 %  Auto Basophil % : 0.4 %      Serial CBC's  06-10 @ 07:56  Hct-28.5 / Hgb-9.6 / Plat-588 / RBC-3.16 / WBC-16.98  Serial CBC's  06-09 @ 07:35  Hct-26.7 / Hgb-9.3 / Plat-497 / RBC-2.99 / WBC-17.46  Serial CBC's  06-08 @ 07:33  Hct-26.1 / Hgb-9.0 / Plat-387 / RBC-3.01 / WBC-18.98  Serial CBC's  06-07 @ 04:53  Hct-27.1 / Hgb-9.7 / Plat-297 / RBC-3.18 / WBC-20.40  Serial CBC's  06-06 @ 16:52  Hct-21.6 / Hgb-7.3 / Plat-510 / RBC-2.47 / WBC-17.13      06-10    139  |  99  |  4<L>  ----------------------------<  94  4.3   |  26  |  <0.5<L>    Ca    9.8      10 [place] 2022 07:56  Mg     1.9     06-10    TPro  6.7  /  Alb  3.3<L>  /  TBili  1.9<H>  /  DBili  x   /  AST  32  /  ALT  56<H>  /  AlkPhos  171<H>  06-10          Reticulocyte Percent: 11.8 % (06-10 @ 07:56)  Reticulocyte Percent: 12.0 % (06-09 @ 07:35)  Reticulocyte Percent: 8.4 % (06-07 @ 04:53)  Ferritin, Serum: 2669 ng/mL (06-04 @ 04:36)  Ferritin, Serum: 2493 ng/mL (06-03 @ 18:55)       n/a

## 2023-06-20 NOTE — DISCHARGE NOTE PROVIDER - DISCHARGE DIET
Head; normocephalic, atraumatic.  Mouth and Throat; Oral cavity and pharynx appearance normal.
Regular Diet - No restrictions

## 2023-11-14 NOTE — DIETITIAN INITIAL EVALUATION ADULT - NAME AND PHONE
Sudha
Intervention: 1.Meals and Snacks  Monitor/Evaluate: Diet order, energy intake, nutrition focused physical findings, anemia profile  Goals: 1.Continue to consume/tolerate 75%-100% of meals in 10 days (Not At Risk) 
I personally spent

## 2023-12-16 NOTE — ED ADULT NURSE NOTE - NSIMPLEMENTINTERV_GEN_ALL_ED
Implemented All Universal Safety Interventions:  [place] to call system. Call bell, personal items and telephone within reach. Instruct patient to call for assistance. Room bathroom lighting operational. Non-slip footwear when patient is off stretcher. Physically safe environment: no spills, clutter or unnecessary equipment. Stretcher in lowest position, wheels locked, appropriate side rails in place.
2019 novel coronavirus disease (COVID-19)

## 2024-01-16 ENCOUNTER — APPOINTMENT (OUTPATIENT)
Dept: CARDIOLOGY | Facility: CLINIC | Age: 29
End: 2024-01-16
Payer: COMMERCIAL

## 2024-01-16 ENCOUNTER — RESULT CHARGE (OUTPATIENT)
Age: 29
End: 2024-01-16

## 2024-01-16 VITALS
SYSTOLIC BLOOD PRESSURE: 104 MMHG | HEART RATE: 76 BPM | HEIGHT: 64 IN | BODY MASS INDEX: 24.24 KG/M2 | WEIGHT: 142 LBS | DIASTOLIC BLOOD PRESSURE: 60 MMHG

## 2024-01-16 DIAGNOSIS — R06.02 SHORTNESS OF BREATH: ICD-10-CM

## 2024-01-16 PROCEDURE — 99214 OFFICE O/P EST MOD 30 MIN: CPT | Mod: 25

## 2024-01-16 PROCEDURE — 93000 ELECTROCARDIOGRAM COMPLETE: CPT

## 2024-01-16 RX ORDER — VOXELOTOR 500 MG/1
500 TABLET, FILM COATED ORAL TWICE DAILY
Refills: 0 | Status: ACTIVE | COMMUNITY

## 2024-01-16 NOTE — ASSESSMENT
[FreeTextEntry1] : 29 y/o female with history as above.  SSD F/u with Heme Minimize transfusions Secondary hemochromatosis  TREVINO Likely secondary to anemia Improved  Mitral valve disease No significant regurgitation Myxomatous valve Repeat echo next year  F/u in 12 months

## 2024-01-16 NOTE — REVIEW OF SYSTEMS
[SOB] : shortness of breath [Dyspnea on exertion] : not dyspnea during exertion [Chest Discomfort] : no chest discomfort [Lower Ext Edema] : no extremity edema [Leg Claudication] : no intermittent leg claudication [Orthopnea] : no orthopnea [PND] : no PND [Syncope] : no syncope [Negative] : Heme/Lymph

## 2024-01-16 NOTE — REVIEW OF SYSTEMS
[SOB] : shortness of breath [Negative] : Heme/Lymph [Dyspnea on exertion] : not dyspnea during exertion [Chest Discomfort] : no chest discomfort [Lower Ext Edema] : no extremity edema [Leg Claudication] : no intermittent leg claudication [Orthopnea] : no orthopnea [PND] : no PND [Syncope] : no syncope

## 2024-01-16 NOTE — PHYSICAL EXAM

## 2024-01-16 NOTE — HISTORY OF PRESENT ILLNESS
[FreeTextEntry1] : Pt is 28 year old female with PMH of Sickle  cell anemia, hypoxia, hx of multiple blood transfusions.   Hx of chronic hypoxia Pulse ox ranging 86%to 92% on room air.  Pt denies any SOB, no chest pain, no palpitations.  Pt walks TIW 1 mile without cardiac issues.  Pts Hgb Baseline is 7.   11/16/23 Hgb 7.2 06/01/22 TTE EF 63% Myxomatous MV.

## 2024-01-16 NOTE — ASSESSMENT
[FreeTextEntry1] : 27 y/o female with SS disease Anemic] Hypoxemia, likely due to anemia  Echo noted - mild MR Repeat echo next year Prior w/u reviewed F/u with hematology  Return in 6 months.

## 2024-01-16 NOTE — HISTORY OF PRESENT ILLNESS
[FreeTextEntry1] : Pt is 27 year old female with PMH of Sickle  cell anemia, hypoxia, hx of multiple blood transfusions.   Hx of chronic hypoxia Pulse ox ranging 86%to 92% on room air.  Pt denies any SOB, no chest pain, no palpitations.  Pt walks TIW 1 mile without cardiac issues.  Pts Hgb Baseline is 7.   11/16/23 Hgb 7.2 06/01/22 TTE EF 63% Myxomatous MV.

## 2024-06-13 ENCOUNTER — TRANSCRIPTION ENCOUNTER (OUTPATIENT)
Age: 29
End: 2024-06-13

## 2024-06-13 ENCOUNTER — INPATIENT (INPATIENT)
Facility: HOSPITAL | Age: 29
LOS: 1 days | Discharge: LEFT BEFORE DISCHARGE | DRG: 310 | End: 2024-06-15
Attending: HOSPITALIST | Admitting: STUDENT IN AN ORGANIZED HEALTH CARE EDUCATION/TRAINING PROGRAM
Payer: COMMERCIAL

## 2024-06-13 VITALS
RESPIRATION RATE: 18 BRPM | TEMPERATURE: 99 F | HEART RATE: 711 BPM | SYSTOLIC BLOOD PRESSURE: 117 MMHG | OXYGEN SATURATION: 99 % | WEIGHT: 145.06 LBS | DIASTOLIC BLOOD PRESSURE: 70 MMHG

## 2024-06-13 VITALS
TEMPERATURE: 99 F | SYSTOLIC BLOOD PRESSURE: 108 MMHG | RESPIRATION RATE: 18 BRPM | HEART RATE: 62 BPM | OXYGEN SATURATION: 100 % | DIASTOLIC BLOOD PRESSURE: 66 MMHG

## 2024-06-13 DIAGNOSIS — Z90.49 ACQUIRED ABSENCE OF OTHER SPECIFIED PARTS OF DIGESTIVE TRACT: Chronic | ICD-10-CM

## 2024-06-13 DIAGNOSIS — D57.01 HB-SS DISEASE WITH ACUTE CHEST SYNDROME: ICD-10-CM

## 2024-06-13 LAB
ALBUMIN SERPL ELPH-MCNC: 4.6 G/DL — SIGNIFICANT CHANGE UP (ref 3.5–5.2)
ALP SERPL-CCNC: 47 U/L — SIGNIFICANT CHANGE UP (ref 30–115)
ALT FLD-CCNC: 14 U/L — SIGNIFICANT CHANGE UP (ref 0–41)
ANION GAP SERPL CALC-SCNC: 11 MMOL/L — SIGNIFICANT CHANGE UP (ref 7–14)
APTT BLD: 30.6 SEC — SIGNIFICANT CHANGE UP (ref 27–39.2)
AST SERPL-CCNC: 23 U/L — SIGNIFICANT CHANGE UP (ref 0–41)
BASOPHILS # BLD AUTO: 0.08 K/UL — SIGNIFICANT CHANGE UP (ref 0–0.2)
BASOPHILS NFR BLD AUTO: 0.9 % — SIGNIFICANT CHANGE UP (ref 0–1)
BILIRUB SERPL-MCNC: 2.5 MG/DL — HIGH (ref 0.2–1.2)
BUN SERPL-MCNC: 7 MG/DL — LOW (ref 10–20)
CALCIUM SERPL-MCNC: 9.6 MG/DL — SIGNIFICANT CHANGE UP (ref 8.4–10.5)
CHLORIDE SERPL-SCNC: 105 MMOL/L — SIGNIFICANT CHANGE UP (ref 98–110)
CO2 SERPL-SCNC: 21 MMOL/L — SIGNIFICANT CHANGE UP (ref 17–32)
CREAT SERPL-MCNC: 0.5 MG/DL — LOW (ref 0.7–1.5)
D DIMER BLD IA.RAPID-MCNC: 353 NG/ML DDU — HIGH
EGFR: 133 ML/MIN/1.73M2 — SIGNIFICANT CHANGE UP
EGFR: 133 ML/MIN/1.73M2 — SIGNIFICANT CHANGE UP
EOSINOPHIL # BLD AUTO: 0.12 K/UL — SIGNIFICANT CHANGE UP (ref 0–0.7)
EOSINOPHIL NFR BLD AUTO: 1.3 % — SIGNIFICANT CHANGE UP (ref 0–8)
GLUCOSE SERPL-MCNC: 94 MG/DL — SIGNIFICANT CHANGE UP (ref 70–99)
HCG SERPL QL: NEGATIVE — SIGNIFICANT CHANGE UP
HCT VFR BLD CALC: 25.6 % — LOW (ref 37–47)
HGB BLD-MCNC: 9 G/DL — LOW (ref 12–16)
IMM GRANULOCYTES NFR BLD AUTO: 0.3 % — SIGNIFICANT CHANGE UP (ref 0.1–0.3)
INR BLD: 1.23 RATIO — SIGNIFICANT CHANGE UP (ref 0.65–1.3)
LYMPHOCYTES # BLD AUTO: 3.13 K/UL — SIGNIFICANT CHANGE UP (ref 1.2–3.4)
LYMPHOCYTES # BLD AUTO: 33.8 % — SIGNIFICANT CHANGE UP (ref 20.5–51.1)
MAGNESIUM SERPL-MCNC: 1.9 MG/DL — SIGNIFICANT CHANGE UP (ref 1.8–2.4)
MCHC RBC-ENTMCNC: 29.4 PG — SIGNIFICANT CHANGE UP (ref 27–31)
MCHC RBC-ENTMCNC: 35.2 G/DL — SIGNIFICANT CHANGE UP (ref 32–37)
MCV RBC AUTO: 83.7 FL — SIGNIFICANT CHANGE UP (ref 81–99)
MONOCYTES # BLD AUTO: 0.94 K/UL — HIGH (ref 0.1–0.6)
MONOCYTES NFR BLD AUTO: 10.2 % — HIGH (ref 1.7–9.3)
NEUTROPHILS # BLD AUTO: 4.95 K/UL — SIGNIFICANT CHANGE UP (ref 1.4–6.5)
NEUTROPHILS NFR BLD AUTO: 53.5 % — SIGNIFICANT CHANGE UP (ref 42.2–75.2)
NRBC # BLD: 0 /100 WBCS — SIGNIFICANT CHANGE UP (ref 0–0)
NRBC BLD-RTO: 0 /100 WBCS — SIGNIFICANT CHANGE UP (ref 0–0)
NT-PROBNP SERPL-SCNC: <36 PG/ML — SIGNIFICANT CHANGE UP (ref 0–300)
PLATELET # BLD AUTO: 606 K/UL — HIGH (ref 130–400)
PMV BLD: 10 FL — SIGNIFICANT CHANGE UP (ref 7.4–10.4)
POTASSIUM SERPL-MCNC: 4.3 MMOL/L — SIGNIFICANT CHANGE UP (ref 3.5–5)
POTASSIUM SERPL-SCNC: 4.3 MMOL/L — SIGNIFICANT CHANGE UP (ref 3.5–5)
PROT SERPL-MCNC: 7.4 G/DL — SIGNIFICANT CHANGE UP (ref 6–8)
PROTHROM AB SERPL-ACNC: 14 SEC — HIGH (ref 9.95–12.87)
RBC # BLD: 3.05 M/UL — LOW (ref 4.2–5.4)
RBC # BLD: 3.06 M/UL — LOW (ref 4.2–5.4)
RBC # FLD: 18.3 % — HIGH (ref 11.5–14.5)
RETICS #: 284.6 K/UL — HIGH (ref 25–125)
RETICS/RBC NFR: 9.3 % — HIGH (ref 0.5–1.5)
SODIUM SERPL-SCNC: 137 MMOL/L — SIGNIFICANT CHANGE UP (ref 135–146)
TROPONIN T, HIGH SENSITIVITY RESULT: <6 NG/L — SIGNIFICANT CHANGE UP (ref 6–13)
WBC # BLD: 9.25 K/UL — SIGNIFICANT CHANGE UP (ref 4.8–10.8)
WBC # FLD AUTO: 9.25 K/UL — SIGNIFICANT CHANGE UP (ref 4.8–10.8)

## 2024-06-13 PROCEDURE — 99285 EMERGENCY DEPT VISIT HI MDM: CPT

## 2024-06-13 PROCEDURE — 87040 BLOOD CULTURE FOR BACTERIA: CPT

## 2024-06-13 PROCEDURE — 86077 PHYS BLOOD BANK SERV XMATCH: CPT

## 2024-06-13 PROCEDURE — 71275 CT ANGIOGRAPHY CHEST: CPT | Mod: 26,MC

## 2024-06-13 PROCEDURE — 71045 X-RAY EXAM CHEST 1 VIEW: CPT | Mod: 26

## 2024-06-13 PROCEDURE — 99223 1ST HOSP IP/OBS HIGH 75: CPT

## 2024-06-13 PROCEDURE — 93010 ELECTROCARDIOGRAM REPORT: CPT

## 2024-06-13 RX ORDER — SODIUM CHLORIDE 9 G/1000ML
1000 INJECTION, SOLUTION INTRAVENOUS ONCE
Refills: 0 | Status: COMPLETED | OUTPATIENT
Start: 2024-06-13 | End: 2024-06-13

## 2024-06-13 RX ORDER — AZITHROMYCIN 250 MG
500 CAPSULE ORAL ONCE
Refills: 0 | Status: COMPLETED | OUTPATIENT
Start: 2024-06-13 | End: 2024-06-13

## 2024-06-13 RX ORDER — CEFTRIAXONE 500 MG/1
1000 INJECTION, POWDER, FOR SOLUTION INTRAMUSCULAR; INTRAVENOUS ONCE
Refills: 0 | Status: COMPLETED | OUTPATIENT
Start: 2024-06-13 | End: 2024-06-13

## 2024-06-13 RX ADMIN — SODIUM CHLORIDE 1000 MILLILITER(S): 9 INJECTION, SOLUTION INTRAVENOUS at 21:49

## 2024-06-13 RX ADMIN — CEFTRIAXONE 100 MILLIGRAM(S): 500 INJECTION, POWDER, FOR SOLUTION INTRAMUSCULAR; INTRAVENOUS at 23:38

## 2024-06-14 RX ORDER — LOSARTAN POTASSIUM 100 MG/1
50 TABLET, FILM COATED ORAL DAILY
Refills: 0 | Status: DISCONTINUED | OUTPATIENT
Start: 2024-06-14 | End: 2024-06-15

## 2024-06-14 RX ORDER — LOSARTAN POTASSIUM 100 MG/1
1 TABLET, FILM COATED ORAL
Refills: 0 | DISCHARGE

## 2024-06-14 RX ORDER — FOLIC ACID 1 MG/1
1 TABLET ORAL DAILY
Refills: 0 | Status: DISCONTINUED | OUTPATIENT
Start: 2024-06-14 | End: 2024-06-15

## 2024-06-14 RX ADMIN — Medication 255 MILLIGRAM(S): at 01:03

## 2024-06-19 LAB
CULTURE RESULTS: SIGNIFICANT CHANGE UP
CULTURE RESULTS: SIGNIFICANT CHANGE UP
SPECIMEN SOURCE: SIGNIFICANT CHANGE UP
SPECIMEN SOURCE: SIGNIFICANT CHANGE UP

## 2024-06-25 DIAGNOSIS — D57.1 SICKLE-CELL DISEASE WITHOUT CRISIS: ICD-10-CM

## 2024-06-25 DIAGNOSIS — D64.9 ANEMIA, UNSPECIFIED: ICD-10-CM

## 2024-06-25 DIAGNOSIS — R00.2 PALPITATIONS: ICD-10-CM

## 2024-06-25 DIAGNOSIS — Z53.29 PROCEDURE AND TREATMENT NOT CARRIED OUT BECAUSE OF PATIENT'S DECISION FOR OTHER REASONS: ICD-10-CM

## 2024-06-25 DIAGNOSIS — Z79.899 OTHER LONG TERM (CURRENT) DRUG THERAPY: ICD-10-CM

## 2024-06-25 DIAGNOSIS — Z79.52 LONG TERM (CURRENT) USE OF SYSTEMIC STEROIDS: ICD-10-CM

## 2024-06-25 DIAGNOSIS — I44.0 ATRIOVENTRICULAR BLOCK, FIRST DEGREE: ICD-10-CM

## 2024-06-25 DIAGNOSIS — Z86.19 PERSONAL HISTORY OF OTHER INFECTIOUS AND PARASITIC DISEASES: ICD-10-CM

## 2024-06-27 ENCOUNTER — NON-APPOINTMENT (OUTPATIENT)
Age: 29
End: 2024-06-27

## 2024-07-01 ENCOUNTER — RESULT CHARGE (OUTPATIENT)
Age: 29
End: 2024-07-01

## 2024-07-01 ENCOUNTER — APPOINTMENT (OUTPATIENT)
Dept: CARDIOLOGY | Facility: CLINIC | Age: 29
End: 2024-07-01
Payer: COMMERCIAL

## 2024-07-01 VITALS
OXYGEN SATURATION: 98 % | DIASTOLIC BLOOD PRESSURE: 74 MMHG | WEIGHT: 145 LBS | HEART RATE: 79 BPM | HEIGHT: 64 IN | BODY MASS INDEX: 24.75 KG/M2 | SYSTOLIC BLOOD PRESSURE: 98 MMHG

## 2024-07-01 DIAGNOSIS — I34.9 NONRHEUMATIC MITRAL VALVE DISORDER, UNSPECIFIED: ICD-10-CM

## 2024-07-01 DIAGNOSIS — I10 ESSENTIAL (PRIMARY) HYPERTENSION: ICD-10-CM

## 2024-07-01 DIAGNOSIS — R00.2 PALPITATIONS: ICD-10-CM

## 2024-07-01 DIAGNOSIS — D57.1 SICKLE-CELL DISEASE W/OUT CRISIS: ICD-10-CM

## 2024-07-01 PROCEDURE — 93000 ELECTROCARDIOGRAM COMPLETE: CPT

## 2024-07-01 PROCEDURE — 99214 OFFICE O/P EST MOD 30 MIN: CPT

## 2024-07-01 PROCEDURE — G2211 COMPLEX E/M VISIT ADD ON: CPT

## 2024-07-14 ENCOUNTER — NON-APPOINTMENT (OUTPATIENT)
Age: 29
End: 2024-07-14

## 2024-07-15 ENCOUNTER — NON-APPOINTMENT (OUTPATIENT)
Age: 29
End: 2024-07-15

## 2024-07-16 ENCOUNTER — APPOINTMENT (OUTPATIENT)
Dept: NEUROLOGY | Facility: CLINIC | Age: 29
End: 2024-07-16
Payer: COMMERCIAL

## 2024-07-16 VITALS
WEIGHT: 145 LBS | BODY MASS INDEX: 24.75 KG/M2 | HEART RATE: 84 BPM | DIASTOLIC BLOOD PRESSURE: 65 MMHG | SYSTOLIC BLOOD PRESSURE: 106 MMHG | OXYGEN SATURATION: 98 % | HEIGHT: 64 IN | TEMPERATURE: 98 F

## 2024-07-16 VITALS — DIASTOLIC BLOOD PRESSURE: 68 MMHG | SYSTOLIC BLOOD PRESSURE: 110 MMHG | HEART RATE: 84 BPM

## 2024-07-16 VITALS — DIASTOLIC BLOOD PRESSURE: 73 MMHG | SYSTOLIC BLOOD PRESSURE: 110 MMHG | HEART RATE: 94 BPM

## 2024-07-16 DIAGNOSIS — R42 DIZZINESS AND GIDDINESS: ICD-10-CM

## 2024-07-16 PROCEDURE — G2211 COMPLEX E/M VISIT ADD ON: CPT

## 2024-07-16 PROCEDURE — 99215 OFFICE O/P EST HI 40 MIN: CPT

## 2024-07-16 RX ORDER — MECLIZINE HYDROCHLORIDE 25 MG/1
25 TABLET ORAL
Qty: 60 | Refills: 0 | Status: ACTIVE | COMMUNITY
Start: 2024-07-16 | End: 1900-01-01

## 2024-10-14 NOTE — ED PROVIDER NOTE - PROGRESS NOTE DETAILS
No
MQ: Talked to ICU fellow Shelby, will admit approved by Dr. Hardy, discussed case with heme/onc fellow Dr. Shoemaker, no blood exchange for now, but will talk to the attending

## 2024-11-01 ENCOUNTER — APPOINTMENT (OUTPATIENT)
Dept: NEUROLOGY | Facility: CLINIC | Age: 29
End: 2024-11-01

## 2025-02-25 ENCOUNTER — INPATIENT (INPATIENT)
Facility: HOSPITAL | Age: 30
LOS: 1 days | Discharge: HOME | DRG: 812 | End: 2025-02-27
Attending: STUDENT IN AN ORGANIZED HEALTH CARE EDUCATION/TRAINING PROGRAM | Admitting: INTERNAL MEDICINE
Payer: COMMERCIAL

## 2025-02-25 VITALS
HEIGHT: 65 IN | OXYGEN SATURATION: 100 % | RESPIRATION RATE: 120 BRPM | WEIGHT: 143.08 LBS | SYSTOLIC BLOOD PRESSURE: 93 MMHG | HEART RATE: 16 BPM | DIASTOLIC BLOOD PRESSURE: 60 MMHG | TEMPERATURE: 97 F

## 2025-02-25 DIAGNOSIS — Z90.49 ACQUIRED ABSENCE OF OTHER SPECIFIED PARTS OF DIGESTIVE TRACT: Chronic | ICD-10-CM

## 2025-02-25 DIAGNOSIS — D57.01 HB-SS DISEASE WITH ACUTE CHEST SYNDROME: ICD-10-CM

## 2025-02-25 LAB
ALBUMIN SERPL ELPH-MCNC: 4.3 G/DL — SIGNIFICANT CHANGE UP (ref 3.5–5.2)
ALP SERPL-CCNC: 53 U/L — SIGNIFICANT CHANGE UP (ref 30–115)
ALT FLD-CCNC: 12 U/L — SIGNIFICANT CHANGE UP (ref 0–41)
ANION GAP SERPL CALC-SCNC: 9 MMOL/L — SIGNIFICANT CHANGE UP (ref 7–14)
ANISOCYTOSIS BLD QL: SIGNIFICANT CHANGE UP
AST SERPL-CCNC: 48 U/L — HIGH (ref 0–41)
BASOPHILS # BLD AUTO: 0.19 K/UL — SIGNIFICANT CHANGE UP (ref 0–0.2)
BASOPHILS NFR BLD AUTO: 1.7 % — HIGH (ref 0–1)
BILIRUB DIRECT SERPL-MCNC: 0.3 MG/DL — SIGNIFICANT CHANGE UP (ref 0–0.3)
BILIRUB INDIRECT FLD-MCNC: 6.3 MG/DL — HIGH (ref 0.2–1.2)
BILIRUB SERPL-MCNC: 6.6 MG/DL — HIGH (ref 0.2–1.2)
BILIRUB SERPL-MCNC: 6.6 MG/DL — HIGH (ref 0.2–1.2)
BUN SERPL-MCNC: 5 MG/DL — LOW (ref 10–20)
CALCIUM SERPL-MCNC: 9.5 MG/DL — SIGNIFICANT CHANGE UP (ref 8.4–10.4)
CHLORIDE SERPL-SCNC: 105 MMOL/L — SIGNIFICANT CHANGE UP (ref 98–110)
CO2 SERPL-SCNC: 24 MMOL/L — SIGNIFICANT CHANGE UP (ref 17–32)
CREAT SERPL-MCNC: 0.5 MG/DL — LOW (ref 0.7–1.5)
EGFR: 132 ML/MIN/1.73M2 — SIGNIFICANT CHANGE UP
EOSINOPHIL # BLD AUTO: 0.19 K/UL — SIGNIFICANT CHANGE UP (ref 0–0.7)
EOSINOPHIL NFR BLD AUTO: 1.7 % — SIGNIFICANT CHANGE UP (ref 0–8)
GIANT PLATELETS BLD QL SMEAR: PRESENT — SIGNIFICANT CHANGE UP
GLUCOSE SERPL-MCNC: 99 MG/DL — SIGNIFICANT CHANGE UP (ref 70–99)
HCT VFR BLD CALC: 19.5 % — LOW (ref 37–47)
HGB BLD-MCNC: 7.2 G/DL — LOW (ref 12–16)
HOWELL-JOLLY BOD BLD QL SMEAR: PRESENT — SIGNIFICANT CHANGE UP
LDH SERPL L TO P-CCNC: 659 — HIGH (ref 50–242)
LYMPHOCYTES # BLD AUTO: 2.76 K/UL — SIGNIFICANT CHANGE UP (ref 1.2–3.4)
LYMPHOCYTES # BLD AUTO: 24.6 % — SIGNIFICANT CHANGE UP (ref 20.5–51.1)
MACROCYTES BLD QL: SLIGHT — SIGNIFICANT CHANGE UP
MANUAL SMEAR VERIFICATION: SIGNIFICANT CHANGE UP
MCHC RBC-ENTMCNC: 32.6 PG — HIGH (ref 27–31)
MCHC RBC-ENTMCNC: 36.9 G/DL — SIGNIFICANT CHANGE UP (ref 32–37)
MCV RBC AUTO: 88.2 FL — SIGNIFICANT CHANGE UP (ref 81–99)
MICROCYTES BLD QL: SIGNIFICANT CHANGE UP
MONOCYTES # BLD AUTO: 0.78 K/UL — HIGH (ref 0.1–0.6)
MONOCYTES NFR BLD AUTO: 7 % — SIGNIFICANT CHANGE UP (ref 1.7–9.3)
MYELOCYTES NFR BLD: 0.9 % — HIGH (ref 0–0)
NEUTROPHILS # BLD AUTO: 6.98 K/UL — HIGH (ref 1.4–6.5)
NEUTROPHILS NFR BLD AUTO: 62.3 % — SIGNIFICANT CHANGE UP (ref 42.2–75.2)
NRBC # BLD: 2 /100 WBCS — HIGH (ref 0–0)
NRBC BLD AUTO-RTO: SIGNIFICANT CHANGE UP /100 WBCS (ref 0–0)
NRBC BLD-RTO: 2 /100 WBCS — HIGH (ref 0–0)
PLAT MORPH BLD: NORMAL — SIGNIFICANT CHANGE UP
PLATELET # BLD AUTO: 466 K/UL — HIGH (ref 130–400)
PMV BLD: 10.6 FL — HIGH (ref 7.4–10.4)
POIKILOCYTOSIS BLD QL AUTO: SIGNIFICANT CHANGE UP
POLYCHROMASIA BLD QL SMEAR: SIGNIFICANT CHANGE UP
POTASSIUM SERPL-MCNC: 5.1 MMOL/L — HIGH (ref 3.5–5)
POTASSIUM SERPL-SCNC: 5.1 MMOL/L — HIGH (ref 3.5–5)
PROT SERPL-MCNC: 7.3 G/DL — SIGNIFICANT CHANGE UP (ref 6–8)
RBC # BLD: 2.21 M/UL — LOW (ref 4.2–5.4)
RBC # BLD: 2.21 M/UL — LOW (ref 4.2–5.4)
RBC # FLD: 24.1 % — HIGH (ref 11.5–14.5)
RBC BLD AUTO: ABNORMAL
RETICS #: 624.8 K/UL — HIGH (ref 25–125)
RETICS/RBC NFR: 28.3 % — HIGH (ref 0.5–1.5)
SICKLE CELLS BLD QL SMEAR: SIGNIFICANT CHANGE UP
SODIUM SERPL-SCNC: 138 MMOL/L — SIGNIFICANT CHANGE UP (ref 135–146)
STOMATOCYTES BLD QL SMEAR: SLIGHT — SIGNIFICANT CHANGE UP
TARGETS BLD QL SMEAR: SIGNIFICANT CHANGE UP
VARIANT LYMPHS # BLD: 1.8 % — SIGNIFICANT CHANGE UP (ref 0–5)
VARIANT LYMPHS NFR BLD MANUAL: 1.8 % — SIGNIFICANT CHANGE UP (ref 0–5)
WBC # BLD: 11.21 K/UL — HIGH (ref 4.8–10.8)
WBC # FLD AUTO: 11.21 K/UL — HIGH (ref 4.8–10.8)

## 2025-02-25 PROCEDURE — 87040 BLOOD CULTURE FOR BACTERIA: CPT

## 2025-02-25 PROCEDURE — 82728 ASSAY OF FERRITIN: CPT

## 2025-02-25 PROCEDURE — 86850 RBC ANTIBODY SCREEN: CPT

## 2025-02-25 PROCEDURE — 81001 URINALYSIS AUTO W/SCOPE: CPT

## 2025-02-25 PROCEDURE — 86140 C-REACTIVE PROTEIN: CPT

## 2025-02-25 PROCEDURE — 86922 COMPATIBILITY TEST ANTIGLOB: CPT

## 2025-02-25 PROCEDURE — 86880 COOMBS TEST DIRECT: CPT

## 2025-02-25 PROCEDURE — 0225U NFCT DS DNA&RNA 21 SARSCOV2: CPT

## 2025-02-25 PROCEDURE — 36415 COLL VENOUS BLD VENIPUNCTURE: CPT

## 2025-02-25 PROCEDURE — 86900 BLOOD TYPING SEROLOGIC ABO: CPT

## 2025-02-25 PROCEDURE — 99222 1ST HOSP IP/OBS MODERATE 55: CPT

## 2025-02-25 PROCEDURE — 83020 HEMOGLOBIN ELECTROPHORESIS: CPT

## 2025-02-25 PROCEDURE — 86902 BLOOD TYPE ANTIGEN DONOR EA: CPT

## 2025-02-25 PROCEDURE — 73620 X-RAY EXAM OF FOOT: CPT | Mod: RT

## 2025-02-25 PROCEDURE — 86901 BLOOD TYPING SEROLOGIC RH(D): CPT

## 2025-02-25 PROCEDURE — 36430 TRANSFUSION BLD/BLD COMPNT: CPT

## 2025-02-25 PROCEDURE — 85610 PROTHROMBIN TIME: CPT

## 2025-02-25 PROCEDURE — 73600 X-RAY EXAM OF ANKLE: CPT | Mod: LT

## 2025-02-25 PROCEDURE — 80053 COMPREHEN METABOLIC PANEL: CPT

## 2025-02-25 PROCEDURE — 86870 RBC ANTIBODY IDENTIFICATION: CPT

## 2025-02-25 PROCEDURE — 71045 X-RAY EXAM CHEST 1 VIEW: CPT | Mod: 26

## 2025-02-25 PROCEDURE — 83735 ASSAY OF MAGNESIUM: CPT

## 2025-02-25 PROCEDURE — 85025 COMPLETE CBC W/AUTO DIFF WBC: CPT

## 2025-02-25 PROCEDURE — 84145 PROCALCITONIN (PCT): CPT

## 2025-02-25 PROCEDURE — 83010 ASSAY OF HAPTOGLOBIN QUANT: CPT

## 2025-02-25 PROCEDURE — 99285 EMERGENCY DEPT VISIT HI MDM: CPT

## 2025-02-25 PROCEDURE — 85730 THROMBOPLASTIN TIME PARTIAL: CPT

## 2025-02-25 PROCEDURE — P9040: CPT

## 2025-02-25 PROCEDURE — 71045 X-RAY EXAM CHEST 1 VIEW: CPT

## 2025-02-25 PROCEDURE — 85045 AUTOMATED RETICULOCYTE COUNT: CPT

## 2025-02-25 PROCEDURE — 85652 RBC SED RATE AUTOMATED: CPT

## 2025-02-25 PROCEDURE — 73120 X-RAY EXAM OF HAND: CPT | Mod: 50

## 2025-02-25 PROCEDURE — 83615 LACTATE (LD) (LDH) ENZYME: CPT

## 2025-02-25 RX ORDER — LOSARTAN POTASSIUM 100 MG/1
50 TABLET, FILM COATED ORAL DAILY
Refills: 0 | Status: DISCONTINUED | OUTPATIENT
Start: 2025-02-25 | End: 2025-02-27

## 2025-02-25 RX ORDER — HYDROMORPHONE/SOD CHLOR,ISO/PF 2 MG/10 ML
0.5 SYRINGE (ML) INJECTION EVERY 4 HOURS
Refills: 0 | Status: DISCONTINUED | OUTPATIENT
Start: 2025-02-25 | End: 2025-02-27

## 2025-02-25 RX ORDER — SODIUM CHLORIDE 9 G/1000ML
1000 INJECTION, SOLUTION INTRAVENOUS
Refills: 0 | Status: DISCONTINUED | OUTPATIENT
Start: 2025-02-25 | End: 2025-02-27

## 2025-02-25 RX ORDER — ACETAMINOPHEN 500 MG/5ML
650 LIQUID (ML) ORAL EVERY 6 HOURS
Refills: 0 | Status: DISCONTINUED | OUTPATIENT
Start: 2025-02-25 | End: 2025-02-27

## 2025-02-25 RX ORDER — FOLIC ACID 1 MG/1
1 TABLET ORAL DAILY
Refills: 0 | Status: DISCONTINUED | OUTPATIENT
Start: 2025-02-25 | End: 2025-02-27

## 2025-02-25 RX ORDER — VOXELOTOR 500 MG/1
2 TABLET, FILM COATED ORAL
Refills: 0 | DISCHARGE

## 2025-02-25 RX ORDER — HEPARIN SODIUM 1000 [USP'U]/ML
5000 INJECTION INTRAVENOUS; SUBCUTANEOUS EVERY 12 HOURS
Refills: 0 | Status: DISCONTINUED | OUTPATIENT
Start: 2025-02-25 | End: 2025-02-27

## 2025-02-25 RX ORDER — INFLUENZA A VIRUS A/IDAHO/07/2018 (H1N1) ANTIGEN (MDCK CELL DERIVED, PROPIOLACTONE INACTIVATED, INFLUENZA A VIRUS A/INDIANA/08/2018 (H3N2) ANTIGEN (MDCK CELL DERIVED, PROPIOLACTONE INACTIVATED), INFLUENZA B VIRUS B/SINGAPORE/INFTT-16-0610/2016 ANTIGEN (MDCK CELL DERIVED, PROPIOLACTONE INACTIVATED), INFLUENZA B VIRUS B/IOWA/06/2017 ANTIGEN (MDCK CELL DERIVED, PROPIOLACTONE INACTIVATED) 15; 15; 15; 15 UG/.5ML; UG/.5ML; UG/.5ML; UG/.5ML
0.5 INJECTION, SUSPENSION INTRAMUSCULAR ONCE
Refills: 0 | Status: DISCONTINUED | OUTPATIENT
Start: 2025-02-25 | End: 2025-02-27

## 2025-02-25 RX ORDER — LOSARTAN POTASSIUM 100 MG/1
1 TABLET, FILM COATED ORAL
Refills: 0 | DISCHARGE

## 2025-02-25 RX ADMIN — Medication 4 MILLIGRAM(S): at 13:37

## 2025-02-25 RX ADMIN — SODIUM CHLORIDE 100 MILLILITER(S): 9 INJECTION, SOLUTION INTRAVENOUS at 21:45

## 2025-02-25 RX ADMIN — Medication 0.5 MILLIGRAM(S): at 22:46

## 2025-02-25 RX ADMIN — Medication 1000 MILLILITER(S): at 13:38

## 2025-02-25 RX ADMIN — Medication 0.5 MILLIGRAM(S): at 23:15

## 2025-02-25 NOTE — ED PROVIDER NOTE - DIFFERENTIAL DIAGNOSIS
Diffuse pain including in hands, similar to patient's prior sickle cell crises. Likely sickle cell crisis but will r/o concurrent infection vs acute chest vs other underlying metabolic derangement. Differential Diagnosis

## 2025-02-25 NOTE — ED PROVIDER NOTE - IN ACCORDANCE WITH [place] STATE LAW, WE OFFER EVERY PATIENT A HEPATITIS C TEST. WOULD YOU LIKE TO BE TESTED TODAY?
What Was Performed First?: Curettage Size Of Lesion After Curettage: 0.7 Bill As A Line Item Or As Units: Line Item Post-Care Instructions: I reviewed with the patient in detail post-care instructions. Patient is to keep the area dry for 48 hours, and not to engage in any swimming until the area is healed. Should the patient develop any fevers, chills, bleeding, severe pain patient will contact the office immediately. Hide Accession Number?: No Additional Information: (Optional): The wound was cleaned, and a pressure dressing was applied.  The patient received detailed post-op instructions. Anesthesia Type: 1% lidocaine with epinephrine Total Volume (Ccs): 1 Detail Level: Detailed Cautery Type: electrodesiccation Opt out Consent was obtained from the patient. The risks, benefits and alternatives to therapy were discussed in detail. Specifically, the risks of infection, scarring, bleeding, prolonged wound healing, nerve injury, incomplete removal, allergy to anesthesia and recurrence were addressed. Alternatives to ED&C, such as: surgical removal and XRT were also discussed.  Prior to the procedure, the treatment site was clearly identified and confirmed by the patient. All components of Universal Protocol/PAUSE Rule completed. Number Of Curettages: 3 Size Of Lesion In Cm: 0.4

## 2025-02-25 NOTE — H&P ADULT - NSHPPHYSICALEXAM_GEN_ALL_CORE
PHYSICAL EXAM:  GENERAL: NAD  EYES: conjunctiva and sclera clear  ENT: No tonsillar erythema, exudates, or enlargement; Moist mucous membranes  NECK: Supple, No JVD, Normal thyroid  HEART: Regular rate and rhythm; No murmurs, rubs, or gallops, Normal S1 S2   RESPIRATORY: Clear to auscultation bilaterally, resonant percussion note, no added sounds   ABDOMEN: Soft, Nontender, Nondistended; Bowel sounds present  NEUROLOGY: A&Ox3, grossly intact power and sensation   EXTREMITIES:  2+ Peripheral Pulses, No clubbing, cyanosis, or edema. Tenderness on palpitations on b/l feet and hands  SKIN: warm, dry, normal color, no rash or abnormal lesions PHYSICAL EXAM:  GENERAL: NAD  EYES: yellowish discoloration of sclera  HEART: Regular rate and rhythm; normal S1/S2, systolic murmur +  RESPIRATORY: Clear to auscultation bilaterally, resonant percussion note, no added sounds   ABDOMEN: Soft, Nontender, Nondistended; Bowel sounds present  NEUROLOGY: A&Ox3, grossly intact power and sensation. bilateral hand  5/5.  EXTREMITIES:  2+ Peripheral Pulses, bilateral radial pulses palpable. No clubbing, cyanosis, or edema. mild tenderness on palpitations on b/l feet and hands  SKIN: hyperpigmentation of knuckles of 2nd and 3rd digits of both hands and right big toe.

## 2025-02-25 NOTE — ED PROVIDER NOTE - CONSIDERATION OF ADMISSION OBSERVATION
Consideration of Admission/Observation Patient with acute sickle cell crisis, intractable pain. Will require admission for pain control and likely electrophoresis

## 2025-02-25 NOTE — H&P ADULT - ATTENDING COMMENTS
Patient seen on 02/25/25.    29-year-old female, with PMH of sickle cell anemia (on folic acid) and proteinuria presents to the ED with pain in her 2nd and 3rd digits of both hands, right big toe and left heel. Pt says the pain has been on and off since January, but over the last week it has gotten worse and she rates it as a 7/10. She uses Tylenol and a heating pad to help with the pain. Pain is also made worse with the cold. Follows with Dr. Wesley Jimenez in [facility].  Denies SOB, chest pain, any sources of bleeding, fever, chills, cough. She reports that her baseline hgb is around 7.4.    In the ED  Vitals - BP 93/60, , RR 16, Temp 98.1F, O2 100% on RA  Labs - WBC 11.21, RBC 2.21, Hgb 7.2, Reticulocytes  28.3%, T. Bilirubin 6.6%, Indirect Bilirubin 6.3%  CXR - Unremarkable  EKG - NSR  Given NS 1L Bolus, Morphine 4mg IV push      #Sickle Cell Anemia Crisis  #B/L 2nd and 3rd digits, right big toe and left heel pain  #Normocytic anemia  #Hyperbilirubinemia, indirect  - RBC 2.21, Hgb 7.2, Reticulocytes  28.3%, Bilirubin 6.6%   - Follows with  Dr. Wesley Jimenez from [facility]  - Given NS 1L Bolus, Morphine 4mg IV push  - CXR - Unremarkable  - EKG - NSR  - Heme/onc note appreciate, start IV fluids 0.5 NS @100cc/hr, Get hemoglobin electrophoresis, get CXR, incentive spirometer   - Hgb 7.2, Baseline hb is 7-8. No indication for transfusion based on today's labs   - C/w home folic acid 1mg qd PO  - Will c/w with Dilaudid 0.5 mg IV push q4h PRN  - Pain management consult placed, per Heme recs  - F/u Am CBC  - check xray both hands, right foot and left ankle.    #Leukocytosis  #Hypotension - Resolved  #Tachycardia - Resolved  #Volume depletion  - WBC 11.21  - BP 93/60,   - Given 1L NS in the ED, BP and HR corrected  - C/w 1/2 NS 100cc/hr, per heme recs  - F/u repeat CBC and CMP in the AM    #Proteinuria  - C/w home Losartan 50mg qd    DVT PPx: Heparin 5000u q12h  GI PPx: NI  Activity: AAT  Diet: Regular Diet  Code Status: Full Code  Dispo: Medsurg

## 2025-02-25 NOTE — H&P ADULT - ASSESSMENT
29-year-old female, with PMH of sickle cell anemia (on folic acid) and proteinuria presents to the ED with pain in her bilateral hand and toes. Pt says the pain has been on and off since january, but over the last week it has gotten worse and she rates it as a 7/10. She uses Tylenol and a heating pad to help with the pain. Pain is also made worse with the cold. Follows with Dr. Wesley Jimenez in [facility].  Denies SOB, chest pain, any sources of bleeding, fever, chills, cough.    In the ED  Vitals - BP 93/60, , RR 16, Temp 98.1F, O2 100% on RA  Labs - WBC 11.21, RBC 2.21, Hgb 7.2, Reticulocytes  28.3%, T. Bilirubin 6.6%, Indirect Bilirubin 6.3%  CXR - Unremarkable  EKG - NSR  Given NS 1L Bolus, Morphine 4mg IV push      #Sickle Cell Anemia Crisis  #B/L hand and feet pain  #Normocytic anemia  #Hyperbilirubinemia, indirect  - RBC 2.21, Hgb 7.2, Reticulocytes  28.3%, Bilirubin 6.6%   - Follows with  Dr. Wesley Jimenez from [facility]  - Given NS 1L Bolus, Morphine 4mg IV push  - CXR - Unremarkable  - EKG - NSR  - Heme saw pt in the ED, start IV fluids 0.5 NS @100cc/hr, Get hemoglobin electrophoresis, get CXR, incentive spirometer   - Hgb 7.2, Baseline hb is 7-8. No indication for transfusion based on today's labs   - C/w home folic acid 1mg qd PO  - Will c/w with Dilaudid 0.5 mg IV push q4h PRN  - Pain management consult placed, per Heme recs  - F/u Am CBC    #Leukocytosis  #Hypotension - Resolved  #Tachycardia - Resolved  #Volume depletion  - WBC 11.21  - BP 93/60,   - Given 1L NS in the ED, BP and HR corrected  - C/w 1/2 NS 100cc/hr, per heme recs  - F/u repeat CBC and CMP in the AM    #Proteinuria  - C/w home Losartan 50mg qd    DVT PPx: Heparin 5000u q12h  GI PPx: NI  Activity: AAT  Diet: Regular Diet  Code Status: Full Code  Dispo: Medsurg    29-year-old female, with PMH of sickle cell anemia (on folic acid) and proteinuria presents to the ED with pain in her 2nd and 3rd digits of both hands, right big toe and left heel. Pt says the pain has been on and off since January, but over the last week it has gotten worse and she rates it as a 7/10. She uses Tylenol and a heating pad to help with the pain. Pain is also made worse with the cold. Follows with Dr. Wesley Jimenez in [facility].  Denies SOB, chest pain, any sources of bleeding, fever, chills, cough. She reports that her baseline hgb is around 7.4.    In the ED  Vitals - BP 93/60, , RR 16, Temp 98.1F, O2 100% on RA  Labs - WBC 11.21, RBC 2.21, Hgb 7.2, Reticulocytes  28.3%, T. Bilirubin 6.6%, Indirect Bilirubin 6.3%  CXR - Unremarkable  EKG - NSR  Given NS 1L Bolus, Morphine 4mg IV push      #Sickle Cell Anemia Crisis  #B/L 2nd and 3rd digits, right big toe and left heel pain  #Normocytic anemia  #Hyperbilirubinemia, indirect  - RBC 2.21, Hgb 7.2, Reticulocytes  28.3%, Bilirubin 6.6%   - Follows with  Dr. Wesley Jimenez from [facility]  - Given NS 1L Bolus, Morphine 4mg IV push  - CXR - Unremarkable  - EKG - NSR  - Heme/onc note appreciate, start IV fluids 0.5 NS @100cc/hr, Get hemoglobin electrophoresis, get CXR, incentive spirometer   - Hgb 7.2, Baseline hb is 7-8. No indication for transfusion based on today's labs   - C/w home folic acid 1mg qd PO  - Will c/w with Dilaudid 0.5 mg IV push q4h PRN  - Pain management consult placed, per Heme recs  - F/u Am CBC  - check xray both hands, right foot and left ankle.    #Leukocytosis  #Hypotension - Resolved  #Tachycardia - Resolved  #Volume depletion  - WBC 11.21  - BP 93/60,   - Given 1L NS in the ED, BP and HR corrected  - C/w 1/2 NS 100cc/hr, per heme recs  - F/u repeat CBC and CMP in the AM    #Proteinuria  - C/w home Losartan 50mg qd    DVT PPx: Heparin 5000u q12h  GI PPx: NI  Activity: AAT  Diet: Regular Diet  Code Status: Full Code  Dispo: Medsurg

## 2025-02-25 NOTE — ED PROVIDER NOTE - CLINICAL SUMMARY MEDICAL DECISION MAKING FREE TEXT BOX
Patient presented with worsening diffuse body pain, most pronounced in her b/l hands as documented, hx sickle cell disease and states this is the same as prior flares. Otherwise afebrile, HD stable, neurovascularly intact, overall well appearing. Obtained labs which showed (+) known anemia but not at level requiring transfusion, as well as elevateed retic count but otherwise were grossly unremarkable including no significant leukocytosis, signs of dehydration/ANTWAN, transaminitis or significant electrolyte abnormalities. Chest xray negative for pneumothorax, pneumonia, widened mediastinum, evidence of rib fractures, enlarged cardiac silhouette beyond baseline eor any other emergent pathologies. EKG non-specific but no evidence of STEMI, and patient without chest pain/dyspnea to suggest acute chest. Attempted pain control in the ED but patient still with intractable pain. Heme consulted and agreed with plan to admit at this time and they will follow for further management. Patient agreeable with plan. HD stable at time of admission.

## 2025-02-25 NOTE — H&P ADULT - HISTORY OF PRESENT ILLNESS
29-year-old female, with PMH of sickle cell anemia (on folic acid) and proteinuria presents to the ED with pain in her bilateral hand and toes especially when exposed to cold over the past month.  Follows with hematologist in [place].  Denies SOB, chest pain, any sources of bleeding, fever, chills, cough. 29-year-old female, with PMH of sickle cell anemia (on folic acid) and proteinuria presents to the ED with pain in her bilateral hand and toes. Pt says the pain has been on and off since january, but over the last week it has gotten worse and she rates it as a 7/10. She uses Tylenol and a heating pad to help with the pain. Pain is also made worse with the cold. Follows with Dr. Wesley Jimenez in [place].  Denies SOB, chest pain, any sources of bleeding, fever, chills, cough.    In the ED  Vitals - BP 93/60, , RR 16, Temp 98.1F, O2 100% on RA  Labs - WBC 11.21, RBC 2.21, Hgb 7.2, Reticulocytes  28.3%, Bilirubin 6.6%   CXR - Unremarkable  EKG - NSR  Given NS 1L Bolus, Morphine 4mg IV push 29-year-old female, with PMH of sickle cell anemia (on folic acid) and proteinuria presents to the ED with pain in her bilateral hand and toes. Pt says the pain has been on and off since january, but over the last week it has gotten worse and she rates it as a 7/10. She uses Tylenol and a heating pad to help with the pain. Pain is also made worse with the cold. Follows with Dr. Wesley Jimenez in [facility].  Denies SOB, chest pain, any sources of bleeding, fever, chills, cough.    In the ED  Vitals - BP 93/60, , RR 16, Temp 98.1F, O2 100% on RA  Labs - WBC 11.21, RBC 2.21, Hgb 7.2, Reticulocytes  28.3%, T. Bilirubin 6.6%, Indirect Bilirubin 6.3%  CXR - Unremarkable  EKG - NSR  Given NS 1L Bolus, Morphine 4mg IV push 29-year-old female, with PMH of sickle cell anemia (on folic acid) and proteinuria presents to the ED with pain in her 2nd and 3rd digits of both hands, right big toe and left heel. Pt says the pain has been on and off since January, but over the last week it has gotten worse and she rates it as a 7/10. She uses Tylenol and a heating pad to help with the pain. Pain is also made worse with the cold. Follows with Dr. Wesley Jimenez in [facility].  Denies SOB, chest pain, any sources of bleeding, fever, chills, cough. She reports that her baseline hgb is around 7.4.    In the ED  Vitals - BP 93/60, , RR 16, Temp 98.1F, O2 100% on RA  Labs - WBC 11.21, RBC 2.21, Hgb 7.2, Reticulocytes  28.3%, T. Bilirubin 6.6%, Indirect Bilirubin 6.3%  CXR - Unremarkable  EKG - NSR  Given NS 1L Bolus, Morphine 4mg IV push

## 2025-02-25 NOTE — H&P ADULT - TIME BILLING
60 minutes spent to complete patient's bedside assessment, review medical chart, discuss medical plan of care with covering medical team with >50% of time spent face to face with patient, discussion with patient/family and/or coordination of care.

## 2025-02-25 NOTE — CHART NOTE - NSCHARTNOTEFT_GEN_A_CORE
Heme consulted for SSA patient     Pt previously seen in 2022   [place] hematologist [place] the city   Labs reviewed    Recommendations:  Admit to medicine for sickle cell crisis  start IV fluids 0.5 NS @100cc/hr   Get hemoglobin electrophoresis    Consult pain management   C/w folic acid  get CXR and document vitals. per ED signout- no hypoxia   baseline hb is 7-8. no indication for transfusion based on today's labs   incentive spirometer     Discussed with Dr Acuna

## 2025-02-25 NOTE — ED PROVIDER NOTE - PHYSICAL EXAMINATION
GENERAL: Well-developed; well-nourished; in no acute distress.   SKIN: warm, dry  HEAD: Normocephalic; atraumatic.  EYES: 2mm pupils, PERRLA, EOMI, no conjunctival erythema, significant scleral icterus   ENT: No nasal discharge; airway clear.  NECK: Supple; non tender.  CARD: Regular rate and rhythm. S1, S2 normal; no murmurs, gallops, or rubs.   RESP: LCTAB; No wheezes, rales, rhonchi, or stridor.  ABD: soft, nontender, and nondistended  EXT: Normal ROM.  No LE TTP or edema bilaterally.  NEURO: A/ox3, grossly unremarkable  PSYCH: Cooperative, appropriate.
No

## 2025-02-25 NOTE — ED PROVIDER NOTE - OBJECTIVE STATEMENT
29-year-old female, with past medical history of sickle cell anemia only on folic acid, presents to the ED with pain in her bilateral second digits and toes especially when exposed to cold over the past month.  Follows with hematologist in [place].  Denies SOB, chest pain, any sources of bleeding, fever, chills, cough.

## 2025-02-25 NOTE — H&P ADULT - NSHPLABSRESULTS_GEN_ALL_CORE
7.2    11.21 )-----------( 466      ( 25 Feb 2025 14:27 )             19.5       02-25    138  |  105  |  5[L]  ----------------------------<  99  5.1[H]   |  24  |  0.5[L]    Ca    9.5      25 Feb 2025 14:27    TPro  7.3  /  Alb  4.3  /  TBili  6.6[H]  /  DBili  0.3  /  AST  48[H]  /  ALT  12  /  AlkPhos  53  02-25              Urinalysis Basic - ( 25 Feb 2025 14:27 )    Color: x / Appearance: x / SG: x / pH: x  Gluc: 99 mg/dL / Ketone: x  / Bili: x / Urobili: x   Blood: x / Protein: x / Nitrite: x   Leuk Esterase: x / RBC: x / WBC x   Sq Epi: x / Non Sq Epi: x / Bacteria: x            Lactate Trend            CAPILLARY BLOOD GLUCOSE

## 2025-02-26 LAB
ALBUMIN SERPL ELPH-MCNC: 4 G/DL — SIGNIFICANT CHANGE UP (ref 3.5–5.2)
ALP SERPL-CCNC: 52 U/L — SIGNIFICANT CHANGE UP (ref 30–115)
ALT FLD-CCNC: 12 U/L — SIGNIFICANT CHANGE UP (ref 0–41)
ANION GAP SERPL CALC-SCNC: 8 MMOL/L — SIGNIFICANT CHANGE UP (ref 7–14)
APPEARANCE UR: CLEAR — SIGNIFICANT CHANGE UP
APTT BLD: 31 SEC — SIGNIFICANT CHANGE UP (ref 27–39.2)
AST SERPL-CCNC: 34 U/L — SIGNIFICANT CHANGE UP (ref 0–41)
BASOPHILS # BLD AUTO: 0.08 K/UL — SIGNIFICANT CHANGE UP (ref 0–0.2)
BASOPHILS NFR BLD AUTO: 0.5 % — SIGNIFICANT CHANGE UP (ref 0–1)
BILIRUB SERPL-MCNC: 6.4 MG/DL — HIGH (ref 0.2–1.2)
BILIRUB UR-MCNC: NEGATIVE — SIGNIFICANT CHANGE UP
BUN SERPL-MCNC: 5 MG/DL — LOW (ref 10–20)
CALCIUM SERPL-MCNC: 9.1 MG/DL — SIGNIFICANT CHANGE UP (ref 8.4–10.5)
CHLORIDE SERPL-SCNC: 103 MMOL/L — SIGNIFICANT CHANGE UP (ref 98–110)
CO2 SERPL-SCNC: 24 MMOL/L — SIGNIFICANT CHANGE UP (ref 17–32)
COLOR SPEC: YELLOW — SIGNIFICANT CHANGE UP
CREAT SERPL-MCNC: <0.5 MG/DL — LOW (ref 0.7–1.5)
CRP SERPL-MCNC: 3.5 MG/L — SIGNIFICANT CHANGE UP
DIFF PNL FLD: ABNORMAL
EGFR: 139 ML/MIN/1.73M2 — SIGNIFICANT CHANGE UP
EOSINOPHIL # BLD AUTO: 0.08 K/UL — SIGNIFICANT CHANGE UP (ref 0–0.7)
EOSINOPHIL NFR BLD AUTO: 0.5 % — SIGNIFICANT CHANGE UP (ref 0–8)
ERYTHROCYTE [SEDIMENTATION RATE] IN BLOOD: 5 MM/HR — SIGNIFICANT CHANGE UP (ref 0–20)
GLUCOSE SERPL-MCNC: 113 MG/DL — HIGH (ref 70–99)
GLUCOSE UR QL: NEGATIVE MG/DL — SIGNIFICANT CHANGE UP
HCT VFR BLD CALC: 18.4 % — LOW (ref 37–47)
HGB BLD-MCNC: 6.9 G/DL — CRITICAL LOW (ref 12–16)
IMM GRANULOCYTES NFR BLD AUTO: 1.4 % — HIGH (ref 0.1–0.3)
INR BLD: 1.16 RATIO — SIGNIFICANT CHANGE UP (ref 0.65–1.3)
KETONES UR-MCNC: NEGATIVE MG/DL — SIGNIFICANT CHANGE UP
LDH SERPL L TO P-CCNC: 505 — HIGH (ref 50–242)
LEUKOCYTE ESTERASE UR-ACNC: ABNORMAL
LYMPHOCYTES # BLD AUTO: 14 % — LOW (ref 20.5–51.1)
LYMPHOCYTES # BLD AUTO: 2.24 K/UL — SIGNIFICANT CHANGE UP (ref 1.2–3.4)
MCHC RBC-ENTMCNC: 32.5 PG — HIGH (ref 27–31)
MCHC RBC-ENTMCNC: 37.5 G/DL — HIGH (ref 32–37)
MCV RBC AUTO: 86.8 FL — SIGNIFICANT CHANGE UP (ref 81–99)
MONOCYTES # BLD AUTO: 1.42 K/UL — HIGH (ref 0.1–0.6)
MONOCYTES NFR BLD AUTO: 8.9 % — SIGNIFICANT CHANGE UP (ref 1.7–9.3)
NEUTROPHILS # BLD AUTO: 11.92 K/UL — HIGH (ref 1.4–6.5)
NEUTROPHILS NFR BLD AUTO: 74.7 % — SIGNIFICANT CHANGE UP (ref 42.2–75.2)
NITRITE UR-MCNC: NEGATIVE — SIGNIFICANT CHANGE UP
NRBC BLD AUTO-RTO: 0 /100 WBCS — SIGNIFICANT CHANGE UP (ref 0–0)
PH UR: 6 — SIGNIFICANT CHANGE UP (ref 5–8)
PLATELET # BLD AUTO: 451 K/UL — HIGH (ref 130–400)
PMV BLD: 10.2 FL — SIGNIFICANT CHANGE UP (ref 7.4–10.4)
POTASSIUM SERPL-MCNC: 4.4 MMOL/L — SIGNIFICANT CHANGE UP (ref 3.5–5)
POTASSIUM SERPL-SCNC: 4.4 MMOL/L — SIGNIFICANT CHANGE UP (ref 3.5–5)
PROT SERPL-MCNC: 7.1 G/DL — SIGNIFICANT CHANGE UP (ref 6–8)
PROT UR-MCNC: SIGNIFICANT CHANGE UP MG/DL
PROTHROM AB SERPL-ACNC: 13.7 SEC — HIGH (ref 9.95–12.87)
RAPID RVP RESULT: SIGNIFICANT CHANGE UP
RBC # BLD: 2.12 M/UL — LOW (ref 4.2–5.4)
RBC # BLD: 2.12 M/UL — LOW (ref 4.2–5.4)
RBC # FLD: 23.1 % — HIGH (ref 11.5–14.5)
RETICS #: 576.7 K/UL — HIGH (ref 25–125)
RETICS/RBC NFR: 27.5 % — HIGH (ref 0.5–1.5)
SARS-COV-2 RNA SPEC QL NAA+PROBE: SIGNIFICANT CHANGE UP
SODIUM SERPL-SCNC: 135 MMOL/L — SIGNIFICANT CHANGE UP (ref 135–146)
SP GR SPEC: 1.01 — SIGNIFICANT CHANGE UP (ref 1–1.03)
UROBILINOGEN FLD QL: 1 MG/DL — SIGNIFICANT CHANGE UP (ref 0.2–1)
WBC # BLD: 15.97 K/UL — HIGH (ref 4.8–10.8)
WBC # FLD AUTO: 15.97 K/UL — HIGH (ref 4.8–10.8)

## 2025-02-26 PROCEDURE — 83020 HEMOGLOBIN ELECTROPHORESIS: CPT | Mod: 26

## 2025-02-26 PROCEDURE — 99222 1ST HOSP IP/OBS MODERATE 55: CPT

## 2025-02-26 PROCEDURE — 99222 1ST HOSP IP/OBS MODERATE 55: CPT | Mod: 25

## 2025-02-26 PROCEDURE — 73120 X-RAY EXAM OF HAND: CPT | Mod: 26,50

## 2025-02-26 PROCEDURE — 99232 SBSQ HOSP IP/OBS MODERATE 35: CPT

## 2025-02-26 PROCEDURE — 73620 X-RAY EXAM OF FOOT: CPT | Mod: 26,RT

## 2025-02-26 PROCEDURE — 73600 X-RAY EXAM OF ANKLE: CPT | Mod: 26,LT

## 2025-02-26 RX ADMIN — Medication 650 MILLIGRAM(S): at 06:10

## 2025-02-26 RX ADMIN — Medication 0.5 MILLIGRAM(S): at 07:31

## 2025-02-26 RX ADMIN — Medication 0.5 MILLIGRAM(S): at 03:18

## 2025-02-26 RX ADMIN — FOLIC ACID 1 MILLIGRAM(S): 1 TABLET ORAL at 11:09

## 2025-02-26 RX ADMIN — Medication 0.5 MILLIGRAM(S): at 20:40

## 2025-02-26 RX ADMIN — Medication 0.5 MILLIGRAM(S): at 14:26

## 2025-02-26 RX ADMIN — LOSARTAN POTASSIUM 50 MILLIGRAM(S): 100 TABLET, FILM COATED ORAL at 05:42

## 2025-02-26 RX ADMIN — Medication 0.5 MILLIGRAM(S): at 02:48

## 2025-02-26 RX ADMIN — Medication 0.5 MILLIGRAM(S): at 14:11

## 2025-02-26 RX ADMIN — Medication 0.5 MILLIGRAM(S): at 07:15

## 2025-02-26 RX ADMIN — Medication 0.5 MILLIGRAM(S): at 21:10

## 2025-02-26 RX ADMIN — HEPARIN SODIUM 5000 UNIT(S): 1000 INJECTION INTRAVENOUS; SUBCUTANEOUS at 05:43

## 2025-02-26 RX ADMIN — Medication 650 MILLIGRAM(S): at 05:28

## 2025-02-26 RX ADMIN — SODIUM CHLORIDE 100 MILLILITER(S): 9 INJECTION, SOLUTION INTRAVENOUS at 07:15

## 2025-02-26 RX ADMIN — SODIUM CHLORIDE 100 MILLILITER(S): 9 INJECTION, SOLUTION INTRAVENOUS at 15:48

## 2025-02-26 NOTE — PROVIDER CONTACT NOTE (OTHER) - SITUATION
patient hgl is 6.9; had been mentioned to day resident sintia trimble and no intervention ordered; following up as patient may benefit from transfusion with dropping hgl.
patients hemaglobin is 6.9 and wants results of her xrays taken earlier

## 2025-02-26 NOTE — CONSULT NOTE ADULT - SUBJECTIVE AND OBJECTIVE BOX
HPI: Patient is a 29F with PMH of sickle cell anemia (on folic acid) and proteinuria presents to the ED with pain in her 2nd and 3rd digits of both hands, right big toe and left heel. Pt says the pain has been on and off since January, but over the last week it has gotten worse and she rates it as a 7/10. She uses Tylenol and a heating pad to help with the pain. Pain is also made worse with the cold. Follows with Dr. Wesley Jiemnez in [facility].  Denies SOB, chest pain, any sources of bleeding, fever, chills, cough. She reports that her baseline hgb is around 7.4.    Current Inpatient Medication Regimen:  acetaminophen     Tablet .. 650 milliGRAM(s) Oral every 6 hours PRN  folic acid 1 milliGRAM(s) Oral daily  heparin   Injectable 5000 Unit(s) SubCutaneous every 12 hours  HYDROmorphone  Injectable 0.5 milliGRAM(s) IV Push every 4 hours PRN  influenza   Vaccine 0.5 milliLiter(s) IntraMuscular once  losartan 50 milliGRAM(s) Oral daily  sodium chloride 0.45%. 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    Sickle cell anemia    Sickle cell crisis    S/P cholecystectomy    SICKLE CELL    Pain in finger of both hands      Review of Systems:  General: no fevers or chills  Eyes: no diplopia or blurred vision  ENT: no rhinorrhea  CV: no chest pain  Resp: no cough or dyspnea  GI: no abdominal pain, constipation, or diarrhea  : no urinary incontinence or dysuria  Neuro: no focal weakness or numbness    Physical Exam:  T(C): 36.8 (02-26-25 @ 05:40), Max: 36.8 (02-26-25 @ 05:40)  HR: 78 (02-26-25 @ 05:40) (78 - 81)  BP: 104/62 (02-26-25 @ 05:40) (104/62 - 127/70)  RR: 18 (02-26-25 @ 05:40) (18 - 18)  SpO2: 99% (02-26-25 @ 05:40) (93% - 99%)  Gen: NAD  Eyes: no glasses or scleral icterus  Head: Normocephalic / Atraumatic  CV: no JVD  Lungs: nonlabored breathing  Abdomen: nondistended, soft  : no reilly catheter in place  Back: no tenderness to palpation  Neuro: AOx3  Extremities: full ROM in upper/lower extremities  Psych: normal affect      Labs:  CBC                        7.2    11.21 )-----------( 466      ( 25 Feb 2025 14:27 )             19.5     BMP  135 mmol/L [135 - 146] | 103 mmol/L [98 - 110] | 5 mg/dL[L] [10 - 20]  4.4 mmol/L [3.5 - 5.0] | 24 mmol/L [17 - 32] | <0.5 mg/dL[L] [0.7 - 1.5]    113 mg/dL[H] [70 - 99]  CBC  11.21 K/uL[H] [4.80 - 10.80] > 7.2 g/dL[L] [12.0 - 16.0] / 19.5 %[L] [37.0 - 47.0] < 466 K/uL[H] [130 - 400]      BMP  138 mmol/L [135 - 146] | 105 mmol/L [98 - 110] | 5 mg/dL[L] [10 - 20]  5.1 mmol/L[H] [3.5 - 5.0] | 24 mmol/L [17 - 32] | 0.5 mg/dL[L] [0.7 - 1.5]    99 mg/dL [70 - 99]          Opioid Risk Assessment Tool                                                                           Female       Male  Family History  Alcohol                                                              1                3  Illegal drugs                                                       2                3  Rx drugs                                                            4                4    Personal History   Alcohol                                                              3                3  Illegal drugs                                                       4                4  Rx drugs                                                            5                5    Age between 16—45 years                                1                1  History of preadolescent sexual abuse               3                0    Psychological disease  ADD, OCD, bipolar, schizophrenia                      2                2  Depression                                                       1                1    Total Score                                                     1             __    0 - 3 = low risk for future opioid abuse  4 - 7 = moderate risk for future opioid abuse  8+ = high risk for future opioid abuse

## 2025-02-26 NOTE — CONSULT NOTE ADULT - NS ATTEST RISK PROBLEM GEN_ALL_CORE FT
This 29-year-old female with sickle cell anemia and proteinuria presents with worsening hand and foot pain, potentially consistent with a vaso-occlusive crisis. Her baseline hemoglobin is concerningly low at 7.4. Key considerations include differentiating vaso-occlusive crisis from other causes (dactylitis, neuropathy), optimizing pain management with a multimodal approach (consider NSAIDs, gabapentinoids), investigating the low hemoglobin and proteinuria, ensuring adequate hydration, and confirming hematology follow-up. A specific bowel regimen for opioid-induced constipation should be prescribed. Patient education on sickle cell crisis management and the importance of hydration is crucial.

## 2025-02-26 NOTE — PROVIDER CONTACT NOTE (OTHER) - RECOMMENDATIONS
consult with team and order blood if approprite
questioned md if a transfusion would be needed ? concerned for patient

## 2025-02-26 NOTE — CONSULT NOTE ADULT - SUBJECTIVE AND OBJECTIVE BOX
Hematology Consult Note    HPI:  29-year-old female, with PMH of sickle cell anemia (on folic acid) and proteinuria presents to the ED with pain in her 2nd and 3rd digits of both hands, right big toe and left heel. Pt says the pain has been on and off since January, but over the last week it has gotten worse and she rates it as a 7/10. She uses Tylenol and a heating pad to help with the pain. Pain is also made worse with the cold. Follows with Dr. Wesley Jimenez in [facility].  Denies SOB, chest pain, any sources of bleeding, fever, chills, cough. She reports that her baseline hgb is around 7.4.    In the ED  Vitals - BP 93/60, , RR 16, Temp 98.1F, O2 100% on RA  Labs - WBC 11.21, RBC 2.21, Hgb 7.2, Reticulocytes  28.3%, T. Bilirubin 6.6%, Indirect Bilirubin 6.3%  CXR - Unremarkable  EKG - NSR  Given NS 1L Bolus, Morphine 4mg IV push (25 Feb 2025 22:38)      Allergies    No Known Allergies    Intolerances        MEDICATIONS  (STANDING):  folic acid 1 milliGRAM(s) Oral daily  heparin   Injectable 5000 Unit(s) SubCutaneous every 12 hours  influenza   Vaccine 0.5 milliLiter(s) IntraMuscular once  losartan 50 milliGRAM(s) Oral daily  sodium chloride 0.45%. 1000 milliLiter(s) (100 mL/Hr) IV Continuous <Continuous>    MEDICATIONS  (PRN):  acetaminophen     Tablet .. 650 milliGRAM(s) Oral every 6 hours PRN Temp greater or equal to 38C (100.4F), Mild Pain (1 - 3)  HYDROmorphone  Injectable 0.5 milliGRAM(s) IV Push every 4 hours PRN Severe Pain (7 - 10)      PAST MEDICAL & SURGICAL HISTORY:  Sickle cell anemia      S/P cholecystectomy          FAMILY HISTORY:      SOCIAL HISTORY: No EtOH, no tobacco    REVIEW OF SYSTEMS:    CONSTITUTIONAL: No weakness, fevers or chills  EYES/ENT: No visual changes;  No vertigo or throat pain   NECK: No pain or stiffness  RESPIRATORY: No cough, wheezing, hemoptysis; No shortness of breath  CARDIOVASCULAR: No chest pain or palpitations  GASTROINTESTINAL: No abdominal or epigastric pain. No nausea, vomiting, or hematemesis; No diarrhea or constipation. No melena or hematochezia.  GENITOURINARY: No dysuria, frequency or hematuria  NEUROLOGICAL: No numbness or weakness  SKIN: No itching, burning, rashes, or lesions   All other review of systems is negative unless indicated above.    Height (cm): 165.1 (02-25 @ 21:03)  Weight (kg): 64.41 (02-25 @ 21:03)  BMI (kg/m2): 23.6 (02-25 @ 21:03)  BSA (m2): 1.71 (02-25 @ 21:03)    T(F): 98.2 (02-26-25 @ 05:40), Max: 98.2 (02-26-25 @ 05:40)  HR: 78 (02-26-25 @ 05:40)  BP: 104/62 (02-26-25 @ 05:40)  RR: 18 (02-26-25 @ 05:40)  SpO2: 99% (02-26-25 @ 05:40)  Wt(kg): --    GENERAL: NAD, well-developed  HEAD:  Atraumatic, Normocephalic  EYES: EOMI, PERRLA, conjunctiva and sclera clear  NECK: Supple, No JVD  CHEST/LUNG: Clear to auscultation bilaterally; No wheeze  HEART: Regular rate and rhythm; No murmurs, rubs, or gallops  ABDOMEN: Soft, Nontender, Nondistended; Bowel sounds present  EXTREMITIES:  2+ Peripheral Pulses, No clubbing, cyanosis, or edema  NEUROLOGY: non-focal  SKIN: No rashes or lesions                          7.2    11.21 )-----------( 466      ( 25 Feb 2025 14:27 )             19.5       02-25    138  |  105  |  5[L]  ----------------------------<  99  5.1[H]   |  24  |  0.5[L]    Ca    9.5      25 Feb 2025 14:27    TPro  7.3  /  Alb  4.3  /  TBili  6.6[H]  /  DBili  0.3  /  AST  48[H]  /  ALT  12  /  AlkPhos  53  02-25      Lactate Dehydrogenase, Serum: 659 (02-25 @ 14:27)

## 2025-02-26 NOTE — CONSULT NOTE ADULT - ASSESSMENT
29-year-old female, with PMH of sickle cell anemia (on folic acid) and proteinuria presents to the ED with pain in her 2nd and 3rd digits of both hands, right big toe and left heel. Pt says the pain has been on and off since January, but over the last week it has gotten worse and she rates it as a 7/10. She uses Tylenol and a heating pad to help with the pain. Pain is also made worse with the cold. Follows with Dr. Wesley Jimenez in [facility].  Denies SOB, chest pain, any sources of bleeding, fever, chills, cough. She reports that her baseline hgb is around 7.4.    #Sickle Cell Anemia Crisis  #Normocytic anemia  #Hyperbilirubinemia, indirect  -  Hgb 7.2 (baseline 7-8), Reticulocytes  28.3%, Bilirubin 6.6   - Follows with  Dr. Wesley Jimenez from [facility]  - Given NS 1L Bolus and started on pain control   - CXR - Unremarkable  - EKG - NSR  - electrophoresis from 2022 noting >70% HbS     Recommendations:   - c/w hypotonic IV fluids at 100 cc/hr  - follow up hemoglobin electrophoresis   - pain management consult to ensure adequate pain control  - c/w folic acid   - baseline hb reported 7-8, please recheck CBC  - Daily LDH, Bilirubin (direct & indirect), and reticulocyte count   - incentive spirometer

## 2025-02-26 NOTE — PROVIDER CONTACT NOTE (OTHER) - ASSESSMENT
patient aware of her hgl and adimant about receiving care with transfusion if medically necessary to stabalize hgl

## 2025-02-26 NOTE — CHART NOTE - NSCHARTNOTEFT_GEN_A_CORE
ROSETTA Serna informed me of the patients concerns of symptoms present on ROSETTA Serna informed me of the patients sx of weakness, lethargy and fatigue. EMR/Chart reviewed. Patient's recent set of labs reviewed. CBC revealing Hg of 6.9 iso Sickle cell anemia. Type and screen active. Consent obtained with patient at bedside, explained risks/benefits/alternatives/addressed questions. Form completed. pRBCs ordered. Will signout to the night resident to inform primary team to follow AM CBC. Otherwise, patient is HD stable, no signs of bleeding on my exam.

## 2025-02-26 NOTE — PROVIDER CONTACT NOTE (OTHER) - ACTION/TREATMENT ORDERED:
NO ACTION OR TREATMENT ORDERED; FOLLOWED UP WITH EVENING RESIDENT WENDY QUESADA WHO IS HELPING ME FIGURE OUT WHY SUCH A DELAY IN CARE WAS PROVIDED

## 2025-02-26 NOTE — CONSULT NOTE ADULT - ASSESSMENT
A/P:  Patient is a 29F with PMH of sickle cell anemia (on folic acid) and proteinuria presents to the ED with pain in her 2nd and 3rd digits of both hands, right big toe and left heel. Pt says the pain has been on and off since January, but over the last week it has gotten worse and she rates it as a 7/10. She uses Tylenol and a heating pad to help with the pain. Pain is also made worse with the cold. Follows with Dr. Wesley Jimenez in [facility].  Denies SOB, chest pain, any sources of bleeding, fever, chills, cough. She reports that her baseline hgb is around 7.4.    PLAN  #Sickle cell crisis  Patient seen and evaluated at bedside. States her pain is exacerbated by the cold weather, took tylenol as needed at home before coming to hospital. Patient states pain is improving.  - Please d/c IV Hydromorphone upon discharge, transition to PO Oxycodone 5mg q4h PRN for severe pain - can send home with 3 day supply.  - Acetaminophen 975mg q8h scheduled  - f/u with out patient Hematologist     #Opioid induced constipation  - Bowel regimen as per primary team A/P:  Patient is a 29F with PMH of sickle cell anemia (on folic acid) and proteinuria presents to the ED with pain in her 2nd and 3rd digits of both hands, right big toe and left heel. Pt says the pain has been on and off since January, but over the last week it has gotten worse and she rates it as a 7/10. She uses Tylenol and a heating pad to help with the pain. Pain is also made worse with the cold. Follows with Dr. Wesley Jimenez in [facility].  Denies SOB, chest pain, any sources of bleeding, fever, chills, cough. She reports that her baseline hgb is around 7.4.    PLAN  #Sickle cell crisis  Patient seen and evaluated at bedside. States her pain is exacerbated by the cold weather, took tylenol as needed at home before coming to hospital. Patient states pain is improving.  - Please d/c IV Hydromorphone upon discharge, transition to PO Morphine 7.5mg q6h PRN for severe pain, can send home with 5 day supply.   - Acetaminophen 975mg q8h scheduled  - f/u with out patient Hematologist     #Opioid induced constipation  - Bowel regimen as per primary team

## 2025-02-26 NOTE — PROGRESS NOTE ADULT - ATTENDING COMMENTS
Patient is a 28 y/o female, with PMH of sickle cell anemia (on folic acid) and proteinuria presents to the ED with pain in her 2nd and 3rd digits of both hands, right big toe and left heel. Pt says the pain has been on and off since January, but over the last week it has gotten worse and she rates it as a 7/10. She uses Tylenol and a heating pad to help with the pain. Pain is also made worse with the cold. Follows with Dr. Wesley Jimenez in [facility].  Denies SOB, chest pain, any sources of bleeding, fever, chills, cough. She reports that her baseline hgb is around 7.4.    #Sickle Cell Anemia Crisis Baseline hb is 7- to 7.5   #B/L 2nd and 3rd digits, right big toe and left heel pain  #Normocytic anemia  #Hyperbilirubinemia, indirect  - RBC 2.21, Hgb 7.2, Reticulocytes  28.3%, Bilirubin 6.6%   - Follows with  Dr. Wesley Jimenez from [facility]  - CXR - Unremarkable  - EKG - NSR  - Heme/onc on board   - C/w home folic acid 1mg qd PO, IVF 1/2NS, daily CBC, LDH, retic count monitoring   - Will c/w with Dilaudid 0.5 mg IV push q4h PRN  - s/p xray both hands, right foot and left ankle- no fractures.    #Leukocytosis- worsening WBC count- possibly reactive , f/up CBC in am   - CXR- no infiltrates, U/A- neg   - obtain blood cxs, full RVP panel, consult ID       DVT PPx: Heparin 5000u q12h  Activity: AAT  Diet: Regular Diet  Code Status: Full Code    #Progress Note Handoff: Continue IVF, f/up CBC, BMP, LDH, retic count in am, pain management, ID work up ,   Family discussion: current medical plan of tx. d/w pt. Disposition: Home_ once medically stable    Total time spent to complete patient's bedside assessment, review medical chart, discuss medical plan of care with covering medical team was more than 35 minutes with >50% of time spent face to face with patient, discussion with patient/family and/or coordination of care

## 2025-02-27 ENCOUNTER — TRANSCRIPTION ENCOUNTER (OUTPATIENT)
Age: 30
End: 2025-02-27

## 2025-02-27 VITALS
TEMPERATURE: 98 F | DIASTOLIC BLOOD PRESSURE: 61 MMHG | SYSTOLIC BLOOD PRESSURE: 95 MMHG | HEART RATE: 74 BPM | RESPIRATION RATE: 18 BRPM

## 2025-02-27 LAB
ALBUMIN SERPL ELPH-MCNC: 4.2 G/DL — SIGNIFICANT CHANGE UP (ref 3.5–5.2)
ALP SERPL-CCNC: 60 U/L — SIGNIFICANT CHANGE UP (ref 30–115)
ALT FLD-CCNC: 14 U/L — SIGNIFICANT CHANGE UP (ref 0–41)
ANION GAP SERPL CALC-SCNC: 8 MMOL/L — SIGNIFICANT CHANGE UP (ref 7–14)
AST SERPL-CCNC: 35 U/L — SIGNIFICANT CHANGE UP (ref 0–41)
BASOPHILS # BLD AUTO: 0.1 K/UL — SIGNIFICANT CHANGE UP (ref 0–0.2)
BASOPHILS NFR BLD AUTO: 0.8 % — SIGNIFICANT CHANGE UP (ref 0–1)
BILIRUB SERPL-MCNC: 6.6 MG/DL — HIGH (ref 0.2–1.2)
BUN SERPL-MCNC: 6 MG/DL — LOW (ref 10–20)
CALCIUM SERPL-MCNC: 8.9 MG/DL — SIGNIFICANT CHANGE UP (ref 8.4–10.5)
CHLORIDE SERPL-SCNC: 106 MMOL/L — SIGNIFICANT CHANGE UP (ref 98–110)
CO2 SERPL-SCNC: 24 MMOL/L — SIGNIFICANT CHANGE UP (ref 17–32)
CREAT SERPL-MCNC: <0.5 MG/DL — LOW (ref 0.7–1.5)
EGFR: 139 ML/MIN/1.73M2 — SIGNIFICANT CHANGE UP
EOSINOPHIL # BLD AUTO: 0.31 K/UL — SIGNIFICANT CHANGE UP (ref 0–0.7)
EOSINOPHIL NFR BLD AUTO: 2.5 % — SIGNIFICANT CHANGE UP (ref 0–8)
FERRITIN SERPL-MCNC: 72 NG/ML — SIGNIFICANT CHANGE UP (ref 15–150)
GLUCOSE SERPL-MCNC: 91 MG/DL — SIGNIFICANT CHANGE UP (ref 70–99)
HAPTOGLOB SERPL-MCNC: <20 MG/DL — LOW (ref 34–200)
HAPTOGLOB SERPL-MCNC: <20 MG/DL — LOW (ref 34–200)
HCT VFR BLD CALC: 22.7 % — LOW (ref 37–47)
HEMOGLOBIN INTERPRETATION: SIGNIFICANT CHANGE UP
HGB A MFR BLD: 0 % — LOW (ref 95.8–98)
HGB A2 MFR BLD: 3.2 % — SIGNIFICANT CHANGE UP (ref 2–3.2)
HGB BLD-MCNC: 8.2 G/DL — LOW (ref 12–16)
HGB F MFR BLD: 5.3 % — HIGH (ref 0–1)
HGB S MFR BLD: 91.5 % — HIGH
IMM GRANULOCYTES NFR BLD AUTO: 0.9 % — HIGH (ref 0.1–0.3)
LDH SERPL L TO P-CCNC: 514 — HIGH (ref 50–242)
LYMPHOCYTES # BLD AUTO: 2.96 K/UL — SIGNIFICANT CHANGE UP (ref 1.2–3.4)
LYMPHOCYTES # BLD AUTO: 23.9 % — SIGNIFICANT CHANGE UP (ref 20.5–51.1)
MAGNESIUM SERPL-MCNC: 1.7 MG/DL — LOW (ref 1.8–2.4)
MCHC RBC-ENTMCNC: 32.2 PG — HIGH (ref 27–31)
MCHC RBC-ENTMCNC: 36.1 G/DL — SIGNIFICANT CHANGE UP (ref 32–37)
MCV RBC AUTO: 89 FL — SIGNIFICANT CHANGE UP (ref 81–99)
MONOCYTES # BLD AUTO: 1.38 K/UL — HIGH (ref 0.1–0.6)
MONOCYTES NFR BLD AUTO: 11.1 % — HIGH (ref 1.7–9.3)
NEUTROPHILS # BLD AUTO: 7.52 K/UL — HIGH (ref 1.4–6.5)
NEUTROPHILS NFR BLD AUTO: 60.8 % — SIGNIFICANT CHANGE UP (ref 42.2–75.2)
NRBC BLD AUTO-RTO: 2 /100 WBCS — HIGH (ref 0–0)
PLATELET # BLD AUTO: 430 K/UL — HIGH (ref 130–400)
PMV BLD: 10.2 FL — SIGNIFICANT CHANGE UP (ref 7.4–10.4)
POTASSIUM SERPL-MCNC: 4.5 MMOL/L — SIGNIFICANT CHANGE UP (ref 3.5–5)
POTASSIUM SERPL-SCNC: 4.5 MMOL/L — SIGNIFICANT CHANGE UP (ref 3.5–5)
PROCALCITONIN SERPL-MCNC: 0.06 NG/ML — SIGNIFICANT CHANGE UP (ref 0.02–0.1)
PROT SERPL-MCNC: 6.8 G/DL — SIGNIFICANT CHANGE UP (ref 6–8)
RBC # BLD: 2.55 M/UL — LOW (ref 4.2–5.4)
RBC # BLD: 2.55 M/UL — LOW (ref 4.2–5.4)
RBC # FLD: 22.7 % — HIGH (ref 11.5–14.5)
RETICS #: 852.2 K/UL — HIGH (ref 25–125)
RETICS/RBC NFR: 33.4 % — HIGH (ref 0.5–1.5)
SODIUM SERPL-SCNC: 138 MMOL/L — SIGNIFICANT CHANGE UP (ref 135–146)
WBC # BLD: 12.38 K/UL — HIGH (ref 4.8–10.8)
WBC # FLD AUTO: 12.38 K/UL — HIGH (ref 4.8–10.8)

## 2025-02-27 PROCEDURE — 99239 HOSP IP/OBS DSCHRG MGMT >30: CPT

## 2025-02-27 RX ORDER — MAGNESIUM SULFATE 500 MG/ML
2 SYRINGE (ML) INJECTION ONCE
Refills: 0 | Status: COMPLETED | OUTPATIENT
Start: 2025-02-27 | End: 2025-02-27

## 2025-02-27 RX ORDER — OXYCODONE HYDROCHLORIDE AND ACETAMINOPHEN 10; 325 MG/1; MG/1
1 TABLET ORAL
Qty: 8 | Refills: 0
Start: 2025-02-27 | End: 2025-03-02

## 2025-02-27 RX ORDER — ACETAMINOPHEN 500 MG/5ML
650 LIQUID (ML) ORAL EVERY 6 HOURS
Refills: 0 | Status: DISCONTINUED | OUTPATIENT
Start: 2025-02-27 | End: 2025-02-27

## 2025-02-27 RX ADMIN — Medication 0.5 MILLIGRAM(S): at 05:45

## 2025-02-27 RX ADMIN — Medication 25 GRAM(S): at 12:37

## 2025-02-27 RX ADMIN — SODIUM CHLORIDE 100 MILLILITER(S): 9 INJECTION, SOLUTION INTRAVENOUS at 12:37

## 2025-02-27 RX ADMIN — SODIUM CHLORIDE 100 MILLILITER(S): 9 INJECTION, SOLUTION INTRAVENOUS at 05:15

## 2025-02-27 RX ADMIN — FOLIC ACID 1 MILLIGRAM(S): 1 TABLET ORAL at 12:37

## 2025-02-27 RX ADMIN — Medication 0.5 MILLIGRAM(S): at 05:15

## 2025-02-27 NOTE — DISCHARGE NOTE NURSING/CASE MANAGEMENT/SOCIAL WORK - FINANCIAL ASSISTANCE
[facility] provides services at a reduced cost to those who are determined to be eligible through [facility]’s financial assistance program. Information regarding [facility]’s financial assistance program can be found by going to [email or web address] or by calling [phone].

## 2025-02-27 NOTE — PROGRESS NOTE ADULT - SUBJECTIVE AND OBJECTIVE BOX
24H events:    Patient is a 29y old Female who presents with a chief complaint of Hand and feet pain (2025 09:00)    Primary diagnosis of Sickle cell crisis    Today is hospital day 1d. This morning patient was seen and examined at bedside, resting comfortably in bed.    Patient mentions her pain is better but her pain meds are making her.    Hemodynamically stable, tolerating oral diet, voiding appropriately with appropriate bowel movements.     Code Status: Full code    PAST MEDICAL & SURGICAL HISTORY  Sickle cell anemia    S/P cholecystectomy      SOCIAL HISTORY:  Social History:  Lives at home (2025 22:38)      ALLERGIES:  No Known Allergies    MEDICATIONS:  STANDING MEDICATIONS  folic acid 1 milliGRAM(s) Oral daily  heparin   Injectable 5000 Unit(s) SubCutaneous every 12 hours  influenza   Vaccine 0.5 milliLiter(s) IntraMuscular once  losartan 50 milliGRAM(s) Oral daily  sodium chloride 0.45%. 1000 milliLiter(s) IV Continuous <Continuous>    PRN MEDICATIONS  acetaminophen     Tablet .. 650 milliGRAM(s) Oral every 6 hours PRN  HYDROmorphone  Injectable 0.5 milliGRAM(s) IV Push every 4 hours PRN    VITALS:   T(F): 98.2  HR: 78  BP: 104/62  RR: 18  SpO2: 99%    PHYSICAL EXAM:  GENERAL: no acute distress  EYES: yellowish discoloration of sclera  HEART: Regular rate and rhythm; normal S1/S2, systolic murmur +  RESPIRATORY: Clear to auscultation bilaterally, resonant percussion note, no added sounds   ABDOMEN: Soft, Nontender, Nondistended; Bowel sounds present  NEUROLOGY: A&Ox3, grossly intact power and sensation. bilateral hand  5/5.  EXTREMITIES:  2+ Peripheral Pulses, bilateral radial pulses palpable. No clubbing, cyanosis, or edema. mild tenderness on palpitations on b/l feet and hands  SKIN: hyperpigmentation of knuckles of 2nd and 3rd digits of both hands and right big toe.    LABS:                        7.2    11.21 )-----------( 466      ( 2025 14:27 )             19.5         138  |  105  |  5[L]  ----------------------------<  99  5.1[H]   |  24  |  0.5[L]    Ca    9.5      2025 14:27    TPro  7.3  /  Alb  4.3  /  TBili  6.6[H]  /  DBili  0.3  /  AST  48[H]  /  ALT  12  /  AlkPhos  53      Urinalysis Basic - ( 2025 10:19 )  Color: Yellow / Appearance: Clear / S.010 / pH: x  Gluc: x / Ketone: Negative mg/dL  / Bili: Negative / Urobili: 1.0 mg/dL   Blood: x / Protein: Trace mg/dL / Nitrite: Negative   Leuk Esterase: Moderate / RBC: x / WBC x   Sq Epi: x / Non Sq Epi: x / Bacteria: x      RADIOLOGY:    ACC: [id number] EXAM:  XR CHEST PORTABLE ROUTINE 1V   ORDERED BY: SCOOTER HOLDEN     PROCEDURE DATE:  2025      INTERPRETATION:  CLINICAL HISTORY: Chest pain.    COMPARISON: 2024.    TECHNIQUE: Portable frontal chest radiograph.    FINDINGS:    Support devices: None.    Cardiac/mediastinum/hilum: Stable cardiomegaly.    Lung parenchyma/Pleura: No focal parenchymal opacities, pleural effusions, or pneumothorax.    Skeleton/soft tissues: Stable.      IMPRESSION:    Cardiomegaly. Stable.    --- End of Report ---    MAGUI BAER MD; Attending Interventional Radiologist  This document has been electronically signed. 2025  6:46AM  
24H events:    Patient is a 29y old Female who presents with a chief complaint of Hand and feet pain (27 Feb 2025 09:13)    Primary diagnosis of Sickle cell crisis          Today is hospital day 2d.   This morning patient was seen and examined at bedside, resting comfortably in bed.    No acute or major events overnight.    Code Status: Full    PAST MEDICAL & SURGICAL HISTORY  Sickle cell anemia    S/P cholecystectomy      SOCIAL HISTORY:  Social History:  Lives at home (25 Feb 2025 22:38)      ALLERGIES:  No Known Allergies    MEDICATIONS:  STANDING MEDICATIONS  acetaminophen     Tablet .. 650 milliGRAM(s) Oral every 6 hours  folic acid 1 milliGRAM(s) Oral daily  heparin   Injectable 5000 Unit(s) SubCutaneous every 12 hours  influenza   Vaccine 0.5 milliLiter(s) IntraMuscular once  losartan 50 milliGRAM(s) Oral daily  sodium chloride 0.45%. 1000 milliLiter(s) IV Continuous <Continuous>    PRN MEDICATIONS  morphine  IR 7.5 milliGRAM(s) Oral every 6 hours PRN    VITALS:   T(F): 98.2  HR: 74  BP: 95/61  RR: 18  SpO2: 100%    PHYSICAL EXAM:  GENERAL: NAD, lying in bed comfortably  HEAD:  Atraumatic, Normocephalic  EYES: EOMI, PERRLA, conjunctiva and sclera clear  ENT: Moist mucous membranes  NECK: Supple, No JVD  CHEST/LUNG: Clear to auscultation bilaterally; No rales, rhonchi, wheezing, or rubs. Unlabored respirations  HEART: Regular rate and rhythm; No murmurs, rubs, or gallops  ABDOMEN: BSx4; Soft, nontender, nondistended  EXTREMITIES:  2+ Peripheral Pulses, brisk capillary refill. No clubbing, cyanosis, or edema  NERVOUS SYSTEM:  A&Ox3, no focal deficits   SKIN: No rashes or lesions      LABS:                        8.2    12.38 )-----------( 430      ( 27 Feb 2025 07:35 )             22.7     02-27    138  |  106  |  6[L]  ----------------------------<  91  4.5   |  24  |  <0.5[L]    Ca    8.9      27 Feb 2025 07:35  Mg     1.7     02-27    TPro  6.8  /  Alb  4.2  /  TBili  6.6[H]  /  DBili  x   /  AST  35  /  ALT  14  /  AlkPhos  60  02-27    PT/INR - ( 26 Feb 2025 12:08 )   PT: 13.70 sec;   INR: 1.16 ratio         PTT - ( 26 Feb 2025 12:08 )  PTT:31.0 sec  Urinalysis Basic - ( 27 Feb 2025 07:35 )    Color: x / Appearance: x / SG: x / pH: x  Gluc: 91 mg/dL / Ketone: x  / Bili: x / Urobili: x   Blood: x / Protein: x / Nitrite: x   Leuk Esterase: x / RBC: x / WBC x   Sq Epi: x / Non Sq Epi: x / Bacteria: x

## 2025-02-27 NOTE — PROGRESS NOTE ADULT - ASSESSMENT
29-year-old female, with PMH of sickle cell anemia (on folic acid) and proteinuria presents to the ED with pain in her 2nd and 3rd digits of both hands, right big toe and left heel. Pt says the pain has been on and off since January, but over the last week it has gotten worse and she rates it as a 7/10. She uses Tylenol and a heating pad to help with the pain. Pain is also made worse with the cold. Follows with Dr. Wesley Jimenez in [facility].  Denies SOB, chest pain, any sources of bleeding, fever, chills, cough. She reports that her baseline hgb is around 7.4.    #Sickle Cell Anemia Crisis  #B/L 2nd and 3rd digits, right big toe and left heel pain  #Normocytic anemia  #Hyperbilirubinemia, indirect  - RBC 2.21, Hgb 7.2, Reticulocytes  28.3%, Bilirubin 6.6%   - Follows with  Dr. Wesley Jimenez from [facility]  - Had an exchange transfusion in 2022 for severe cycle cell crisiss  - Given NS 1L Bolus, Morphine 4mg IV push  - CXR - Unremarkable  - EKG - NSR  - Heme/onc note appreciate, start IV fluids 0.5 NS @100cc/hr, Get hemoglobin electrophoresis, get CXR, incentive spirometer   - Hgb 7.2, Baseline hb is 7-8. No indication for transfusion based on today's labs   - c/w home folic acid 1mg qd PO  - Will c/w with Dilaudid 0.5 mg IV push q4h PRN  - Pain management consult placed, per Heme recs  - F/u CBC, reticulocytes, LDH, haptoglobin, Ferritin  - F/U xray both hands, right foot and left ankle.    #Leukocytosis  #Hypotension - Resolved  #Tachycardia - Resolved  #Volume depletion  - WBC 11.21 and BP 93/60,  on admission  - Given 1L NS in the ED, BP and HR corrected  - c/w 1/2 NS 100cc/hr, per heme recs  - F/u repeat CBC, CRP, Full RVP and UA    #Proteinuria  - C/w home Losartan 50mg qd    DVT PPx: Heparin 5000u q12h  GI PPx: NI  Activity: AAT  Diet: Regular Diet  Code Status: Full Code  Dispo: med/surg
29-year-old female, with PMH of sickle cell anemia (on folic acid) and proteinuria presents to the ED with pain in her 2nd and 3rd digits of both hands, right big toe and left heel. Pt says the pain has been on and off since January, but over the last week it has gotten worse and she rates it as a 7/10. She uses Tylenol and a heating pad to help with the pain. Pain is also made worse with the cold. Follows with Dr. Wesley Jimenez in [facility].  Denies SOB, chest pain, any sources of bleeding, fever, chills, cough. She reports that her baseline hgb is around 7.4.    #Sickle Cell Anemia Crisis  #B/L 2nd and 3rd digits, right big toe and left heel pain  #Normocytic anemia  #Hyperbilirubinemia, indirect  - RBC 2.21, Hgb 7.2, Reticulocytes  28.3%, Bilirubin 6.6%   - Follows with  Dr. Wesley Jimenez from [facility]  - Had an exchange transfusion in 2022 for severe cycle cell crisiss  - Given NS 1L Bolus, Morphine 4mg IV push  - CXR - Unremarkable  - EKG - NSR  - Heme/onc note appreciate, start IV fluids 0.5 NS @100cc/hr, Get hemoglobin electrophoresis, get CXR, incentive spirometer   - Hgb 7.2, Baseline hb is 7-8. No indication for transfusion based on today's labs   - c/w home folic acid 1mg qd PO  - Will c/w with Dilaudid 0.5 mg IV push q4h PRN  - Pain management consult placed, per Heme recs  - F/u CBC, reticulocytes, LDH, haptoglobin, Ferritin  - F/U xray both hands, right foot and left ankle.    #Leukocytosis  #Hypotension - Resolved  #Tachycardia - Resolved  #Volume depletion  - WBC 11.21 and BP 93/60,  on admission  - Given 1L NS in the ED, BP and HR corrected  - c/w 1/2 NS 100cc/hr, per heme recs  - F/u repeat CBC, CRP, Full RVP and UA    #Proteinuria  - C/w home Losartan 50mg qd    DVT PPx: Heparin 5000u q12h  GI PPx: NI  Activity: AAT  Diet: Regular Diet  Code Status: Full Code  Dispo: med/surg

## 2025-02-27 NOTE — DISCHARGE NOTE NURSING/CASE MANAGEMENT/SOCIAL WORK - PATIENT PORTAL LINK FT
You can access the FollowMyHealth Patient Portal offered by [facility] by registering at the following website: [email or web address]. By joining [facility]’s FollowMyHealth portal, you will also be able to view your health information using other applications (apps) compatible with our system.

## 2025-02-27 NOTE — DISCHARGE NOTE PROVIDER - PROVIDER TOKENS
FREE:[LAST:[Tony],FIRST:[Imo],PHONE:[phone]],FAX:[(   )    -],FOLLOWUP:[1 week]],FREE:[LAST:[Your primary care physician],PHONE:[(   )    -],FAX:[(   )    -],FOLLOWUP:[2 weeks]]

## 2025-02-27 NOTE — DISCHARGE NOTE PROVIDER - NSDCCPCAREPLAN_GEN_ALL_CORE_FT
PRINCIPAL DISCHARGE DIAGNOSIS  Diagnosis: Sickle cell crisis  Assessment and Plan of Treatment: You were admitted to the hospital after presenting to the Emergency Department with increased pain in your 2nd and 3rd digits of both hands, your right big toe, and your left heel, describing the pain intensity as 7 out of 10. This problem started in January and has recently worsened. Your initial vital signs showed hypotension and tachycardia, but you were afebrile and had normal oxygen saturation. Laboratory results indicated a hemoglobin of 7.2, indicative of your baseline anemia associated with sickle cell disease, along with high reticulocyte and bilirubin levels, typical of a sickle cell crisis. You also showed signs of leukocytosis.  During your hospital stay, you received treatment including IV fluids and a blood transfusion which helped stabilize your hemoglobin levels and alleviate your symptoms. Your pain was managed effectively with morphine initially, transitioning to Dilaudid, and eventually to Percocet. You continued to take your home medications, including folic acid and Losartan, while under the care of the hospital's hematology team. We performed routine imaging and cardiac tests, which showed no acute issues. Following your recovery from the acute crisis and stabilization of your condition, you have been deemed ready for discharge. Please follow up with your hematologist, Dr. Wesley Jimenez, and your primary care provider. You will continue taking Percocet as needed for any residual pain and maintain your usual medications at home.      SECONDARY DISCHARGE DIAGNOSES  Diagnosis: Pain in finger of both hands  Assessment and Plan of Treatment:

## 2025-02-27 NOTE — CONSULT NOTE ADULT - SUBJECTIVE AND OBJECTIVE BOX
JAMES VÁSQUEZ  29y, Female  Allergy: No Known Allergies      CHIEF COMPLAINT:   Hand and feet pain (26 Feb 2025 13:30)      LOS  2d    HPI  HPI:  29-year-old female, with PMH of sickle cell anemia (on folic acid) and proteinuria presents to the ED with pain in her 2nd and 3rd digits of both hands, right big toe and left heel. Pt says the pain has been on and off since January, but over the last week it has gotten worse and she rates it as a 7/10. She uses Tylenol and a heating pad to help with the pain. Pain is also made worse with the cold. Follows with Dr. Wesley Jimenez in [facility].  Denies SOB, chest pain, any sources of bleeding, fever, chills, cough. She reports that her baseline hgb is around 7.4.    In the ED  Vitals - BP 93/60, , RR 16, Temp 98.1F, O2 100% on RA  Labs - WBC 11.21, RBC 2.21, Hgb 7.2, Reticulocytes  28.3%, T. Bilirubin 6.6%, Indirect Bilirubin 6.3%  CXR - Unremarkable  EKG - NSR  Given NS 1L Bolus, Morphine 4mg IV push (25 Feb 2025 22:38)      INFECTIOUS DISEASE HISTORY:  ID consulted for leukocytosis in the setting of sickle cell crisis  Afebrile     WBC 11--> 15 (hgb drop to 6.9)--> 12    Currently ordered for:      PMH  PAST MEDICAL & SURGICAL HISTORY:  Sickle cell anemia      S/P cholecystectomy          FAMILY HISTORY      SOCIAL HISTORY  Social History:  Lives at home (25 Feb 2025 22:38)        ROS  ***    VITALS:  T(F): 98.2, Max: 98.8 (02-26-25 @ 21:30)  HR: 72  BP: 100/62  RR: 19Vital Signs Last 24 Hrs  T(C): 36.8 (27 Feb 2025 05:23), Max: 37.1 (26 Feb 2025 21:30)  T(F): 98.2 (27 Feb 2025 05:23), Max: 98.8 (26 Feb 2025 21:30)  HR: 72 (27 Feb 2025 05:23) (72 - 90)  BP: 100/62 (27 Feb 2025 05:23) (94/57 - 106/67)  BP(mean): --  RR: 19 (27 Feb 2025 05:23) (18 - 19)  SpO2: 100% (27 Feb 2025 05:23) (97% - 100%)    Parameters below as of 27 Feb 2025 05:23  Patient On (Oxygen Delivery Method): room air        PHYSICAL EXAM:  ***    TESTS & MEASUREMENTS:                        8.2    12.38 )-----------( 430      ( 27 Feb 2025 07:35 )             22.7     02-27    138  |  106  |  6[L]  ----------------------------<  91  4.5   |  24  |  <0.5[L]    Ca    8.9      27 Feb 2025 07:35  Mg     1.7     02-27    TPro  6.8  /  Alb  4.2  /  TBili  6.6[H]  /  DBili  x   /  AST  35  /  ALT  14  /  AlkPhos  60  02-27      LIVER FUNCTIONS - ( 27 Feb 2025 07:35 )  Alb: 4.2 g/dL / Pro: 6.8 g/dL / ALK PHOS: 60 U/L / ALT: 14 U/L / AST: 35 U/L / GGT: x           Urinalysis Basic - ( 27 Feb 2025 07:35 )    Color: x / Appearance: x / SG: x / pH: x  Gluc: 91 mg/dL / Ketone: x  / Bili: x / Urobili: x   Blood: x / Protein: x / Nitrite: x   Leuk Esterase: x / RBC: x / WBC x   Sq Epi: x / Non Sq Epi: x / Bacteria: x        Culture - Blood (collected 06-13-24 @ 23:50)  Source: .Blood Blood  Final Report (06-19-24 @ 10:00):    No growth at 5 days    Culture - Blood (collected 06-13-24 @ 23:50)  Source: .Blood Blood  Final Report (06-19-24 @ 10:00):    No growth at 5 days            INFECTIOUS DISEASES TESTING  Procalcitonin: 0.06 ng/mL (02-26-25 @ 17:29)  Rapid RVP Result: NotDetec (02-26-25 @ 10:20)      INFLAMMATORY MARKERS  C-Reactive Protein: 3.5 mg/L (02-26-25 @ 12:08)      RADIOLOGY & ADDITIONAL TESTS:  I have personally reviewed the last Chest xray  CXR      CT      CARDIOLOGY TESTING  12 Lead ECG:   Ventricular Rate 85 BPM    Atrial Rate 85 BPM    P-R Interval 158 ms <TRUNCATED> (02-25-25 @ 13:42)       MEDICATIONS  folic acid 1 Oral daily  heparin   Injectable 5000 SubCutaneous every 12 hours  influenza   Vaccine 0.5 IntraMuscular once  losartan 50 Oral daily  sodium chloride 0.45%. 1000 IV Continuous <Continuous>      ANTIBIOTICS:      ALLERGIES:  No Known Allergies           JAMES VÁSQUEZ  29y, Female  Allergy: No Known Allergies      CHIEF COMPLAINT:   Hand and feet pain (26 Feb 2025 13:30)      LOS  2d    HPI  HPI:  29-year-old female, with PMH of sickle cell anemia (on folic acid) and proteinuria presents to the ED with pain in her 2nd and 3rd digits of both hands, right big toe and left heel. Pt says the pain has been on and off since January, but over the last week it has gotten worse and she rates it as a 7/10. She uses Tylenol and a heating pad to help with the pain. Pain is also made worse with the cold. Follows with Dr. Wesley Jimenez in [facility].  Denies SOB, chest pain, any sources of bleeding, fever, chills, cough. She reports that her baseline hgb is around 7.4.    In the ED  Vitals - BP 93/60, , RR 16, Temp 98.1F, O2 100% on RA  Labs - WBC 11.21, RBC 2.21, Hgb 7.2, Reticulocytes  28.3%, T. Bilirubin 6.6%, Indirect Bilirubin 6.3%  CXR - Unremarkable  EKG - NSR  Given NS 1L Bolus, Morphine 4mg IV push (25 Feb 2025 22:38)      INFECTIOUS DISEASE HISTORY:  ID consulted for leukocytosis in the setting of sickle cell crisis  Afebrile     WBC 11--> 15 (hgb drop to 6.9)--> 12    Pt denies HA, rhinorrhea, sore throat, cough, SOB, abd pain, diarrhea, n/v, dysuria, rash,    Currently ordered for:      PMH  PAST MEDICAL & SURGICAL HISTORY:  Sickle cell anemia      S/P cholecystectomy          FAMILY HISTORY      SOCIAL HISTORY  Social History:  Lives at home (25 Feb 2025 22:38)        ROS  General: Denies rigors, nightsweats  HEENT: Denies headache, rhinorrhea, sore throat, eye pain  CV: Denies CP, palpitations  PULM: Denies wheezing, hemoptysis  GI: Denies hematemesis, hematochezia, melena  : Denies discharge, hematuria  MSK: as noted above   SKIN: Denies rash, lesions  NEURO: Denies paresthesias, weakness  PSYCH: Denies depression, anxiety     VITALS:  T(F): 98.2, Max: 98.8 (02-26-25 @ 21:30)  HR: 72  BP: 100/62  RR: 19Vital Signs Last 24 Hrs  T(C): 36.8 (27 Feb 2025 05:23), Max: 37.1 (26 Feb 2025 21:30)  T(F): 98.2 (27 Feb 2025 05:23), Max: 98.8 (26 Feb 2025 21:30)  HR: 72 (27 Feb 2025 05:23) (72 - 90)  BP: 100/62 (27 Feb 2025 05:23) (94/57 - 106/67)  BP(mean): --  RR: 19 (27 Feb 2025 05:23) (18 - 19)  SpO2: 100% (27 Feb 2025 05:23) (97% - 100%)    Parameters below as of 27 Feb 2025 05:23  Patient On (Oxygen Delivery Method): room air        PHYSICAL EXAM:  Gen: NAD, resting in bed  HEENT: Normocephalic, atraumatic  Neck: supple, no lymphadenopathy  CV: Regular rate & regular rhythm  Lungs: decreased BS at bases, no fremitus  Abdomen: Soft, BS present  Ext: Warm, well perfused  Neuro: non focal, awake  Skin: no rash, no erythema  Lines: no phlebitis     TESTS & MEASUREMENTS:                        8.2    12.38 )-----------( 430      ( 27 Feb 2025 07:35 )             22.7     02-27    138  |  106  |  6[L]  ----------------------------<  91  4.5   |  24  |  <0.5[L]    Ca    8.9      27 Feb 2025 07:35  Mg     1.7     02-27    TPro  6.8  /  Alb  4.2  /  TBili  6.6[H]  /  DBili  x   /  AST  35  /  ALT  14  /  AlkPhos  60  02-27      LIVER FUNCTIONS - ( 27 Feb 2025 07:35 )  Alb: 4.2 g/dL / Pro: 6.8 g/dL / ALK PHOS: 60 U/L / ALT: 14 U/L / AST: 35 U/L / GGT: x           Urinalysis Basic - ( 27 Feb 2025 07:35 )    Color: x / Appearance: x / SG: x / pH: x  Gluc: 91 mg/dL / Ketone: x  / Bili: x / Urobili: x   Blood: x / Protein: x / Nitrite: x   Leuk Esterase: x / RBC: x / WBC x   Sq Epi: x / Non Sq Epi: x / Bacteria: x        Culture - Blood (collected 06-13-24 @ 23:50)  Source: .Blood Blood  Final Report (06-19-24 @ 10:00):    No growth at 5 days    Culture - Blood (collected 06-13-24 @ 23:50)  Source: .Blood Blood  Final Report (06-19-24 @ 10:00):    No growth at 5 days            INFECTIOUS DISEASES TESTING  Procalcitonin: 0.06 ng/mL (02-26-25 @ 17:29)  Rapid RVP Result: NotDetec (02-26-25 @ 10:20)      INFLAMMATORY MARKERS  C-Reactive Protein: 3.5 mg/L (02-26-25 @ 12:08)      RADIOLOGY & ADDITIONAL TESTS:  I have personally reviewed the last Chest xray  CXR      CT      CARDIOLOGY TESTING  12 Lead ECG:   Ventricular Rate 85 BPM    Atrial Rate 85 BPM    P-R Interval 158 ms <TRUNCATED> (02-25-25 @ 13:42)       MEDICATIONS  folic acid 1 Oral daily  heparin   Injectable 5000 SubCutaneous every 12 hours  influenza   Vaccine 0.5 IntraMuscular once  losartan 50 Oral daily  sodium chloride 0.45%. 1000 IV Continuous <Continuous>      ANTIBIOTICS:      ALLERGIES:  No Known Allergies

## 2025-02-27 NOTE — CONSULT NOTE ADULT - ASSESSMENT
ASSESSMENT  29-year-old female, with PMH of sickle cell anemia (on folic acid) and proteinuria presents to the ED with pain in her 2nd and 3rd digits of both hands, right big toe and left heel.   ID consulted for leukocytosis in the setting of sickle cell crisis    IMPRESSION  #Leukocytosis in the setting of dropping hgb, sickle cell crisis, suspect reactive  Procalcitonin: 0.06 ng/mL (02-26-25 @ 17:29)  Rapid RVP Result: NotDetec (02-26-25 @ 10:20)  #Sickle cell crisis  #Abx allergy: No Known Allergies    Creatinine: <0.5 (02-27-25 @ 07:35)    Height (cm): 165.1 (02-25-25 @ 21:03)  Weight (kg): 64.41 (02-25-25 @ 21:03)    RECOMMENDATIONS  This is an incomplete consult note. All final recommendations to follow after interview and examination of the patient. Please follow recommendations noted below.    If any questions, please send a message or call on [employer] Teams  Please continue to update ID with any pertinent new laboratory, radiographic findings, or change in clinical status ASSESSMENT  29-year-old female, with PMH of sickle cell anemia (on folic acid) and proteinuria presents to the ED with pain in her 2nd and 3rd digits of both hands, right big toe and left heel.   ID consulted for leukocytosis in the setting of sickle cell crisis    IMPRESSION  #Leukocytosis in the setting of dropping hgb, sickle cell crisis, suspect reactive  Procalcitonin: 0.06 ng/mL (02-26-25 @ 17:29)  Rapid RVP Result: NotDetec (02-26-25 @ 10:20)  #Sickle cell crisis  #Abx allergy: No Known Allergies    Creatinine: <0.5 (02-27-25 @ 07:35)    Height (cm): 165.1 (02-25-25 @ 21:03)  Weight (kg): 64.41 (02-25-25 @ 21:03)    RECOMMENDATIONS  - monitor off antimicrobials   - trend wbc  - Please recall ID PRN. Please inform ID of any patient clinical change or any new pertinent laboratory or radiographic data     If any questions, please send a message or call on [employer] Teams  Please continue to update ID with any pertinent new laboratory, radiographic findings, or change in clinical status

## 2025-02-27 NOTE — DISCHARGE NOTE PROVIDER - NSDCMRMEDTOKEN_GEN_ALL_CORE_FT
folic acid 1 mg oral tablet: 1 tab(s) orally once a day  losartan 50 mg oral tablet: 1 tab(s) orally once a day  Percocet 5 mg-325 mg oral tablet: 1 tab(s) orally 2 times a day as needed for  severe pain MDD: 2 tablets

## 2025-02-27 NOTE — CONSULT NOTE ADULT - TIME BILLING
I have personally seen and examined this patient.  I have reviewed all pertinent clinical information and reviewed all relevant imaging and diagnostic studies personally.   I counseled the patient about diagnostic testing and treatment plan.   I discussed my recommendations with the primary team Lara

## 2025-02-27 NOTE — DISCHARGE NOTE PROVIDER - HOSPITAL COURSE
The patient, a 29-year-old female with a past medical history of sickle cell anemia and proteinuria, presented to the ED with complaints of worsening pain in her 2nd and 3rd digits of both hands, right big toe, and left heel, which she rated as 7 out of 10. This pain had been intermittent since January but had recently become more severe over the last week. At baseline, her hemoglobin typically hovers around 7.4. Initial evaluation in the ED showed her to be hypotensive with tachycardia, although afebrile with oxygen saturation at 100% on room air. Labs revealed normocytic anemia, with a hemoglobin of 7.2, high reticulocyte count, and elevated bilirubin levels suggesting hemolysis, typical of sickle cell crisis. She also exhibited leukocytosis.    Management in the ED included a 1-liter bolus of normal saline and morphine for pain control. Subsequent hospital course included maintenance of hemodynamics with IV fluids, a transfusion of one unit of packed red blood cells which improved her symptoms, and a transition from dilaudid to percocet for pain management. Her pain was further evaluated and managed in conjunction with pain management specialists.    Given her history of sickle cell anemia, she continued her routine folic acid and consulted with hematology/oncology for further management including hemoglobin electrophoresis. Imaging studies including CXR and EKG were unremarkable, indicating no acute cardiopulmonary issues. She was also continued on her home medication Losartan.    Upon stabilization, resolution of her acute pain, and correction of her initial hypotension and tachycardia, she was deemed stable for discharge. She was instructed to follow up with her hematologist Dr. Wesley Jimenez and her primary care provider, with a prescription for Percocet to manage any residual pain as needed.    Final Plan:    #Sickle Cell Anemia Crisis  #B/L 2nd and 3rd digits, right big toe and left heel pain  #Normocytic anemia  #Hyperbilirubinemia, indirect  - RBC 2.21, Hgb 7.2, Reticulocytes  28.3%, Bilirubin 6.6%   - Follows with  Dr. Wesley Jimenez from [facility]  - Had an exchange transfusion in 2022 for severe cycle cell crisiss  - Given NS 1L Bolus, Morphine 4mg IV push  - CXR - Unremarkable  - EKG - NSR  - Heme onc following while in the hospital, received course of fluids  - Received 1 unit pRBC on this admission for HgB 6.9  - c/w home folic acid 1mg qd PO  - Will discharge with percocet PRN for pain    #Leukocytosis  #Hypotension - Resolved  #Tachycardia - Resolved  #Volume depletion  - WBC 11.21 and BP 93/60,  on admission  - Given 1L NS in the ED, BP and HR corrected  - ID consulted for leukocytosis, likely reactive, monitor off abx.     #Proteinuria  - C/w home Losartan 50mg qd The patient, a 29-year-old female with a past medical history of sickle cell anemia and proteinuria, presented to the ED with complaints of worsening pain in her 2nd and 3rd digits of both hands, right big toe, and left heel, which she rated as 7 out of 10. This pain had been intermittent since January but had recently become more severe over the last week. At baseline, her hemoglobin typically hovers around 7.4. Initial evaluation in the ED showed her to be hypotensive with tachycardia, although afebrile with oxygen saturation at 100% on room air. Labs revealed normocytic anemia, with a hemoglobin of 7.2, high reticulocyte count, and elevated bilirubin levels suggesting hemolysis, typical of sickle cell crisis. She also exhibited leukocytosis.    Management in the ED included a 1-liter bolus of normal saline and morphine for pain control. Subsequent hospital course included maintenance of hemodynamics with IV fluids, a transfusion of one unit of packed red blood cells which improved her symptoms, and a transition from Dilaudid to percocet for pain management. Her pain was further evaluated and managed in conjunction with pain management specialists.    Given her history of sickle cell anemia, she continued her routine folic acid and consulted with hematology/oncology for further management including hemoglobin electrophoresis. Imaging studies including CXR and EKG were unremarkable, indicating no acute cardiopulmonary issues. She was also continued on her home medication Losartan.    Upon stabilization, resolution of her acute pain, and correction of her initial hypotension   and tachycardia, she was deemed stable for discharge. She was instructed to follow up with her hematologist Dr. Wesley Jimenez and her primary care provider, with a prescription for Percocet to manage any residual pain as needed.    Final Plan:    #Sickle Cell Anemia Crisis  #B/L 2nd and 3rd digits, right big toe and left heel pain  #Normocytic anemia  #Hyperbilirubinemia, indirect  - RBC 2.21, Hgb 7.2, Reticulocytes  28.3%, Bilirubin 6.6%   - Follows with  Dr. Wesley Jimenez from [facility]  - Had an exchange transfusion in 2022 for severe cycle cell crisiss  - Given NS 1L Bolus, Morphine 4mg IV push  - CXR - Unremarkable  - EKG - NSR  - Heme onc following while in the hospital, received course of fluids  - Received 1 unit pRBC on this admission for HgB 6.9  - c/w home folic acid 1mg qd PO  - Will discharge with percocet PRN for pain    #Leukocytosis  #Hypotension - Resolved  #Tachycardia - Resolved  #Volume depletion  - WBC 11.21 and BP 93/60,  on admission  - Given 1L NS in the ED, BP and HR corrected  - ID consulted for leukocytosis, likely reactive, monitor off abx.     #Proteinuria  - C/w home Losartan 50mg qd

## 2025-02-27 NOTE — DISCHARGE NOTE PROVIDER - CARE PROVIDER_API CALL
Wesley Jimenez  Phone: [phone]  Fax: (   )    -  Follow Up Time: 1 week    Your primary care physician,   Phone: (   )    -  Fax: (   )    -  Follow Up Time: 2 weeks

## 2025-02-27 NOTE — DISCHARGE NOTE PROVIDER - DISCHARGE SERVICE FOR PATIENT
on the discharge service for the patient. I have reviewed and made amendments to the documentation where necessary. Heart Failure

## 2025-03-04 DIAGNOSIS — R00.0 TACHYCARDIA, UNSPECIFIED: ICD-10-CM

## 2025-03-04 DIAGNOSIS — E80.7 DISORDER OF BILIRUBIN METABOLISM, UNSPECIFIED: ICD-10-CM

## 2025-03-04 DIAGNOSIS — D64.9 ANEMIA, UNSPECIFIED: ICD-10-CM

## 2025-03-04 DIAGNOSIS — I95.9 HYPOTENSION, UNSPECIFIED: ICD-10-CM

## 2025-03-04 DIAGNOSIS — R80.9 PROTEINURIA, UNSPECIFIED: ICD-10-CM

## 2025-03-04 DIAGNOSIS — D72.829 ELEVATED WHITE BLOOD CELL COUNT, UNSPECIFIED: ICD-10-CM

## 2025-03-04 DIAGNOSIS — D57.00 HB-SS DISEASE WITH CRISIS, UNSPECIFIED: ICD-10-CM

## 2025-03-04 DIAGNOSIS — M79.644 PAIN IN RIGHT FINGER(S): ICD-10-CM

## 2025-03-04 LAB
CULTURE RESULTS: SIGNIFICANT CHANGE UP
SPECIMEN SOURCE: SIGNIFICANT CHANGE UP

## 2025-03-27 ENCOUNTER — EMERGENCY (EMERGENCY)
Facility: HOSPITAL | Age: 30
LOS: 0 days | Discharge: HOME | End: 2025-03-27
Attending: STUDENT IN AN ORGANIZED HEALTH CARE EDUCATION/TRAINING PROGRAM
Payer: COMMERCIAL

## 2025-03-27 VITALS
DIASTOLIC BLOOD PRESSURE: 72 MMHG | HEIGHT: 65 IN | RESPIRATION RATE: 18 BRPM | OXYGEN SATURATION: 100 % | SYSTOLIC BLOOD PRESSURE: 109 MMHG | TEMPERATURE: 98 F | WEIGHT: 141.98 LBS | HEART RATE: 85 BPM

## 2025-03-27 DIAGNOSIS — Z90.49 ACQUIRED ABSENCE OF OTHER SPECIFIED PARTS OF DIGESTIVE TRACT: Chronic | ICD-10-CM

## 2025-03-27 DIAGNOSIS — M25.562 PAIN IN LEFT KNEE: ICD-10-CM

## 2025-03-27 DIAGNOSIS — M79.641 PAIN IN RIGHT HAND: ICD-10-CM

## 2025-03-27 DIAGNOSIS — M79.642 PAIN IN LEFT HAND: ICD-10-CM

## 2025-03-27 DIAGNOSIS — D57.00 HB-SS DISEASE WITH CRISIS, UNSPECIFIED: ICD-10-CM

## 2025-03-27 LAB
ACANTHOCYTES BLD QL SMEAR: SLIGHT — SIGNIFICANT CHANGE UP
ALBUMIN SERPL ELPH-MCNC: 4.3 G/DL — SIGNIFICANT CHANGE UP (ref 3.5–5.2)
ALP SERPL-CCNC: 62 U/L — SIGNIFICANT CHANGE UP (ref 30–115)
ALT FLD-CCNC: 15 U/L — SIGNIFICANT CHANGE UP (ref 0–41)
ANION GAP SERPL CALC-SCNC: 9 MMOL/L — SIGNIFICANT CHANGE UP (ref 7–14)
APTT BLD: 31.6 SEC — SIGNIFICANT CHANGE UP (ref 27–39.2)
AST SERPL-CCNC: 42 U/L — HIGH (ref 0–41)
BASOPHILS # BLD AUTO: 0.09 K/UL — SIGNIFICANT CHANGE UP (ref 0–0.2)
BASOPHILS NFR BLD AUTO: 0.7 % — SIGNIFICANT CHANGE UP (ref 0–1)
BILIRUB SERPL-MCNC: 6.8 MG/DL — HIGH (ref 0.2–1.2)
BUN SERPL-MCNC: 5 MG/DL — LOW (ref 10–20)
BURR CELLS BLD QL SMEAR: PRESENT — SIGNIFICANT CHANGE UP
CALCIUM SERPL-MCNC: 9.6 MG/DL — SIGNIFICANT CHANGE UP (ref 8.4–10.5)
CHLORIDE SERPL-SCNC: 105 MMOL/L — SIGNIFICANT CHANGE UP (ref 98–110)
CO2 SERPL-SCNC: 25 MMOL/L — SIGNIFICANT CHANGE UP (ref 17–32)
CREAT SERPL-MCNC: <0.5 MG/DL — LOW (ref 0.7–1.5)
EGFR: 139 ML/MIN/1.73M2 — SIGNIFICANT CHANGE UP
EGFR: 139 ML/MIN/1.73M2 — SIGNIFICANT CHANGE UP
EOSINOPHIL # BLD AUTO: 0.15 K/UL — SIGNIFICANT CHANGE UP (ref 0–0.7)
EOSINOPHIL NFR BLD AUTO: 1.2 % — SIGNIFICANT CHANGE UP (ref 0–8)
GIANT PLATELETS BLD QL SMEAR: PRESENT — SIGNIFICANT CHANGE UP
GLUCOSE SERPL-MCNC: 99 MG/DL — SIGNIFICANT CHANGE UP (ref 70–99)
HCG SERPL QL: NEGATIVE — SIGNIFICANT CHANGE UP
HCT VFR BLD CALC: 21.6 % — LOW (ref 37–47)
HGB BLD-MCNC: 7.8 G/DL — LOW (ref 12–16)
IMM GRANULOCYTES NFR BLD AUTO: 0.8 % — HIGH (ref 0.1–0.3)
INR BLD: 1.12 RATIO — SIGNIFICANT CHANGE UP (ref 0.65–1.3)
IRON SATN MFR SERPL: 107 UG/DL — SIGNIFICANT CHANGE UP (ref 35–150)
IRON SATN MFR SERPL: 40 % — SIGNIFICANT CHANGE UP (ref 15–50)
LYMPHOCYTES # BLD AUTO: 27.1 % — SIGNIFICANT CHANGE UP (ref 20.5–51.1)
LYMPHOCYTES # BLD AUTO: 3.44 K/UL — HIGH (ref 1.2–3.4)
MANUAL SMEAR VERIFICATION: SIGNIFICANT CHANGE UP
MCHC RBC-ENTMCNC: 34.1 PG — HIGH (ref 27–31)
MCHC RBC-ENTMCNC: 36.1 G/DL — SIGNIFICANT CHANGE UP (ref 32–37)
MCV RBC AUTO: 94.3 FL — SIGNIFICANT CHANGE UP (ref 81–99)
MONOCYTES # BLD AUTO: 0.72 K/UL — HIGH (ref 0.1–0.6)
MONOCYTES NFR BLD AUTO: 5.7 % — SIGNIFICANT CHANGE UP (ref 1.7–9.3)
NEUTROPHILS # BLD AUTO: 8.19 K/UL — HIGH (ref 1.4–6.5)
NEUTROPHILS NFR BLD AUTO: 64.5 % — SIGNIFICANT CHANGE UP (ref 42.2–75.2)
NRBC # BLD: 4 /100 WBCS — HIGH (ref 0–0)
NRBC BLD AUTO-RTO: 1 /100 WBCS — HIGH (ref 0–0)
NRBC BLD-RTO: 4 /100 WBCS — HIGH (ref 0–0)
PLAT MORPH BLD: ABNORMAL
PLATELET # BLD AUTO: 506 K/UL — HIGH (ref 130–400)
PMV BLD: 9.8 FL — SIGNIFICANT CHANGE UP (ref 7.4–10.4)
POIKILOCYTOSIS BLD QL AUTO: SIGNIFICANT CHANGE UP
POLYCHROMASIA BLD QL SMEAR: SIGNIFICANT CHANGE UP
POTASSIUM SERPL-MCNC: 4.1 MMOL/L — SIGNIFICANT CHANGE UP (ref 3.5–5)
POTASSIUM SERPL-SCNC: 4.1 MMOL/L — SIGNIFICANT CHANGE UP (ref 3.5–5)
PROT SERPL-MCNC: 7.5 G/DL — SIGNIFICANT CHANGE UP (ref 6–8)
PROTHROM AB SERPL-ACNC: 13.3 SEC — HIGH (ref 9.95–12.87)
RBC # BLD: 2.29 M/UL — LOW (ref 4.2–5.4)
RBC # BLD: 2.29 M/UL — LOW (ref 4.2–5.4)
RBC # FLD: 21.6 % — HIGH (ref 11.5–14.5)
RBC BLD AUTO: ABNORMAL
RETICS #: 718.4 K/UL — HIGH (ref 25–125)
RETICS/RBC NFR: 31.4 % — HIGH (ref 0.5–1.5)
SICKLE CELLS BLD QL SMEAR: SIGNIFICANT CHANGE UP
SODIUM SERPL-SCNC: 139 MMOL/L — SIGNIFICANT CHANGE UP (ref 135–146)
STOMATOCYTES BLD QL SMEAR: SIGNIFICANT CHANGE UP
TARGETS BLD QL SMEAR: SIGNIFICANT CHANGE UP
TIBC SERPL-MCNC: 267 UG/DL — SIGNIFICANT CHANGE UP (ref 220–430)
UIBC SERPL-MCNC: 160 UG/DL — SIGNIFICANT CHANGE UP (ref 110–370)
VARIANT LYMPHS # BLD: 5.3 % — HIGH (ref 0–5)
VARIANT LYMPHS NFR BLD MANUAL: 5.3 % — HIGH (ref 0–5)
WBC # BLD: 12.69 K/UL — HIGH (ref 4.8–10.8)
WBC # FLD AUTO: 12.69 K/UL — HIGH (ref 4.8–10.8)

## 2025-03-27 PROCEDURE — 85730 THROMBOPLASTIN TIME PARTIAL: CPT

## 2025-03-27 PROCEDURE — 96376 TX/PRO/DX INJ SAME DRUG ADON: CPT

## 2025-03-27 PROCEDURE — 85025 COMPLETE CBC W/AUTO DIFF WBC: CPT

## 2025-03-27 PROCEDURE — 84703 CHORIONIC GONADOTROPIN ASSAY: CPT

## 2025-03-27 PROCEDURE — 99284 EMERGENCY DEPT VISIT MOD MDM: CPT

## 2025-03-27 PROCEDURE — 96374 THER/PROPH/DIAG INJ IV PUSH: CPT

## 2025-03-27 PROCEDURE — 99284 EMERGENCY DEPT VISIT MOD MDM: CPT | Mod: 25

## 2025-03-27 PROCEDURE — 85045 AUTOMATED RETICULOCYTE COUNT: CPT

## 2025-03-27 PROCEDURE — 83020 HEMOGLOBIN ELECTROPHORESIS: CPT | Mod: 26

## 2025-03-27 PROCEDURE — 96375 TX/PRO/DX INJ NEW DRUG ADDON: CPT

## 2025-03-27 PROCEDURE — 83020 HEMOGLOBIN ELECTROPHORESIS: CPT

## 2025-03-27 PROCEDURE — 85610 PROTHROMBIN TIME: CPT

## 2025-03-27 PROCEDURE — 83550 IRON BINDING TEST: CPT

## 2025-03-27 PROCEDURE — 93010 ELECTROCARDIOGRAM REPORT: CPT

## 2025-03-27 PROCEDURE — 80053 COMPREHEN METABOLIC PANEL: CPT

## 2025-03-27 PROCEDURE — 36415 COLL VENOUS BLD VENIPUNCTURE: CPT

## 2025-03-27 PROCEDURE — 93005 ELECTROCARDIOGRAM TRACING: CPT

## 2025-03-27 PROCEDURE — 83540 ASSAY OF IRON: CPT

## 2025-03-27 RX ORDER — ONDANSETRON HCL/PF 4 MG/2 ML
4 VIAL (ML) INJECTION ONCE
Refills: 0 | Status: COMPLETED | OUTPATIENT
Start: 2025-03-27 | End: 2025-03-27

## 2025-03-27 RX ORDER — HYDROMORPHONE/SOD CHLOR,ISO/PF 2 MG/10 ML
1 SYRINGE (ML) INJECTION ONCE
Refills: 0 | Status: DISCONTINUED | OUTPATIENT
Start: 2025-03-27 | End: 2025-03-27

## 2025-03-27 RX ORDER — HYDROMORPHONE/SOD CHLOR,ISO/PF 2 MG/10 ML
1.3 SYRINGE (ML) INJECTION ONCE
Refills: 0 | Status: DISCONTINUED | OUTPATIENT
Start: 2025-03-27 | End: 2025-03-27

## 2025-03-27 RX ADMIN — Medication 1 MILLIGRAM(S): at 14:57

## 2025-03-27 RX ADMIN — Medication 1000 MILLILITER(S): at 19:01

## 2025-03-27 RX ADMIN — Medication 1000 MILLILITER(S): at 14:36

## 2025-03-27 RX ADMIN — Medication 1 MILLIGRAM(S): at 18:21

## 2025-03-27 RX ADMIN — Medication 4 MILLIGRAM(S): at 19:34

## 2025-03-27 NOTE — ED PROVIDER NOTE - ATTENDING APP SHARED VISIT CONTRIBUTION OF CARE
29-year-old female past medical history nephric and for sickle cell anemia and proteinuria who presents with sickle cell crisis.  Patient states that she is having knee pain and hand pain which is typical for her crisis. Pt denies any chest pain, sob, nausea, vomiting, or any other medical complaints.     VITAL SIGNS: I have reviewed nursing notes and confirm.  CONSTITUTIONAL: non-toxic, well appearing  SKIN: no rash, no petechiae.  EYES:  EOMI, pink conjunctiva, anicteric  ENT: tongue midline, no exudates, MMM  NECK: Supple; no meningismus, no JVD  CARD: RRR, no murmurs, equal radial pulses bilaterally 2+  RESP: CTAB, no respiratory distress  ABD: Soft, non-tender, non-distended, no peritoneal signs, no HSM, no CVA tenderness    29 yr old f that presents with a sickle cell crisis. labs, pain meds reassess dispo pending.

## 2025-03-27 NOTE — ED PROVIDER NOTE - NSFOLLOWUPINSTRUCTIONS_ED_ALL_ED_FT
Please follow up with your primary care physician within 24-72 hours and return immediately if symptoms worsen.    Sickle Cell Crisis    WHAT YOU NEED TO KNOW:    What is a sickle cell crisis? A sickle cell crisis is a painful episode that occurs in people who have sickle cell anemia. It happens when sickle-shaped red blood cells (RBCs) block blood vessels. Blood and oxygen cannot get to your tissues, causing pain. A sickle cell crisis can also damage your tissues and cause organ failure, such liver or kidney failure. A sickle cell crisis can become life-threatening.    What are signs and symptoms of a sickle cell crisis? Your symptoms may change each time you have a crisis. They will depend on the area of your body where blood flow has been blocked.    Fever    Pain    Weakness or fatigue    Abdominal pain and swelling    Headaches    A painful, erect penis (priapism) in men    Fast heartbeats    Shortness of breath  What can trigger a sickle cell crisis?    Dehydration    Infection, such as a cold or the flu    Low oxygen levels from difficult exercise, flying, or high altitude    Getting cold or going from warm to cold quickly    Medical procedures, surgery, or having a baby    Strong emotions, such as anger or depression  How is pain managed during a sickle cell crisis?    Medicines may be given to decrease sickling of your RBCs. You may also need medicine to prevent a bacterial infection or help you breathe more easily.    Prescription pain medicine may be given. Ask your healthcare provider how to take this medicine safely. Some prescription pain medicines contain acetaminophen. Do not take other medicines that contain acetaminophen without talking to your healthcare provider. Too much acetaminophen may cause liver damage. Prescription pain medicine may cause constipation. Ask your healthcare provider how to prevent or treat constipation.    NSAIDs, such as ibuprofen, help decrease swelling, pain, and fever. This medicine is available with or without a doctor's order. NSAIDs can cause stomach bleeding or kidney problems in certain people. If you take blood thinner medicine, always ask your healthcare provider if NSAIDs are safe for you. Always read the medicine label and follow directions.    Acetaminophen decreases pain and fever. It is available without a doctor's order. Ask how much to take and how often to take it. Follow directions. Read the labels of all other medicines you are using to see if they also contain acetaminophen, or ask your doctor or pharmacist. Acetaminophen can cause liver damage if not taken correctly.  How else is a sickle cell crisis treated?    IV fluids treat dehydration and help reduce sickling of RBCs.    Oxygen helps increase oxygen levels in your blood and make it easier for you to breathe.    A blood transfusion replaces blood with RBCs that are not sickle shaped.    Surgery may be done to remove part of your spleen.  How can I prevent a sickle cell crisis?    Take vitamins and minerals as directed. Folic acid may help prevent blood vessel problems that can come with sickle cell anemia. Zinc may decrease how often you have pain.    Drink liquids as directed. Dehydration can increase your risk for a sick cell crisis. Ask how much liquid to drink each day and which liquids are best for you.    Balance rest and exercise. Rest during a sickle cell crisis. Over time, increase your activity to a moderate amount. Exercise as directed. Avoid exercise or activities that can cause injury, such as football. Ask about the best exercise plan for you.    Wash your hands frequently. Handwashing can help prevent illness. Wash your hands before you prepare or eat food, and after you use the bathroom.  Handwashing      Avoid quick changes in temperature. Do not go quickly from a warm place to a cold place. Get in a pool slowly instead of jumping in. Avoid getting too hot or too cold. Dress in light clothing in the summer and warm clothing in the winter.    Do not smoke cigarettes or drink alcohol. These increase your risk for a sickle cell crisis. Nicotine and other chemicals in cigarettes and cigars can cause lung damage. Ask your healthcare provider for information if you currently smoke and need help to quit. E-cigarettes or smokeless tobacco still contain nicotine. Talk to your healthcare provider before you use these products.    Ask about vaccines you may need. Vaccines can help prevent a viral infection that may lead to a sickle cell crisis. Get a flu shot as soon as recommended each year, usually in September or October. You may need pneumonia vaccines every 5 years. Your healthcare provider can tell you if you need other vaccines, and when to get them.  Call your local emergency number (911 in the [place]) if:    You have shortness of breath or chest pain.    You are a man and have an erection that is painful and does not go away.    You lose vision in one or both eyes.  When should I seek immediate care?    You feel like you cannot cope with your pain, or you feel like hurting yourself.    You have behavior changes, a seizure, or faint.    You have a fever.    You have abdominal pain, nausea, or vomiting.    You have a different or worse headache.    You have new weakness or numbness in your arm, leg, or face.    You have new pain in any part of your body.    Your urine is dark and you are urinating less than usual or not at all.    You are dizzy, lightheaded, or faint.  When should I call my doctor?    You have any new signs or symptoms.    You have blood in your urine.    You are constipated or you have diarrhea.    You have changes in your vision.    You have increased fatigue.    You plan to travel by airplane or to a high [facility].    You have questions or concerns about your condition or care.  CARE AGREEMENT:    You have the right to help plan your care. Learn about your health condition and how it may be treated. Discuss treatment options with your healthcare providers to decide what care you want to receive. You always have the right to refuse treatment.    © [employer] 1973, 2025

## 2025-03-27 NOTE — ED PROVIDER NOTE - CLINICAL SUMMARY MEDICAL DECISION MAKING FREE TEXT BOX
29 yr old f that presents with a sickle cell crisis. labs, pain meds. pt reports improvement. pt neurovascularly intact and hemodynamically stable. pt requesting discharge. Labs  were ordered and reviewed.  Appropriate medications for patient's presenting complaints were ordered and effects were reassessed.  Patient's records (prior hospital, ED visit, and/or nursing home notes if available) were reviewed.  Additional history was obtained from EMS, family, and/or PCP (where available).  Escalation to admission/observation was considered.   However patient feels much better and is comfortable with discharge.  Appropriate follow-up was arranged.     I have discussed the discharge plan with the patient. The patient agrees with the plan, as discussed.  The patient understands Emergency Department diagnosis is a preliminary diagnosis often based on limited information and that the patient must adhere to the follow-up plan as discussed.  The patient understands that if the symptoms worsen or if prescribed medications do not have the desired/planned effect that the patient may return to the Emergency Department at any time for further evaluation and treatment.

## 2025-03-27 NOTE — ED ADULT NURSE NOTE - NSFALLUNIVINTERV_ED_ALL_ED
Bed/Stretcher in lowest position, wheels locked, appropriate side rails in place/Call bell, personal items and telephone in reach/Instruct patient to call for assistance before getting out of bed/chair/stretcher/Non-slip footwear applied when patient is off stretcher/[place] to call system/Physically safe environment - no spills, clutter or unnecessary equipment/Purposeful proactive rounding/Room/bathroom lighting operational, light cord in reach

## 2025-03-27 NOTE — ED PROVIDER NOTE - PATIENT PORTAL LINK FT
You can access the FollowMyHealth Patient Portal offered by [facility] by registering at the following website: [email or web address]. By joining [facility]’s FollowMyHealth portal, you will also be able to view your health information using other applications (apps) compatible with our system.

## 2025-03-27 NOTE — ED PROVIDER NOTE - OBJECTIVE STATEMENT
29-year-old female with a past medical history of sickle cell disease presents complaining of bilateral hands and left knee pain.  Patient states this feels similar to her prior sickle cell crisis.  Patient states she has taken Tylenol prior to arrival. pt denies any other symptoms including fevers, chill, headache, recent illness/travel, cough, abdominal pain, chest pain, or SOB.

## 2025-03-29 LAB
HEMOGLOBIN INTERPRETATION: SIGNIFICANT CHANGE UP
HGB A MFR BLD: 13 % — LOW (ref 95.8–98)
HGB A2 MFR BLD: 3.3 % — HIGH (ref 2–3.2)
HGB F MFR BLD: 5.4 % — HIGH (ref 0–1)
HGB S MFR BLD: 78.3 % — HIGH

## 2025-05-06 ENCOUNTER — INPATIENT (INPATIENT)
Facility: HOSPITAL | Age: 30
LOS: 1 days | Discharge: HOME | DRG: 812 | End: 2025-05-08
Attending: INTERNAL MEDICINE | Admitting: INTERNAL MEDICINE
Payer: COMMERCIAL

## 2025-05-06 VITALS
TEMPERATURE: 99 F | DIASTOLIC BLOOD PRESSURE: 81 MMHG | WEIGHT: 141.1 LBS | HEART RATE: 90 BPM | OXYGEN SATURATION: 93 % | RESPIRATION RATE: 16 BRPM | SYSTOLIC BLOOD PRESSURE: 115 MMHG | HEIGHT: 64 IN

## 2025-05-06 DIAGNOSIS — D57.00 HB-SS DISEASE WITH CRISIS, UNSPECIFIED: ICD-10-CM

## 2025-05-06 DIAGNOSIS — Z90.49 ACQUIRED ABSENCE OF OTHER SPECIFIED PARTS OF DIGESTIVE TRACT: Chronic | ICD-10-CM

## 2025-05-06 LAB
ALBUMIN SERPL ELPH-MCNC: 4.1 G/DL — SIGNIFICANT CHANGE UP (ref 3.5–5.2)
ALP SERPL-CCNC: 41 U/L — SIGNIFICANT CHANGE UP (ref 30–115)
ALT FLD-CCNC: 19 U/L — SIGNIFICANT CHANGE UP (ref 0–41)
ANION GAP SERPL CALC-SCNC: 12 MMOL/L — SIGNIFICANT CHANGE UP (ref 7–14)
ANISOCYTOSIS BLD QL: SIGNIFICANT CHANGE UP
AST SERPL-CCNC: 122 U/L — HIGH (ref 0–41)
BASOPHILS # BLD AUTO: 0.14 K/UL — SIGNIFICANT CHANGE UP (ref 0–0.2)
BASOPHILS NFR BLD AUTO: 0.9 % — SIGNIFICANT CHANGE UP (ref 0–1)
BILIRUB SERPL-MCNC: 4.7 MG/DL — HIGH (ref 0.2–1.2)
BUN SERPL-MCNC: 5 MG/DL — LOW (ref 10–20)
CALCIUM SERPL-MCNC: 9.1 MG/DL — SIGNIFICANT CHANGE UP (ref 8.4–10.5)
CHLORIDE SERPL-SCNC: 103 MMOL/L — SIGNIFICANT CHANGE UP (ref 98–110)
CO2 SERPL-SCNC: 20 MMOL/L — SIGNIFICANT CHANGE UP (ref 17–32)
CREAT SERPL-MCNC: 0.5 MG/DL — LOW (ref 0.7–1.5)
EGFR: 132 ML/MIN/1.73M2 — SIGNIFICANT CHANGE UP
EGFR: 132 ML/MIN/1.73M2 — SIGNIFICANT CHANGE UP
EOSINOPHIL # BLD AUTO: 0 K/UL — SIGNIFICANT CHANGE UP (ref 0–0.7)
EOSINOPHIL NFR BLD AUTO: 0 % — SIGNIFICANT CHANGE UP (ref 0–8)
GIANT PLATELETS BLD QL SMEAR: PRESENT — SIGNIFICANT CHANGE UP
GLUCOSE SERPL-MCNC: 85 MG/DL — SIGNIFICANT CHANGE UP (ref 70–99)
HCT VFR BLD CALC: 20.2 % — LOW (ref 37–47)
HGB BLD-MCNC: 7.5 G/DL — LOW (ref 12–16)
LACTATE SERPL-SCNC: 1.6 MMOL/L — SIGNIFICANT CHANGE UP (ref 0.7–2)
LYMPHOCYTES # BLD AUTO: 1.79 K/UL — SIGNIFICANT CHANGE UP (ref 1.2–3.4)
LYMPHOCYTES # BLD AUTO: 11.6 % — LOW (ref 20.5–51.1)
MACROCYTES BLD QL: SIGNIFICANT CHANGE UP
MANUAL SMEAR VERIFICATION: SIGNIFICANT CHANGE UP
MCHC RBC-ENTMCNC: 33.9 PG — HIGH (ref 27–31)
MCHC RBC-ENTMCNC: 37.1 G/DL — HIGH (ref 32–37)
MCV RBC AUTO: 91.4 FL — SIGNIFICANT CHANGE UP (ref 81–99)
MONOCYTES # BLD AUTO: 0 K/UL — LOW (ref 0.1–0.6)
MONOCYTES NFR BLD AUTO: 0 % — LOW (ref 1.7–9.3)
NEUTROPHILS # BLD AUTO: 13.08 K/UL — HIGH (ref 1.4–6.5)
NEUTROPHILS NFR BLD AUTO: 84.8 % — HIGH (ref 42.2–75.2)
NRBC # BLD: 2 /100 WBCS — HIGH (ref 0–0)
NRBC BLD AUTO-RTO: SIGNIFICANT CHANGE UP /100 WBCS (ref 0–0)
NRBC BLD-RTO: 2 /100 WBCS — HIGH (ref 0–0)
PLAT MORPH BLD: ABNORMAL
PLATELET # BLD AUTO: 495 K/UL — HIGH (ref 130–400)
PLATELET CLUMP BLD QL SMEAR: SLIGHT
PMV BLD: 11.1 FL — HIGH (ref 7.4–10.4)
POIKILOCYTOSIS BLD QL AUTO: SIGNIFICANT CHANGE UP
POLYCHROMASIA BLD QL SMEAR: SIGNIFICANT CHANGE UP
POTASSIUM SERPL-MCNC: SIGNIFICANT CHANGE UP MMOL/L (ref 3.5–5)
POTASSIUM SERPL-SCNC: SIGNIFICANT CHANGE UP MMOL/L (ref 3.5–5)
PROT SERPL-MCNC: 7.5 G/DL — SIGNIFICANT CHANGE UP (ref 6–8)
RBC # BLD: 2.21 M/UL — LOW (ref 4.2–5.4)
RBC # FLD: 21.8 % — HIGH (ref 11.5–14.5)
RBC BLD AUTO: ABNORMAL
SICKLE CELLS BLD QL SMEAR: SIGNIFICANT CHANGE UP
SMUDGE CELLS # BLD: PRESENT — SIGNIFICANT CHANGE UP
SODIUM SERPL-SCNC: 135 MMOL/L — SIGNIFICANT CHANGE UP (ref 135–146)
TARGETS BLD QL SMEAR: SIGNIFICANT CHANGE UP
VARIANT LYMPHS # BLD: 2.7 % — SIGNIFICANT CHANGE UP (ref 0–5)
VARIANT LYMPHS NFR BLD MANUAL: 2.7 % — SIGNIFICANT CHANGE UP (ref 0–5)
WBC # BLD: 15.42 K/UL — HIGH (ref 4.8–10.8)
WBC # FLD AUTO: 15.42 K/UL — HIGH (ref 4.8–10.8)

## 2025-05-06 PROCEDURE — 86902 BLOOD TYPE ANTIGEN DONOR EA: CPT

## 2025-05-06 PROCEDURE — 82248 BILIRUBIN DIRECT: CPT

## 2025-05-06 PROCEDURE — 85025 COMPLETE CBC W/AUTO DIFF WBC: CPT

## 2025-05-06 PROCEDURE — 93005 ELECTROCARDIOGRAM TRACING: CPT

## 2025-05-06 PROCEDURE — 83550 IRON BINDING TEST: CPT

## 2025-05-06 PROCEDURE — 36430 TRANSFUSION BLD/BLD COMPNT: CPT

## 2025-05-06 PROCEDURE — 82728 ASSAY OF FERRITIN: CPT

## 2025-05-06 PROCEDURE — P9040: CPT

## 2025-05-06 PROCEDURE — 85730 THROMBOPLASTIN TIME PARTIAL: CPT

## 2025-05-06 PROCEDURE — 99285 EMERGENCY DEPT VISIT HI MDM: CPT

## 2025-05-06 PROCEDURE — 83020 HEMOGLOBIN ELECTROPHORESIS: CPT

## 2025-05-06 PROCEDURE — 85045 AUTOMATED RETICULOCYTE COUNT: CPT

## 2025-05-06 PROCEDURE — 86880 COOMBS TEST DIRECT: CPT

## 2025-05-06 PROCEDURE — 71045 X-RAY EXAM CHEST 1 VIEW: CPT

## 2025-05-06 PROCEDURE — 85610 PROTHROMBIN TIME: CPT

## 2025-05-06 PROCEDURE — 71045 X-RAY EXAM CHEST 1 VIEW: CPT | Mod: 26

## 2025-05-06 PROCEDURE — 84466 ASSAY OF TRANSFERRIN: CPT

## 2025-05-06 PROCEDURE — 83735 ASSAY OF MAGNESIUM: CPT

## 2025-05-06 PROCEDURE — 86870 RBC ANTIBODY IDENTIFICATION: CPT

## 2025-05-06 PROCEDURE — 86900 BLOOD TYPING SEROLOGIC ABO: CPT

## 2025-05-06 PROCEDURE — 36415 COLL VENOUS BLD VENIPUNCTURE: CPT

## 2025-05-06 PROCEDURE — 83010 ASSAY OF HAPTOGLOBIN QUANT: CPT

## 2025-05-06 PROCEDURE — 80053 COMPREHEN METABOLIC PANEL: CPT

## 2025-05-06 PROCEDURE — 82247 BILIRUBIN TOTAL: CPT

## 2025-05-06 PROCEDURE — 84100 ASSAY OF PHOSPHORUS: CPT

## 2025-05-06 PROCEDURE — 86901 BLOOD TYPING SEROLOGIC RH(D): CPT

## 2025-05-06 PROCEDURE — 86850 RBC ANTIBODY SCREEN: CPT

## 2025-05-06 PROCEDURE — 99222 1ST HOSP IP/OBS MODERATE 55: CPT

## 2025-05-06 PROCEDURE — 83540 ASSAY OF IRON: CPT

## 2025-05-06 PROCEDURE — 73110 X-RAY EXAM OF WRIST: CPT | Mod: 26,50

## 2025-05-06 PROCEDURE — 86922 COMPATIBILITY TEST ANTIGLOB: CPT

## 2025-05-06 PROCEDURE — 83615 LACTATE (LD) (LDH) ENZYME: CPT

## 2025-05-06 RX ORDER — LOSARTAN POTASSIUM 100 MG/1
50 TABLET, FILM COATED ORAL DAILY
Refills: 0 | Status: DISCONTINUED | OUTPATIENT
Start: 2025-05-06 | End: 2025-05-08

## 2025-05-06 RX ORDER — SODIUM CHLORIDE 9 G/1000ML
1000 INJECTION, SOLUTION INTRAVENOUS ONCE
Refills: 0 | Status: COMPLETED | OUTPATIENT
Start: 2025-05-06 | End: 2025-05-06

## 2025-05-06 RX ORDER — HYDROMORPHONE/SOD CHLOR,ISO/PF 2 MG/10 ML
1 SYRINGE (ML) INJECTION EVERY 4 HOURS
Refills: 0 | Status: DISCONTINUED | OUTPATIENT
Start: 2025-05-06 | End: 2025-05-07

## 2025-05-06 RX ORDER — ENOXAPARIN SODIUM 100 MG/ML
40 INJECTION SUBCUTANEOUS EVERY 24 HOURS
Refills: 0 | Status: DISCONTINUED | OUTPATIENT
Start: 2025-05-06 | End: 2025-05-08

## 2025-05-06 RX ORDER — KETOROLAC TROMETHAMINE 30 MG/ML
15 INJECTION, SOLUTION INTRAMUSCULAR; INTRAVENOUS ONCE
Refills: 0 | Status: DISCONTINUED | OUTPATIENT
Start: 2025-05-06 | End: 2025-05-06

## 2025-05-06 RX ORDER — FOLIC ACID 1 MG/1
1 TABLET ORAL DAILY
Refills: 0 | Status: DISCONTINUED | OUTPATIENT
Start: 2025-05-06 | End: 2025-05-08

## 2025-05-06 RX ORDER — SODIUM CHLORIDE 9 G/1000ML
1000 INJECTION, SOLUTION INTRAVENOUS
Refills: 0 | Status: DISCONTINUED | OUTPATIENT
Start: 2025-05-06 | End: 2025-05-08

## 2025-05-06 RX ADMIN — SODIUM CHLORIDE 1000 MILLILITER(S): 9 INJECTION, SOLUTION INTRAVENOUS at 14:19

## 2025-05-06 RX ADMIN — Medication 4 MILLIGRAM(S): at 13:47

## 2025-05-06 RX ADMIN — Medication 1 MILLIGRAM(S): at 21:50

## 2025-05-06 RX ADMIN — Medication 1 MILLIGRAM(S): at 21:02

## 2025-05-06 RX ADMIN — SODIUM CHLORIDE 100 MILLILITER(S): 9 INJECTION, SOLUTION INTRAVENOUS at 18:41

## 2025-05-06 RX ADMIN — SODIUM CHLORIDE 1000 MILLILITER(S): 9 INJECTION, SOLUTION INTRAVENOUS at 11:00

## 2025-05-06 RX ADMIN — KETOROLAC TROMETHAMINE 15 MILLIGRAM(S): 30 INJECTION, SOLUTION INTRAMUSCULAR; INTRAVENOUS at 11:00

## 2025-05-06 RX ADMIN — Medication 2 MILLIGRAM(S): at 15:50

## 2025-05-06 NOTE — ED PROVIDER NOTE - CLINICAL SUMMARY MEDICAL DECISION MAKING FREE TEXT BOX
pt with sickle crisis    XR interpretation: No cardiopulmonary disease, independently interpretted by Jose Miller DO    Appropriate medications for patient's presenting complaints were ordered and effects were reassessed. Patient's external records were reviewed    Escalation to admission and/or observation was considered.  Patient feels much better and is comfortable with discharge.  Appropriate follow-up was arranged.

## 2025-05-06 NOTE — H&P ADULT - NSHPPHYSICALEXAM_GEN_ALL_CORE
LOS:     VITALS:   T(C): 36.9 (05-06-25 @ 15:59), Max: 37.2 (05-06-25 @ 09:23)  HR: 73 (05-06-25 @ 15:59) (73 - 90)  BP: 101/66 (05-06-25 @ 15:59) (101/66 - 115/81)  RR: 16 (05-06-25 @ 15:59) (16 - 16)  SpO2: 94% (05-06-25 @ 15:59) (93% - 94%)    GENERAL: NAD, lying in bed comfortably  HEAD:  Atraumatic, Normocephalic  EYES: EOMI, PERRLA, conjunctiva and sclera clear  ENT: Moist mucous membranes  NECK: Supple, No JVD  CHEST/LUNG: Clear to auscultation bilaterally; No rales, rhonchi, wheezing, or rubs. Unlabored respirations  HEART: Regular rate and rhythm; No murmurs, rubs, or gallops  ABDOMEN: BSx4; Soft, nontender, nondistended  EXTREMITIES:  2+ Peripheral Pulses, brisk capillary refill. No clubbing, cyanosis, or edema  NERVOUS SYSTEM:  A&Ox3, no focal deficits   SKIN: No rashes or lesions

## 2025-05-06 NOTE — PATIENT PROFILE ADULT - STATED REASON FOR ADMISSION
Sickle Cell Disease  Kilograms Preamble Statement (Weight Entered In Details Tab): Reported Weight in kilograms:

## 2025-05-06 NOTE — H&P ADULT - ATTENDING SUPERVISION STATEMENT
Spoke with pt - lantus 36 units caused am bs to be 50  Pt decreased it to 20 units, but am bs was 300.  Pt has been using humalog after meals  Recommend pt to take lantus 28 units in the evening.  Use humalog sliding scale before meals to prevent blood sugar spikes.  Pt agrees and will make f/up apt with new pcp dr block   Resident

## 2025-05-06 NOTE — H&P ADULT - ATTENDING COMMENTS
Medicine Attending Attestation  Patient was seen and examined with medicine team.  Nursing records reviewed. I agree with the resident/PA/NP's note including past medical history, home medications, social history, allergies, surgical history, family history, and review of system. I have reviewed relevant vitals, laboratory values, imaging studies, and microbiology.   - Above resident's note was edited by me  - rest of A/P as per detailed housestaff note above except above modifications    Total time spent to complete patient's bedside assessment, reviewed medical chart, discussed medical plan of care with team was 45 minutes with >50% of time spent face to face with patient, discussion with patient/family and/or coordination of care.

## 2025-05-06 NOTE — H&P ADULT - ASSESSMENT
34 year old female pmhx proteinuria and sickle cell presents to ed with 1 day hx of bilateral shoulder pain cannot lift shoulders.       #Ho sickle cell disease now active crisis   - half normal saline at 100cc/hr avoid overload  trend bmp   -trend ldh retic count  -pain control dilauded 1mg q4 hours prn and pain consult  -monitor off antibiotics for now trend wbc   -follows with heme onc at [place]     #Ho Proteinuria   -continue home losartan       #Diet regular   #DVT ppx lowvnox   #GI ppx protonix

## 2025-05-06 NOTE — ED ADULT NURSE NOTE - OBJECTIVE STATEMENT
Patient presents to ED with complaints of intermittent sickle cell crisis for last 3 months with L arm swelling and pain for last 3 weeks. Patient now reports R arm is started to have pain now. Denies CP, SOB, fever.

## 2025-05-06 NOTE — ED PROVIDER NOTE - PHYSICAL EXAMINATION
Uncomortable due to pain. NCAT PERRLA EOMI neck supple non tender normal wob cta bl rrr abdomen s nt nd no rebound no guarding WWPx4 neuro non focal

## 2025-05-06 NOTE — H&P ADULT - HISTORY OF PRESENT ILLNESS
34 year old female pmhx proteinuria and sickle cell presents to ed with 1 day hx of bilateral shoulder pain cannot lift shoulders.   In the ED WBC 15.4 Hemoglobin 7.8 bilirubin 4.7 alk phos   XRAY bl arms no acute   CXR some cardiomegaly official read pending,   ECG NSR @71bpm     given some pain control and fluids     Admitted to medicine for sickle cell crisis

## 2025-05-06 NOTE — H&P ADULT - NSHPLABSRESULTS_GEN_ALL_CORE
.  LABS:                         7.5    15.42 )-----------( 495      ( 06 May 2025 10:55 )             20.2     05-06    135  |  103  |  5[L]  ----------------------------<  85  HEMOLYZED   |  20  |  0.5[L]    Ca    9.1      06 May 2025 10:55    TPro  7.5  /  Alb  4.1  /  TBili  4.7[H]  /  DBili  x   /  AST  122[H]  /  ALT  19  /  AlkPhos  41  05-06      Urinalysis Basic - ( 06 May 2025 10:55 )    Color: x / Appearance: x / SG: x / pH: x  Gluc: 85 mg/dL / Ketone: x  / Bili: x / Urobili: x   Blood: x / Protein: x / Nitrite: x   Leuk Esterase: x / RBC: x / WBC x   Sq Epi: x / Non Sq Epi: x / Bacteria: x            Lactate, Blood: 1.6 mmol/L (05-06 @ 10:55)      RADIOLOGY, EKG & ADDITIONAL TESTS: Reviewed.

## 2025-05-06 NOTE — H&P ADULT - NSHPREVIEWOFSYSTEMS_GEN_ALL_CORE
REVIEW OF SYSTEMS:    CONSTITUTIONAL: No weakness, fevers or chills  EYES/ENT: No visual changes;  No vertigo or throat pain   NECK: No pain or stiffness  RESPIRATORY: No cough, wheezing, hemoptysis; No shortness of breath  CARDIOVASCULAR: No chest pain or palpitations  GASTROINTESTINAL: No abdominal or epigastric pain. No nausea, vomiting, or hematemesis; No diarrhea or constipation. No melena or hematochezia.  GENITOURINARY: No dysuria, frequency or hematuria  NEUROLOGICAL: No numbness or weakness  MSK: bl shoulder pain x 1 day  SKIN: No itching, rashes

## 2025-05-06 NOTE — PATIENT PROFILE ADULT - FALL HARM RISK - HARM RISK INTERVENTIONS
Communicate Risk of Fall with Harm to all staff/Monitor gait and stability/Reinforce activity limits and safety measures with patient and family/Tailored Fall Risk Interventions/Use of alarms - bed, chair and/or voice tab/Visual Cue: Yellow wristband and red socks/Bed in lowest position, wheels locked, appropriate side rails in place/Call bell, personal items and telephone in reach/Instruct patient to call for assistance before getting out of bed or chair/Non-slip footwear when patient is out of bed/[place] to call system/Physically safe environment - no spills, clutter or unnecessary equipment/Purposeful Proactive Rounding/Room/bathroom lighting operational, light cord in reach

## 2025-05-07 LAB
ALBUMIN SERPL ELPH-MCNC: 3.4 G/DL — LOW (ref 3.5–5.2)
ALP SERPL-CCNC: 47 U/L — SIGNIFICANT CHANGE UP (ref 30–115)
ALT FLD-CCNC: 9 U/L — SIGNIFICANT CHANGE UP (ref 0–41)
ANION GAP SERPL CALC-SCNC: 11 MMOL/L — SIGNIFICANT CHANGE UP (ref 7–14)
AST SERPL-CCNC: 31 U/L — SIGNIFICANT CHANGE UP (ref 0–41)
BASOPHILS # BLD AUTO: 0.06 K/UL — SIGNIFICANT CHANGE UP (ref 0–0.2)
BASOPHILS NFR BLD AUTO: 0.5 % — SIGNIFICANT CHANGE UP (ref 0–1)
BILIRUB DIRECT SERPL-MCNC: 0.5 MG/DL — HIGH (ref 0–0.3)
BILIRUB INDIRECT FLD-MCNC: 5.7 MG/DL — HIGH (ref 0.2–1.2)
BILIRUB SERPL-MCNC: 4.2 MG/DL — HIGH (ref 0.2–1.2)
BILIRUB SERPL-MCNC: 6.2 MG/DL — HIGH (ref 0.2–1.2)
BUN SERPL-MCNC: 4 MG/DL — LOW (ref 10–20)
CALCIUM SERPL-MCNC: 8.7 MG/DL — SIGNIFICANT CHANGE UP (ref 8.4–10.5)
CHLORIDE SERPL-SCNC: 103 MMOL/L — SIGNIFICANT CHANGE UP (ref 98–110)
CO2 SERPL-SCNC: 23 MMOL/L — SIGNIFICANT CHANGE UP (ref 17–32)
CREAT SERPL-MCNC: <0.5 MG/DL — LOW (ref 0.7–1.5)
EGFR: 149 ML/MIN/1.73M2 — SIGNIFICANT CHANGE UP
EGFR: 149 ML/MIN/1.73M2 — SIGNIFICANT CHANGE UP
EOSINOPHIL # BLD AUTO: 0.15 K/UL — SIGNIFICANT CHANGE UP (ref 0–0.7)
EOSINOPHIL NFR BLD AUTO: 1.2 % — SIGNIFICANT CHANGE UP (ref 0–8)
GLUCOSE SERPL-MCNC: 76 MG/DL — SIGNIFICANT CHANGE UP (ref 70–99)
HCT VFR BLD CALC: 18.2 % — LOW (ref 37–47)
HGB BLD-MCNC: 6.5 G/DL — CRITICAL LOW (ref 12–16)
IMM GRANULOCYTES NFR BLD AUTO: 1 % — HIGH (ref 0.1–0.3)
INR BLD: 1.14 RATIO — SIGNIFICANT CHANGE UP (ref 0.65–1.3)
LDH SERPL L TO P-CCNC: 449 — HIGH (ref 50–242)
LYMPHOCYTES # BLD AUTO: 2.94 K/UL — SIGNIFICANT CHANGE UP (ref 1.2–3.4)
LYMPHOCYTES # BLD AUTO: 23.6 % — SIGNIFICANT CHANGE UP (ref 20.5–51.1)
MAGNESIUM SERPL-MCNC: 1.5 MG/DL — LOW (ref 1.8–2.4)
MCHC RBC-ENTMCNC: 33.3 PG — HIGH (ref 27–31)
MCHC RBC-ENTMCNC: 35.7 G/DL — SIGNIFICANT CHANGE UP (ref 32–37)
MCV RBC AUTO: 93.3 FL — SIGNIFICANT CHANGE UP (ref 81–99)
MONOCYTES # BLD AUTO: 0.91 K/UL — HIGH (ref 0.1–0.6)
MONOCYTES NFR BLD AUTO: 7.3 % — SIGNIFICANT CHANGE UP (ref 1.7–9.3)
NEUTROPHILS # BLD AUTO: 8.29 K/UL — HIGH (ref 1.4–6.5)
NEUTROPHILS NFR BLD AUTO: 66.4 % — SIGNIFICANT CHANGE UP (ref 42.2–75.2)
NRBC BLD AUTO-RTO: 1 /100 WBCS — HIGH (ref 0–0)
PHOSPHATE SERPL-MCNC: 4.2 MG/DL — SIGNIFICANT CHANGE UP (ref 2.1–4.9)
PLATELET # BLD AUTO: 420 K/UL — HIGH (ref 130–400)
PMV BLD: 10.2 FL — SIGNIFICANT CHANGE UP (ref 7.4–10.4)
POTASSIUM SERPL-MCNC: 4.1 MMOL/L — SIGNIFICANT CHANGE UP (ref 3.5–5)
POTASSIUM SERPL-SCNC: 4.1 MMOL/L — SIGNIFICANT CHANGE UP (ref 3.5–5)
PROT SERPL-MCNC: 6.5 G/DL — SIGNIFICANT CHANGE UP (ref 6–8)
PROTHROM AB SERPL-ACNC: 13.5 SEC — HIGH (ref 9.95–12.87)
RBC # BLD: 1.95 M/UL — LOW (ref 4.2–5.4)
RBC # BLD: 1.95 M/UL — LOW (ref 4.2–5.4)
RBC # FLD: 22.4 % — HIGH (ref 11.5–14.5)
RETICS #: 500.6 K/UL — HIGH (ref 25–125)
RETICS/RBC NFR: 25.7 % — HIGH (ref 0.5–1.5)
SODIUM SERPL-SCNC: 137 MMOL/L — SIGNIFICANT CHANGE UP (ref 135–146)
WBC # BLD: 12.48 K/UL — HIGH (ref 4.8–10.8)
WBC # FLD AUTO: 12.48 K/UL — HIGH (ref 4.8–10.8)

## 2025-05-07 PROCEDURE — 86077 PHYS BLOOD BANK SERV XMATCH: CPT

## 2025-05-07 PROCEDURE — 93010 ELECTROCARDIOGRAM REPORT: CPT

## 2025-05-07 PROCEDURE — 99223 1ST HOSP IP/OBS HIGH 75: CPT | Mod: 25

## 2025-05-07 PROCEDURE — 83020 HEMOGLOBIN ELECTROPHORESIS: CPT | Mod: 26

## 2025-05-07 PROCEDURE — 99232 SBSQ HOSP IP/OBS MODERATE 35: CPT

## 2025-05-07 RX ORDER — MECLIZINE HCL 12.5 MG
12.5 TABLET ORAL THREE TIMES A DAY
Refills: 0 | Status: DISCONTINUED | OUTPATIENT
Start: 2025-05-07 | End: 2025-05-08

## 2025-05-07 RX ORDER — OXYCODONE HYDROCHLORIDE AND ACETAMINOPHEN 10; 325 MG/1; MG/1
1 TABLET ORAL ONCE
Refills: 0 | Status: DISCONTINUED | OUTPATIENT
Start: 2025-05-07 | End: 2025-05-07

## 2025-05-07 RX ORDER — ONDANSETRON HCL/PF 4 MG/2 ML
4 VIAL (ML) INJECTION ONCE
Refills: 0 | Status: COMPLETED | OUTPATIENT
Start: 2025-05-07 | End: 2025-05-07

## 2025-05-07 RX ORDER — ACETAMINOPHEN 500 MG/5ML
650 LIQUID (ML) ORAL ONCE
Refills: 0 | Status: COMPLETED | OUTPATIENT
Start: 2025-05-07 | End: 2025-05-07

## 2025-05-07 RX ORDER — KETOROLAC TROMETHAMINE 30 MG/ML
15 INJECTION, SOLUTION INTRAMUSCULAR; INTRAVENOUS EVERY 6 HOURS
Refills: 0 | Status: DISCONTINUED | OUTPATIENT
Start: 2025-05-07 | End: 2025-05-08

## 2025-05-07 RX ORDER — POLYETHYLENE GLYCOL 3350 17 G/17G
17 POWDER, FOR SOLUTION ORAL DAILY
Refills: 0 | Status: DISCONTINUED | OUTPATIENT
Start: 2025-05-07 | End: 2025-05-08

## 2025-05-07 RX ORDER — SODIUM CHLORIDE 9 G/1000ML
1000 INJECTION, SOLUTION INTRAVENOUS ONCE
Refills: 0 | Status: COMPLETED | OUTPATIENT
Start: 2025-05-07 | End: 2025-05-07

## 2025-05-07 RX ORDER — ACETAMINOPHEN 500 MG/5ML
650 LIQUID (ML) ORAL EVERY 8 HOURS
Refills: 0 | Status: DISCONTINUED | OUTPATIENT
Start: 2025-05-07 | End: 2025-05-08

## 2025-05-07 RX ORDER — MAGNESIUM SULFATE 500 MG/ML
2 SYRINGE (ML) INJECTION
Refills: 0 | Status: COMPLETED | OUTPATIENT
Start: 2025-05-07 | End: 2025-05-07

## 2025-05-07 RX ORDER — SENNA 187 MG
2 TABLET ORAL AT BEDTIME
Refills: 0 | Status: DISCONTINUED | OUTPATIENT
Start: 2025-05-07 | End: 2025-05-08

## 2025-05-07 RX ORDER — PROCHLORPERAZINE 25 MG
10 SUPPOSITORY, RECTAL RECTAL
Refills: 0 | Status: DISCONTINUED | OUTPATIENT
Start: 2025-05-07 | End: 2025-05-08

## 2025-05-07 RX ADMIN — Medication 40 MILLIGRAM(S): at 06:42

## 2025-05-07 RX ADMIN — FOLIC ACID 1 MILLIGRAM(S): 1 TABLET ORAL at 12:05

## 2025-05-07 RX ADMIN — LOSARTAN POTASSIUM 50 MILLIGRAM(S): 100 TABLET, FILM COATED ORAL at 06:42

## 2025-05-07 RX ADMIN — SODIUM CHLORIDE 100 MILLILITER(S): 9 INJECTION, SOLUTION INTRAVENOUS at 06:57

## 2025-05-07 RX ADMIN — Medication 1 MILLIGRAM(S): at 09:30

## 2025-05-07 RX ADMIN — Medication 1 MILLIGRAM(S): at 08:48

## 2025-05-07 RX ADMIN — Medication 6 MILLIGRAM(S): at 18:50

## 2025-05-07 RX ADMIN — ENOXAPARIN SODIUM 40 MILLIGRAM(S): 100 INJECTION SUBCUTANEOUS at 12:06

## 2025-05-07 RX ADMIN — Medication 25 GRAM(S): at 14:19

## 2025-05-07 RX ADMIN — Medication 1 MILLIGRAM(S): at 04:00

## 2025-05-07 RX ADMIN — Medication 4 MILLIGRAM(S): at 08:48

## 2025-05-07 RX ADMIN — SODIUM CHLORIDE 1000 MILLILITER(S): 9 INJECTION, SOLUTION INTRAVENOUS at 12:04

## 2025-05-07 RX ADMIN — Medication 1 MILLIGRAM(S): at 03:13

## 2025-05-07 RX ADMIN — Medication 25 GRAM(S): at 11:36

## 2025-05-07 NOTE — PROGRESS NOTE ADULT - ASSESSMENT
34 year old female pmhx proteinuria and sickle cell presents to ed with 1 day hx of bilateral shoulder pain cannot lift shoulders.     #Ho sickle cell disease now active crisis   - half normal saline at 100cc/hr avoid overload trend bmp   - ldh retic count noted, trend  - pain control dilauded 1mg q4 hours prn and pain consult, endorses pain control  - monitor off antibiotics for now trend wbc   - follows with heme onc at [place]  - Obtain STAT and type and screen and tranfuse PRN     #Ho Proteinuria   - continue home losartan     #Diet regular   #DVT ppx Lovenox   #GI ppx protonix

## 2025-05-07 NOTE — CONSULT NOTE ADULT - SUBJECTIVE AND OBJECTIVE BOX
HPI: Patient is a 29 with PMH of proteinuria and sickle cell presents to ed with 1 day hx of bilateral shoulder pain cannot lift shoulders due to sickle cell crisis.     History of Present Illness  Pain onset 1 day ago  Pain location bilateral shoulders  Pain quality stabbing  Pain severity 10/10  Pain improves with minor improvement on current regimen   Pain worsens with ROM, nausea, vomiting     Current Inpatient Medication Regimen:  chlorhexidine 2% Cloths 1 Application(s) Topical daily  enoxaparin Injectable 40 milliGRAM(s) SubCutaneous every 24 hours  folic acid 1 milliGRAM(s) Oral daily  HYDROmorphone  Injectable 1 milliGRAM(s) IV Push every 4 hours PRN  losartan 50 milliGRAM(s) Oral daily  magnesium sulfate  IVPB 2 Gram(s) IV Intermittent every 2 hours  pantoprazole    Tablet 40 milliGRAM(s) Oral before breakfast  prochlorperazine   Injectable 10 milliGRAM(s) IV Push four times a day PRN  sodium chloride 0.45%. 1000 milliLiter(s) IV Continuous <Continuous>        Allergies:  No Known Allergies      Past Medical History:  Hemoglobin SS disease with crisis    Handoff    MEWS Score    Sickle cell anemia    Sickle cell crisis    S/P cholecystectomy    SICKLE CELL CRISIS    41    [id number]        Past Surgical History:      Family History:      Social History:  Tobacco -   EtOH -   Drugs -       Review of Systems:  General: no fevers or chills  Eyes: no diplopia or blurred vision  ENT: no rhinorrhea  CV: no chest pain  Resp: no cough or dyspnea  GI: no abdominal pain, constipation, or diarrhea  : no urinary incontinence or dysuria  Neuro: no focal weakness or numbness in ___  Psych: [  ] depression, [  ] anxiety    Physical Exam:  T(C): 36.6 (05-07-25 @ 13:59), Max: 36.9 (05-06-25 @ 15:59)  HR: 60 (05-07-25 @ 13:59) (60 - 73)  BP: 96/60 (05-07-25 @ 13:59) (96/60 - 102/62)  RR: 18 (05-07-25 @ 04:35) (16 - 18)  SpO2: 90% (05-07-25 @ 08:53) (90% - 98%)  Gen: NAD  Eyes: no glasses or scleral icterus  Head: Normocephalic / Atraumatic  CV: no JVD  Lungs: nonlabored breathing  Abdomen: nondistended, soft  : no reilly catheter in place  Back: tenderness to palpation  Neuro: AOx3, Cranial nerves intact, +5/5 strength in ______ extremities  Extremities: full ROM in upper/lower extremities  Psych: normal affect      Labs:  CBC  12.48 K/uL[H] [4.80 - 10.80] > 6.5 g/dL[LL] [12.0 - 16.0] / 18.2 %[L] [37.0 - 47.0] < 420 K/uL[H] [130 - 400]      BMP  137 mmol/L [135 - 146] | 103 mmol/L [98 - 110] | 4 mg/dL[L] [10 - 20]  4.1 mmol/L [3.5 - 5.0] | 23 mmol/L [17 - 32] | <0.5 mg/dL[L] [0.7 - 1.5]    76 mg/dL [70 - 99]        Imaging Studies:        Opioid Risk Assessment Tool                                                                           Female       Male  Family History  Alcohol                                                              1                3  Illegal drugs                                                       2                3  Rx drugs                                                            4                4    Personal History   Alcohol                                                              3                3  Illegal drugs                                                       4                4  Rx drugs                                                            5                5    Age between 16—45 years                                1                1  History of preadolescent sexual abuse               3                0    Psychological disease  ADD, OCD, bipolar, schizophrenia                      2                2  Depression                                                       1                1    Total Score                                                      __              __    0 - 3 = low risk for future opioid abuse  4 - 7 = moderate risk for future opioid abuse  8+ = high risk for future opioid abuse

## 2025-05-07 NOTE — CONSULT NOTE ADULT - ASSESSMENT
A/P: Patient is a 29 with PMH of proteinuria and sickle cell presents to ed with 1 day hx of bilateral shoulder pain cannot lift shoulders due to sickle cell crisis.     PLAN  Patient evaluated at bedside, states she feels like shes been in crisis for one month throughout different parts of body and her now shoulder pain brought her to the hospital. During examination she is complaining of nausea and is vomiting despite zofran administration    #Sickle cell crisis  - Please d/c IV Hydromorphone, start IV Morphine 6mg q4h PRN for severe pain  - Acetaminophen 975mg q8h scheduled  - Start IV Ketoralac 15mg q6h scheduled  - Nausea regimen with Meclizine PRN   - Bowel regimen

## 2025-05-07 NOTE — CONSULT NOTE ADULT - NS ATTEST RISK PROBLEM GEN_ALL_CORE FT
This 29-year-old female with sickle cell anemia and proteinuria presents with severe bilateral shoulder pain, limiting range of motion, consistent with a sickle cell crisis. The duration of her symptoms (one month of migrating pain) and the presence of uncontrolled nausea and vomiting despite zofran raise significant concerns and place her at high risk.

## 2025-05-07 NOTE — PROGRESS NOTE ADULT - SUBJECTIVE AND OBJECTIVE BOX
24H events:    Patient is a 29y old Female who presents with a chief complaint of   Primary diagnosis of Sickle cell crisis    Today is 1d of hospitalization. This morning patient was seen and examined at bedside, resting comfortably in bed.      Endorses pain better with Dilaudid fluids running, noted low Hb, will need transfusion.       PAST MEDICAL & SURGICAL HISTORY  Sickle cell anemia    S/P cholecystectomy      SOCIAL HISTORY:  Social History:      ALLERGIES:  No Known Allergies    MEDICATIONS:  STANDING MEDICATIONS  chlorhexidine 2% Cloths 1 Application(s) Topical daily  enoxaparin Injectable 40 milliGRAM(s) SubCutaneous every 24 hours  folic acid 1 milliGRAM(s) Oral daily  losartan 50 milliGRAM(s) Oral daily  magnesium sulfate  IVPB 2 Gram(s) IV Intermittent every 2 hours  pantoprazole    Tablet 40 milliGRAM(s) Oral before breakfast  sodium chloride 0.45%. 1000 milliLiter(s) IV Continuous <Continuous>    PRN MEDICATIONS  HYDROmorphone  Injectable 1 milliGRAM(s) IV Push every 4 hours PRN    VITALS:   T(F): 97.8  HR: 71  BP: 102/62  RR: 18  SpO2: 90%    PHYSICAL EXAM:  GENERAL: NAD, lying in bed comfortably  HEAD:  Atraumatic, Normocephalic  EYES: EOMI, PERRLA, conjunctiva and sclera clear  ENT: Moist mucous membranes  NECK: Supple, No JVD  CHEST/LUNG: Clear to auscultation bilaterally; No rales, rhonchi, wheezing, or rubs. Unlabored respirations  HEART: Regular rate and rhythm; No murmurs, rubs, or gallops  ABDOMEN: BSx4; Soft, nontender, nondistended  EXTREMITIES:  2+ Peripheral Pulses, brisk capillary refill. No clubbing, cyanosis, or edema  NERVOUS SYSTEM:  A&Ox3, no focal deficits   SKIN: No rashes or lesions      LABS:                        6.5    12.48 )-----------( 420      ( 07 May 2025 08:13 )             18.2     05-07    137  |  103  |  4[L]  ----------------------------<  76  4.1   |  23  |  <0.5[L]    Ca    8.7      07 May 2025 08:13  Phos  4.2     05-07  Mg     1.5     05-07    TPro  6.5  /  Alb  3.4[L]  /  TBili  4.2[H]  /  DBili  x   /  AST  31  /  ALT  9   /  AlkPhos  47  05-07    PT/INR - ( 07 May 2025 08:13 )   PT: 13.50 sec;   INR: 1.14 ratio           Urinalysis Basic - ( 07 May 2025 08:13 )    Color: x / Appearance: x / SG: x / pH: x  Gluc: 76 mg/dL / Ketone: x  / Bili: x / Urobili: x   Blood: x / Protein: x / Nitrite: x   Leuk Esterase: x / RBC: x / WBC x   Sq Epi: x / Non Sq Epi: x / Bacteria: x

## 2025-05-07 NOTE — PROGRESS NOTE ADULT - ASSESSMENT
Patient is a 29y old Female who presents with a chief complaint of Currently admitted to medicine with the primary diagnosis of Sickle cell crisis    #Sickle Cell Anemia Crisis    Hemoglobin 7.5 on presentation today 6.5. Mild leukocytosis and thrombocytosis  Total bilirubin 4.2  Follows with  Dr. Wesley Jimenez from [facility]  Feb 2025 Electrophoresis showed: HbS 91 and HbF 5, HBA 0, consistent with HBSS  CXR shows no consolidation, mild cardiomegaly    Recommendations:   Check direct/indirect bilirubin  Continue half NS  Pain management     Patient is a 29y old Female who presents with a chief complaint of Currently admitted to medicine with the primary diagnosis of Sickle cell crisis    #Sickle Cell Anemia Crisis    Hemoglobin 7.5 on presentation today 6.5. Mild leukocytosis and thrombocytosis  Total bilirubin 4.2  Follows with  Dr. Wesley Jimenez from [facility]  Was on Voxelotor in the past, was stopped as it was withdrawn from the market  Feb 2025 Electrophoresis showed: HbS 91 and HbF 5, HBA 0, consistent with HBSS  CXR shows no consolidation, mild cardiomegaly    Recommendations:   Check direct/indirect bilirubin  Continue half NS  Pain management  Her Hb is slightly lower than her baseline (7-8), can consider blood transfusion if hb drops <6 or if she is symptomatic from anemia  Monitor CBC, CMp daily for now  Monitor for fever, hypoxia  Please check iron panel with ferritin   INcentive spirometry to prevent atelectasis     Patient is a 29y old Female who presents with a chief complaint of Currently admitted to medicine with the primary diagnosis of Sickle cell crisis    #Sickle Cell Anemia Crisis    Hemoglobin 7.5 on presentation today 6.5. Mild leukocytosis and thrombocytosis  Total bilirubin 4.2  Follows with  Dr. Wesley Jimenez from [facility]  Was on Voxelotor in the past, was stopped as it was withdrawn from the market  Feb 2025 Electrophoresis showed: HbS 91 and HbF 5, HBA 0, consistent with HBSS  CXR shows no consolidation, mild cardiomegaly    Recommendations:   Check direct/indirect bilirubin  Continue half NS  Pain management  Her Hb is slightly lower than her baseline (7-8), can consider blood transfusion if hb drops <6.5 or if she is symptomatic from anemia  Monitor CBC, CMp daily for now  Monitor for fever, hypoxia  Please check iron panel with ferritin   INcentive spirometry to prevent atelectasis

## 2025-05-07 NOTE — PROGRESS NOTE ADULT - SUBJECTIVE AND OBJECTIVE BOX
JAMES VÁSQUEZ 29y Female  MRN#: [id number]   Hospital Day: 1d    SUBJECTIVE  Patient is a 29y old Female who presents with a chief complaint of Currently admitted to medicine with the primary diagnosis of Sickle cell crisis      INTERVAL HPI AND OVERNIGHT EVENTS:  Patient was examined and seen at bedside. This morning she is resting comfortably in bed and reports no issues or overnight events.    REVIEW OF SYMPTOMS:  CONSTITUTIONAL: No weakness, fevers or chills; No headaches  EYES: No visual changes, eye pain, or discharge  ENT: No vertigo; No ear pain or change in hearing; No sore throat or difficulty swallowing  NECK: No pain or stiffness  RESPIRATORY: No cough, wheezing, or hemoptysis; No shortness of breath  CARDIOVASCULAR: No chest pain or palpitations  GASTROINTESTINAL: No abdominal or epigastric pain; No nausea, vomiting, or hematemesis; No diarrhea or constipation; No melena or hematochezia  GENITOURINARY: No dysuria, frequency or hematuria  MUSCULOSKELETAL: No joint pain, no muscle pain, no weakness  NEUROLOGICAL: No numbness or weakness  SKIN: No itching or rashes    OBJECTIVE  PAST MEDICAL & SURGICAL HISTORY  Sickle cell anemia    S/P cholecystectomy      ALLERGIES:  No Known Allergies    MEDICATIONS:  STANDING MEDICATIONS  chlorhexidine 2% Cloths 1 Application(s) Topical daily  enoxaparin Injectable 40 milliGRAM(s) SubCutaneous every 24 hours  folic acid 1 milliGRAM(s) Oral daily  losartan 50 milliGRAM(s) Oral daily  magnesium sulfate  IVPB 2 Gram(s) IV Intermittent every 2 hours  pantoprazole    Tablet 40 milliGRAM(s) Oral before breakfast  sodium chloride 0.45%. 1000 milliLiter(s) IV Continuous <Continuous>    PRN MEDICATIONS  HYDROmorphone  Injectable 1 milliGRAM(s) IV Push every 4 hours PRN      VITAL SIGNS: Last 24 Hours  T(C): 36.6 (07 May 2025 04:35), Max: 36.9 (06 May 2025 15:59)  T(F): 97.8 (07 May 2025 04:35), Max: 98.5 (06 May 2025 15:59)  HR: 71 (07 May 2025 04:35) (65 - 73)  BP: 102/62 (07 May 2025 05:30) (98/62 - 102/62)  BP(mean): 74 (07 May 2025 05:30) (73 - 74)  RR: 18 (07 May 2025 04:35) (16 - 18)  SpO2: 90% (07 May 2025 08:53) (90% - 98%)    LABS:                        6.5    12.48 )-----------( 420      ( 07 May 2025 08:13 )             18.2     05-07    137  |  103  |  4[L]  ----------------------------<  76  4.1   |  23  |  <0.5[L]    Ca    8.7      07 May 2025 08:13  Phos  4.2     05-07  Mg     1.5     05-07    TPro  6.5  /  Alb  3.4[L]  /  TBili  4.2[H]  /  DBili  x   /  AST  31  /  ALT  9   /  AlkPhos  47  05-07    PT/INR - ( 07 May 2025 08:13 )   PT: 13.50 sec;   INR: 1.14 ratio           Urinalysis Basic - ( 07 May 2025 08:13 )    Color: x / Appearance: x / SG: x / pH: x  Gluc: 76 mg/dL / Ketone: x  / Bili: x / Urobili: x   Blood: x / Protein: x / Nitrite: x   Leuk Esterase: x / RBC: x / WBC x   Sq Epi: x / Non Sq Epi: x / Bacteria: x                RADIOLOGY:      PHYSICAL EXAM:  CONSTITUTIONAL: No acute distress, well-developed, well-groomed, AAOx3  HEAD: Atraumatic, normocephalic  EYES: EOM intact, PERRLA, conjunctiva and sclera clear  ENT: Supple, no masses, no thyromegaly, no bruits, no JVD; moist mucous membranes  PULMONARY: Clear to auscultation bilaterally; no wheezes, rales, or rhonchi  CARDIOVASCULAR: Regular rate and rhythm; no murmurs, rubs, or gallops  GASTROINTESTINAL: Soft, non-tender, non-distended; bowel sounds present  MUSCULOSKELETAL: 2+ peripheral pulses; no clubbing, no cyanosis, no edema  NEUROLOGY: non-focal  SKIN: No rashes or lesions; warm and dry    ASSESSMENT & PLAN   JAMES VÁSQUEZ 29y Female  MRN#: [id number]   Hospital Day: 1d    SUBJECTIVE  Patient is a 29y old Female who presents with a chief complaint of Currently admitted to medicine with the primary diagnosis of Sickle cell crisis      INTERVAL HPI AND OVERNIGHT EVENTS:  Patient was examined and seen at bedside.     REVIEW OF SYMPTOMS:  Reports having some vomiting after the dilaudid  Denies any abdominal pain or shortness of breath    OBJECTIVE  PAST MEDICAL & SURGICAL HISTORY  Sickle cell anemia    S/P cholecystectomy      ALLERGIES:  No Known Allergies    MEDICATIONS:  STANDING MEDICATIONS  chlorhexidine 2% Cloths 1 Application(s) Topical daily  enoxaparin Injectable 40 milliGRAM(s) SubCutaneous every 24 hours  folic acid 1 milliGRAM(s) Oral daily  losartan 50 milliGRAM(s) Oral daily  magnesium sulfate  IVPB 2 Gram(s) IV Intermittent every 2 hours  pantoprazole    Tablet 40 milliGRAM(s) Oral before breakfast  sodium chloride 0.45%. 1000 milliLiter(s) IV Continuous <Continuous>    PRN MEDICATIONS  HYDROmorphone  Injectable 1 milliGRAM(s) IV Push every 4 hours PRN      VITAL SIGNS: Last 24 Hours  T(C): 36.6 (07 May 2025 04:35), Max: 36.9 (06 May 2025 15:59)  T(F): 97.8 (07 May 2025 04:35), Max: 98.5 (06 May 2025 15:59)  HR: 71 (07 May 2025 04:35) (65 - 73)  BP: 102/62 (07 May 2025 05:30) (98/62 - 102/62)  BP(mean): 74 (07 May 2025 05:30) (73 - 74)  RR: 18 (07 May 2025 04:35) (16 - 18)  SpO2: 90% (07 May 2025 08:53) (90% - 98%)    LABS:                        6.5    12.48 )-----------( 420      ( 07 May 2025 08:13 )             18.2     05-07    137  |  103  |  4[L]  ----------------------------<  76  4.1   |  23  |  <0.5[L]    Ca    8.7      07 May 2025 08:13  Phos  4.2     05-07  Mg     1.5     05-07    TPro  6.5  /  Alb  3.4[L]  /  TBili  4.2[H]  /  DBili  x   /  AST  31  /  ALT  9   /  AlkPhos  47  05-07    PT/INR - ( 07 May 2025 08:13 )   PT: 13.50 sec;   INR: 1.14 ratio           Urinalysis Basic - ( 07 May 2025 08:13 )    Color: x / Appearance: x / SG: x / pH: x  Gluc: 76 mg/dL / Ketone: x  / Bili: x / Urobili: x   Blood: x / Protein: x / Nitrite: x   Leuk Esterase: x / RBC: x / WBC x   Sq Epi: x / Non Sq Epi: x / Bacteria: x    PHYSICAL EXAM:  CONSTITUTIONAL: No acute distress  PULMONARY: Clear to auscultation bilaterally; no wheezes, rales, or rhonchi  CARDIOVASCULAR: Regular rate and rhythm; no murmurs, rubs, or gallops  GASTROINTESTINAL: Soft, non-tender, non-distended; bowel sounds present  MUSCULOSKELETAL: 2+ peripheral pulses; no clubbing, no cyanosis, no edema  NEUROLOGY: non-focal  SKIN: No rashes or lesions; warm and dry

## 2025-05-08 ENCOUNTER — TRANSCRIPTION ENCOUNTER (OUTPATIENT)
Age: 30
End: 2025-05-08

## 2025-05-08 VITALS
TEMPERATURE: 98 F | OXYGEN SATURATION: 96 % | HEART RATE: 76 BPM | DIASTOLIC BLOOD PRESSURE: 62 MMHG | SYSTOLIC BLOOD PRESSURE: 99 MMHG | RESPIRATION RATE: 18 BRPM

## 2025-05-08 LAB
ALBUMIN SERPL ELPH-MCNC: 3.5 G/DL — SIGNIFICANT CHANGE UP (ref 3.5–5.2)
ALP SERPL-CCNC: 51 U/L — SIGNIFICANT CHANGE UP (ref 30–115)
ALT FLD-CCNC: 13 U/L — SIGNIFICANT CHANGE UP (ref 0–41)
ANION GAP SERPL CALC-SCNC: 8 MMOL/L — SIGNIFICANT CHANGE UP (ref 7–14)
APTT BLD: 31.4 SEC — SIGNIFICANT CHANGE UP (ref 27–39.2)
AST SERPL-CCNC: 31 U/L — SIGNIFICANT CHANGE UP (ref 0–41)
BASOPHILS # BLD AUTO: 0.07 K/UL — SIGNIFICANT CHANGE UP (ref 0–0.2)
BASOPHILS NFR BLD AUTO: 0.5 % — SIGNIFICANT CHANGE UP (ref 0–1)
BILIRUB SERPL-MCNC: 6 MG/DL — HIGH (ref 0.2–1.2)
BUN SERPL-MCNC: <3 MG/DL — LOW (ref 10–20)
CALCIUM SERPL-MCNC: 8.7 MG/DL — SIGNIFICANT CHANGE UP (ref 8.4–10.5)
CHLORIDE SERPL-SCNC: 105 MMOL/L — SIGNIFICANT CHANGE UP (ref 98–110)
CO2 SERPL-SCNC: 26 MMOL/L — SIGNIFICANT CHANGE UP (ref 17–32)
CREAT SERPL-MCNC: <0.5 MG/DL — LOW (ref 0.7–1.5)
EGFR: 139 ML/MIN/1.73M2 — SIGNIFICANT CHANGE UP
EGFR: 139 ML/MIN/1.73M2 — SIGNIFICANT CHANGE UP
EOSINOPHIL # BLD AUTO: 0.11 K/UL — SIGNIFICANT CHANGE UP (ref 0–0.7)
EOSINOPHIL NFR BLD AUTO: 0.8 % — SIGNIFICANT CHANGE UP (ref 0–8)
GLUCOSE SERPL-MCNC: 77 MG/DL — SIGNIFICANT CHANGE UP (ref 70–99)
HAPTOGLOB SERPL-MCNC: <20 MG/DL — LOW (ref 34–200)
HCT VFR BLD CALC: 20.8 % — LOW (ref 37–47)
HEMOGLOBIN INTERPRETATION: SIGNIFICANT CHANGE UP
HGB A MFR BLD: 1.9 % — LOW (ref 95.8–98)
HGB A2 MFR BLD: 3 % — SIGNIFICANT CHANGE UP (ref 2–3.2)
HGB BLD-MCNC: 7.6 G/DL — LOW (ref 12–16)
HGB F MFR BLD: 5 % — HIGH (ref 0–1)
HGB S MFR BLD: 90.1 % — HIGH
IMM GRANULOCYTES NFR BLD AUTO: 0.9 % — HIGH (ref 0.1–0.3)
INR BLD: 1.2 RATIO — SIGNIFICANT CHANGE UP (ref 0.65–1.3)
IRON SATN MFR SERPL: 31 % — SIGNIFICANT CHANGE UP (ref 15–50)
IRON SATN MFR SERPL: 72 UG/DL — SIGNIFICANT CHANGE UP (ref 35–150)
LDH SERPL L TO P-CCNC: 425 — HIGH (ref 50–242)
LYMPHOCYTES # BLD AUTO: 17.7 % — LOW (ref 20.5–51.1)
LYMPHOCYTES # BLD AUTO: 2.45 K/UL — SIGNIFICANT CHANGE UP (ref 1.2–3.4)
MAGNESIUM SERPL-MCNC: 1.7 MG/DL — LOW (ref 1.8–2.4)
MCHC RBC-ENTMCNC: 32.8 PG — HIGH (ref 27–31)
MCHC RBC-ENTMCNC: 36.5 G/DL — SIGNIFICANT CHANGE UP (ref 32–37)
MCV RBC AUTO: 89.7 FL — SIGNIFICANT CHANGE UP (ref 81–99)
MONOCYTES # BLD AUTO: 1.2 K/UL — HIGH (ref 0.1–0.6)
MONOCYTES NFR BLD AUTO: 8.7 % — SIGNIFICANT CHANGE UP (ref 1.7–9.3)
NEUTROPHILS # BLD AUTO: 9.9 K/UL — HIGH (ref 1.4–6.5)
NEUTROPHILS NFR BLD AUTO: 71.4 % — SIGNIFICANT CHANGE UP (ref 42.2–75.2)
NRBC BLD AUTO-RTO: 2 /100 WBCS — HIGH (ref 0–0)
PHOSPHATE SERPL-MCNC: 4.1 MG/DL — SIGNIFICANT CHANGE UP (ref 2.1–4.9)
PLATELET # BLD AUTO: 472 K/UL — HIGH (ref 130–400)
PMV BLD: 9.5 FL — SIGNIFICANT CHANGE UP (ref 7.4–10.4)
POTASSIUM SERPL-MCNC: 4.2 MMOL/L — SIGNIFICANT CHANGE UP (ref 3.5–5)
POTASSIUM SERPL-SCNC: 4.2 MMOL/L — SIGNIFICANT CHANGE UP (ref 3.5–5)
PROT SERPL-MCNC: 6.4 G/DL — SIGNIFICANT CHANGE UP (ref 6–8)
PROTHROM AB SERPL-ACNC: 14.2 SEC — HIGH (ref 9.95–12.87)
RBC # BLD: 2.32 M/UL — LOW (ref 4.2–5.4)
RBC # BLD: 2.32 M/UL — LOW (ref 4.2–5.4)
RBC # FLD: 22.1 % — HIGH (ref 11.5–14.5)
RETICS #: 777 K/UL — HIGH (ref 25–125)
RETICS/RBC NFR: 33.5 % — HIGH (ref 0.5–1.5)
SODIUM SERPL-SCNC: 139 MMOL/L — SIGNIFICANT CHANGE UP (ref 135–146)
TIBC SERPL-MCNC: 233 UG/DL — SIGNIFICANT CHANGE UP (ref 220–430)
TRANSFERRIN SERPL-MCNC: 201 MG/DL — SIGNIFICANT CHANGE UP (ref 200–360)
UIBC SERPL-MCNC: 161 UG/DL — SIGNIFICANT CHANGE UP (ref 110–370)
WBC # BLD: 13.85 K/UL — HIGH (ref 4.8–10.8)
WBC # FLD AUTO: 13.85 K/UL — HIGH (ref 4.8–10.8)

## 2025-05-08 PROCEDURE — 71045 X-RAY EXAM CHEST 1 VIEW: CPT | Mod: 26

## 2025-05-08 PROCEDURE — 99239 HOSP IP/OBS DSCHRG MGMT >30: CPT

## 2025-05-08 RX ORDER — OXYCODONE HYDROCHLORIDE 30 MG/1
1 TABLET ORAL
Qty: 30 | Refills: 0
Start: 2025-05-08 | End: 2025-05-14

## 2025-05-08 RX ORDER — MAGNESIUM SULFATE 500 MG/ML
2 SYRINGE (ML) INJECTION ONCE
Refills: 0 | Status: COMPLETED | OUTPATIENT
Start: 2025-05-08 | End: 2025-05-08

## 2025-05-08 RX ORDER — POLYETHYLENE GLYCOL 3350 17 G/17G
17 POWDER, FOR SOLUTION ORAL
Qty: 170 | Refills: 0
Start: 2025-05-08 | End: 2025-05-17

## 2025-05-08 RX ORDER — MECLIZINE HCL 12.5 MG
1 TABLET ORAL
Qty: 45 | Refills: 0
Start: 2025-05-08 | End: 2025-05-22

## 2025-05-08 RX ORDER — SENNA 187 MG
2 TABLET ORAL
Qty: 30 | Refills: 0
Start: 2025-05-08 | End: 2025-05-22

## 2025-05-08 RX ADMIN — KETOROLAC TROMETHAMINE 15 MILLIGRAM(S): 30 INJECTION, SOLUTION INTRAMUSCULAR; INTRAVENOUS at 11:15

## 2025-05-08 RX ADMIN — Medication 4 MILLIGRAM(S): at 05:10

## 2025-05-08 RX ADMIN — SODIUM CHLORIDE 100 MILLILITER(S): 9 INJECTION, SOLUTION INTRAVENOUS at 06:55

## 2025-05-08 RX ADMIN — KETOROLAC TROMETHAMINE 15 MILLIGRAM(S): 30 INJECTION, SOLUTION INTRAMUSCULAR; INTRAVENOUS at 11:04

## 2025-05-08 RX ADMIN — Medication 650 MILLIGRAM(S): at 13:14

## 2025-05-08 RX ADMIN — KETOROLAC TROMETHAMINE 15 MILLIGRAM(S): 30 INJECTION, SOLUTION INTRAMUSCULAR; INTRAVENOUS at 17:30

## 2025-05-08 RX ADMIN — Medication 1 APPLICATION(S): at 11:09

## 2025-05-08 RX ADMIN — Medication 40 MILLIGRAM(S): at 05:13

## 2025-05-08 RX ADMIN — FOLIC ACID 1 MILLIGRAM(S): 1 TABLET ORAL at 11:04

## 2025-05-08 RX ADMIN — LOSARTAN POTASSIUM 50 MILLIGRAM(S): 100 TABLET, FILM COATED ORAL at 05:10

## 2025-05-08 RX ADMIN — Medication 650 MILLIGRAM(S): at 14:00

## 2025-05-08 RX ADMIN — Medication 25 GRAM(S): at 11:03

## 2025-05-08 RX ADMIN — KETOROLAC TROMETHAMINE 15 MILLIGRAM(S): 30 INJECTION, SOLUTION INTRAMUSCULAR; INTRAVENOUS at 17:09

## 2025-05-08 NOTE — DISCHARGE NOTE PROVIDER - PROVIDER TOKENS
FREE:[LAST:[Wesley Jimenez],PHONE:[(   )    -],FAX:[(   )    -],ADDRESS:[[place]],FOLLOWUP:[1 week],ESTABLISHEDPATIENT:[T]]

## 2025-05-08 NOTE — PROGRESS NOTE ADULT - ASSESSMENT
34 year old female pmhx proteinuria and sickle cell presents to ed with 1 day hx of bilateral shoulder pain cannot lift shoulders.     #Ho sickle cell disease now active crisis   - half normal saline at 100cc/hr avoid overload trend bmp   - ldh retic count noted, trend  - endorses pain control with current regimen, Pain management recs noted  - monitor off antibiotics for now trend wbc   - follows with heme onc at [place], Heme onc IP noted  - sp 1 Units of pRBC 5/7, Hb improved adequately this am    #Ho Proteinuria   - continue home losartan     #Diet regular   #DVT ppx Lovenox   #GI ppx protonix

## 2025-05-08 NOTE — DISCHARGE NOTE NURSING/CASE MANAGEMENT/SOCIAL WORK - PATIENT PORTAL LINK FT
You can access the FollowMyHealth Patient Portal offered by [facility] by registering at the following website: [email or web address]. By joining [employer]’s FollowMyHealth portal, you will also be able to view your health information using other applications (apps) compatible with our system.

## 2025-05-08 NOTE — DISCHARGE NOTE PROVIDER - NSDCMRMEDTOKEN_GEN_ALL_CORE_FT
folic acid 1 mg oral tablet: 1 tab(s) orally once a day  losartan 50 mg oral tablet: 1 tab(s) orally once a day   folic acid 1 mg oral tablet: 1 tab(s) orally once a day  losartan 50 mg oral tablet: 1 tab(s) orally once a day  meclizine 12.5 mg oral tablet: 1 tab(s) orally 3 times a day as needed for  nausea/vomiting  oxyCODONE 5 mg oral tablet: 1 tab(s) orally every 6 hours as needed for sickle pain crisis MDD: 4  polyethylene glycol 3350 oral powder for reconstitution: 17 gram(s) orally once a day as needed for Constipation  senna leaf extract oral tablet: 2 tab(s) orally once a day (at bedtime)

## 2025-05-08 NOTE — DISCHARGE NOTE PROVIDER - CARE PROVIDER_API CALL
[facility]  Phone: (   )    -  Fax: (   )    -  Established Patient  Follow Up Time: 1 week

## 2025-05-08 NOTE — CHART NOTE - NSCHARTNOTEFT_GEN_A_CORE
Resident signed off blood from Blood Bank. After nurse received blood products, patient refused transfusion because she is feeling nauseous.   99/64   temp 98.4  Ox 94 on 3L   Hr 76  Patient is AOx4 with insight into why she came into the hospital. She currently is denying a transfusion because she does not feel well with no further articulation. Patient understands the risk of not getting a transfusion and was informed that their Hgb is 6.5 and could be even less since morning labs, further contributing her not feeling well.
Patient's O2 sat is 88% on room air at rest. Patient's 02 sat improves to 96% with 2 LPM 02 nasal cannula at rest. Patient is requiring home oxygen as a complication of her Sickle cell disease, currently admitted for active crisis

## 2025-05-08 NOTE — PROGRESS NOTE ADULT - ATTENDING COMMENTS
Medicine Attending Attestation  Patient was seen and examined with medicine team.  Nursing records reviewed. I agree with the resident/PA/NP's note including past medical history, home medications, social history, allergies, surgical history, family history, and review of system. I have reviewed relevant vitals, laboratory values, imaging studies, and microbiology.   - Above resident's note was edited by me  - No acute complaints  - patient feels better; wants to go home  - stable to dc patient with oxycodone x 7 days.  - Patient also requiring Oxygen supplementation given Oxygen level drops to 88% on ambulation. CM was updated it.  - rest of A/P as per detailed housestaff note above except above modifications    Total time spent to complete patient's bedside assessment, reviewed medical chart, discussed medical plan of care with team was 45 minutes with >50% of time spent face to face with patient, discussion with patient/family and/or coordination of care.
Patient was seen and examined with fellow during inpatient hematology oncology rounds.  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  Patient is not on hydroxyurea. Continues on folic acid supplements.  Reported being followed by hematology with  Dr. Wesley Jimenez from [facility].  Denied history of acute chest syndrome.  Denied history of iron overload.  Reported no recurrent hospitalizations.  However, she was seen at [facility] at least twice this year.      Admitted with sickle cell painful crisis, affecting joints.  Afebrile.  Stable labs.  Chest x-ray showed no infiltrates.  Continue monitor labs (send repeat HPLC, iron panel/ferritin), IV fluid hydration, pain management.  Incentive spirometer. Folic acid supplement. Hematology will follow.
Medicine Attending Attestation  Patient was seen and examined with medicine team.  Nursing records reviewed. I agree with the resident/PA/NP's note including past medical history, home medications, social history, allergies, surgical history, family history, and review of system. I have reviewed relevant vitals, laboratory values, imaging studies, and microbiology.   - Above resident's note was edited by me  - Reports nausea/vomiting  - On antiemesis medication. Will bolus with 1L and continue maintenance  - Hgb 6.5; will transfuse  - will supplement electrolytes  - Pain management on the case for patient's sickle cell crisis with acute pain  - rest of A/P as per detailed housestaff note above except above modifications    Total time spent to complete patient's bedside assessment, reviewed medical chart, discussed medical plan of care with team was 45 minutes with >50% of time spent face to face with patient, discussion with patient/family and/or coordination of care.

## 2025-05-08 NOTE — DISCHARGE NOTE PROVIDER - HOSPITAL COURSE
34 year old female pmhx proteinuria and sickle cell presents to ed with 1 day hx of bilateral shoulder pain cannot lift shoulders.     #Ho sickle cell disease now active crisis   - half normal saline at 100cc/hr avoid overload trend bmp   - ldh retic count noted, trend  - endorses pain control with current regimen, Pain management recs noted  - monitor off antibiotics for now trend wbc   - follows with heme onc at [place], Heme onc IP noted,   - sp 1 Units of pRBC 5/7, Hb improved adequately this am    #Ho Proteinuria   - continue home losartan on dc    Pt anticipated for discharge this am. Pt will need to follow up, Wesley Jimenez of [place], for further management. Pt discharge approved and discussed with Dr. Juarez.    34 year old female pmhx proteinuria and sickle cell presents to ed with 1 day hx of bilateral shoulder pain cannot lift shoulders.     #Ho sickle cell disease now active crisis   - half normal saline at 100cc/hr avoid overload trend bmp   - ldh retic count noted, trend  - endorses pain control with current regimen, Pain management recs noted, discharge on pain meds determined by the attending  - monitor off antibiotics for now trend wbc   - follows with heme onc at [place], Heme onc IP noted,   - sp 1 Units of pRBC 5/7, Hb improved adequately this am    #Ho Proteinuria   - continue home losartan on dc    Pt anticipated for discharge this am. Pt will need to follow up, Wesley Jimenez of [place], for further management. Pt discharge approved and discussed with Dr. Juarez.

## 2025-05-08 NOTE — PROGRESS NOTE ADULT - SUBJECTIVE AND OBJECTIVE BOX
24H events:    Patient is a 29y old Female who presents with a chief complaint of   Primary diagnosis of Sickle cell crisis        Today is 2d of hospitalization. This morning patient was seen and examined at bedside, resting comfortably in bed.      Pt got the unit of blood yesterday, Hb improved adequately.       PAST MEDICAL & SURGICAL HISTORY  Sickle cell anemia    S/P cholecystectomy      SOCIAL HISTORY:  Social History:      ALLERGIES:  No Known Allergies    MEDICATIONS:  STANDING MEDICATIONS  acetaminophen     Tablet .. 650 milliGRAM(s) Oral every 8 hours  chlorhexidine 2% Cloths 1 Application(s) Topical daily  enoxaparin Injectable 40 milliGRAM(s) SubCutaneous every 24 hours  folic acid 1 milliGRAM(s) Oral daily  ketorolac   Injectable 15 milliGRAM(s) IV Push every 6 hours  losartan 50 milliGRAM(s) Oral daily  magnesium sulfate  IVPB 2 Gram(s) IV Intermittent once  pantoprazole    Tablet 40 milliGRAM(s) Oral before breakfast  senna 2 Tablet(s) Oral at bedtime  sodium chloride 0.45%. 1000 milliLiter(s) IV Continuous <Continuous>    PRN MEDICATIONS  meclizine 12.5 milliGRAM(s) Oral three times a day PRN  morphine  - Injectable 4 milliGRAM(s) IV Push every 4 hours PRN  polyethylene glycol 3350 17 Gram(s) Oral daily PRN    VITALS:   T(F): 98  HR: 85  BP: 112/74  RR: 18  SpO2: 96%    PHYSICAL EXAM:  GENERAL: NAD, lying in bed comfortably  HEAD:  Atraumatic, Normocephalic  EYES: EOMI, PERRLA, conjunctiva and sclera clear  ENT: Moist mucous membranes  NECK: Supple, No JVD  CHEST/LUNG: Clear to auscultation bilaterally; No rales, rhonchi, wheezing, or rubs. Unlabored respirations  HEART: Regular rate and rhythm; No murmurs, rubs, or gallops  ABDOMEN: BSx4; Soft, nontender, nondistended  EXTREMITIES:  2+ Peripheral Pulses, brisk capillary refill. No clubbing, cyanosis, or edema  NERVOUS SYSTEM:  A&Ox3, no focal deficits   SKIN: No rashes or lesions      LABS:                        7.6    13.85 )-----------( 472      ( 08 May 2025 07:49 )             20.8     05-08    139  |  105  |  <3[L]  ----------------------------<  77  4.2   |  26  |  <0.5[L]    Ca    8.7      08 May 2025 07:49  Phos  4.1     05-08  Mg     1.7     05-08    TPro  6.4  /  Alb  3.5  /  TBili  6.0[H]  /  DBili  x   /  AST  31  /  ALT  13  /  AlkPhos  51  05-08    PT/INR - ( 08 May 2025 07:49 )   PT: 14.20 sec;   INR: 1.20 ratio         PTT - ( 08 May 2025 07:49 )  PTT:31.4 sec  Urinalysis Basic - ( 08 May 2025 07:49 )    Color: x / Appearance: x / SG: x / pH: x  Gluc: 77 mg/dL / Ketone: x  / Bili: x / Urobili: x   Blood: x / Protein: x / Nitrite: x   Leuk Esterase: x / RBC: x / WBC x   Sq Epi: x / Non Sq Epi: x / Bacteria: x

## 2025-05-08 NOTE — DISCHARGE NOTE NURSING/CASE MANAGEMENT/SOCIAL WORK - FINANCIAL ASSISTANCE
[facility] provides services at a reduced cost to those who are determined to be eligible through [facility]’s financial assistance program. Information regarding [facility]’s financial assistance program can be found by going to [email or web address] or by calling [phone].

## 2025-05-08 NOTE — DISCHARGE NOTE PROVIDER - NSDCCPCAREPLAN_GEN_ALL_CORE_FT
PRINCIPAL DISCHARGE DIAGNOSIS  Diagnosis: Sickle cell crisis  Assessment and Plan of Treatment: You came to the emergency department with sudden shoulder pain on both sides and difficulty lifting your arms. You have a history of sickle cell disease, which is currently causing a pain crisis. To help, your care team has given you fluids and watching your lab results closely, including your blood counts and markers like LDH. You received one unit of packed red blood cells, which improved your hemoglobin level. Your pain is being managed with medications, and you do not currently need antibiotics. You also have proteinuria (protein in your urine) and should continue taking your usual dose of losartan. It’s important to stay in touch with your hematologist at [place] for ongoing care of your sickle cell disease. Please take your medications as listed on your discharge medrec.     PRINCIPAL DISCHARGE DIAGNOSIS  Diagnosis: Sickle cell crisis  Assessment and Plan of Treatment: You came to the emergency department with sudden shoulder pain on both sides and difficulty lifting your arms. You have a history of sickle cell disease, which is currently causing a pain crisis. To help, your care team has given you fluids and watching your lab results closely, including your blood counts and markers like LDH. You received one unit of packed red blood cells, which improved your hemoglobin level. Your pain is being managed with medications, and you do not currently need antibiotics. You also have proteinuria (protein in your urine) and should continue taking your usual dose of losartan. It’s important to stay in touch with your hematologist at [place] for ongoing care of your sickle cell disease. Please take your medications as listed on your discharge medrec. We've given you a prescription for Pain med which [facility] at [facility].

## 2025-05-09 LAB — FERRITIN SERPL-MCNC: 72 NG/ML — SIGNIFICANT CHANGE UP (ref 15–150)

## 2025-05-13 DIAGNOSIS — M25.512 PAIN IN LEFT SHOULDER: ICD-10-CM

## 2025-05-13 DIAGNOSIS — M25.511 PAIN IN RIGHT SHOULDER: ICD-10-CM

## 2025-05-13 DIAGNOSIS — D57.00 HB-SS DISEASE WITH CRISIS, UNSPECIFIED: ICD-10-CM

## 2025-05-13 DIAGNOSIS — R80.9 PROTEINURIA, UNSPECIFIED: ICD-10-CM

## 2025-07-01 NOTE — DISCHARGE NOTE NURSING/CASE MANAGEMENT/SOCIAL WORK - PATIENT PORTAL LINK FT
You can access the FollowMyHealth Patient Portal offered by [facility] by registering at the following website: [email or web address]. By joining [employer]’s FollowMyHealth portal, you will also be able to view your health information using other applications (apps) compatible with our system. OBSERVATION

## 2025-08-07 ENCOUNTER — OUTPATIENT (OUTPATIENT)
Dept: OUTPATIENT SERVICES | Facility: HOSPITAL | Age: 30
LOS: 1 days | End: 2025-08-07
Payer: COMMERCIAL

## 2025-08-07 DIAGNOSIS — Z90.49 ACQUIRED ABSENCE OF OTHER SPECIFIED PARTS OF DIGESTIVE TRACT: Chronic | ICD-10-CM

## 2025-08-07 DIAGNOSIS — R80.9 PROTEINURIA, UNSPECIFIED: ICD-10-CM

## 2025-08-07 DIAGNOSIS — Z00.8 ENCOUNTER FOR OTHER GENERAL EXAMINATION: ICD-10-CM

## 2025-08-07 PROCEDURE — 76770 US EXAM ABDO BACK WALL COMP: CPT | Mod: 26

## 2025-08-07 PROCEDURE — 76770 US EXAM ABDO BACK WALL COMP: CPT

## 2025-08-08 DIAGNOSIS — R80.9 PROTEINURIA, UNSPECIFIED: ICD-10-CM
